# Patient Record
Sex: FEMALE | Race: BLACK OR AFRICAN AMERICAN | Employment: UNEMPLOYED | URBAN - METROPOLITAN AREA
[De-identification: names, ages, dates, MRNs, and addresses within clinical notes are randomized per-mention and may not be internally consistent; named-entity substitution may affect disease eponyms.]

---

## 2018-09-25 ENCOUNTER — HOSPITAL ENCOUNTER (EMERGENCY)
Age: 28
Discharge: HOME | End: 2018-09-25
Attending: EMERGENCY MEDICINE
Payer: COMMERCIAL

## 2018-09-25 VITALS
HEIGHT: 67 IN | OXYGEN SATURATION: 100 % | HEART RATE: 88 BPM | DIASTOLIC BLOOD PRESSURE: 98 MMHG | RESPIRATION RATE: 16 BRPM | WEIGHT: 180 LBS | BODY MASS INDEX: 28.25 KG/M2 | TEMPERATURE: 98 F | SYSTOLIC BLOOD PRESSURE: 145 MMHG

## 2018-09-25 DIAGNOSIS — N39.0 URINARY TRACT INFECTION WITHOUT HEMATURIA, SITE UNSPECIFIED: Primary | ICD-10-CM

## 2018-09-25 LAB
BACTERIA: ABNORMAL /HPF
BILIRUBIN URINE: NEGATIVE
BLOOD, URINE: ABNORMAL
CLARITY: ABNORMAL
COLOR: YELLOW
EPITHELIAL CELLS, UA: ABNORMAL /HPF
GLUCOSE URINE: NEGATIVE MG/DL
HCG(URINE) PREGNANCY TEST: NEGATIVE
KETONES, URINE: ABNORMAL MG/DL
LEUKOCYTE ESTERASE, URINE: ABNORMAL
NITRITE, URINE: NEGATIVE
PH UA: 6.5 (ref 5–9)
PROTEIN UA: 30 MG/DL
RBC UA: ABNORMAL /HPF (ref 0–2)
SPECIFIC GRAVITY UA: 1.02 (ref 1–1.03)
UROBILINOGEN, URINE: 1 E.U./DL
WBC UA: >20 /HPF (ref 0–5)

## 2018-09-25 PROCEDURE — 99283 EMERGENCY DEPT VISIT LOW MDM: CPT

## 2018-09-25 PROCEDURE — 81001 URINALYSIS AUTO W/SCOPE: CPT

## 2018-09-25 PROCEDURE — 81025 URINE PREGNANCY TEST: CPT

## 2018-09-25 PROCEDURE — G0382 LEV 3 HOSP TYPE B ED VISIT: HCPCS

## 2018-09-25 RX ORDER — NITROFURANTOIN 25; 75 MG/1; MG/1
100 CAPSULE ORAL 2 TIMES DAILY
Qty: 20 CAPSULE | Refills: 0 | Status: SHIPPED | OUTPATIENT
Start: 2018-09-25 | End: 2018-10-05

## 2018-09-25 RX ORDER — PHENAZOPYRIDINE HYDROCHLORIDE 100 MG/1
100 TABLET, FILM COATED ORAL 3 TIMES DAILY PRN
Qty: 6 TABLET | Refills: 0 | Status: SHIPPED | OUTPATIENT
Start: 2018-09-25 | End: 2018-09-28

## 2018-09-25 ASSESSMENT — ENCOUNTER SYMPTOMS
ABDOMINAL DISTENTION: 0
WHEEZING: 0
SINUS PRESSURE: 0
EYE PAIN: 0
NAUSEA: 0
SORE THROAT: 0
BACK PAIN: 0
DIARRHEA: 0
EYE REDNESS: 0
VOMITING: 0
EYE DISCHARGE: 0
SHORTNESS OF BREATH: 0
COUGH: 0

## 2018-09-26 ENCOUNTER — HOSPITAL ENCOUNTER (EMERGENCY)
Age: 28
Discharge: HOME | End: 2018-09-26
Payer: COMMERCIAL

## 2018-09-26 VITALS
DIASTOLIC BLOOD PRESSURE: 93 MMHG | RESPIRATION RATE: 16 BRPM | HEART RATE: 83 BPM | OXYGEN SATURATION: 100 % | SYSTOLIC BLOOD PRESSURE: 143 MMHG | WEIGHT: 180 LBS | BODY MASS INDEX: 28.19 KG/M2 | TEMPERATURE: 97.8 F

## 2018-09-26 DIAGNOSIS — B96.89 BACTERIAL VAGINOSIS: Primary | ICD-10-CM

## 2018-09-26 DIAGNOSIS — Z20.2 STD EXPOSURE: ICD-10-CM

## 2018-09-26 DIAGNOSIS — N76.0 BACTERIAL VAGINOSIS: Primary | ICD-10-CM

## 2018-09-26 LAB
BACTERIA: ABNORMAL /HPF
BILIRUBIN URINE: NEGATIVE
BLOOD, URINE: NEGATIVE
CHP ED QC CHECK: YES
CLARITY: CLEAR
CLUE CELLS: ABNORMAL
COLOR: YELLOW
EPITHELIAL CELLS, UA: ABNORMAL /HPF
GLUCOSE URINE: NEGATIVE MG/DL
KETONES, URINE: NEGATIVE MG/DL
LEUKOCYTE ESTERASE, URINE: ABNORMAL
NITRITE, URINE: NEGATIVE
PH UA: 6.5 (ref 5–9)
PREGNANCY TEST URINE, POC: NORMAL
PROTEIN UA: NEGATIVE MG/DL
RBC UA: ABNORMAL /HPF (ref 0–2)
SOURCE WET PREP: ABNORMAL
SPECIFIC GRAVITY UA: 1.01 (ref 1–1.03)
TRICHOMONAS PREP: ABNORMAL
UROBILINOGEN, URINE: 0.2 E.U./DL
WBC UA: ABNORMAL /HPF (ref 0–5)
YEAST WET PREP: ABNORMAL

## 2018-09-26 PROCEDURE — 96372 THER/PROPH/DIAG INJ SC/IM: CPT

## 2018-09-26 PROCEDURE — 87210 SMEAR WET MOUNT SALINE/INK: CPT

## 2018-09-26 PROCEDURE — 6370000000 HC RX 637 (ALT 250 FOR IP): Performed by: PHYSICIAN ASSISTANT

## 2018-09-26 PROCEDURE — 99283 EMERGENCY DEPT VISIT LOW MDM: CPT

## 2018-09-26 PROCEDURE — 87591 N.GONORRHOEAE DNA AMP PROB: CPT

## 2018-09-26 PROCEDURE — 6360000002 HC RX W HCPCS: Performed by: PHYSICIAN ASSISTANT

## 2018-09-26 PROCEDURE — 87491 CHLMYD TRACH DNA AMP PROBE: CPT

## 2018-09-26 PROCEDURE — 81001 URINALYSIS AUTO W/SCOPE: CPT

## 2018-09-26 RX ORDER — METRONIDAZOLE 7.5 MG/G
GEL VAGINAL
Qty: 70 G | Refills: 0 | Status: SHIPPED | OUTPATIENT
Start: 2018-09-26 | End: 2018-10-03

## 2018-09-26 RX ORDER — AZITHROMYCIN 250 MG/1
1000 TABLET, FILM COATED ORAL ONCE
Status: COMPLETED | OUTPATIENT
Start: 2018-09-26 | End: 2018-09-26

## 2018-09-26 RX ORDER — CEFTRIAXONE SODIUM 250 MG/1
250 INJECTION, POWDER, FOR SOLUTION INTRAMUSCULAR; INTRAVENOUS ONCE
Status: COMPLETED | OUTPATIENT
Start: 2018-09-26 | End: 2018-09-26

## 2018-09-26 RX ADMIN — CEFTRIAXONE 250 MG: 250 INJECTION, POWDER, FOR SOLUTION INTRAMUSCULAR; INTRAVENOUS at 17:32

## 2018-09-26 RX ADMIN — AZITHROMYCIN MONOHYDRATE 1000 MG: 250 TABLET ORAL at 17:29

## 2018-09-26 NOTE — ED PROVIDER NOTES
[facility]     Department of Emergency Medicine   ED  Provider Note  Admit Date/RoomTime: 9/26/2018  2:54 PM  ED Room: 19/19  Chief Complaint      Exposure to STD (pt went to urgent care yesterday and diagnosed with uti, pt states her partner called and told her he has a std today. )    History of Present Illness   Source of history provided by:  patient. History/Exam Limitations: none. Elkin Muse is a 32 y.o. old female who has a past medical Hx of:   Past Medical History:   Diagnosis Date    Abnormal Pap smear     Complication of anesthesia 2011    States hhas spinal curvature and had one-sided Epidural    Herpes simplex without mention of complication     presents to the emergency department by private vehicle, for STD exposure. Patient states that her boyfriend tested positive for currently on chlamydia. She states that she is here for STD testing. She denies any vaginal discharge. She states that she was having some dysuria for the past few days and did go to urgent care yesterday. She was diagnosed with a urinary tract infection insert on Macrobid. She has been taking them. She denies any fevers or chills. She denies any vomiting or diarrhea. No abdominal pain. ROS    Pertinent positives and negatives are stated within HPI, all other systems reviewed and are negative. History reviewed. No pertinent surgical history. Social History:  reports that she has been smoking Cigarettes. She has been smoking about 0.50 packs per day. She has never used smokeless tobacco. She reports that she drinks alcohol. She reports that she uses drugs, including Cocaine. Family History: family history includes Diabetes in her maternal grandfather; Hearing Loss in her sister; Heart Disease in her sister; Heart Surgery in her sister; Hypertension in her sister. Allergies: Patient has no known allergies.     Physical Exam           ED Triage Vitals [09/26/18 1449]   BP Temp Temp Source Pulse Resp SpO2 Height Weight   (!) 143/93 97.8 °F (36.6 °C) Oral 83 16 100 % -- 180 lb (81.6 kg)      Oxygen Saturation Interpretation: Normal.    General Appearance/Constitutional:  Alert, development consistent with age, NAD. HEENT:  NC/NT. PERRLA. Airway patent. Neck:  Supple. No lymphadenopathy. Respiratory:  Clear to auscultation and breath sounds equal.  CV:  Regular rate and rhythm. GI:  General Appearance: normal.       Bowel sounds: normal bowel sounds. Distension:  None. Tenderness: No abdominal tenderness, no rebound tenderness, no guarding. Liver: non-tender. Spleen:  non-tender. Pulsatile Mass: absent. Hernia:  no inguinal or femoral hernias noted. Back: CVA Tenderness: None b/l.  : (chaperone present during examination, jaqueline Weaver, present for exam). External Genitalia: General appearance; normal, Hair distribution; normal, Lesions absent. Urethral Meatus: Size normal, Location normal, Lesions absent, Prolapse absent. Vagina: discharge, minimal, white. Cervix: normal appearing cervix without discharge or lesions. No CMT. Uterus:  normal size, contour, position, consistency, mobility, non-tender. Adenexa: no tenderness bilaterally. Anus/Perineum:  normal.  Integument:  Normal turgor. Warm, dry, without visible rash, unless noted elsewhere. Lymphatics: No lymphangitis or adenopathy noted. Neurological:  Orientation age-appropriate. Motor functions intact.     Lab / Imaging Results   (All laboratory and radiology results have been personally reviewed by myself)  Labs:  Results for orders placed or performed during the hospital encounter of 09/26/18   Wet prep, genital   Result Value Ref Range    Trichomonas Prep None Seen     Yeast, Wet Prep None Seen     Clue Cells, Wet Prep 2+ (A)     Source Wet Prep VAGINAL    Urinalysis   Result Value Ref Range    Color, UA Yellow Straw/Yellow    Clarity, UA Clear Clear Glucose, Ur Negative Negative mg/dL    Bilirubin Urine Negative Negative    Ketones, Urine Negative Negative mg/dL    Specific Gravity, UA 1.015 1.005 - 1.030    Blood, Urine Negative Negative    pH, UA 6.5 5.0 - 9.0    Protein, UA Negative Negative mg/dL    Urobilinogen, Urine 0.2 <2.0 E.U./dL    Nitrite, Urine Negative Negative    Leukocyte Esterase, Urine SMALL (A) Negative   Microscopic Urinalysis   Result Value Ref Range    WBC, UA 10-20 (A) 0 - 5 /HPF    RBC, UA NONE 0 - 2 /HPF    Epi Cells FEW /HPF    Bacteria, UA FEW (A) /HPF   POC Pregnancy Urine Qual   Result Value Ref Range    Preg Test, Ur neg     QC OK? yes      Imaging: All Radiology results interpreted by Radiologist unless otherwise noted. No orders to display     ED Course / Medical Decision Making     Medications   cefTRIAXone (ROCEPHIN) injection 250 mg (not administered)   azithromycin (ZITHROMAX) tablet 1,000 mg (not administered)        Re-examination:  9/26/18       Time: 1700    Patients symptoms show no change. Updated on results. Consults:   None    Procedures:   none    MDM:   Patient will be treated for STDs at this time as well as bacterial vaginosis. Advised to follow-up with ObGyn and return to the ER for any worsening symptoms. Counseling: The emergency provider has spoken with the patient and discussed todays results, in addition to providing specific details for the plan of care and counseling regarding the diagnosis and prognosis. Questions are answered at this time and they are agreeable with the plan . Assessment      1. Bacterial vaginosis    2. STD exposure      Plan   Discharge to home  Patient condition is good    New Medications     New Prescriptions    METRONIDAZOLE (METROGEL VAGINAL) 0.75 % VAGINAL GEL    Place vaginally 2 times daily for 7 days. Electronically signed by CEM Sheffield   DD: 9/26/18  **This report was transcribed using voice recognition software.  Every effort was made to ensure accuracy; however, inadvertent computerized transcription errors may be present.   END OF ED PROVIDER NOTE       Cassia Ordoñez [place]  09/26/18 2315

## 2018-10-01 LAB
CHLAMYDIA TRACHOMATIS AMPLIFIED DET: ABNORMAL
ORGANISM: ABNORMAL

## 2019-07-20 ENCOUNTER — APPOINTMENT (OUTPATIENT)
Dept: ULTRASOUND IMAGING | Age: 29
End: 2019-07-20
Payer: COMMERCIAL

## 2019-07-20 ENCOUNTER — HOSPITAL ENCOUNTER (OUTPATIENT)
Age: 29
Discharge: HOME | End: 2019-07-20
Attending: OBSTETRICS & GYNECOLOGY | Admitting: OBSTETRICS & GYNECOLOGY
Payer: COMMERCIAL

## 2019-07-20 VITALS
TEMPERATURE: 97.9 F | HEIGHT: 67 IN | HEART RATE: 92 BPM | RESPIRATION RATE: 18 BRPM | BODY MASS INDEX: 30.45 KG/M2 | SYSTOLIC BLOOD PRESSURE: 123 MMHG | WEIGHT: 194 LBS | DIASTOLIC BLOOD PRESSURE: 76 MMHG

## 2019-07-20 PROBLEM — Z3A.34 34 WEEKS GESTATION OF PREGNANCY: Status: ACTIVE | Noted: 2019-07-20

## 2019-07-20 LAB
ALBUMIN SERPL-MCNC: 3.8 G/DL (ref 3.5–5.2)
ALP BLD-CCNC: 86 U/L (ref 35–104)
ALT SERPL-CCNC: 6 U/L (ref 0–32)
AMORPHOUS: ABNORMAL
AMPHETAMINE SCREEN, URINE: NOT DETECTED
ANION GAP SERPL CALCULATED.3IONS-SCNC: 11 MMOL/L (ref 7–16)
AST SERPL-CCNC: 13 U/L (ref 0–31)
BACTERIA: ABNORMAL /HPF
BARBITURATE SCREEN URINE: NOT DETECTED
BASOPHILS ABSOLUTE: 0.01 E9/L (ref 0–0.2)
BASOPHILS RELATIVE PERCENT: 0.1 % (ref 0–2)
BENZODIAZEPINE SCREEN, URINE: NOT DETECTED
BILIRUB SERPL-MCNC: 0.4 MG/DL (ref 0–1.2)
BILIRUBIN URINE: NEGATIVE
BLOOD, URINE: NEGATIVE
BUN BLDV-MCNC: 6 MG/DL (ref 6–20)
CALCIUM SERPL-MCNC: 8.9 MG/DL (ref 8.6–10.2)
CANNABINOID SCREEN URINE: NOT DETECTED
CHLORIDE BLD-SCNC: 104 MMOL/L (ref 98–107)
CLARITY: CLEAR
CO2: 23 MMOL/L (ref 22–29)
COCAINE METABOLITE SCREEN URINE: NOT DETECTED
COLOR: YELLOW
CREAT SERPL-MCNC: 0.6 MG/DL (ref 0.5–1)
EOSINOPHILS ABSOLUTE: 0.2 E9/L (ref 0.05–0.5)
EOSINOPHILS RELATIVE PERCENT: 2.3 % (ref 0–6)
EPITHELIAL CELLS, UA: ABNORMAL /HPF
GFR AFRICAN AMERICAN: >60
GFR NON-AFRICAN AMERICAN: >60 ML/MIN/1.73
GLUCOSE BLD-MCNC: 83 MG/DL (ref 74–99)
GLUCOSE URINE: 100 MG/DL
HCT VFR BLD CALC: 30.9 % (ref 34–48)
HEMOGLOBIN: 10.1 G/DL (ref 11.5–15.5)
IMMATURE GRANULOCYTES #: 0.04 E9/L
IMMATURE GRANULOCYTES %: 0.5 % (ref 0–5)
KETONES, URINE: NEGATIVE MG/DL
LEUKOCYTE ESTERASE, URINE: ABNORMAL
LYMPHOCYTES ABSOLUTE: 1.41 E9/L (ref 1.5–4)
LYMPHOCYTES RELATIVE PERCENT: 16.3 % (ref 20–42)
MCH RBC QN AUTO: 29.9 PG (ref 26–35)
MCHC RBC AUTO-ENTMCNC: 32.7 % (ref 32–34.5)
MCV RBC AUTO: 91.4 FL (ref 80–99.9)
METHADONE SCREEN, URINE: NOT DETECTED
MONOCYTES ABSOLUTE: 0.72 E9/L (ref 0.1–0.95)
MONOCYTES RELATIVE PERCENT: 8.3 % (ref 2–12)
NEUTROPHILS ABSOLUTE: 6.27 E9/L (ref 1.8–7.3)
NEUTROPHILS RELATIVE PERCENT: 72.5 % (ref 43–80)
NITRITE, URINE: NEGATIVE
OPIATE SCREEN URINE: NOT DETECTED
PDW BLD-RTO: 12.9 FL (ref 11.5–15)
PH UA: 7 (ref 5–9)
PHENCYCLIDINE SCREEN URINE: NOT DETECTED
PLATELET # BLD: 281 E9/L (ref 130–450)
PMV BLD AUTO: 10.2 FL (ref 7–12)
POTASSIUM SERPL-SCNC: 4.1 MMOL/L (ref 3.5–5)
PROPOXYPHENE SCREEN: NOT DETECTED
PROTEIN UA: NEGATIVE MG/DL
RBC # BLD: 3.38 E12/L (ref 3.5–5.5)
RBC UA: ABNORMAL /HPF (ref 0–2)
SODIUM BLD-SCNC: 138 MMOL/L (ref 132–146)
SPECIFIC GRAVITY UA: 1.01 (ref 1–1.03)
TOTAL PROTEIN: 6.7 G/DL (ref 6.4–8.3)
TRICHOMONAS: ABNORMAL /HPF
UROBILINOGEN, URINE: 0.2 E.U./DL
WBC # BLD: 8.7 E9/L (ref 4.5–11.5)
WBC UA: ABNORMAL /HPF (ref 0–5)

## 2019-07-20 PROCEDURE — 76805 OB US >/= 14 WKS SNGL FETUS: CPT

## 2019-07-20 PROCEDURE — 85025 COMPLETE CBC W/AUTO DIFF WBC: CPT

## 2019-07-20 PROCEDURE — 80053 COMPREHEN METABOLIC PANEL: CPT

## 2019-07-20 PROCEDURE — 81001 URINALYSIS AUTO W/SCOPE: CPT

## 2019-07-20 PROCEDURE — 76775 US EXAM ABDO BACK WALL LIM: CPT

## 2019-07-20 PROCEDURE — 36415 COLL VENOUS BLD VENIPUNCTURE: CPT

## 2019-07-20 PROCEDURE — 2580000003 HC RX 258: Performed by: OBSTETRICS & GYNECOLOGY

## 2019-07-20 PROCEDURE — 6360000002 HC RX W HCPCS: Performed by: OBSTETRICS & GYNECOLOGY

## 2019-07-20 PROCEDURE — 87088 URINE BACTERIA CULTURE: CPT

## 2019-07-20 PROCEDURE — 99201 HC NEW PT, OUTPT VISIT LEVEL 1: CPT

## 2019-07-20 PROCEDURE — 80307 DRUG TEST PRSMV CHEM ANLYZR: CPT

## 2019-07-20 RX ORDER — SODIUM CHLORIDE, SODIUM LACTATE, POTASSIUM CHLORIDE, CALCIUM CHLORIDE 600; 310; 30; 20 MG/100ML; MG/100ML; MG/100ML; MG/100ML
INJECTION, SOLUTION INTRAVENOUS CONTINUOUS
Status: DISCONTINUED | OUTPATIENT
Start: 2019-07-20 | End: 2019-07-21 | Stop reason: HOSPADM

## 2019-07-20 RX ORDER — TERBUTALINE SULFATE 1 MG/ML
0.25 INJECTION, SOLUTION SUBCUTANEOUS ONCE
Status: COMPLETED | OUTPATIENT
Start: 2019-07-20 | End: 2019-07-20

## 2019-07-20 RX ORDER — SODIUM CHLORIDE, SODIUM LACTATE, POTASSIUM CHLORIDE, AND CALCIUM CHLORIDE .6; .31; .03; .02 G/100ML; G/100ML; G/100ML; G/100ML
500 INJECTION, SOLUTION INTRAVENOUS ONCE
Status: COMPLETED | OUTPATIENT
Start: 2019-07-20 | End: 2019-07-20

## 2019-07-20 RX ORDER — CEFOXITIN SODIUM 1 G/50ML
1 INJECTION, SOLUTION INTRAVENOUS EVERY 6 HOURS SCHEDULED
Status: DISCONTINUED | OUTPATIENT
Start: 2019-07-20 | End: 2019-07-21 | Stop reason: HOSPADM

## 2019-07-20 RX ADMIN — CEFOXITIN SODIUM 1 G: 1 INJECTION, SOLUTION INTRAVENOUS at 19:49

## 2019-07-20 RX ADMIN — TERBUTALINE SULFATE 0.25 MG: 1 INJECTION, SOLUTION SUBCUTANEOUS at 19:49

## 2019-07-20 RX ADMIN — SODIUM CHLORIDE, POTASSIUM CHLORIDE, SODIUM LACTATE AND CALCIUM CHLORIDE 500 ML: 600; 310; 30; 20 INJECTION, SOLUTION INTRAVENOUS at 17:45

## 2019-07-20 ASSESSMENT — PAIN DESCRIPTION - DESCRIPTORS: DESCRIPTORS: CRAMPING;DISCOMFORT;PENETRATING

## 2019-07-21 ENCOUNTER — HOSPITAL ENCOUNTER (OUTPATIENT)
Age: 29
Discharge: HOME | End: 2019-07-21
Attending: OBSTETRICS & GYNECOLOGY | Admitting: OBSTETRICS & GYNECOLOGY
Payer: COMMERCIAL

## 2019-07-21 VITALS
SYSTOLIC BLOOD PRESSURE: 129 MMHG | RESPIRATION RATE: 18 BRPM | HEART RATE: 90 BPM | TEMPERATURE: 97.5 F | DIASTOLIC BLOOD PRESSURE: 81 MMHG

## 2019-07-21 PROCEDURE — 99211 OFF/OP EST MAY X REQ PHY/QHP: CPT

## 2019-07-21 NOTE — H&P
complication      Past Surgical History:    No past surgical history on file. Allergies:  Patient has no known allergies.   Social History:    Social History     Socioeconomic History    Marital status: Single     Spouse name: Not on file    Number of children: Not on file    Years of education: Not on file    Highest education level: Not on file   Occupational History    Not on file   Social Needs    Financial resource strain: Not on file    Food insecurity:     Worry: Not on file     Inability: Not on file    Transportation needs:     Medical: Not on file     Non-medical: Not on file   Tobacco Use    Smoking status: Current Every Day Smoker     Packs/day: 0.25     Years: 4.00     Pack years: 1.00     Types: Cigarettes    Smokeless tobacco: Never Used   Substance and Sexual Activity    Alcohol use: Not Currently     Comment: rare    Drug use: Yes     Types: Marijuana    Sexual activity: Yes   Lifestyle    Physical activity:     Days per week: Not on file     Minutes per session: Not on file    Stress: Not on file   Relationships    Social connections:     Talks on phone: Not on file     Gets together: Not on file     Attends Jewish service: Not on file     Active member of club or organization: Not on file     Attends meetings of clubs or organizations: Not on file     Relationship status: Not on file    Intimate partner violence:     Fear of current or ex partner: Not on file     Emotionally abused: Not on file     Physically abused: Not on file     Forced sexual activity: Not on file   Other Topics Concern    Not on file   Social History Narrative    Not on file     Family History:       Problem Relation Age of Onset    Hearing Loss Sister     Hypertension Sister     Heart Disease Sister     Heart Surgery Sister     Diabetes Maternal Grandfather      Medications Prior to Admission:  Medications Prior to Admission: Prenatal MV-Min-Fe Fum-FA-DHA (PRENATAL 1 PO), Take by mouth    REVIEW OF

## 2019-07-21 NOTE — PROGRESS NOTES
Discharged to home undelivered in stable condition with copy of instructions-verbalized understanding. Instructed to followup in office on Wed. 7-24-19 to see Edu Sheth and to call office to schedule appointment. S&S labor reviewed -verbalized understanding. Discharged to home undelivered in stable condition.

## 2019-07-21 NOTE — PROGRESS NOTES
Dr. Zuleyka Kruse called with patient update and lab and u/s results. Patient ok to be discharged to home.

## 2019-07-21 NOTE — PROGRESS NOTES
PO fluids provided. Resting quietly at this time. States contractions are more irregular now and feeling a little better.

## 2019-07-22 LAB — URINE CULTURE, ROUTINE: NORMAL

## 2019-08-18 ENCOUNTER — HOSPITAL ENCOUNTER (INPATIENT)
Age: 29
LOS: 2 days | Discharge: HOME | DRG: 560 | End: 2019-08-20
Attending: OBSTETRICS & GYNECOLOGY | Admitting: OBSTETRICS & GYNECOLOGY
Payer: COMMERCIAL

## 2019-08-18 PROBLEM — Z34.93 NORMAL PREGNANCY IN THIRD TRIMESTER: Status: ACTIVE | Noted: 2019-08-18

## 2019-08-18 LAB
ABO/RH: NORMAL
AMPHETAMINE SCREEN, URINE: NOT DETECTED
ANTIBODY SCREEN: NORMAL
BARBITURATE SCREEN URINE: NOT DETECTED
BASOPHILS ABSOLUTE: 0.01 E9/L (ref 0–0.2)
BASOPHILS RELATIVE PERCENT: 0.1 % (ref 0–2)
BENZODIAZEPINE SCREEN, URINE: NOT DETECTED
CANNABINOID SCREEN URINE: NOT DETECTED
COCAINE METABOLITE SCREEN URINE: NOT DETECTED
EOSINOPHILS ABSOLUTE: 0.16 E9/L (ref 0.05–0.5)
EOSINOPHILS RELATIVE PERCENT: 1.8 % (ref 0–6)
HCT VFR BLD CALC: 30.2 % (ref 34–48)
HEMOGLOBIN: 9.8 G/DL (ref 11.5–15.5)
IMMATURE GRANULOCYTES #: 0.06 E9/L
IMMATURE GRANULOCYTES %: 0.7 % (ref 0–5)
LYMPHOCYTES ABSOLUTE: 1.47 E9/L (ref 1.5–4)
LYMPHOCYTES RELATIVE PERCENT: 16.4 % (ref 20–42)
Lab: NORMAL
MCH RBC QN AUTO: 28.8 PG (ref 26–35)
MCHC RBC AUTO-ENTMCNC: 32.5 % (ref 32–34.5)
MCV RBC AUTO: 88.8 FL (ref 80–99.9)
METHADONE SCREEN, URINE: NOT DETECTED
MONOCYTES ABSOLUTE: 0.57 E9/L (ref 0.1–0.95)
MONOCYTES RELATIVE PERCENT: 6.4 % (ref 2–12)
NEUTROPHILS ABSOLUTE: 6.68 E9/L (ref 1.8–7.3)
NEUTROPHILS RELATIVE PERCENT: 74.6 % (ref 43–80)
OPIATE SCREEN URINE: NOT DETECTED
PDW BLD-RTO: 12.8 FL (ref 11.5–15)
PHENCYCLIDINE SCREEN URINE: NOT DETECTED
PLATELET # BLD: 305 E9/L (ref 130–450)
PMV BLD AUTO: 9.8 FL (ref 7–12)
PROPOXYPHENE SCREEN: NOT DETECTED
RBC # BLD: 3.4 E12/L (ref 3.5–5.5)
WBC # BLD: 9 E9/L (ref 4.5–11.5)

## 2019-08-18 PROCEDURE — 86850 RBC ANTIBODY SCREEN: CPT

## 2019-08-18 PROCEDURE — 36415 COLL VENOUS BLD VENIPUNCTURE: CPT

## 2019-08-18 PROCEDURE — 86900 BLOOD TYPING SEROLOGIC ABO: CPT

## 2019-08-18 PROCEDURE — 2580000003 HC RX 258: Performed by: OBSTETRICS & GYNECOLOGY

## 2019-08-18 PROCEDURE — 80307 DRUG TEST PRSMV CHEM ANLYZR: CPT

## 2019-08-18 PROCEDURE — 86901 BLOOD TYPING SEROLOGIC RH(D): CPT

## 2019-08-18 PROCEDURE — 85025 COMPLETE CBC W/AUTO DIFF WBC: CPT

## 2019-08-18 PROCEDURE — 1220000001 HC SEMI PRIVATE L&D R&B

## 2019-08-18 PROCEDURE — 7200000001 HC VAGINAL DELIVERY

## 2019-08-18 PROCEDURE — 6360000002 HC RX W HCPCS

## 2019-08-18 PROCEDURE — 59050 FETAL MONITOR W/REPORT: CPT

## 2019-08-18 PROCEDURE — 0HQ9XZZ REPAIR PERINEUM SKIN, EXTERNAL APPROACH: ICD-10-PCS | Performed by: OBSTETRICS & GYNECOLOGY

## 2019-08-18 PROCEDURE — 6370000000 HC RX 637 (ALT 250 FOR IP): Performed by: OBSTETRICS & GYNECOLOGY

## 2019-08-18 RX ORDER — DOCUSATE SODIUM 100 MG/1
100 CAPSULE, LIQUID FILLED ORAL 2 TIMES DAILY
Status: DISCONTINUED | OUTPATIENT
Start: 2019-08-18 | End: 2019-08-20 | Stop reason: HOSPADM

## 2019-08-18 RX ORDER — ONDANSETRON 2 MG/ML
4 INJECTION INTRAMUSCULAR; INTRAVENOUS EVERY 6 HOURS PRN
Status: DISCONTINUED | OUTPATIENT
Start: 2019-08-18 | End: 2019-08-20 | Stop reason: HOSPADM

## 2019-08-18 RX ORDER — ACETAMINOPHEN 325 MG/1
650 TABLET ORAL EVERY 4 HOURS PRN
Status: DISCONTINUED | OUTPATIENT
Start: 2019-08-18 | End: 2019-08-20 | Stop reason: HOSPADM

## 2019-08-18 RX ORDER — SODIUM CHLORIDE 0.9 % (FLUSH) 0.9 %
10 SYRINGE (ML) INJECTION PRN
Status: DISCONTINUED | OUTPATIENT
Start: 2019-08-18 | End: 2019-08-20 | Stop reason: HOSPADM

## 2019-08-18 RX ORDER — SODIUM CHLORIDE, SODIUM LACTATE, POTASSIUM CHLORIDE, CALCIUM CHLORIDE 600; 310; 30; 20 MG/100ML; MG/100ML; MG/100ML; MG/100ML
INJECTION, SOLUTION INTRAVENOUS CONTINUOUS
Status: DISCONTINUED | OUTPATIENT
Start: 2019-08-18 | End: 2019-08-20 | Stop reason: HOSPADM

## 2019-08-18 RX ORDER — NALBUPHINE HCL 10 MG/ML
10 AMPUL (ML) INJECTION
Status: DISCONTINUED | OUTPATIENT
Start: 2019-08-18 | End: 2019-08-20 | Stop reason: HOSPADM

## 2019-08-18 RX ORDER — OXYCODONE HYDROCHLORIDE 5 MG/1
10 TABLET ORAL EVERY 4 HOURS PRN
Status: DISCONTINUED | OUTPATIENT
Start: 2019-08-18 | End: 2019-08-19

## 2019-08-18 RX ORDER — OXYCODONE HYDROCHLORIDE 5 MG/1
5 TABLET ORAL EVERY 4 HOURS PRN
Status: DISCONTINUED | OUTPATIENT
Start: 2019-08-18 | End: 2019-08-19

## 2019-08-18 RX ORDER — LANOLIN 100 %
OINTMENT (GRAM) TOPICAL PRN
Status: DISCONTINUED | OUTPATIENT
Start: 2019-08-18 | End: 2019-08-20 | Stop reason: HOSPADM

## 2019-08-18 RX ORDER — SODIUM CHLORIDE 0.9 % (FLUSH) 0.9 %
10 SYRINGE (ML) INJECTION EVERY 12 HOURS SCHEDULED
Status: DISCONTINUED | OUTPATIENT
Start: 2019-08-18 | End: 2019-08-20 | Stop reason: HOSPADM

## 2019-08-18 RX ORDER — LIDOCAINE HYDROCHLORIDE 10 MG/ML
INJECTION, SOLUTION INFILTRATION; PERINEURAL
Status: DISPENSED
Start: 2019-08-18 | End: 2019-08-19

## 2019-08-18 RX ADMIN — BENZOCAINE AND LEVOMENTHOL: 200; 5 SPRAY TOPICAL at 23:37

## 2019-08-18 RX ADMIN — Medication 999 MILLI-UNITS/MIN: at 20:32

## 2019-08-18 RX ADMIN — SODIUM CHLORIDE, POTASSIUM CHLORIDE, SODIUM LACTATE AND CALCIUM CHLORIDE: 600; 310; 30; 20 INJECTION, SOLUTION INTRAVENOUS at 20:10

## 2019-08-18 RX ADMIN — DOCUSATE SODIUM 100 MG: 100 CAPSULE, LIQUID FILLED ORAL at 23:36

## 2019-08-18 RX ADMIN — OXYCODONE HYDROCHLORIDE 10 MG: 5 TABLET ORAL at 23:36

## 2019-08-18 ASSESSMENT — PAIN SCALES - GENERAL
PAINLEVEL_OUTOF10: 10
PAINLEVEL_OUTOF10: 8

## 2019-08-19 LAB
HCT VFR BLD CALC: 27.2 % (ref 34–48)
HEMOGLOBIN: 8.6 G/DL (ref 11.5–15.5)
MCH RBC QN AUTO: 29 PG (ref 26–35)
MCHC RBC AUTO-ENTMCNC: 31.6 % (ref 32–34.5)
MCV RBC AUTO: 91.6 FL (ref 80–99.9)
PDW BLD-RTO: 13 FL (ref 11.5–15)
PLATELET # BLD: 231 E9/L (ref 130–450)
PMV BLD AUTO: 9.8 FL (ref 7–12)
RBC # BLD: 2.97 E12/L (ref 3.5–5.5)
WBC # BLD: 10.9 E9/L (ref 4.5–11.5)

## 2019-08-19 PROCEDURE — 36415 COLL VENOUS BLD VENIPUNCTURE: CPT

## 2019-08-19 PROCEDURE — 6370000000 HC RX 637 (ALT 250 FOR IP): Performed by: OBSTETRICS & GYNECOLOGY

## 2019-08-19 PROCEDURE — 1220000001 HC SEMI PRIVATE L&D R&B

## 2019-08-19 PROCEDURE — 85027 COMPLETE CBC AUTOMATED: CPT

## 2019-08-19 PROCEDURE — 59050 FETAL MONITOR W/REPORT: CPT

## 2019-08-19 RX ORDER — IBUPROFEN 800 MG/1
800 TABLET ORAL EVERY 8 HOURS PRN
Status: DISCONTINUED | OUTPATIENT
Start: 2019-08-19 | End: 2019-08-20 | Stop reason: HOSPADM

## 2019-08-19 RX ORDER — OXYCODONE HYDROCHLORIDE AND ACETAMINOPHEN 5; 325 MG/1; MG/1
2 TABLET ORAL EVERY 4 HOURS PRN
Status: DISCONTINUED | OUTPATIENT
Start: 2019-08-19 | End: 2019-08-20 | Stop reason: HOSPADM

## 2019-08-19 RX ORDER — OXYCODONE HYDROCHLORIDE AND ACETAMINOPHEN 5; 325 MG/1; MG/1
1 TABLET ORAL EVERY 4 HOURS PRN
Status: DISCONTINUED | OUTPATIENT
Start: 2019-08-19 | End: 2019-08-20 | Stop reason: HOSPADM

## 2019-08-19 RX ADMIN — OXYCODONE HYDROCHLORIDE AND ACETAMINOPHEN 1 TABLET: 5; 325 TABLET ORAL at 10:14

## 2019-08-19 RX ADMIN — OXYCODONE HYDROCHLORIDE AND ACETAMINOPHEN 2 TABLET: 5; 325 TABLET ORAL at 22:27

## 2019-08-19 RX ADMIN — DOCUSATE SODIUM 100 MG: 100 CAPSULE, LIQUID FILLED ORAL at 20:45

## 2019-08-19 RX ADMIN — ACETAMINOPHEN 650 MG: 325 TABLET, FILM COATED ORAL at 02:49

## 2019-08-19 RX ADMIN — DOCUSATE SODIUM 100 MG: 100 CAPSULE, LIQUID FILLED ORAL at 08:21

## 2019-08-19 RX ADMIN — ACETAMINOPHEN 650 MG: 325 TABLET, FILM COATED ORAL at 18:22

## 2019-08-19 RX ADMIN — IBUPROFEN 800 MG: 800 TABLET, FILM COATED ORAL at 15:21

## 2019-08-19 RX ADMIN — ACETAMINOPHEN 650 MG: 325 TABLET, FILM COATED ORAL at 08:21

## 2019-08-19 ASSESSMENT — PAIN SCALES - GENERAL
PAINLEVEL_OUTOF10: 10
PAINLEVEL_OUTOF10: 1
PAINLEVEL_OUTOF10: 7
PAINLEVEL_OUTOF10: 7
PAINLEVEL_OUTOF10: 3
PAINLEVEL_OUTOF10: 3
PAINLEVEL_OUTOF10: 5
PAINLEVEL_OUTOF10: 6
PAINLEVEL_OUTOF10: 3
PAINLEVEL_OUTOF10: 8

## 2019-08-19 ASSESSMENT — PAIN DESCRIPTION - DESCRIPTORS
DESCRIPTORS: CRAMPING
DESCRIPTORS: CRAMPING

## 2019-08-20 VITALS
HEIGHT: 67 IN | HEART RATE: 74 BPM | WEIGHT: 190 LBS | RESPIRATION RATE: 16 BRPM | BODY MASS INDEX: 29.82 KG/M2 | SYSTOLIC BLOOD PRESSURE: 118 MMHG | DIASTOLIC BLOOD PRESSURE: 76 MMHG | TEMPERATURE: 98.2 F

## 2019-08-20 PROCEDURE — 7200000001 HC VAGINAL DELIVERY

## 2019-08-20 PROCEDURE — 6370000000 HC RX 637 (ALT 250 FOR IP): Performed by: OBSTETRICS & GYNECOLOGY

## 2019-08-20 RX ORDER — FERROUS SULFATE 325(65) MG
325 TABLET ORAL 2 TIMES DAILY
Qty: 60 TABLET | Refills: 1 | Status: SHIPPED | OUTPATIENT
Start: 2019-08-20 | End: 2020-07-22

## 2019-08-20 RX ORDER — IBUPROFEN 800 MG/1
800 TABLET ORAL EVERY 8 HOURS PRN
Qty: 15 TABLET | Refills: 3 | Status: SHIPPED | OUTPATIENT
Start: 2019-08-20 | End: 2020-07-22

## 2019-08-20 RX ORDER — OXYCODONE HYDROCHLORIDE AND ACETAMINOPHEN 5; 325 MG/1; MG/1
1 TABLET ORAL EVERY 8 HOURS PRN
Qty: 15 TABLET | Refills: 0 | Status: SHIPPED | OUTPATIENT
Start: 2019-08-20 | End: 2019-08-25

## 2019-08-20 RX ADMIN — IBUPROFEN 800 MG: 800 TABLET, FILM COATED ORAL at 02:58

## 2019-08-20 RX ADMIN — DOCUSATE SODIUM 100 MG: 100 CAPSULE, LIQUID FILLED ORAL at 07:48

## 2019-08-20 ASSESSMENT — PAIN SCALES - GENERAL: PAINLEVEL_OUTOF10: 8

## 2019-08-20 NOTE — PROGRESS NOTES
SW states she has not heard back from [facility] yet, they are supposed to return her call. Notified that pt is anxious to go because her ride is supposed to work at 1100.

## 2019-08-20 NOTE — PROGRESS NOTES
Postpartum day #2  Patient doing very well having average bleeding no pain in the abdomen and cramping patient is bottlefeeding baby doing very well  Vital signs stable and afebrile  Abdomen is soft the uterus well contracted nontender  Moderate lochia  H&H 8.6 x 27.2  Postpartum day #2 doing well  Discharged home with instructions  Ibuprofen and Percocet alternately for pain as needed  Iron sulfate twice a day and prenatal vitamin once a day  Return to office in 2 weeks for follow-up

## 2019-09-21 NOTE — H&P
Result Value Ref Range    WBC 9.0 4.5 - 11.5 E9/L    RBC 3.40 (L) 3.50 - 5.50 E12/L    Hemoglobin 9.8 (L) 11.5 - 15.5 g/dL    Hematocrit 30.2 (L) 34.0 - 48.0 %    MCV 88.8 80.0 - 99.9 fL    MCH 28.8 26.0 - 35.0 pg    MCHC 32.5 32.0 - 34.5 %    RDW 12.8 11.5 - 15.0 fL    Platelets 824 259 - 351 E9/L    MPV 9.8 7.0 - 12.0 fL    Neutrophils % 74.6 43.0 - 80.0 %    Immature Granulocytes % 0.7 0.0 - 5.0 %    Lymphocytes % 16.4 (L) 20.0 - 42.0 %    Monocytes % 6.4 2.0 - 12.0 %    Eosinophils % 1.8 0.0 - 6.0 %    Basophils % 0.1 0.0 - 2.0 %    Neutrophils Absolute 6.68 1.80 - 7.30 E9/L    Immature Granulocytes # 0.06 E9/L    Lymphocytes Absolute 1.47 (L) 1.50 - 4.00 E9/L    Monocytes Absolute 0.57 0.10 - 0.95 E9/L    Eosinophils Absolute 0.16 0.05 - 0.50 E9/L    Basophils Absolute 0.01 0.00 - 0.20 E9/L   Urine Drug Screen   Result Value Ref Range    Amphetamine Screen, Urine NOT DETECTED Negative <1000 ng/mL    Barbiturate Screen, Ur NOT DETECTED Negative < 200 ng/mL    Benzodiazepine Screen, Urine NOT DETECTED Negative < 200 ng/mL    Cannabinoid Scrn, Ur NOT DETECTED Negative < 50ng/mL    Cocaine Metabolite Screen, Urine NOT DETECTED Negative < 300 ng/mL    Opiate Scrn, Ur NOT DETECTED Negative < 300ng/mL    PCP Screen, Urine NOT DETECTED Negative < 25 ng/mL    Methadone Screen, Urine NOT DETECTED Negative <300 ng/mL    Propoxyphene Scrn, Ur NOT DETECTED Negative <300 ng/mL    Drug Screen Comment: see below    CBC   Result Value Ref Range    WBC 10.9 4.5 - 11.5 E9/L    RBC 2.97 (L) 3.50 - 5.50 E12/L    Hemoglobin 8.6 (L) 11.5 - 15.5 g/dL    Hematocrit 27.2 (L) 34.0 - 48.0 %    MCV 91.6 80.0 - 99.9 fL    MCH 29.0 26.0 - 35.0 pg    MCHC 31.6 (L) 32.0 - 34.5 %    RDW 13.0 11.5 - 15.0 fL    Platelets 296 085 - 805 E9/L    MPV 9.8 7.0 - 12.0 fL   TYPE AND SCREEN   Result Value Ref Range    ABO/Rh B POS     Antibody Screen NEG        ASSESSMENT AND PLAN:full term pregnancy, spontaneous active labor        Active Problems:

## 2020-07-22 ENCOUNTER — HOSPITAL ENCOUNTER (EMERGENCY)
Age: 30
Discharge: HOME | End: 2020-07-22
Attending: EMERGENCY MEDICINE
Payer: COMMERCIAL

## 2020-07-22 VITALS
OXYGEN SATURATION: 100 % | DIASTOLIC BLOOD PRESSURE: 119 MMHG | HEIGHT: 67 IN | SYSTOLIC BLOOD PRESSURE: 165 MMHG | HEART RATE: 108 BPM | BODY MASS INDEX: 28.25 KG/M2 | WEIGHT: 180 LBS | RESPIRATION RATE: 18 BRPM | TEMPERATURE: 97.5 F

## 2020-07-22 LAB
BILIRUBIN URINE: NEGATIVE
BLOOD, URINE: NEGATIVE
CLARITY: CLEAR
CLUE CELLS: NORMAL
COLOR: YELLOW
GLUCOSE URINE: NEGATIVE MG/DL
HCG, URINE, POC: POSITIVE
HCG, URINE, POC: POSITIVE
KETONES, URINE: NEGATIVE MG/DL
LEUKOCYTE ESTERASE, URINE: NEGATIVE
Lab: NORMAL
Lab: NORMAL
NEGATIVE QC PASS/FAIL: NORMAL
NEGATIVE QC PASS/FAIL: NORMAL
NITRITE, URINE: NEGATIVE
PH UA: 7.5 (ref 5–9)
POSITIVE QC PASS/FAIL: NORMAL
POSITIVE QC PASS/FAIL: NORMAL
PROTEIN UA: NEGATIVE MG/DL
SOURCE WET PREP: NORMAL
SPECIFIC GRAVITY UA: 1.02 (ref 1–1.03)
TRICHOMONAS PREP: NORMAL
UROBILINOGEN, URINE: 0.2 E.U./DL
YEAST WET PREP: NORMAL

## 2020-07-22 PROCEDURE — 6360000002 HC RX W HCPCS: Performed by: EMERGENCY MEDICINE

## 2020-07-22 PROCEDURE — 6370000000 HC RX 637 (ALT 250 FOR IP): Performed by: EMERGENCY MEDICINE

## 2020-07-22 PROCEDURE — 96372 THER/PROPH/DIAG INJ SC/IM: CPT

## 2020-07-22 PROCEDURE — 87591 N.GONORRHOEAE DNA AMP PROB: CPT

## 2020-07-22 PROCEDURE — 87491 CHLMYD TRACH DNA AMP PROBE: CPT

## 2020-07-22 PROCEDURE — 99283 EMERGENCY DEPT VISIT LOW MDM: CPT

## 2020-07-22 PROCEDURE — 87210 SMEAR WET MOUNT SALINE/INK: CPT

## 2020-07-22 PROCEDURE — 81003 URINALYSIS AUTO W/O SCOPE: CPT

## 2020-07-22 RX ORDER — AZITHROMYCIN 250 MG/1
1000 TABLET, FILM COATED ORAL ONCE
Status: COMPLETED | OUTPATIENT
Start: 2020-07-22 | End: 2020-07-22

## 2020-07-22 RX ORDER — CEFTRIAXONE SODIUM 250 MG/1
250 INJECTION, POWDER, FOR SOLUTION INTRAMUSCULAR; INTRAVENOUS ONCE
Status: COMPLETED | OUTPATIENT
Start: 2020-07-22 | End: 2020-07-22

## 2020-07-22 RX ADMIN — AZITHROMYCIN MONOHYDRATE 1000 MG: 250 TABLET ORAL at 03:06

## 2020-07-22 RX ADMIN — CEFTRIAXONE SODIUM 250 MG: 250 INJECTION, POWDER, FOR SOLUTION INTRAMUSCULAR; INTRAVENOUS at 03:07

## 2020-07-22 ASSESSMENT — ENCOUNTER SYMPTOMS
EYE DISCHARGE: 0
ABDOMINAL DISTENTION: 0
NAUSEA: 0
WHEEZING: 0
COUGH: 0
EYE PAIN: 0
BACK PAIN: 0
SHORTNESS OF BREATH: 0
DIARRHEA: 0
EYE REDNESS: 0
SORE THROAT: 0
SINUS PRESSURE: 0
VOMITING: 0

## 2020-07-22 NOTE — ED PROVIDER NOTES
Patient is a 31 y/o female who presents to the ED for an STD check. Patient states that her boyfriend told her that it burns when he urinates. She denies any symptoms. She is currently pregnant. She denies any dysuria, abdominal pain, vaginal discharge or vaginal bleeding. Review of Systems   Constitutional: Negative for chills and fever. HENT: Negative for ear pain, sinus pressure and sore throat. Eyes: Negative for pain, discharge and redness. Respiratory: Negative for cough, shortness of breath and wheezing. Cardiovascular: Negative for chest pain. Gastrointestinal: Negative for abdominal distention, diarrhea, nausea and vomiting. Genitourinary: Negative for dysuria and frequency. Musculoskeletal: Negative for arthralgias and back pain. Skin: Negative for rash and wound. Neurological: Negative for weakness and headaches. Hematological: Negative for adenopathy. All other systems reviewed and are negative. Physical Exam  Vitals signs and nursing note reviewed. Constitutional:       General: She is not in acute distress. Appearance: Normal appearance. HENT:      Head: Normocephalic and atraumatic. Right Ear: External ear normal.      Left Ear: External ear normal.      Nose: Nose normal.      Mouth/Throat:      Mouth: Mucous membranes are moist.   Eyes:      Conjunctiva/sclera: Conjunctivae normal.      Pupils: Pupils are equal, round, and reactive to light. Neck:      Musculoskeletal: Normal range of motion and neck supple. Cardiovascular:      Rate and Rhythm: Normal rate and regular rhythm. Heart sounds: No murmur. Pulmonary:      Effort: Pulmonary effort is normal. No respiratory distress. Breath sounds: Normal breath sounds. No stridor. No wheezing, rhonchi or rales. Abdominal:      General: Bowel sounds are normal. There is no distension. Palpations: Abdomen is soft. Tenderness: There is no abdominal tenderness.  There is no guarding. Musculoskeletal: Normal range of motion. General: No swelling. Skin:     General: Skin is warm and dry. Findings: No rash. Neurological:      Mental Status: She is alert and oriented to person, place, and time. Procedures     [facility]                --------------------------------------------- PAST HISTORY ---------------------------------------------  Past Medical History:  has a past medical history of Abnormal Pap smear, Complication of anesthesia, and Herpes simplex without mention of complication. Past Surgical History:  has no past surgical history on file. Social History:  reports that she has been smoking cigarettes. She has a 1.00 pack-year smoking history. She has never used smokeless tobacco. She reports previous alcohol use. She reports previous drug use. Drugs: Marijuana and Cocaine. Family History: family history includes Diabetes in her maternal grandfather; Hearing Loss in her sister; Heart Disease in her sister; Heart Surgery in her sister; Hypertension in her sister. The patients home medications have been reviewed. Allergies: Patient has no known allergies.     -------------------------------------------------- RESULTS -------------------------------------------------  Labs:  Results for orders placed or performed during the hospital encounter of 07/22/20   C.trachomatis N.gonorrhoeae DNA    Specimen: Cervix   Result Value Ref Range    Source Vagina    Wet prep, genital    Specimen: Vaginal   Result Value Ref Range    Trichomonas Prep None Seen     Yeast, Wet Prep None Seen     Clue Cells, Wet Prep None Seen     Source Wet Prep VAGINAL    Urinalysis   Result Value Ref Range    Color, UA Yellow Straw/Yellow    Clarity, UA Clear Clear    Glucose, Ur Negative Negative mg/dL    Bilirubin Urine Negative Negative    Ketones, Urine Negative Negative mg/dL    Specific Gravity, UA 1.025 1.005 - 1.030    Blood, Urine Negative Negative    pH, UA 7.5 5.0 - 9.0 Protein, UA Negative Negative mg/dL    Urobilinogen, Urine 0.2 <2.0 E.U./dL    Nitrite, Urine Negative Negative    Leukocyte Esterase, Urine Negative Negative   POC Pregnancy Urine Qual   Result Value Ref Range    HCG, Urine, POC Positive Negative    Lot Number [id number]     Positive QC Pass/Fail Pass     Negative QC Pass/Fail Pass    POC Pregnancy Urine Qual   Result Value Ref Range    HCG, Urine, POC Positive Negative    Lot Number [id number]     Positive QC Pass/Fail Pass     Negative QC Pass/Fail Pass        Radiology:  No orders to display       ------------------------- NURSING NOTES AND VITALS REVIEWED ---------------------------  Date / Time Roomed:  7/22/2020  2:02 AM  ED Bed Assignment:  07/07    The nursing notes within the ED encounter and vital signs as below have been reviewed. BP (!) 165/119   Pulse 108   Temp 97.5 °F (36.4 °C) (Temporal)   Resp 18   Ht 5' 7\" (1.702 m)   Wt 180 lb (81.6 kg)   LMP 06/10/2020 (Approximate)   SpO2 100%   BMI 28.19 kg/m²   Oxygen Saturation Interpretation: Normal      ------------------------------------------ PROGRESS NOTES ------------------------------------------  I have spoken with the patient and discussed todays results, in addition to providing specific details for the plan of care and counseling regarding the diagnosis and prognosis. Their questions are answered at this time and they are agreeable with the plan. I discussed at length with them reasons for immediate return here for re evaluation. They will followup with primary care by calling their office tomorrow. --------------------------------- ADDITIONAL PROVIDER NOTES ---------------------------------  At this time the patient is without objective evidence of an acute process requiring hospitalization or inpatient management. They have remained hemodynamically stable throughout their entire ED visit and are stable for discharge with outpatient follow-up.      The plan has been discussed in detail and they are aware of the specific conditions for emergent return, as well as the importance of follow-up. New Prescriptions    No medications on file       Diagnosis:  1. Potential exposure to STD    2. Pregnancy, unspecified gestational age        Disposition:  Patient's disposition: Discharge to home  Patient's condition is stable.              Aure Fischer DO  07/22/20 3502

## 2020-07-24 LAB
C TRACH DNA GENITAL QL NAA+PROBE: NEGATIVE
N. GONORRHOEAE DNA: ABNORMAL
SOURCE: ABNORMAL

## 2020-08-18 ENCOUNTER — APPOINTMENT (OUTPATIENT)
Dept: GENERAL RADIOLOGY | Age: 30
End: 2020-08-18
Payer: COMMERCIAL

## 2020-08-18 ENCOUNTER — HOSPITAL ENCOUNTER (EMERGENCY)
Age: 30
Discharge: HOME | End: 2020-08-18
Attending: EMERGENCY MEDICINE
Payer: COMMERCIAL

## 2020-08-18 VITALS
HEART RATE: 100 BPM | BODY MASS INDEX: 28.25 KG/M2 | OXYGEN SATURATION: 99 % | RESPIRATION RATE: 16 BRPM | DIASTOLIC BLOOD PRESSURE: 83 MMHG | TEMPERATURE: 98.2 F | WEIGHT: 180 LBS | SYSTOLIC BLOOD PRESSURE: 127 MMHG | HEIGHT: 67 IN

## 2020-08-18 PROCEDURE — 73630 X-RAY EXAM OF FOOT: CPT

## 2020-08-18 PROCEDURE — G0382 LEV 3 HOSP TYPE B ED VISIT: HCPCS

## 2020-08-18 ASSESSMENT — PAIN SCALES - GENERAL: PAINLEVEL_OUTOF10: 9

## 2020-08-18 ASSESSMENT — PAIN DESCRIPTION - ORIENTATION: ORIENTATION: RIGHT

## 2020-08-18 ASSESSMENT — ENCOUNTER SYMPTOMS
RESPIRATORY NEGATIVE: 1
ALLERGIC/IMMUNOLOGIC NEGATIVE: 1
EYES NEGATIVE: 1
GASTROINTESTINAL NEGATIVE: 1

## 2020-08-18 ASSESSMENT — PAIN DESCRIPTION - PAIN TYPE: TYPE: ACUTE PAIN

## 2020-08-18 ASSESSMENT — PAIN DESCRIPTION - DESCRIPTORS: DESCRIPTORS: THROBBING

## 2020-08-18 ASSESSMENT — PAIN DESCRIPTION - LOCATION: LOCATION: FOOT

## 2020-08-18 NOTE — ED PROVIDER NOTES
9year old landed on the insole of her foot with all their weight;  Pain on wt bearing, swollen           Review of Systems   Constitutional: Positive for activity change. HENT: Negative. Eyes: Negative. Respiratory: Negative. Cardiovascular: Negative. Gastrointestinal: Negative. Genitourinary: Negative. Musculoskeletal: Positive for arthralgias, gait problem, joint swelling and myalgias. Skin: Negative. Allergic/Immunologic: Negative. Hematological: Negative. Psychiatric/Behavioral: Negative. All other systems reviewed and are negative.     --------------------------------------------- PAST HISTORY ---------------------------------------------  Past Medical History:  has a past medical history of Abnormal Pap smear, Complication of anesthesia, and Herpes simplex without mention of complication. Past Surgical History:  has no past surgical history on file. Social History:  reports that she has been smoking cigarettes. She has a 1.00 pack-year smoking history. She has never used smokeless tobacco. She reports previous alcohol use. She reports previous drug use. Drugs: Marijuana and Cocaine. Family History: family history includes Diabetes in her maternal grandfather; Hearing Loss in her sister; Heart Disease in her sister; Heart Surgery in her sister; Hypertension in her sister. The patients home medications have been reviewed. Allergies: Patient has no known allergies. -------------------------------------------------- RESULTS -------------------------------------------------  No results found for this visit on 08/18/20. XR FOOT RIGHT (MIN 3 VIEWS)   Final Result   No fracture or dislocation. If symptoms persist in 10-14 days, consider   follow-up imaging.             ------------------------- NURSING NOTES AND VITALS REVIEWED ---------------------------   The nursing notes within the ED encounter and vital signs as below have been reviewed.    /83 baseline. Psychiatric:         Mood and Affect: Mood normal.      Xr Foot Right (min 3 Views)    Result Date: 8/18/2020  EXAMINATION: THREE XRAY VIEWS OF THE RIGHT FOOT 8/18/2020 5:34 pm COMPARISON: None. HISTORY: ORDERING SYSTEM PROVIDED HISTORY: medial instep pain, 2 y/o jumped on this area TECHNOLOGIST PROVIDED HISTORY: Reason for exam:->medial instep pain, 8 y/o jumped on this area FINDINGS: No fracture, dislocation, or focal osseous lesion is noted. Mild medial soft tissue swelling. No fracture or dislocation. If symptoms persist in 10-14 days, consider follow-up imaging.      Procedures     ABBY Medina DO  08/18/20 7222

## 2020-11-24 ENCOUNTER — OFFICE VISIT (OUTPATIENT)
Dept: PRIMARY CARE CLINIC | Age: 30
End: 2020-11-24
Payer: COMMERCIAL

## 2020-11-24 VITALS
RESPIRATION RATE: 20 BRPM | HEIGHT: 68 IN | HEART RATE: 108 BPM | BODY MASS INDEX: 28.79 KG/M2 | WEIGHT: 190 LBS | OXYGEN SATURATION: 97 % | SYSTOLIC BLOOD PRESSURE: 130 MMHG | DIASTOLIC BLOOD PRESSURE: 80 MMHG | TEMPERATURE: 99.4 F

## 2020-11-24 LAB
Lab: NORMAL
QC PASS/FAIL: NORMAL
SARS-COV-2, POC: NORMAL

## 2020-11-24 PROCEDURE — 99214 OFFICE O/P EST MOD 30 MIN: CPT | Performed by: NURSE PRACTITIONER

## 2020-11-24 PROCEDURE — 87426 SARSCOV CORONAVIRUS AG IA: CPT | Performed by: NURSE PRACTITIONER

## 2020-11-24 RX ORDER — ASCORBIC ACID 500 MG
500 TABLET ORAL 2 TIMES DAILY
Qty: 14 TABLET | Refills: 0 | Status: ON HOLD
Start: 2020-11-24 | End: 2021-03-10

## 2020-11-24 RX ORDER — ZINC SULFATE 50(220)MG
50 CAPSULE ORAL DAILY
Qty: 7 CAPSULE | Refills: 0 | Status: ON HOLD
Start: 2020-11-24 | End: 2021-01-17 | Stop reason: HOSPADM

## 2020-11-24 RX ORDER — AZITHROMYCIN 250 MG/1
250 TABLET, FILM COATED ORAL DAILY
Qty: 6 TABLET | Refills: 0 | Status: ON HOLD
Start: 2020-11-24 | End: 2021-03-10

## 2020-11-24 NOTE — PROGRESS NOTES
History   Problem Relation Age of Onset   Ardyth Moritz Hearing Loss Sister     Hypertension Sister     Heart Disease Sister     Heart Surgery Sister     Diabetes Maternal Grandfather        Medications:     Current Outpatient Medications:     dextromethorphan-guaiFENesin (MUCINEX DM)  MG per extended release tablet, 1-2 tablets every 12 hours as needed for cough or congestion, Disp: 28 tablet, Rfl: 0    vitamin C (ASCORBIC ACID) 500 MG tablet, Take 1 tablet by mouth 2 times daily for 7 days, Disp: 14 tablet, Rfl: 0    zinc sulfate (ZINC-220) 220 (50 Zn) MG capsule, Take 1 capsule by mouth daily for 7 days, Disp: 7 capsule, Rfl: 0    azithromycin (ZITHROMAX Z-CORTNEY) 250 MG tablet, Take 1 tablet by mouth daily Take 2 tabs on day one, then 1 tab daily for the next 4 days, Disp: 6 tablet, Rfl: 0    Allergies:   No Known Allergies    Social History:     Social History     Tobacco Use    Smoking status: Current Every Day Smoker     Packs/day: 0.25     Years: 4.00     Pack years: 1.00     Types: Cigarettes    Smokeless tobacco: Never Used   Substance Use Topics    Alcohol use: Not Currently     Comment: rare    Drug use: Not Currently     Types: Marijuana, Cocaine     Comment: several positives during this pregnancy       Patient lives at home. Physical Exam:     Vitals:    11/24/20 1050 11/24/20 1102   BP: 130/80    Site: Right Upper Arm    Position: Sitting    Cuff Size: Medium Adult    Pulse: 113 108   Resp: 18 20   Temp: 99.4 °F (37.4 °C)    SpO2: 94% 97%   Weight: 190 lb (86.2 kg)    Height: 5' 7.5\" (1.715 m)        Physical Exam (PE)    Physical Exam  Constitutional:       Appearance: Normal appearance. HENT:      Head: Normocephalic. Comments: Positive bilateral frontal and maxillary sinus tenderness with palpation. Right Ear: Tympanic membrane, ear canal and external ear normal.      Left Ear: Tympanic membrane, ear canal and external ear normal.      Nose: Congestion and rhinorrhea present. Mouth/Throat:      Mouth: Mucous membranes are moist.      Pharynx: Oropharynx is clear. Posterior oropharyngeal erythema present. No oropharyngeal exudate. Eyes:      Pupils: Pupils are equal, round, and reactive to light. Neck:      Musculoskeletal: Normal range of motion and neck supple. Cardiovascular:      Rate and Rhythm: Regular rhythm. Tachycardia present. Pulses: Normal pulses. Heart sounds: Normal heart sounds. Pulmonary:      Effort: Pulmonary effort is normal. No respiratory distress. Breath sounds: Normal breath sounds. No wheezing, rhonchi or rales. Abdominal:      General: Bowel sounds are normal.      Palpations: Abdomen is soft. Tenderness: There is no abdominal tenderness. There is no guarding or rebound. Musculoskeletal: Normal range of motion. Lymphadenopathy:      Cervical: No cervical adenopathy. Skin:     General: Skin is warm and dry. Capillary Refill: Capillary refill takes less than 2 seconds. Neurological:      General: No focal deficit present. Mental Status: She is alert and oriented to person, place, and time. Psychiatric:         Mood and Affect: Mood normal.         Behavior: Behavior normal.          Testing:   (All laboratory and radiology results have been personally reviewed by myself)  Labs:  Results for orders placed or performed in visit on 11/24/20   POCT COVID-19, Antigen   Result Value Ref Range    SARS-COV-2, POC Not-Detected Not Detected    Lot Number [id number]     QC Pass/Fail pass        Imaging: All Radiology results interpreted by Radiologist unless otherwise noted. No orders to display       Assessment / Plan:   The patient's vitals, allergies, medications, and past medical history have been reviewed. Eliane was seen today for shortness of breath, cough, nausea & vomiting and other.     Diagnoses and all orders for this visit:    Upper respiratory tract infection, unspecified type  -     dextromethorphan-guaiFENesin (MUCINEX DM)  MG per extended release tablet; 1-2 tablets every 12 hours as needed for cough or congestion  -     azithromycin (ZITHROMAX Z-CORTNEY) 250 MG tablet; Take 1 tablet by mouth daily Take 2 tabs on day one, then 1 tab daily for the next 4 days    Exposure to COVID-19 virus  -     POCT COVID-19, Antigen  -     vitamin C (ASCORBIC ACID) 500 MG tablet; Take 1 tablet by mouth 2 times daily for 7 days  -     zinc sulfate (ZINC-220) 220 (50 Zn) MG capsule; Take 1 capsule by mouth daily for 7 days  -     COVID-19 Ambulatory    23 weeks gestation of pregnancy        - Patient is directed to take the prescribed medication as ordered. Discussed the benefits of daily vitamin C 1 g and zinc 50 mg for immune support. Patient is advised to call her OB/GYN today and notify them of her symptoms and treatment plan. Patient is advised to stop smoking.    - COVID-19 swab obtained and pending, will call with results once available. Advised cautionary self-quarantine at home in the interim per CDC guidelines. Increase fluids and rest. Symptomatic relief discussed including Tylenol prn pain/fever. Schedule f/u with PCP in 2-3 days. Red flag symptoms were discussed with the patient today. The patient is directed to go the ED if symptoms worsen or change. Pt verbalizes understanding and is in agreement with plan of care. All questions answered.     SIGNATURE: MATILDE Bennett-CNP

## 2020-11-24 NOTE — PATIENT INSTRUCTIONS
Patient Education        Learning About Coronavirus [phone])  Coronavirus [phone]): Overview  What is coronavirus ([id number])? The coronavirus disease (COVID-19) is caused by a virus. It is an illness that was first found in December 2019. It has since spread worldwide. The virus can cause fever, cough, and trouble breathing. In severe cases, it can cause pneumonia and make it hard to breathe without help. It can cause death. This virus spreads person-to-person through droplets from coughing and sneezing. It can also spread when you are close to someone who is infected. And it can spread when you touch something that has the virus on it, such as a doorknob or a tabletop. Coronaviruses are a large group of viruses. They cause the common cold. They also cause more serious illnesses like [place] respiratory syndrome (MERS) and severe acute respiratory syndrome (SARS). COVID-19 is caused by a novel coronavirus. That means it's a new type that has not been seen in people before. How is COVID-19 treated? Mild illness can be treated at home, but more serious illness needs to be treated in the hospital. Treatment may include medicines to reduce symptoms, plus breathing support such as oxygen therapy or a ventilator. Other treatments, such as antiviral medicines, may help people who have COVID-19. What can you do to protect yourself from COVID-19? The best way to protect yourself from getting sick is to:  · Avoid areas where there is an outbreak. · Avoid contact with people who may be infected. · Avoid crowds and try to stay at least 6 feet away from other people. · Wash your hands often, especially after you cough or sneeze. Use soap and water, and scrub for at least 20 seconds. If soap and water aren't available, use an alcohol-based hand . · Avoid touching your mouth, nose, and eyes. What can you do to avoid spreading the virus to others?   To help avoid spreading the virus to others:  · [employer] your hands often with soap or alcohol-based hand sanitizers. · Cover your mouth with a tissue when you cough or sneeze. Then throw the tissue in the trash. · Use a disinfectant to clean things that you touch often. These include doorknobs, remote controls, phones, and handles on your refrigerator and microwave. And don't forget countertops, tabletops, bathrooms, and computer keyboards. · Wear a cloth face cover if you have to go to public areas. If you know or suspect that you have COVID-19:  · Stay home. Don't go to school, work, or public areas. And don't use public transportation, ride-shares, or taxis unless you have no choice. · Leave your home only if you need to get medical care or testing. But call the doctor's office first so they know you're coming. And wear a face cover. · Limit contact with people in your home. If possible, stay in a separate bedroom and use a separate bathroom. · Wear a face cover whenever you're around other people. It can help stop the spread of the virus when you cough or sneeze. · Clean and disinfect your home every day. Use household  and disinfectant wipes or sprays. Take special care to clean things that you grab with your hands. · Self-isolate until it's safe to be around others again. ? If you have symptoms, it's safe when you haven't had a fever for 3 days and your symptoms have improved and it's been at least 10 days since your symptoms started. ? If you were exposed to the virus but don't have symptoms, it's safe to be around others 14 days after exposure. ? Talk to your doctor about whether you also need testing, especially if you have a weakened immune system. When to call for help  Call 911 anytime you think you may need emergency care. For example, call if:  · You have severe trouble breathing. (You can't talk at all.)  · You have constant chest pain or pressure. · You are severely dizzy or lightheaded.   · You are confused or can't think clearly. · Your face and lips have a blue color. · You passed out (lost consciousness) or are very hard to wake up. Call your doctor now if you develop symptoms such as:  · Shortness of breath. · Fever. · Cough. If you need to get care, call ahead to the doctor's office for instructions before you go. Make sure you wear a face cover to prevent exposing other people to the virus. Where can you get the latest information? The following health organizations are tracking and studying this virus. Their websites contain the most up-to-date information. Ludmila Ma also learn what to do if you think you may have been exposed to the virus. · U.S. Centers for Disease Control and Prevention (CDC): The CDC provides updated news about the disease and travel advice. The website also tells you how to prevent the spread of infection. www.cdc.gov  · World Health Organization [facility]): WHO offers information about the virus outbreaks. WHO also has travel advice. www.who.int  Current as of: July 10, 2020               Content Version: 12.6  © 2006-2020 [employer]. Care instructions adapted under license by [facility] ([facility]). If you have questions about a medical condition or this instruction, always ask your healthcare professional. [address] any warranty or liability for your use of this information.

## 2020-11-26 LAB
SARS-COV-2: DETECTED
SOURCE: ABNORMAL

## 2021-01-16 ENCOUNTER — HOSPITAL ENCOUNTER (OUTPATIENT)
Age: 31
Setting detail: OBSERVATION
Discharge: HOME | End: 2021-01-17
Attending: OBSTETRICS & GYNECOLOGY | Admitting: OBSTETRICS & GYNECOLOGY
Payer: COMMERCIAL

## 2021-01-16 PROBLEM — Z34.92 NORMAL PREGNANCY IN SECOND TRIMESTER: Status: ACTIVE | Noted: 2021-01-16

## 2021-01-16 LAB
ALBUMIN SERPL-MCNC: 3.4 G/DL (ref 3.5–5.2)
ALP BLD-CCNC: 137 U/L (ref 35–104)
ALT SERPL-CCNC: 5 U/L (ref 0–32)
AMPHETAMINE SCREEN, URINE: NOT DETECTED
ANION GAP SERPL CALCULATED.3IONS-SCNC: 8 MMOL/L (ref 7–16)
AST SERPL-CCNC: 14 U/L (ref 0–31)
BACTERIA: ABNORMAL /HPF
BARBITURATE SCREEN URINE: NOT DETECTED
BENZODIAZEPINE SCREEN, URINE: NOT DETECTED
BILIRUB SERPL-MCNC: 0.2 MG/DL (ref 0–1.2)
BILIRUBIN URINE: NEGATIVE
BLOOD, URINE: NEGATIVE
BUN BLDV-MCNC: 4 MG/DL (ref 6–20)
CALCIUM SERPL-MCNC: 8.8 MG/DL (ref 8.6–10.2)
CANNABINOID SCREEN URINE: NOT DETECTED
CHLORIDE BLD-SCNC: 104 MMOL/L (ref 98–107)
CLARITY: CLEAR
CO2: 24 MMOL/L (ref 22–29)
COCAINE METABOLITE SCREEN URINE: NOT DETECTED
COLOR: YELLOW
CREAT SERPL-MCNC: 0.7 MG/DL (ref 0.5–1)
EPITHELIAL CELLS, UA: ABNORMAL /HPF
FENTANYL SCREEN, URINE: NOT DETECTED
FETAL FIBRONECTIN: NEGATIVE
GFR AFRICAN AMERICAN: >60
GFR NON-AFRICAN AMERICAN: >60 ML/MIN/1.73
GLUCOSE BLD-MCNC: 81 MG/DL (ref 74–99)
GLUCOSE URINE: NEGATIVE MG/DL
HCT VFR BLD CALC: 32.5 % (ref 34–48)
HEMOGLOBIN: 10.4 G/DL (ref 11.5–15.5)
KETONES, URINE: NEGATIVE MG/DL
LEUKOCYTE ESTERASE, URINE: ABNORMAL
Lab: NORMAL
MCH RBC QN AUTO: 29.1 PG (ref 26–35)
MCHC RBC AUTO-ENTMCNC: 32 % (ref 32–34.5)
MCV RBC AUTO: 91 FL (ref 80–99.9)
METHADONE SCREEN, URINE: NOT DETECTED
NITRITE, URINE: NEGATIVE
OPIATE SCREEN URINE: NOT DETECTED
OXYCODONE URINE: NOT DETECTED
PDW BLD-RTO: 13.1 FL (ref 11.5–15)
PH UA: 7.5 (ref 5–9)
PHENCYCLIDINE SCREEN URINE: NOT DETECTED
PLATELET # BLD: 247 E9/L (ref 130–450)
PMV BLD AUTO: 10.2 FL (ref 7–12)
POTASSIUM SERPL-SCNC: 4 MMOL/L (ref 3.5–5)
PROTEIN UA: NEGATIVE MG/DL
RBC # BLD: 3.57 E12/L (ref 3.5–5.5)
RBC UA: ABNORMAL /HPF (ref 0–2)
SODIUM BLD-SCNC: 136 MMOL/L (ref 132–146)
SPECIFIC GRAVITY UA: 1.01 (ref 1–1.03)
TOTAL PROTEIN: 6.9 G/DL (ref 6.4–8.3)
URIC ACID, SERUM: 2.5 MG/DL (ref 2.4–5.7)
UROBILINOGEN, URINE: 0.2 E.U./DL
WBC # BLD: 9.1 E9/L (ref 4.5–11.5)
WBC UA: ABNORMAL /HPF (ref 0–5)

## 2021-01-16 PROCEDURE — 87591 N.GONORRHOEAE DNA AMP PROB: CPT

## 2021-01-16 PROCEDURE — 96374 THER/PROPH/DIAG INJ IV PUSH: CPT

## 2021-01-16 PROCEDURE — 96376 TX/PRO/DX INJ SAME DRUG ADON: CPT

## 2021-01-16 PROCEDURE — 96368 THER/DIAG CONCURRENT INF: CPT

## 2021-01-16 PROCEDURE — 80307 DRUG TEST PRSMV CHEM ANLYZR: CPT

## 2021-01-16 PROCEDURE — G0378 HOSPITAL OBSERVATION PER HR: HCPCS

## 2021-01-16 PROCEDURE — 96361 HYDRATE IV INFUSION ADD-ON: CPT

## 2021-01-16 PROCEDURE — 87491 CHLMYD TRACH DNA AMP PROBE: CPT

## 2021-01-16 PROCEDURE — 80053 COMPREHEN METABOLIC PANEL: CPT

## 2021-01-16 PROCEDURE — 6360000002 HC RX W HCPCS: Performed by: OBSTETRICS & GYNECOLOGY

## 2021-01-16 PROCEDURE — 87081 CULTURE SCREEN ONLY: CPT

## 2021-01-16 PROCEDURE — 81001 URINALYSIS AUTO W/SCOPE: CPT

## 2021-01-16 PROCEDURE — 2580000003 HC RX 258: Performed by: OBSTETRICS & GYNECOLOGY

## 2021-01-16 PROCEDURE — 96372 THER/PROPH/DIAG INJ SC/IM: CPT

## 2021-01-16 PROCEDURE — 36415 COLL VENOUS BLD VENIPUNCTURE: CPT

## 2021-01-16 PROCEDURE — 96360 HYDRATION IV INFUSION INIT: CPT

## 2021-01-16 PROCEDURE — G0379 DIRECT REFER HOSPITAL OBSERV: HCPCS

## 2021-01-16 PROCEDURE — 84550 ASSAY OF BLOOD/URIC ACID: CPT

## 2021-01-16 PROCEDURE — 96375 TX/PRO/DX INJ NEW DRUG ADDON: CPT

## 2021-01-16 PROCEDURE — 87147 CULTURE TYPE IMMUNOLOGIC: CPT

## 2021-01-16 PROCEDURE — 82731 ASSAY OF FETAL FIBRONECTIN: CPT

## 2021-01-16 PROCEDURE — 85027 COMPLETE CBC AUTOMATED: CPT

## 2021-01-16 RX ORDER — SODIUM CHLORIDE, SODIUM LACTATE, POTASSIUM CHLORIDE, AND CALCIUM CHLORIDE .6; .31; .03; .02 G/100ML; G/100ML; G/100ML; G/100ML
500 INJECTION, SOLUTION INTRAVENOUS ONCE
Status: COMPLETED | OUTPATIENT
Start: 2021-01-16 | End: 2021-01-16

## 2021-01-16 RX ORDER — TERBUTALINE SULFATE 1 MG/ML
0.25 INJECTION, SOLUTION SUBCUTANEOUS ONCE
Status: COMPLETED | OUTPATIENT
Start: 2021-01-16 | End: 2021-01-16

## 2021-01-16 RX ORDER — BETAMETHASONE SODIUM PHOSPHATE AND BETAMETHASONE ACETATE 3; 3 MG/ML; MG/ML
12 INJECTION, SUSPENSION INTRA-ARTICULAR; INTRALESIONAL; INTRAMUSCULAR; SOFT TISSUE ONCE
Status: COMPLETED | OUTPATIENT
Start: 2021-01-17 | End: 2021-01-17

## 2021-01-16 RX ORDER — ZOLPIDEM TARTRATE 5 MG/1
10 TABLET ORAL ONCE
Status: COMPLETED | OUTPATIENT
Start: 2021-01-17 | End: 2021-01-17

## 2021-01-16 RX ORDER — SODIUM CHLORIDE, SODIUM LACTATE, POTASSIUM CHLORIDE, CALCIUM CHLORIDE 600; 310; 30; 20 MG/100ML; MG/100ML; MG/100ML; MG/100ML
INJECTION, SOLUTION INTRAVENOUS CONTINUOUS
Status: DISCONTINUED | OUTPATIENT
Start: 2021-01-16 | End: 2021-01-17 | Stop reason: HOSPADM

## 2021-01-16 RX ORDER — BETAMETHASONE SODIUM PHOSPHATE AND BETAMETHASONE ACETATE 3; 3 MG/ML; MG/ML
12 INJECTION, SUSPENSION INTRA-ARTICULAR; INTRALESIONAL; INTRAMUSCULAR; SOFT TISSUE ONCE
Status: COMPLETED | OUTPATIENT
Start: 2021-01-16 | End: 2021-01-16

## 2021-01-16 RX ORDER — HYDRALAZINE HYDROCHLORIDE 20 MG/ML
10 INJECTION INTRAMUSCULAR; INTRAVENOUS PRN
Status: DISCONTINUED | OUTPATIENT
Start: 2021-01-16 | End: 2021-01-17 | Stop reason: HOSPADM

## 2021-01-16 RX ORDER — TERBUTALINE SULFATE 1 MG/ML
0.25 INJECTION, SOLUTION SUBCUTANEOUS
Status: DISCONTINUED | OUTPATIENT
Start: 2021-01-17 | End: 2021-01-17

## 2021-01-16 RX ORDER — HYDRALAZINE HYDROCHLORIDE 20 MG/ML
5 INJECTION INTRAMUSCULAR; INTRAVENOUS PRN
Status: DISCONTINUED | OUTPATIENT
Start: 2021-01-16 | End: 2021-01-17 | Stop reason: HOSPADM

## 2021-01-16 RX ORDER — HYDRALAZINE HYDROCHLORIDE 20 MG/ML
5 INJECTION INTRAMUSCULAR; INTRAVENOUS ONCE
Status: COMPLETED | OUTPATIENT
Start: 2021-01-16 | End: 2021-01-16

## 2021-01-16 RX ADMIN — SODIUM CHLORIDE, POTASSIUM CHLORIDE, SODIUM LACTATE AND CALCIUM CHLORIDE: 600; 310; 30; 20 INJECTION, SOLUTION INTRAVENOUS at 21:30

## 2021-01-16 RX ADMIN — HYDRALAZINE HYDROCHLORIDE 5 MG: 20 INJECTION, SOLUTION INTRAMUSCULAR; INTRAVENOUS at 23:17

## 2021-01-16 RX ADMIN — TERBUTALINE SULFATE 0.25 MG: 1 INJECTION, SOLUTION SUBCUTANEOUS at 21:30

## 2021-01-16 RX ADMIN — SODIUM CHLORIDE, POTASSIUM CHLORIDE, SODIUM LACTATE AND CALCIUM CHLORIDE 500 ML: 600; 310; 30; 20 INJECTION, SOLUTION INTRAVENOUS at 16:15

## 2021-01-16 RX ADMIN — BETAMETHASONE SODIUM PHOSPHATE AND BETAMETHASONE ACETATE 12 MG: 3; 3 INJECTION, SUSPENSION INTRA-ARTICULAR; INTRALESIONAL; INTRAMUSCULAR at 21:30

## 2021-01-16 RX ADMIN — WATER 1 G: 1 INJECTION INTRAMUSCULAR; INTRAVENOUS; SUBCUTANEOUS at 23:17

## 2021-01-16 RX ADMIN — WATER 1 G: 1 INJECTION INTRAMUSCULAR; INTRAVENOUS; SUBCUTANEOUS at 16:15

## 2021-01-16 NOTE — PROGRESS NOTES
Patient arrived to the unit with complaints of contractions that began yesterday. Patient denies leaking of fluids or bleeding and perceives fetal movement. Patient placed on external monitors for fetal heart tones and contractions. Oriented to triage room 4. Call light with reach.

## 2021-01-16 NOTE — PROGRESS NOTES
Dr Ariel Page notified of patient's labs, vitals, and contractions. Orders received for LR bolus, followed by 125/hr and Ancef 1g IV q6h.

## 2021-01-17 VITALS
HEART RATE: 86 BPM | RESPIRATION RATE: 16 BRPM | SYSTOLIC BLOOD PRESSURE: 139 MMHG | TEMPERATURE: 98 F | DIASTOLIC BLOOD PRESSURE: 74 MMHG

## 2021-01-17 PROCEDURE — G0378 HOSPITAL OBSERVATION PER HR: HCPCS

## 2021-01-17 PROCEDURE — 6360000002 HC RX W HCPCS: Performed by: OBSTETRICS & GYNECOLOGY

## 2021-01-17 PROCEDURE — 96376 TX/PRO/DX INJ SAME DRUG ADON: CPT

## 2021-01-17 PROCEDURE — 6370000000 HC RX 637 (ALT 250 FOR IP): Performed by: OBSTETRICS & GYNECOLOGY

## 2021-01-17 PROCEDURE — 2580000003 HC RX 258: Performed by: OBSTETRICS & GYNECOLOGY

## 2021-01-17 PROCEDURE — 96372 THER/PROPH/DIAG INJ SC/IM: CPT

## 2021-01-17 RX ORDER — CEPHALEXIN 500 MG/1
500 CAPSULE ORAL 4 TIMES DAILY
Qty: 40 CAPSULE | Refills: 0 | Status: SHIPPED | OUTPATIENT
Start: 2021-01-17 | End: 2021-01-27

## 2021-01-17 RX ORDER — CEPHALEXIN 500 MG/1
500 CAPSULE ORAL EVERY 6 HOURS SCHEDULED
Status: DISCONTINUED | OUTPATIENT
Start: 2021-01-17 | End: 2021-01-17 | Stop reason: HOSPADM

## 2021-01-17 RX ADMIN — SODIUM CHLORIDE, POTASSIUM CHLORIDE, SODIUM LACTATE AND CALCIUM CHLORIDE: 600; 310; 30; 20 INJECTION, SOLUTION INTRAVENOUS at 05:27

## 2021-01-17 RX ADMIN — WATER 1 G: 1 INJECTION INTRAMUSCULAR; INTRAVENOUS; SUBCUTANEOUS at 05:27

## 2021-01-17 RX ADMIN — TERBUTALINE SULFATE 0.25 MG: 1 INJECTION, SOLUTION SUBCUTANEOUS at 04:05

## 2021-01-17 RX ADMIN — TERBUTALINE SULFATE 0.25 MG: 1 INJECTION, SOLUTION SUBCUTANEOUS at 00:35

## 2021-01-17 RX ADMIN — CEPHALEXIN 500 MG: 500 CAPSULE ORAL at 18:03

## 2021-01-17 RX ADMIN — ZOLPIDEM TARTRATE 10 MG: 5 TABLET ORAL at 00:35

## 2021-01-17 RX ADMIN — CEPHALEXIN 500 MG: 500 CAPSULE ORAL at 12:07

## 2021-01-17 RX ADMIN — BETAMETHASONE SODIUM PHOSPHATE AND BETAMETHASONE ACETATE 12 MG: 3; 3 INJECTION, SUSPENSION INTRA-ARTICULAR; INTRALESIONAL; INTRAMUSCULAR at 21:04

## 2021-01-17 NOTE — PROGRESS NOTES
Dr. Lizzy Fox notified of SVE, negative FFN result, fetal heart rate, and uterine activity. Aware that UDS is still pending. Orders received.

## 2021-01-17 NOTE — PROGRESS NOTES
Dr. Akbar Evans updated on ctx pattern, fhr, blood pressures, and overall patient status. Orders to stop the terb, continue iv fluids, and antibiotics, general diet, and increase in PO fluids.

## 2021-01-17 NOTE — PROGRESS NOTES
Assumed patient care. Plan for the evening discussed and all questions answered and patient encouraged to call with any questions, needs, or concerns.

## 2021-01-17 NOTE — PROGRESS NOTES
Department of Obstetrics and Gynecology  Labor and Delivery    Progress Note  Patient Name: Ok Gimenez  YOB: 1990  MRN:    [id number]    Date: 1/17/2021      SUBJECTIVE:  denies having contractions, vaginal bleeding and leaking per vagina    OBJECTIVE:     /78   Pulse 105   Temp 98.2 °F (36.8 °C) (Oral)   Resp 16   LMP 06/14/2020    Weeks of gestation: 32  Fetal heart rate:       Baseline Heart Rate: 140       Accelerations:  present       Long Term Variability:  moderate       Decelerations:  absent         Contraction frequency: Irregular and occasional patient does not feel any since this morning    Membranes:  Intact    Cervix:         Dilation:  Closed                    Vaginal bleeding: ab    Results for orders placed or performed during the hospital encounter of 01/16/21   C.trachomatis N.gonorrhoeae DNA    Specimen: Cervix   Result Value Ref Range    Source Vagina    CBC   Result Value Ref Range    WBC 9.1 4.5 - 11.5 E9/L    RBC 3.57 3.50 - 5.50 E12/L    Hemoglobin 10.4 (L) 11.5 - 15.5 g/dL    Hematocrit 32.5 (L) 34.0 - 48.0 %    MCV 91.0 80.0 - 99.9 fL    MCH 29.1 26.0 - 35.0 pg    MCHC 32.0 32.0 - 34.5 %    RDW 13.1 11.5 - 15.0 fL    Platelets 688 335 - 962 E9/L    MPV 10.2 7.0 - 12.0 fL   Comprehensive metabolic panel   Result Value Ref Range    Sodium 136 132 - 146 mmol/L    Potassium 4.0 3.5 - 5.0 mmol/L    Chloride 104 98 - 107 mmol/L    CO2 24 22 - 29 mmol/L    Anion Gap 8 7 - 16 mmol/L    Glucose 81 74 - 99 mg/dL    BUN 4 (L) 6 - 20 mg/dL    CREATININE 0.7 0.5 - 1.0 mg/dL    GFR Non-African American >60 >=60 mL/min/1.73    GFR African American >60     Calcium 8.8 8.6 - 10.2 mg/dL    Total Protein 6.9 6.4 - 8.3 g/dL    Alb 3.4 (L) 3.5 - 5.2 g/dL    Total Bilirubin 0.2 0.0 - 1.2 mg/dL    Alkaline Phosphatase 137 (H) 35 - 104 U/L    ALT 5 0 - 32 U/L    AST 14 0 - 31 U/L   URIC ACID   Result Value Ref Range    Uric Acid, Serum 2.5 2.4 - 5.7 mg/dL Urinalysis with Microscopic   Result Value Ref Range    Color, UA Yellow Straw/Yellow    Clarity, UA Clear Clear    Glucose, Ur Negative Negative mg/dL    Bilirubin Urine Negative Negative    Ketones, Urine Negative Negative mg/dL    Specific Gravity, UA 1.015 1.005 - 1.030    Blood, Urine Negative Negative    pH, UA 7.5 5.0 - 9.0    Protein, UA Negative Negative mg/dL    Urobilinogen, Urine 0.2 <2.0 E.U./dL    Nitrite, Urine Negative Negative    Leukocyte Esterase, Urine SMALL (A) Negative    WBC, UA 1-3 0 - 5 /HPF    RBC, UA NONE 0 - 2 /HPF    Epithelial Cells, UA FEW /HPF    Bacteria, UA NONE SEEN None Seen /HPF   Drug screen multi urine   Result Value Ref Range    Amphetamine Screen, Urine NOT DETECTED Negative <1000 ng/mL    Barbiturate Screen, Ur NOT DETECTED Negative < 200 ng/mL    Benzodiazepine Screen, Urine NOT DETECTED Negative < 200 ng/mL    Cannabinoid Scrn, Ur NOT DETECTED Negative < 50ng/mL    Cocaine Metabolite Screen, Urine NOT DETECTED Negative < 300 ng/mL    Opiate Scrn, Ur NOT DETECTED Negative < 300ng/mL    PCP Screen, Urine NOT DETECTED Negative < 25 ng/mL    Methadone Screen, Urine NOT DETECTED Negative <300 ng/mL    Oxycodone Urine NOT DETECTED Negative <100 ng/mL    FENTANYL SCREEN, URINE NOT DETECTED Negative <1 ng/mL    Drug Screen Comment: see below    Fetal fibronectin   Result Value Ref Range    Fetal Fibronectin NEGATIVE               ASSESSMENT & PLAN: 31 wk pregnancy  Threatened  labor  Urinary tract infection  Anemia  Grand multipara  Severe PIH r/o toxemia     Threatened premature labor resolved and will continue the hydration and antibiotics orally as IV got infiltrated, and oral hydration patient will receive second dose of Celestone this evening and if she does not have any more contractions cramping, may discharge her to home with instructions.         Krishna Canales M.D.  2021  11:58 AM

## 2021-01-17 NOTE — PROGRESS NOTES
Dr. Gill Anderson called and updated on lab results, ctx pattern, fhr, and blood pressures elevated. Orders received.

## 2021-01-17 NOTE — PROGRESS NOTES
RN went to administer patient's IV ancef. IV was infiltrated. Dr Christa Robledo notified, and new orders received to d/c the IV ancef and start patient on Keflex PO q6h. IV was removed and ice applied to the area.

## 2021-01-17 NOTE — PROGRESS NOTES
Patient comfortable at this time. Denies feeling any contractions. Call light within reach and patient encouraged to call with any questions, needs, or concerns.

## 2021-01-17 NOTE — H&P
Department of Obstetrics and Gynecology   Obstetrics History and Physical      Eliane Arora  2021  [id number]    CHIEF COMPLAINT: Abdominal pains contractions    HISTORY OF PRESENT ILLNESS:      The patient is a 26 y.o. female [id number] at 31w0d. With complains of abdominal pain contractions since yesterday and it is felt more today patient comes to the labor and delivery for further evaluation management denies any vaginal bleeding spotting or any leaking patient had a recent episode in the family next of kin passed away because of some domestic reason that patient does not want to disclose about. Patient is very upset emotionally. OB History        13    Para   6    Term   6       0    AB   6    Living   6       SAB   1    TAB   4    Ectopic   0    Molar   0    Multiple   0    Live Births   4          Obstetric Comments    viable female at 12pm-Apgars 9/10 ,Wt.  5-6   Low Vacuum Extraction Twin B at 1422pm Apgars 9/9 Wt. 5-9         Patient presents with a chief complaint as above and is being admitted for evaluation and management no history of any headache dizziness or migraine denies having any right upper quadrant pain denies having any swelling    Estimated Due Date: Estimated Date of Delivery: 3/21/21    PRENATAL CARE:    Complicated by:   Patient Active Problem List   Diagnosis Code    Twin pregnancy, twins discordant, antepartum O30.009, O41.80    Dichorionic diamniotic twin gestation O30.46   Leticia Marino Dichorionic diamniotic twin pregnancy in third trimester O34.200    29 weeks gestation of pregnancy Z3A.34    Normal pregnancy in third trimester Z34.93    Normal pregnancy in second trimester Z34.92       PAST OB HISTORY  OB History        13    Para   6    Term   6       0    AB   6    Living   6       SAB   1    TAB   4    Ectopic   0    Molar   0    Multiple   0    Live Births   4          Obstetric Comments  viable female at 12pm-Apgars 9/10 ,Wt. 5-6   Low Vacuum Extraction Twin B at 1422pm Apgars 9/9 Wt. 5-9             Past Medical History:        Diagnosis Date    Abnormal Pap smear     Complication of anesthesia 2011    States hhas spinal curvature and had one-sided Epidural    Herpes simplex without mention of complication      Past Surgical History:    No past surgical history on file. Allergies:  Patient has no known allergies.   Social History:    Social History     Socioeconomic History    Marital status: Single     Spouse name: Not on file    Number of children: Not on file    Years of education: Not on file    Highest education level: Not on file   Occupational History    Not on file   Social Needs    Financial resource strain: Not on file    Food insecurity     Worry: Not on file     Inability: Not on file    Transportation needs     Medical: Not on file     Non-medical: Not on file   Tobacco Use    Smoking status: Current Every Day Smoker     Packs/day: 0.25     Years: 4.00     Pack years: 1.00     Types: Cigarettes    Smokeless tobacco: Never Used   Substance and Sexual Activity    Alcohol use: Not Currently     Comment: rare    Drug use: Not Currently     Types: Marijuana, Cocaine     Comment: several positives during this pregnancy    Sexual activity: Yes     Partners: Male   Lifestyle    Physical activity     Days per week: Not on file     Minutes per session: Not on file    Stress: Not on file   Relationships    Social connections     Talks on phone: Not on file     Gets together: Not on file     Attends Hindu service: Not on file     Active member of club or organization: Not on file     Attends meetings of clubs or organizations: Not on file     Relationship status: Not on file    Intimate partner violence     Fear of current or ex partner: Not on file     Emotionally abused: Not on file     Physically abused: Not on file     Forced sexual activity: Not on file Other Topics Concern    Not on file   Social History Narrative    Not on file     Family History:       Problem Relation Age of Onset   Fazal David Hearing Loss Sister     Hypertension Sister     Heart Disease Sister     Heart Surgery Sister     Diabetes Maternal Grandfather      Medications Prior to Admission:  Medications Prior to Admission: dextromethorphan-guaiFENesin (MUCINEX DM)  MG per extended release tablet, 1-2 tablets every 12 hours as needed for cough or congestion  vitamin C (ASCORBIC ACID) 500 MG tablet, Take 1 tablet by mouth 2 times daily for 7 days  zinc sulfate (ZINC-220) 220 (50 Zn) MG capsule, Take 1 capsule by mouth daily for 7 days  azithromycin (ZITHROMAX Z-CORTNEY) 250 MG tablet, Take 1 tablet by mouth daily Take 2 tabs on day one, then 1 tab daily for the next 4 days    REVIEW OF SYSTEMS:    CONSTITUTIONAL:  negative  RESPIRATORY:  negative  CARDIOVASCULAR:  negative  GASTROINTESTINAL:  Negative  GENITOURINARY: Negative  ALLERGIC/IMMUNOLOGIC:  negative  NEUROLOGICAL:  negative  BEHAVIOR/PSYCH:  negative    PHYSICAL EXAM:  Blood pressure 131/76, pulse 92, temperature 98.2 °F (36.8 °C), temperature source Oral, resp. rate 16, last menstrual period 06/14/2020, unknown if currently breastfeeding. General appearance:  awake, alert, cooperative, no apparent distress, and appears stated age  Abdomen:  Gravid  uterus, consistent with her gestational age 32w0d, reg  Uterine contractions. , CVA tenderness No      Fetal heart rate:  Reassuring.  140,with accels and moderate variability,no decels,  Pelvis:  Adequate pelvis  Cervix: soft   Closed,post   50%    -3    Membranes:  intact  Extremities: nontender bilaterally,edema1+    DATA:  Labs:  CBC:   Lab Results   Component Value Date    WBC 9.1 01/16/2021    RBC 3.57 01/16/2021    HGB 10.4 01/16/2021    HCT 32.5 01/16/2021    MCV 91.0 01/16/2021    RDW 13.1 01/16/2021     01/16/2021     CMP:    Lab Results   Component Value Date  01/16/2021    K 4.0 01/16/2021     01/16/2021    CO2 24 01/16/2021    BUN 4 01/16/2021    PROT 6.9 01/16/2021     U/A:  No components found for: [place], USPGRAV, UPH, UPROTEIN, UGLUCOSE, UKETONE, UBILI, UBLOOD, UNITRITE, UUROBIL, ULEUKEST, USQEPI, URENEPI, UWBC, URBC, Synchari, UHYALINE  TSH:  No results found for: TSH  RPR:  No results found for: RPR  RUBELLA: No results found for: RUBELLAIGG  HEPBSAG: No results found for: HBSAGI  HIV:  No results found for: HIV  HgBA1c:  No components found for: HGBA1C  Blood Type:  No results found for: ABOINT  RH:  No results found for: ANATITER, C3, C4, RF  Antibody Screen:  No components found for: ABSCINT  Gonorrhea:  No components found for: PRBCHLGC  Chlamydia:  No components found for: CCHLAM  gbs-STREP B SCREEN:   Lab Results   Component Value Date    GBSAG not isolated 05/09/2012       No orders to display       Results for orders placed or performed during the hospital encounter of 01/16/21   C.trachomatis N.gonorrhoeae DNA    Specimen: Cervix   Result Value Ref Range    Source Vagina    CBC   Result Value Ref Range    WBC 9.1 4.5 - 11.5 E9/L    RBC 3.57 3.50 - 5.50 E12/L    Hemoglobin 10.4 (L) 11.5 - 15.5 g/dL    Hematocrit 32.5 (L) 34.0 - 48.0 %    MCV 91.0 80.0 - 99.9 fL    MCH 29.1 26.0 - 35.0 pg    MCHC 32.0 32.0 - 34.5 %    RDW 13.1 11.5 - 15.0 fL    Platelets 372 842 - 953 E9/L    MPV 10.2 7.0 - 12.0 fL   Comprehensive metabolic panel   Result Value Ref Range    Sodium 136 132 - 146 mmol/L    Potassium 4.0 3.5 - 5.0 mmol/L    Chloride 104 98 - 107 mmol/L    CO2 24 22 - 29 mmol/L    Anion Gap 8 7 - 16 mmol/L    Glucose 81 74 - 99 mg/dL    BUN 4 (L) 6 - 20 mg/dL    CREATININE 0.7 0.5 - 1.0 mg/dL    GFR Non-African American >60 >=60 mL/min/1.73    GFR African American >60     Calcium 8.8 8.6 - 10.2 mg/dL    Total Protein 6.9 6.4 - 8.3 g/dL    Alb 3.4 (L) 3.5 - 5.2 g/dL    Total Bilirubin 0.2 0.0 - 1.2 mg/dL Alkaline Phosphatase 137 (H) 35 - 104 U/L    ALT 5 0 - 32 U/L    AST 14 0 - 31 U/L   URIC ACID   Result Value Ref Range    Uric Acid, Serum 2.5 2.4 - 5.7 mg/dL   Urinalysis with Microscopic   Result Value Ref Range    Color, UA Yellow Straw/Yellow    Clarity, UA Clear Clear    Glucose, Ur Negative Negative mg/dL    Bilirubin Urine Negative Negative    Ketones, Urine Negative Negative mg/dL    Specific Gravity, UA 1.015 1.005 - 1.030    Blood, Urine Negative Negative    pH, UA 7.5 5.0 - 9.0    Protein, UA Negative Negative mg/dL    Urobilinogen, Urine 0.2 <2.0 E.U./dL    Nitrite, Urine Negative Negative    Leukocyte Esterase, Urine SMALL (A) Negative    WBC, UA 1-3 0 - 5 /HPF    RBC, UA NONE 0 - 2 /HPF    Epithelial Cells, UA FEW /HPF    Bacteria, UA NONE SEEN None Seen /HPF   Drug screen multi urine   Result Value Ref Range    Amphetamine Screen, Urine NOT DETECTED Negative <1000 ng/mL    Barbiturate Screen, Ur NOT DETECTED Negative < 200 ng/mL    Benzodiazepine Screen, Urine NOT DETECTED Negative < 200 ng/mL    Cannabinoid Scrn, Ur NOT DETECTED Negative < 50ng/mL    Cocaine Metabolite Screen, Urine NOT DETECTED Negative < 300 ng/mL    Opiate Scrn, Ur NOT DETECTED Negative < 300ng/mL    PCP Screen, Urine NOT DETECTED Negative < 25 ng/mL    Methadone Screen, Urine NOT DETECTED Negative <300 ng/mL    Oxycodone Urine NOT DETECTED Negative <100 ng/mL    FENTANYL SCREEN, URINE NOT DETECTED Negative <1 ng/mL    Drug Screen Comment: see below    Fetal fibronectin   Result Value Ref Range    Fetal Fibronectin NEGATIVE        ASSESSMENT AND PLAN:31 week pregnancy, threatened  labor  Urinary tract infection  Grand multipara  Severe hypertension, rule out toxemia  Active Problems:    Normal pregnancy in second trimester  Resolved Problems:    * No resolved hospital problems.  *      Labor: OBSERVATION, routine labor orders  Fetus: Reassuring  GC chlamydia cultures CBC CMP clean-catch UA with micro and GBS ,FFN IVFluids,labs, reassess periodically and hydralazine intravenously to control the severe hypertension if it does not get corrected with rest and other measures. May consider Brethine subcu to control the contractions as patient is very  and the administer Celestone 2 doses 24 hours apart. Will administer fluids and antibiotics first.  FF and is negative however contractions continue with a similar frequency and  Brethine subcu and course of celestone.   Close observation        Electronically signed by Mary Kay Majano MD on 2021 at 11:48 AM

## 2021-01-17 NOTE — PROGRESS NOTES
Multiple attempts to readjust fetal monitor. Denies any contractions at this time. States she is comfortable and would like to sleep.

## 2021-01-18 LAB
GROUP B STREP CULTURE: ABNORMAL
ORGANISM: ABNORMAL

## 2021-01-18 NOTE — PROGRESS NOTES
Dr. Jeseina Enciso updated on overall patient status, ctx pattern, fhr. Orders for patient to received scheduled second dose of celestone and patient can be discharged home.

## 2021-01-20 LAB
C TRACH DNA GENITAL QL NAA+PROBE: ABNORMAL
N. GONORRHOEAE DNA: ABNORMAL
SOURCE: ABNORMAL

## 2021-03-10 ENCOUNTER — APPOINTMENT (OUTPATIENT)
Dept: LABOR AND DELIVERY | Age: 31
DRG: 560 | End: 2021-03-10
Payer: COMMERCIAL

## 2021-03-10 ENCOUNTER — HOSPITAL ENCOUNTER (INPATIENT)
Age: 31
LOS: 3 days | Discharge: HOME | DRG: 560 | End: 2021-03-13
Attending: OBSTETRICS & GYNECOLOGY | Admitting: OBSTETRICS & GYNECOLOGY
Payer: COMMERCIAL

## 2021-03-10 DIAGNOSIS — D62 ANEMIA ASSOCIATED WITH ACUTE BLOOD LOSS: Primary | ICD-10-CM

## 2021-03-10 LAB
ABO/RH: NORMAL
ALBUMIN SERPL-MCNC: 3.5 G/DL (ref 3.5–5.2)
ALP BLD-CCNC: 107 U/L (ref 35–104)
ALT SERPL-CCNC: 8 U/L (ref 0–32)
AMPHETAMINE SCREEN, URINE: NOT DETECTED
ANION GAP SERPL CALCULATED.3IONS-SCNC: 11 MMOL/L (ref 7–16)
ANTIBODY SCREEN: NORMAL
AST SERPL-CCNC: 20 U/L (ref 0–31)
BARBITURATE SCREEN URINE: NOT DETECTED
BASOPHILS ABSOLUTE: 0.02 E9/L (ref 0–0.2)
BASOPHILS RELATIVE PERCENT: 0.3 % (ref 0–2)
BENZODIAZEPINE SCREEN, URINE: NOT DETECTED
BILIRUB SERPL-MCNC: 0.3 MG/DL (ref 0–1.2)
BUN BLDV-MCNC: 8 MG/DL (ref 6–20)
CALCIUM SERPL-MCNC: 8.4 MG/DL (ref 8.6–10.2)
CANNABINOID SCREEN URINE: NOT DETECTED
CHLORIDE BLD-SCNC: 105 MMOL/L (ref 98–107)
CO2: 19 MMOL/L (ref 22–29)
COCAINE METABOLITE SCREEN URINE: NOT DETECTED
CREAT SERPL-MCNC: 0.6 MG/DL (ref 0.5–1)
EOSINOPHILS ABSOLUTE: 0.19 E9/L (ref 0.05–0.5)
EOSINOPHILS RELATIVE PERCENT: 2.8 % (ref 0–6)
FENTANYL SCREEN, URINE: NOT DETECTED
GFR AFRICAN AMERICAN: >60
GFR NON-AFRICAN AMERICAN: >60 ML/MIN/1.73
GLUCOSE BLD-MCNC: 78 MG/DL (ref 74–99)
HCT VFR BLD CALC: 27.8 % (ref 34–48)
HEMOGLOBIN: 8.9 G/DL (ref 11.5–15.5)
IMMATURE GRANULOCYTES #: 0.02 E9/L
IMMATURE GRANULOCYTES %: 0.3 % (ref 0–5)
LYMPHOCYTES ABSOLUTE: 1.52 E9/L (ref 1.5–4)
LYMPHOCYTES RELATIVE PERCENT: 22.5 % (ref 20–42)
Lab: NORMAL
MCH RBC QN AUTO: 28.1 PG (ref 26–35)
MCHC RBC AUTO-ENTMCNC: 32 % (ref 32–34.5)
MCV RBC AUTO: 87.7 FL (ref 80–99.9)
METHADONE SCREEN, URINE: NOT DETECTED
MONOCYTES ABSOLUTE: 0.56 E9/L (ref 0.1–0.95)
MONOCYTES RELATIVE PERCENT: 8.3 % (ref 2–12)
NEUTROPHILS ABSOLUTE: 4.44 E9/L (ref 1.8–7.3)
NEUTROPHILS RELATIVE PERCENT: 65.8 % (ref 43–80)
OPIATE SCREEN URINE: NOT DETECTED
OXYCODONE URINE: NOT DETECTED
PDW BLD-RTO: 13.7 FL (ref 11.5–15)
PHENCYCLIDINE SCREEN URINE: NOT DETECTED
PLATELET # BLD: 263 E9/L (ref 130–450)
PMV BLD AUTO: 10.6 FL (ref 7–12)
POTASSIUM SERPL-SCNC: 3.8 MMOL/L (ref 3.5–5)
RBC # BLD: 3.17 E12/L (ref 3.5–5.5)
SODIUM BLD-SCNC: 135 MMOL/L (ref 132–146)
TOTAL PROTEIN: 6.1 G/DL (ref 6.4–8.3)
WBC # BLD: 6.8 E9/L (ref 4.5–11.5)

## 2021-03-10 PROCEDURE — 2580000003 HC RX 258: Performed by: OBSTETRICS & GYNECOLOGY

## 2021-03-10 PROCEDURE — 85025 COMPLETE CBC W/AUTO DIFF WBC: CPT

## 2021-03-10 PROCEDURE — 86850 RBC ANTIBODY SCREEN: CPT

## 2021-03-10 PROCEDURE — 1220000001 HC SEMI PRIVATE L&D R&B

## 2021-03-10 PROCEDURE — 36415 COLL VENOUS BLD VENIPUNCTURE: CPT

## 2021-03-10 PROCEDURE — 86900 BLOOD TYPING SEROLOGIC ABO: CPT

## 2021-03-10 PROCEDURE — 80307 DRUG TEST PRSMV CHEM ANLYZR: CPT

## 2021-03-10 PROCEDURE — 86901 BLOOD TYPING SEROLOGIC RH(D): CPT

## 2021-03-10 PROCEDURE — 80053 COMPREHEN METABOLIC PANEL: CPT

## 2021-03-10 RX ORDER — PENICILLIN G 3000000 [IU]/50ML
3 INJECTION, SOLUTION INTRAVENOUS EVERY 4 HOURS
Status: DISCONTINUED | OUTPATIENT
Start: 2021-03-11 | End: 2021-03-11

## 2021-03-10 RX ORDER — SODIUM CHLORIDE 0.9 % (FLUSH) 0.9 %
10 SYRINGE (ML) INJECTION PRN
Status: DISCONTINUED | OUTPATIENT
Start: 2021-03-10 | End: 2021-03-13 | Stop reason: HOSPADM

## 2021-03-10 RX ORDER — NALBUPHINE HCL 10 MG/ML
10 AMPUL (ML) INJECTION
Status: COMPLETED | OUTPATIENT
Start: 2021-03-10 | End: 2021-03-11

## 2021-03-10 RX ORDER — SODIUM CHLORIDE 0.9 % (FLUSH) 0.9 %
10 SYRINGE (ML) INJECTION EVERY 12 HOURS SCHEDULED
Status: DISCONTINUED | OUTPATIENT
Start: 2021-03-10 | End: 2021-03-13 | Stop reason: HOSPADM

## 2021-03-10 RX ORDER — DOCUSATE SODIUM 100 MG/1
100 CAPSULE, LIQUID FILLED ORAL 2 TIMES DAILY
Status: DISCONTINUED | OUTPATIENT
Start: 2021-03-10 | End: 2021-03-13 | Stop reason: HOSPADM

## 2021-03-10 RX ORDER — ONDANSETRON 2 MG/ML
4 INJECTION INTRAMUSCULAR; INTRAVENOUS EVERY 6 HOURS PRN
Status: DISCONTINUED | OUTPATIENT
Start: 2021-03-10 | End: 2021-03-13 | Stop reason: HOSPADM

## 2021-03-10 RX ORDER — SODIUM CHLORIDE, SODIUM LACTATE, POTASSIUM CHLORIDE, CALCIUM CHLORIDE 600; 310; 30; 20 MG/100ML; MG/100ML; MG/100ML; MG/100ML
INJECTION, SOLUTION INTRAVENOUS CONTINUOUS
Status: DISCONTINUED | OUTPATIENT
Start: 2021-03-10 | End: 2021-03-13 | Stop reason: HOSPADM

## 2021-03-10 RX ADMIN — SODIUM CHLORIDE, POTASSIUM CHLORIDE, SODIUM LACTATE AND CALCIUM CHLORIDE: 600; 310; 30; 20 INJECTION, SOLUTION INTRAVENOUS at 23:30

## 2021-03-11 ENCOUNTER — ANESTHESIA (OUTPATIENT)
Dept: LABOR AND DELIVERY | Age: 31
DRG: 560 | End: 2021-03-11
Payer: COMMERCIAL

## 2021-03-11 ENCOUNTER — ANESTHESIA EVENT (OUTPATIENT)
Dept: LABOR AND DELIVERY | Age: 31
DRG: 560 | End: 2021-03-11
Payer: COMMERCIAL

## 2021-03-11 PROCEDURE — 2500000003 HC RX 250 WO HCPCS: Performed by: ANESTHESIOLOGY

## 2021-03-11 PROCEDURE — 2580000003 HC RX 258: Performed by: OBSTETRICS & GYNECOLOGY

## 2021-03-11 PROCEDURE — 6360000002 HC RX W HCPCS: Performed by: OBSTETRICS & GYNECOLOGY

## 2021-03-11 PROCEDURE — 6370000000 HC RX 637 (ALT 250 FOR IP): Performed by: OBSTETRICS & GYNECOLOGY

## 2021-03-11 PROCEDURE — 1220000001 HC SEMI PRIVATE L&D R&B

## 2021-03-11 PROCEDURE — 7200000001 HC VAGINAL DELIVERY

## 2021-03-11 PROCEDURE — 10907ZC DRAINAGE OF AMNIOTIC FLUID, THERAPEUTIC FROM PRODUCTS OF CONCEPTION, VIA NATURAL OR ARTIFICIAL OPENING: ICD-10-PCS | Performed by: OBSTETRICS & GYNECOLOGY

## 2021-03-11 PROCEDURE — 0UQMXZZ REPAIR VULVA, EXTERNAL APPROACH: ICD-10-PCS | Performed by: OBSTETRICS & GYNECOLOGY

## 2021-03-11 PROCEDURE — 3700000025 EPIDURAL BLOCK: Performed by: ANESTHESIOLOGY

## 2021-03-11 RX ORDER — EPHEDRINE SULFATE/0.9% NACL/PF 50 MG/5 ML
5 SYRINGE (ML) INTRAVENOUS PRN
Status: DISCONTINUED | OUTPATIENT
Start: 2021-03-11 | End: 2021-03-13 | Stop reason: HOSPADM

## 2021-03-11 RX ORDER — OXYCODONE HYDROCHLORIDE AND ACETAMINOPHEN 5; 325 MG/1; MG/1
1 TABLET ORAL EVERY 4 HOURS PRN
Status: DISCONTINUED | OUTPATIENT
Start: 2021-03-11 | End: 2021-03-13 | Stop reason: HOSPADM

## 2021-03-11 RX ORDER — MODIFIED LANOLIN
OINTMENT (GRAM) TOPICAL PRN
Status: DISCONTINUED | OUTPATIENT
Start: 2021-03-11 | End: 2021-03-13 | Stop reason: HOSPADM

## 2021-03-11 RX ORDER — KETOROLAC TROMETHAMINE 30 MG/ML
30 INJECTION, SOLUTION INTRAMUSCULAR; INTRAVENOUS EVERY 6 HOURS PRN
Status: ACTIVE | OUTPATIENT
Start: 2021-03-11 | End: 2021-03-11

## 2021-03-11 RX ORDER — LIDOCAINE HYDROCHLORIDE 10 MG/ML
5 INJECTION, SOLUTION EPIDURAL; INFILTRATION; INTRACAUDAL; PERINEURAL ONCE
Status: DISCONTINUED | OUTPATIENT
Start: 2021-03-11 | End: 2021-03-13 | Stop reason: HOSPADM

## 2021-03-11 RX ORDER — FERROUS SULFATE 325(65) MG
325 TABLET ORAL 2 TIMES DAILY WITH MEALS
Status: DISCONTINUED | OUTPATIENT
Start: 2021-03-11 | End: 2021-03-13 | Stop reason: HOSPADM

## 2021-03-11 RX ORDER — METHYLERGONOVINE MALEATE 0.2 MG/ML
200 INJECTION INTRAVENOUS PRN
Status: DISCONTINUED | OUTPATIENT
Start: 2021-03-11 | End: 2021-03-13 | Stop reason: HOSPADM

## 2021-03-11 RX ORDER — OXYCODONE HYDROCHLORIDE AND ACETAMINOPHEN 5; 325 MG/1; MG/1
2 TABLET ORAL EVERY 4 HOURS PRN
Status: DISCONTINUED | OUTPATIENT
Start: 2021-03-11 | End: 2021-03-13 | Stop reason: HOSPADM

## 2021-03-11 RX ORDER — ACETAMINOPHEN 325 MG/1
650 TABLET ORAL EVERY 4 HOURS PRN
Status: DISCONTINUED | OUTPATIENT
Start: 2021-03-11 | End: 2021-03-13 | Stop reason: HOSPADM

## 2021-03-11 RX ORDER — LIDOCAINE HYDROCHLORIDE 10 MG/ML
INJECTION, SOLUTION EPIDURAL; INFILTRATION; INTRACAUDAL; PERINEURAL
Status: DISPENSED
Start: 2021-03-11 | End: 2021-03-11

## 2021-03-11 RX ORDER — SODIUM CHLORIDE, SODIUM LACTATE, POTASSIUM CHLORIDE, AND CALCIUM CHLORIDE .6; .31; .03; .02 G/100ML; G/100ML; G/100ML; G/100ML
500 INJECTION, SOLUTION INTRAVENOUS ONCE
Status: DISCONTINUED | OUTPATIENT
Start: 2021-03-11 | End: 2021-03-13 | Stop reason: HOSPADM

## 2021-03-11 RX ADMIN — DOCUSATE SODIUM 100 MG: 100 CAPSULE, LIQUID FILLED ORAL at 20:50

## 2021-03-11 RX ADMIN — Medication 2 MILLI-UNITS/MIN: at 03:00

## 2021-03-11 RX ADMIN — Medication 87.3 MILLI-UNITS/MIN: at 11:11

## 2021-03-11 RX ADMIN — NALBUPHINE HYDROCHLORIDE 10 MG: 10 INJECTION, SOLUTION INTRAMUSCULAR; INTRAVENOUS; SUBCUTANEOUS at 05:28

## 2021-03-11 RX ADMIN — ACETAMINOPHEN 650 MG: 325 TABLET, FILM COATED ORAL at 20:50

## 2021-03-11 RX ADMIN — OXYCODONE AND ACETAMINOPHEN 1 TABLET: 5; 325 TABLET ORAL at 16:32

## 2021-03-11 RX ADMIN — Medication 909 MILLI-UNITS/MIN: at 11:00

## 2021-03-11 RX ADMIN — SODIUM CHLORIDE, POTASSIUM CHLORIDE, SODIUM LACTATE AND CALCIUM CHLORIDE: 600; 310; 30; 20 INJECTION, SOLUTION INTRAVENOUS at 05:28

## 2021-03-11 RX ADMIN — Medication 15 ML: at 09:41

## 2021-03-11 RX ADMIN — NALBUPHINE HYDROCHLORIDE 10 MG: 10 INJECTION, SOLUTION INTRAMUSCULAR; INTRAVENOUS; SUBCUTANEOUS at 08:39

## 2021-03-11 RX ADMIN — SODIUM CHLORIDE, POTASSIUM CHLORIDE, SODIUM LACTATE AND CALCIUM CHLORIDE: 600; 310; 30; 20 INJECTION, SOLUTION INTRAVENOUS at 09:46

## 2021-03-11 RX ADMIN — FERROUS SULFATE TAB 325 MG (65 MG ELEMENTAL FE) 325 MG: 325 (65 FE) TAB at 16:25

## 2021-03-11 RX ADMIN — OXYCODONE AND ACETAMINOPHEN 1 TABLET: 5; 325 TABLET ORAL at 17:03

## 2021-03-11 RX ADMIN — Medication 15 ML/HR: at 09:45

## 2021-03-11 ASSESSMENT — PAIN SCALES - GENERAL
PAINLEVEL_OUTOF10: 10
PAINLEVEL_OUTOF10: 7

## 2021-03-11 ASSESSMENT — LIFESTYLE VARIABLES: SMOKING_STATUS: 1

## 2021-03-11 NOTE — PROGRESS NOTES
Patient is feeling contractions every 2 to 3 minutes, vitals afebrile and stable  Fetal heart tones 140s reactive no decelerations noticed  Received analgesics IV with no pain relief  Artificial rupture of membranes done with a clear fluid and Intran is placed to record the contractions and adjust the Pitocin accordingly  Diaz catheter is placed to have a continuous drainage. Advised to change the positions frequently  And epidural if no pain relief  Anticipate vaginal delivery.

## 2021-03-11 NOTE — ANESTHESIA PRE PROCEDURE
Department of Anesthesiology  Preprocedure Note       Name:  Lonnie Howard   Age:  26 y.o.  :  1990                                          MRN:  [id number]         Date:  3/11/2021      Surgeon: * No surgeons listed *    Procedure: * No procedures listed *    Medications prior to admission:   Prior to Admission medications    Not on File       Current medications:    Current Facility-Administered Medications   Medication Dose Route Frequency Provider Last Rate Last Admin    lidocaine PF 1 % injection             lidocaine PF 1 % injection 5 mL  5 mL Intradermal Once Mary Kay Majano MD        lactated ringers infusion   Intravenous Continuous Mary Kay Majano  mL/hr at 21 0528 New Bag at 21 0528    sodium chloride flush 0.9 % injection 10 mL  10 mL Intravenous 2 times per day Mary Kay Majano MD        sodium chloride flush 0.9 % injection 10 mL  10 mL Intravenous PRN Mary Kay Majano MD        oxytocin (PITOCIN) 30 units in 500 mL infusion  909 lesly-units/min Intravenous PRN Mary Kay Majano MD        And    oxytocin (PITOCIN) 30 units in 500 mL infusion  87.3 lesly-units/min Intravenous PRN Mary Kay Majano MD        [facility] injection 4 mg  4 mg Intravenous Q6H PRN Mary Kay Majano MD        docusate sodium (COLACE) capsule 100 mg  100 mg Oral BID Mary Kay Majano MD        oxytocin (PITOCIN) 30 units in 500 mL infusion  1-20 lesly-units/min Intravenous Continuous Mary Kay Majano MD 12 mL/hr at 21 0800 12 lesly-units/min at 21 0800       Allergies:  No Known Allergies    Problem List:    Patient Active Problem List   Diagnosis Code    Twin pregnancy, twins discordant, antepartum O35.36, O41.80    Dichorionic diamniotic twin gestation O30.46    Dichorionic diamniotic twin pregnancy in third trimester O34.200    29 weeks gestation of pregnancy Z3A.34    Normal pregnancy in third trimester Z34.93    Normal pregnancy in second trimester Z34.92    Normal labor and delivery O80       Past Medical History:        Diagnosis Date    Abnormal Pap smear     Complication of anesthesia 2011    States hhas spinal curvature and had one-sided Epidural    Herpes simplex without mention of complication        Past Surgical History:  History reviewed. No pertinent surgical history. Social History:    Social History     Tobacco Use    Smoking status: Current Every Day Smoker     Packs/day: 0.25     Years: 4.00     Pack years: 1.00     Types: Cigarettes    Smokeless tobacco: Never Used   Substance Use Topics    Alcohol use: Not Currently     Comment: rare                                Ready to quit: Not Answered  Counseling given: Not Answered      Vital Signs (Current):   Vitals:    03/11/21 0447 03/11/21 0547 03/11/21 0647 03/11/21 0756   BP: 132/74 (!) 139/90 (!) 140/83 (!) 146/94   Pulse: 82 81 83 72   Resp: 17 17 16 16   Temp:       TempSrc:       Weight:       Height:                                                  BP Readings from Last 3 Encounters:   03/11/21 (!) 146/94   01/17/21 139/74   11/24/20 130/80       NPO Status:                                                                                 BMI:   Wt Readings from Last 3 Encounters:   03/10/21 205 lb (93 kg)   11/24/20 190 lb (86.2 kg)   08/18/20 180 lb (81.6 kg)     Body mass index is 32.11 kg/m².     CBC:   Lab Results   Component Value Date    WBC 6.8 03/10/2021    RBC 3.17 03/10/2021    HGB 8.9 03/10/2021    HCT 27.8 03/10/2021    MCV 87.7 03/10/2021    RDW 13.7 03/10/2021     03/10/2021       CMP:   Lab Results   Component Value Date     03/10/2021    K 3.8 03/10/2021     03/10/2021    CO2 19 03/10/2021    BUN 8 03/10/2021    CREATININE 0.6 03/10/2021    GFRAA >60 03/10/2021    LABGLOM >60 03/10/2021    GLUCOSE 78 03/10/2021    GLUCOSE 90 11/08/2010    PROT 6.1 03/10/2021    CALCIUM 8.4 03/10/2021    BILITOT 0.3 03/10/2021    ALKPHOS 107 03/10/2021    AST 20 03/10/2021    ALT 8 03/10/2021       POC Tests: No results for input(s): POCGLU, POCNA, POCK, POCCL, POCBUN, POCHEMO, POCHCT in the last 72 hours. Coags: No results found for: PROTIME, INR, APTT    HCG (If Applicable):   Lab Results   Component Value Date    PREGTESTUR neg 09/26/2018        ABGs: No results found for: PHART, PO2ART, HYQ9NPE, OGV4POC, BEART, N5JGUPFX     Type & Screen (If Applicable):  No results found for: LABABO, LABRH    Drug/Infectious Status (If Applicable):  No results found for: HIV, HEPCAB    COVID-19 Screening (If Applicable):   Lab Results   Component Value Date    COVID19 Detected 11/24/2020         Anesthesia Evaluation  Patient summary reviewed history of anesthetic complications (States hhas spinal curvature and had one-sided Epidural): Airway: Mallampati: II  TM distance: >3 FB   Neck ROM: full  Mouth opening: > = 3 FB Dental: normal exam         Pulmonary: breath sounds clear to auscultation  (+) current smoker                          ROS comment: COVID Pos 11/24/2020   Cardiovascular:Negative CV ROS            Rhythm: regular  Rate: normal                    Neuro/Psych:   Negative Neuro/Psych ROS              GI/Hepatic/Renal: Neg GI/Hepatic/Renal ROS            Endo/Other: Negative Endo/Other ROS                    Abdominal:   (+) obese,         Vascular: negative vascular ROS. Anesthesia Plan      epidural     ASA 2             Anesthetic plan and risks discussed with patient.                       Pino Sommer MD   3/11/2021

## 2021-03-11 NOTE — PROGRESS NOTES
Patient states epidural not working well, co breakthrough pain, using PCA button, SVE shows 7cm dilitation. Reassurance given.

## 2021-03-11 NOTE — H&P
Department of Obstetrics and Gynecology   Obstetrics History and Physical      Eliane Velasco  3/11/2021  [id number]    CHIEF COMPLAINT:  Induction of labor    HISTORY OF PRESENT ILLNESS:      The patient is a 26 y.o. female [id number] at 38w6d  iup  OB History        13    Para   6    Term   6       0    AB   6    Living   6       SAB   1    TAB   4    Ectopic   0    Molar   0    Multiple   0    Live Births   4          Obstetric Comments    viable female at 12pm-Apgars 9/10 ,Wt. 5-6   Low Vacuum Extraction Twin B at 1422pm Apgars 9/9 Wt. 5-9         Patient presents with a chief complaint as above and is being admitted for induction of labor with Pitocin, GBS negative    Estimated Due Date: Estimated Date of Delivery: 3/17/21    PRENATAL CARE:    Complicated by:   Patient Active Problem List   Diagnosis Code    Twin pregnancy, twins discordant, antepartum O35.36, O41.80    Dichorionic diamniotic twin gestation O30.46   Koki Horn Dichorionic diamniotic twin pregnancy in third trimester O34.200    [address] weeks gestation of pregnancy Z3A.34    Normal pregnancy in third trimester Z34.93    Normal pregnancy in second trimester Z34.92    Normal labor and delivery O80       PAST OB HISTORY  OB History        13    Para   6    Term   6       0    AB   6    Living   6       SAB   1    TAB   4    Ectopic   0    Molar   0    Multiple   0    Live Births   4          Obstetric Comments    viable female at 12pm-Apgars 9/10 ,Wt. 5-6   Low Vacuum Extraction Twin B at 1422pm Apgars 9/9 Wt. 5-9             Past Medical History:        Diagnosis Date    Abnormal Pap smear     Complication of anesthesia     States hhas spinal curvature and had one-sided Epidural    Herpes simplex without mention of complication      Past Surgical History:    History reviewed. No pertinent surgical history. Allergies:  Patient has no known allergies.   Social History:    Social History     Socioeconomic History    Marital status: Single     Spouse name: Not on file    Number of children: Not on file    Years of education: Not on file    Highest education level: Not on file   Occupational History    Not on file   Social Needs    Financial resource strain: Not on file    Food insecurity     Worry: Not on file     Inability: Not on file    Transportation needs     Medical: Not on file     Non-medical: Not on file   Tobacco Use    Smoking status: Current Every Day Smoker     Packs/day: 0.25     Years: 4.00     Pack years: 1.00     Types: Cigarettes    Smokeless tobacco: Never Used   Substance and Sexual Activity    Alcohol use: Not Currently     Comment: rare    Drug use: Not Currently     Types: Marijuana, Cocaine     Comment: several positives during this pregnancy    Sexual activity: Yes     Partners: Male   Lifestyle    Physical activity     Days per week: Not on file     Minutes per session: Not on file    Stress: Not on file   Relationships    Social connections     Talks on phone: Not on file     Gets together: Not on file     Attends [employer] service: Not on file     Active member of club or organization: Not on file     Attends meetings of clubs or organizations: Not on file     Relationship status: Not on file    Intimate partner violence     Fear of current or ex partner: Not on file     Emotionally abused: Not on file     Physically abused: Not on file     Forced sexual activity: Not on file   Other Topics Concern    Not on file   Social History Narrative    Not on file     Family History:       Problem Relation Age of Onset    Hearing Loss Sister     Hypertension Sister     Heart Disease Sister     Heart Surgery Sister     Diabetes Maternal Grandfather      Medications Prior to Admission:  Medications Prior to Admission: [DISCONTINUED] dextromethorphan-guaiFENesin (MUCINEX DM)  MG per extended release tablet, 1-2 tablets every 12 hours as needed for cough or congestion  [DISCONTINUED] vitamin C (ASCORBIC ACID) 500 MG tablet, Take 1 tablet by mouth 2 times daily for 7 days  [DISCONTINUED] azithromycin (ZITHROMAX Z-CORTNEY) 250 MG tablet, Take 1 tablet by mouth daily Take 2 tabs on day one, then 1 tab daily for the next 4 days    REVIEW OF SYSTEMS:    CONSTITUTIONAL:  negative  RESPIRATORY:  negative  CARDIOVASCULAR:  negative  GASTROINTESTINAL:  Negative  GENITOURINARY: Negative  ALLERGIC/IMMUNOLOGIC:  negative  NEUROLOGICAL:  negative  BEHAVIOR/PSYCH:  negative    PHYSICAL EXAM:  Blood pressure (!) 155/98, pulse 77, temperature 98.5 °F (36.9 °C), temperature source Oral, resp. rate (!) 169, height 5' 7\" (1.702 m), weight 205 lb (93 kg), last menstrual period 06/14/2020, unknown if currently breastfeeding. General appearance:  awake, alert, cooperative, no apparent distress, and appears stated age  Abdomen:  Gravid  uterus, consistent with her gestational age 38w7d, iup  Uterine contractions. , CVA tenderness No      Fetal heart rate:  Reassuring.  140,with accels and moderate variability,no decels,  Pelvis:  Adequate pelvis  Cervix: soft   1 cm   50%    -2  Presentation: CEPHALIC  Membranes:  intact  Extremities: nontender bilaterally,edema1+    DATA:  Labs:  CBC:   Lab Results   Component Value Date    WBC 6.8 03/10/2021    RBC 3.17 03/10/2021    HGB 8.9 03/10/2021    HCT 27.8 03/10/2021    MCV 87.7 03/10/2021    RDW 13.7 03/10/2021     03/10/2021     CMP:    Lab Results   Component Value Date     03/10/2021    K 3.8 03/10/2021     03/10/2021    CO2 19 03/10/2021    BUN 8 03/10/2021    PROT 6.1 03/10/2021     U/A:  No components found for: Lore Colton, USPGRAV, UPH, UPROTEIN, UGLUCOSE, UKETONE, UBILI, UBLOOD, UNITRITE, UUROBIL, ULEUKEST, USQEPI, URENEPI, UWBC, URBC, Synchari, UHYALINE  TSH:  No results found for: TSH  RPR:  No results found for: RPR  RUBELLA: No results found for: RUBELLAIGG  HEPBSAG: No results found for: HBSAGI  HIV:  No results found for: HIV  HgBA1c:  No components found for: HGBA1C  Blood Type:  No results found for: ABOINT  RH:  No results found for: ANATITER, C3, C4, RF  Antibody Screen:  No components found for: ABSCINT  Gonorrhea:  No components found for: PRBCHLGC  Chlamydia:  No components found for: CCHLAM  gbs-STREP B SCREEN:   Lab Results   Component Value Date    GBSAG not isolated 05/09/2012       No orders to display       Results for orders placed or performed during the hospital encounter of 03/10/21   Urine Drug Screen   Result Value Ref Range    Amphetamine Screen, Urine NOT DETECTED Negative <1000 ng/mL    Barbiturate Screen, Ur NOT DETECTED Negative < 200 ng/mL    Benzodiazepine Screen, Urine NOT DETECTED Negative < 200 ng/mL    Cannabinoid Scrn, Ur NOT DETECTED Negative < 50ng/mL    Cocaine Metabolite Screen, Urine NOT DETECTED Negative < 300 ng/mL    Opiate Scrn, Ur NOT DETECTED Negative < 300ng/mL    PCP Screen, Urine NOT DETECTED Negative < 25 ng/mL    Methadone Screen, Urine NOT DETECTED Negative <300 ng/mL    Oxycodone Urine NOT DETECTED Negative <100 ng/mL    FENTANYL SCREEN, URINE NOT DETECTED Negative <1 ng/mL    Drug Screen Comment: see below    CBC auto differential   Result Value Ref Range    WBC 6.8 4.5 - 11.5 E9/L    RBC 3.17 (L) 3.50 - 5.50 E12/L    Hemoglobin 8.9 (L) 11.5 - 15.5 g/dL    Hematocrit 27.8 (L) 34.0 - 48.0 %    MCV 87.7 80.0 - 99.9 fL    MCH 28.1 26.0 - 35.0 pg    MCHC 32.0 32.0 - 34.5 %    RDW 13.7 11.5 - 15.0 fL    Platelets 071 323 - 463 E9/L    MPV 10.6 7.0 - 12.0 fL    Neutrophils % 65.8 43.0 - 80.0 %    Immature Granulocytes % 0.3 0.0 - 5.0 %    Lymphocytes % 22.5 20.0 - 42.0 %    Monocytes % 8.3 2.0 - 12.0 %    Eosinophils % 2.8 0.0 - 6.0 %    Basophils % 0.3 0.0 - 2.0 %    Neutrophils Absolute 4.44 1.80 - 7.30 E9/L    Immature Granulocytes # 0.02 E9/L    Lymphocytes Absolute 1.52 1.50 - 4.00 E9/L    Monocytes Absolute 0.56 0.10 - 0.95 E9/L    Eosinophils Absolute 0.19 0.05 - 0.50 E9/L    Basophils Absolute 0.02 0.00 - 0.20 E9/L   Comprehensive metabolic panel   Result Value Ref Range    Sodium 135 132 - 146 mmol/L    Potassium 3.8 3.5 - 5.0 mmol/L    Chloride 105 98 - 107 mmol/L    CO2 19 (L) 22 - 29 mmol/L    Anion Gap 11 7 - 16 mmol/L    Glucose 78 74 - 99 mg/dL    BUN 8 6 - 20 mg/dL    CREATININE 0.6 0.5 - 1.0 mg/dL    GFR Non-African American >60 >=60 mL/min/1.73    GFR African American >60     Calcium 8.4 (L) 8.6 - 10.2 mg/dL    Total Protein 6.1 (L) 6.4 - 8.3 g/dL    Albumin 3.5 3.5 - 5.2 g/dL    Total Bilirubin 0.3 0.0 - 1.2 mg/dL    Alkaline Phosphatase 107 (H) 35 - 104 U/L    ALT 8 0 - 32 U/L    AST 20 0 - 31 U/L   TYPE AND SCREEN   Result Value Ref Range    ABO/Rh B POS     Antibody Screen NEG        ASSESSMENT AND PLAN:38 [id number] pregnancy, gestational hypertension  Anemia  Active Problems:    Normal labor and delivery  Resolved Problems:    * No resolved hospital problems. *    As patient is having spontaneous contractions will hold Pitocin till tomorrow morning. will start Pitocin induction tomorrow with continuous monitoring.   Labor: OBSERVATION, anticipate normal delivery, routine labor orders  Fetus: Reassuring  GBS: Negative  IVFluids,labs,analgesics/epidural as needed        Electronically signed by Stevie Cisneros MD on 3/11/2021 at 10:15 AM

## 2021-03-11 NOTE — PROGRESS NOTES
Dr. Reji Ash at bedside, AROM clear fluid, IUPC placed. Plan of care reviewed. Patient perceives fetal movement, states contractions are mild. FHT and Uterine activity assessed q15 min per protocol. Mother of patient at bedside, supportive.

## 2021-03-11 NOTE — L&D DELIVERY SUMMARY NOTE
Department of Obstetrics and Gynecology  Spontaneous Vaginal Delivery Note  Eliane TRAYLOR,[id number]    Pre-operative Diagnosis: Grand multipara ,43 weeks IUP with gestational hypertension for induction of labor with Pitocin    Post-operative Diagnosis:  Living  infant(s) and Male    Information for the patient's :  Hiral Del Rio [[id number]]             Male infant       Labor & Delivery Summary  Dilation Complete Date: 21  Dilation Complete Time: 1057    Infant Wt:   Information for the patient's :  Hiral Del Rio [[id number]]      3289 g        APGARS:     Information for the patient's :  Nandini Cline [[id number]]        9 at 1 minute 9 at 5 minutes     Anesthesia:  epidural anesthesia    Application and Delivery: Labial tear on the right labia minora    Delivery Summary:  Labor & Delivery Summary  Dilation Complete Date: 21  Dilation Complete Time: 1057Patient is admitted to LDR and placed on external monitors. Contractions are regular,FHT reactive with moderate variability without decels. patient was started on Pitocin at 3:30 in the morning. Pt received analgesics IV artificial rupture of membranes done with a clear fluid and Intran was placed and or epidural anesthesia. Progress of labor as anticipated and now precipitously fully dilated cervix. With subsequent contractions she started pushing and delivered vaginally spontaneously without any complications with labia minora tear on the right side,Placenta and cord and membranes delivered spontaneously. Pt is given pitocin in IV fluids. . Labial tears are repaired in standard manner. Vaginal walls,cervix,perineum,rectum intact. Uterus is well contracted. Pt is watched for next 1 hour. mother and baby both are  stable and doing well. Routine postpartum care    Specimen:  Placenta sent to pathology No    Estimated blood loss: 300 cc             Condition:  infant stable to general nursery and mother stable Infant Feeding:    both breast and bottle - Similac with low iron    Gilbert Canales M.D. 3/11/2021 11:51 AM    Eliane Waller

## 2021-03-11 NOTE — PROGRESS NOTES
Myself and KAYLEN Houston agreeable of Category 1 tracing and appropriate to start pitocin as ordered at 0300.

## 2021-03-11 NOTE — PROGRESS NOTES
27 yo  38w3d ambulatory into LND for scheduled IOL. Pt denies any bleeding, cramping, or leaking of fluid. Pt perceives adequate fetal movement. Pt states IOL has been order for high BP. Ambulated to LD8 and oriented to room and call light. efm applied, assessed and monitored every 15 minutes. SVE done 1-2/thick.

## 2021-03-11 NOTE — ANESTHESIA PROCEDURE NOTES
Epidural Block    Patient location during procedure: patient floor  Start time: 3/11/2021 9:30 AM  End time: 3/11/2021 9:50 AM  Reason for block: labor epidural  Staffing  Performed: anesthesiologist   Anesthesiologist: Saira Parra MD  Preanesthetic Checklist  Completed: patient identified, IV checked, site marked, risks and benefits discussed, surgical consent, monitors and equipment checked, pre-op evaluation, timeout performed, anesthesia consent given, oxygen available and patient being monitored  Epidural  Patient position: sitting  Prep: Betadine  Patient monitoring: cardiac monitor and frequent blood pressure checks  Approach: midline  Location: lumbar (1-5)  Injection technique: KAMERON air  Provider prep: mask and sterile gloves  Needle  Needle type: Tuohy   Needle gauge: 16 G  Needle length: 3.5 in  Needle insertion depth: 6.1 cm  Catheter type: end hole  Catheter size: 18 G  Catheter at skin depth: 12 cm  Test dose: negative  Assessment  Sensory level: T6  Hemodynamics: stable  Attempts: 1

## 2021-03-11 NOTE — PROGRESS NOTES
Called Dr. Blake Jama to report patients arrival for scheduled induction. Reported elevated BP's, cervical exam, FHT/UC pattern and pain level experienced with contractions. Reported that patient is GBS negative. Orders received to admit patient, infuse LR and monitor patient, start pitocin if needed at 0300.

## 2021-03-11 NOTE — PROGRESS NOTES
RN returns to bedside after pericare completed; patient having blooding show in restroom and on paper chux. Bedding changed and back to bed with EFM applied. Patient getting more uncomfortable but still doing well with breathing techniques and denies the need for meds and denies feeling any vaginal pressure. Call light within reach.

## 2021-03-11 NOTE — PROGRESS NOTES
RN at bedside to help patient to restroom. Off EFM at this time. Requests made for IV pain control. Will obtain IV nubain per order.

## 2021-03-11 NOTE — PROGRESS NOTES
Patient co pressure, urge to push, complete dilitation, called to nurses station and instructed to call Dr. Kaylen Perez for delivery.

## 2021-03-11 NOTE — PROGRESS NOTES
RN at bedside, patient up and off efm to use the restroom. Understanding to place call light once finished so that she may be helped back to bed and efm reapplied. Call light within reach in restroom.

## 2021-03-11 NOTE — CARE COORDINATION
SS Note:  SS Consult noted regarding \" Mom's SO/FOB  in 2021: supportive resources; history of Cocaine and THC use in distant past; UDS on delivery and during prenatal visits all negative\" Met with pt Eliane for supportive consult, she denies current drug use, addiction or need for treatment, she reports having all necessary provisions and supports to take her baby home and is planning to call for a [facility] appointment, she did accept information on counseling agencies, nursing notified of no ss concerns for 's release home with MOB,complete assessment in 's SS progress note. Electronically signed by ASH Manriquez on 3/11/2021 at 3:33 PM

## 2021-03-11 NOTE — PROGRESS NOTES
Called Dr. Christa Robledo to update on patients cervical exam, FHT/UC pattern, pain level and dose of nubain given. Reported patient has been changing positions from left to right and has tolerated labor well thus far. No new orders received.

## 2021-03-12 LAB
HCT VFR BLD CALC: 30 % (ref 34–48)
HEMOGLOBIN: 9.3 G/DL (ref 11.5–15.5)

## 2021-03-12 PROCEDURE — 85018 HEMOGLOBIN: CPT

## 2021-03-12 PROCEDURE — 36415 COLL VENOUS BLD VENIPUNCTURE: CPT

## 2021-03-12 PROCEDURE — 85014 HEMATOCRIT: CPT

## 2021-03-12 PROCEDURE — 6370000000 HC RX 637 (ALT 250 FOR IP): Performed by: OBSTETRICS & GYNECOLOGY

## 2021-03-12 PROCEDURE — 1220000001 HC SEMI PRIVATE L&D R&B

## 2021-03-12 PROCEDURE — 59050 FETAL MONITOR W/REPORT: CPT

## 2021-03-12 RX ORDER — PRENATAL WITH FERROUS FUM AND FOLIC ACID 3080; 920; 120; 400; 22; 1.84; 3; 20; 10; 1; 12; 200; 27; 25; 2 [IU]/1; [IU]/1; MG/1; [IU]/1; MG/1; MG/1; MG/1; MG/1; MG/1; MG/1; UG/1; MG/1; MG/1; MG/1; MG/1
1 TABLET ORAL DAILY
Status: DISCONTINUED | OUTPATIENT
Start: 2021-03-12 | End: 2021-03-13 | Stop reason: HOSPADM

## 2021-03-12 RX ORDER — IBUPROFEN 600 MG/1
600 TABLET ORAL EVERY 6 HOURS PRN
Status: DISCONTINUED | OUTPATIENT
Start: 2021-03-12 | End: 2021-03-13 | Stop reason: HOSPADM

## 2021-03-12 RX ORDER — NIFEDIPINE 30 MG/1
30 TABLET, FILM COATED, EXTENDED RELEASE ORAL DAILY
Status: DISCONTINUED | OUTPATIENT
Start: 2021-03-12 | End: 2021-03-13 | Stop reason: HOSPADM

## 2021-03-12 RX ADMIN — FERROUS SULFATE TAB 325 MG (65 MG ELEMENTAL FE) 325 MG: 325 (65 FE) TAB at 08:19

## 2021-03-12 RX ADMIN — ACETAMINOPHEN 650 MG: 325 TABLET, FILM COATED ORAL at 13:26

## 2021-03-12 RX ADMIN — DOCUSATE SODIUM 100 MG: 100 CAPSULE, LIQUID FILLED ORAL at 19:37

## 2021-03-12 RX ADMIN — ACETAMINOPHEN 650 MG: 325 TABLET, FILM COATED ORAL at 05:57

## 2021-03-12 RX ADMIN — FERROUS SULFATE TAB 325 MG (65 MG ELEMENTAL FE) 325 MG: 325 (65 FE) TAB at 16:43

## 2021-03-12 RX ADMIN — PRENATAL WITH FERROUS FUM AND FOLIC ACID 1 TABLET: 3080; 920; 120; 400; 22; 1.84; 3; 20; 10; 1; 12; 200; 27; 25; 2 TABLET ORAL at 16:43

## 2021-03-12 RX ADMIN — OXYCODONE AND ACETAMINOPHEN 2 TABLET: 5; 325 TABLET ORAL at 00:17

## 2021-03-12 RX ADMIN — DOCUSATE SODIUM 100 MG: 100 CAPSULE, LIQUID FILLED ORAL at 08:19

## 2021-03-12 RX ADMIN — BENZOCAINE AND LEVOMENTHOL: 200; 5 SPRAY TOPICAL at 07:45

## 2021-03-12 RX ADMIN — OXYCODONE AND ACETAMINOPHEN 2 TABLET: 5; 325 TABLET ORAL at 09:17

## 2021-03-12 RX ADMIN — IBUPROFEN 600 MG: 600 TABLET, FILM COATED ORAL at 16:58

## 2021-03-12 RX ADMIN — NIFEDIPINE 30 MG: 30 TABLET, FILM COATED, EXTENDED RELEASE ORAL at 16:58

## 2021-03-12 RX ADMIN — OXYCODONE AND ACETAMINOPHEN 2 TABLET: 5; 325 TABLET ORAL at 19:37

## 2021-03-12 ASSESSMENT — PAIN SCALES - GENERAL
PAINLEVEL_OUTOF10: 5
PAINLEVEL_OUTOF10: 8
PAINLEVEL_OUTOF10: 8

## 2021-03-12 NOTE — PROGRESS NOTES
[facility] [place] Hearing screening results were discussed with parent. Questions answered. Brochure given to parent. Advised to monitor developmental milestones and contact physician for any concerns.    John Diamond

## 2021-03-12 NOTE — ANESTHESIA POSTPROCEDURE EVALUATION
Department of Anesthesiology  Postprocedure Note    Patient: Candida Mchugh  MRN: [id number]  YOB: 1990  Date of evaluation: 3/11/2021  Time:  7:28 PM     Procedure Summary     Date: 03/11/21 Room / Location:     Anesthesia Start: 0930 Anesthesia Stop: 1100    Procedure: Labor Analgesia Diagnosis:     Scheduled Providers:  Responsible Provider: Cristi Cannon MD    Anesthesia Type: epidural ASA Status: 2          Anesthesia Type: epidural    Vignesh Phase I:      Vignesh Phase II:      Last vitals: Reviewed and per EMR flowsheets.        Anesthesia Post Evaluation    Patient location during evaluation: floor  Patient participation: complete - patient participated  Level of consciousness: awake  Airway patency: patent  Nausea & Vomiting: no nausea and no vomiting  Complications: no  Cardiovascular status: hemodynamically stable  Respiratory status: acceptable  Hydration status: stable

## 2021-03-12 NOTE — PROGRESS NOTES
Subjective:     Postpartum Day 1:   The patient feels well. The patient denies emotional concerns. Pain is well controlled with current medications. The baby iswell. Baby is feeding via bottle - Similac with low iron. Urinary output is adequate. The patient is ambulating well. The patient is tolerating a normal diet. Flatus has been passed. Objective:        Blood pressure (!) 149/84, pulse 72, temperature 97.9 °F (36.6 °C), temperature source Oral, resp. rate 16, height 5' 7\" (1.702 m), weight 205 lb (93 kg), last menstrual period 06/14/2020, SpO2 99 %, unknown if currently breastfeeding. No intake/output data recorded. Lab Results   Component Value Date    WBC 6.8 03/10/2021    HGB 9.3 (L) 03/12/2021    HCT 30.0 (L) 03/12/2021    MCV 87.7 03/10/2021     03/10/2021       General:    alert, appears stated age and cooperative   Lungs:    Lochia:  appropriate   Uterine    firmnontender   Back         no pain to palpation   DVT Evaluation:  No evidence of DVT seen on physical exam.  Negative Felisa's sign.   @ LABS@    Assessment:     Postpartum day 1 doing well.male  Active Problems:    Normal labor and delivery  Resolved Problems:    * No resolved hospital problems. *    Plan:     Continue current care. Prenatal vit daily,  Pain meds as needed, routine postpartum care  Iron sulfate 325 twice a day and prenatal vitamin once a day    Gilbert Canales M.D. 3/12/2021 4:25 PM    Eliane Jeff  1990  [id number]

## 2021-03-12 NOTE — PLAN OF CARE
Problem: Fluid Volume - Imbalance:  Goal: Absence of postpartum hemorrhage signs and symptoms  Description: Absence of postpartum hemorrhage signs and symptoms  Outcome: Met This Shift     Problem: Pain - Acute:  Goal: Able to cope with pain  Description: Able to cope with pain  Outcome: Met This Shift     Problem: Mood - Altered:  Goal: Mood stable  Description: Mood stable  Outcome: Met This Shift

## 2021-03-12 NOTE — PROGRESS NOTES
Dr Hernandez Spore in the patient's room to assess. Instructed to start patient on ferrous sulfate BID and prenatal vitamin daily.

## 2021-03-13 VITALS
DIASTOLIC BLOOD PRESSURE: 89 MMHG | OXYGEN SATURATION: 99 % | TEMPERATURE: 97.9 F | RESPIRATION RATE: 16 BRPM | WEIGHT: 205 LBS | SYSTOLIC BLOOD PRESSURE: 140 MMHG | HEART RATE: 88 BPM | BODY MASS INDEX: 32.18 KG/M2 | HEIGHT: 67 IN

## 2021-03-13 PROCEDURE — 6370000000 HC RX 637 (ALT 250 FOR IP): Performed by: OBSTETRICS & GYNECOLOGY

## 2021-03-13 RX ORDER — VITAMIN A ACETATE, BETA CAROTENE, ASCORBIC ACID, CHOLECALCIFEROL, .ALPHA.-TOCOPHEROL ACETATE, DL-, THIAMINE MONONITRATE, RIBOFLAVIN, NIACINAMIDE, PYRIDOXINE HYDROCHLORIDE, FOLIC ACID, CYANOCOBALAMIN, CALCIUM CARBONATE, FERROUS FUMARATE, ZINC OXIDE, CUPRIC OXIDE 3080; 12; 120; 400; 1; 1.84; 3; 20; 22; 920; 25; 200; 27; 10; 2 [IU]/1; UG/1; MG/1; [IU]/1; MG/1; MG/1; MG/1; MG/1; MG/1; [IU]/1; MG/1; MG/1; MG/1; MG/1; MG/1
1 TABLET, FILM COATED ORAL DAILY
Qty: 30 TABLET | Refills: 3 | Status: SHIPPED | OUTPATIENT
Start: 2021-03-13 | End: 2022-03-18

## 2021-03-13 RX ORDER — FERROUS SULFATE 325(65) MG
325 TABLET ORAL 2 TIMES DAILY
Qty: 180 TABLET | Refills: 1 | Status: SHIPPED | OUTPATIENT
Start: 2021-03-13 | End: 2022-03-18

## 2021-03-13 RX ORDER — NIFEDIPINE 30 MG/1
30 TABLET, EXTENDED RELEASE ORAL DAILY
Qty: 30 TABLET | Refills: 1 | Status: ON HOLD | OUTPATIENT
Start: 2021-03-13 | End: 2021-03-31 | Stop reason: HOSPADM

## 2021-03-13 RX ADMIN — IBUPROFEN 600 MG: 600 TABLET, FILM COATED ORAL at 07:24

## 2021-03-13 RX ADMIN — FERROUS SULFATE TAB 325 MG (65 MG ELEMENTAL FE) 325 MG: 325 (65 FE) TAB at 07:24

## 2021-03-13 RX ADMIN — DOCUSATE SODIUM 100 MG: 100 CAPSULE, LIQUID FILLED ORAL at 07:24

## 2021-03-13 RX ADMIN — NIFEDIPINE 30 MG: 30 TABLET, FILM COATED, EXTENDED RELEASE ORAL at 07:24

## 2021-03-13 RX ADMIN — OXYCODONE AND ACETAMINOPHEN 1 TABLET: 5; 325 TABLET ORAL at 05:19

## 2021-03-13 NOTE — PROGRESS NOTES
Discharge instructions and prescriptions reviewed with patient; discharge form signed by patient and placed in soft chart.

## 2021-03-13 NOTE — DISCHARGE SUMMARY
Patient ID:  Diamond Dominique  [id number]  80 y.o.  1990         Discharge Summary      Admit date: 3/10/2021    Discharge date and time:      Admitting Physician: JULIANNA Deleon FACOG     Diagnoses: Normal labor and delivery [O80] vaginal delivery of male infant, postpartum hypertension    Discharged Condition: good    Indication for Admission: 26 y.o. y.o. [id number] admitted at Term pregnancy in :\"Induced\" labor with Pitocin without complications    Procedures Performed: Labor & Delivery Summary  Dilation Complete Date: 21  Dilation Complete Time: 12    male      Information for the patient's :  Walker Span [[id number]]      . apgars   Information for the patient's :  Walker Span [[id number]]          . Hospital Course: Patient was admitted on the day  and underwent an uncomplicated procedure. Pt received analgesics IV and /or epidural anesthesia and delivered without comps. postpartum care was uncomplicated. H/H stable,Vital stable,,Uterus nontender,perineum healing well/ incision and wound dry no bleeding or oozing, Moderate lochia,voided without probs and passed flatus. Discharge Exam:  BP (!) 140/89   Pulse 88   Temp 97.9 °F (36.6 °C) (Oral)   Resp 16   Ht 5' 7\" (1.702 m)   Wt 205 lb (93 kg)   LMP 2020   SpO2 99%   Breastfeeding Unknown   BMI 32.11 kg/m²   General appearance: alert, appears stated age and cooperative  Abdomen: soft, non-tender; bowel sounds normal; no masses,  no organomegaly uterus well contracted nontender extremities: extremities normal, atraumatic, no cyanosis or edema and Homans sign is negative, no sign of DVT    Disposition: home    Patient Instructions:    Activity: activity as tolerated  Diet: regular diet  Wound Care: none needed    Discharge Medication:    [place]   Home Medication Instructions OGB:[id number]    Printed on:21 1050   Medication Information                      ferrous sulfate (IRON 325) 325 (65 Fe) MG tablet  Take 1 tablet by mouth 2 times daily             NIFEdipine (PROCARDIA XL) 30 MG extended release tablet  Take 1 tablet by mouth daily                Patient is prescribed prenatal vitamin once a day along with iron sulfate three times a day and patient will continue with the Procardia thirty XL once a day  Follow-up with Gilbert Canales M.D. in 1 week.     Linda Canales M.D.  3/13/2021  10:52 AM

## 2021-03-13 NOTE — PROGRESS NOTES
Assuming care of patient. Patient resting in bed, requesting percocet for abdominal cramping. Plan of care discussed with patient; patient verbalizes understanding. Call light in reach.

## 2021-03-13 NOTE — PROGRESS NOTES
Assuming care of patient. Patient resting in bed. Plan of care discussed with patient; patient verbalizes understanding. Patient offered breastfeeding support; declines at this time and states that she wants to proceed with bottle-feeding with formula. Call light in reach.

## 2021-03-13 NOTE — PLAN OF CARE
Problem: Pain - Acute:  Goal: Pain level will decrease  Description: Pain level will decrease  Outcome: Met This Shift  Goal: Able to cope with pain  Description: Able to cope with pain  Outcome: Met This Shift     Problem: Mood - Altered:  Goal: Mood stable  Description: Mood stable  Outcome: Met This Shift

## 2021-03-27 ENCOUNTER — HOSPITAL ENCOUNTER (INPATIENT)
Age: 31
LOS: 4 days | Discharge: HOME | DRG: 561 | End: 2021-03-31
Attending: EMERGENCY MEDICINE | Admitting: INTERNAL MEDICINE
Payer: COMMERCIAL

## 2021-03-27 ENCOUNTER — APPOINTMENT (OUTPATIENT)
Dept: CT IMAGING | Age: 31
DRG: 561 | End: 2021-03-27
Payer: COMMERCIAL

## 2021-03-27 DIAGNOSIS — N17.9 AKI (ACUTE KIDNEY INJURY) (HCC): ICD-10-CM

## 2021-03-27 DIAGNOSIS — I16.0 HYPERTENSIVE URGENCY: Primary | ICD-10-CM

## 2021-03-27 DIAGNOSIS — Z91.14 NONCOMPLIANCE WITH MEDICATIONS: ICD-10-CM

## 2021-03-27 DIAGNOSIS — F14.90 COCAINE USE: ICD-10-CM

## 2021-03-27 LAB
ACETAMINOPHEN LEVEL: <5 MCG/ML (ref 10–30)
ALBUMIN SERPL-MCNC: 3.8 G/DL (ref 3.5–5.2)
ALP BLD-CCNC: 86 U/L (ref 35–104)
ALT SERPL-CCNC: 9 U/L (ref 0–32)
AMPHETAMINE SCREEN, URINE: NOT DETECTED
ANION GAP SERPL CALCULATED.3IONS-SCNC: 10 MMOL/L (ref 7–16)
AST SERPL-CCNC: 15 U/L (ref 0–31)
BACTERIA: ABNORMAL /HPF
BARBITURATE SCREEN URINE: NOT DETECTED
BASOPHILS ABSOLUTE: 0.02 E9/L (ref 0–0.2)
BASOPHILS RELATIVE PERCENT: 0.3 % (ref 0–2)
BENZODIAZEPINE SCREEN, URINE: NOT DETECTED
BILIRUB SERPL-MCNC: 0.5 MG/DL (ref 0–1.2)
BILIRUBIN DIRECT: <0.2 MG/DL (ref 0–0.3)
BILIRUBIN URINE: NEGATIVE
BILIRUBIN, INDIRECT: NORMAL MG/DL (ref 0–1)
BLOOD, URINE: ABNORMAL
BUN BLDV-MCNC: 21 MG/DL (ref 6–20)
CALCIUM SERPL-MCNC: 8.5 MG/DL (ref 8.6–10.2)
CANNABINOID SCREEN URINE: NOT DETECTED
CHLORIDE BLD-SCNC: 104 MMOL/L (ref 98–107)
CLARITY: CLEAR
CO2: 24 MMOL/L (ref 22–29)
COCAINE METABOLITE SCREEN URINE: POSITIVE
COLOR: YELLOW
CREAT SERPL-MCNC: 2.7 MG/DL (ref 0.5–1)
EOSINOPHILS ABSOLUTE: 0.16 E9/L (ref 0.05–0.5)
EOSINOPHILS RELATIVE PERCENT: 2.1 % (ref 0–6)
ETHANOL: <10 MG/DL (ref 0–0.08)
FENTANYL SCREEN, URINE: NOT DETECTED
GFR AFRICAN AMERICAN: 25
GFR NON-AFRICAN AMERICAN: 25 ML/MIN/1.73
GLUCOSE BLD-MCNC: 140 MG/DL (ref 74–99)
GLUCOSE URINE: NEGATIVE MG/DL
HCT VFR BLD CALC: 37.8 % (ref 34–48)
HEMOGLOBIN: 11.7 G/DL (ref 11.5–15.5)
IMMATURE GRANULOCYTES #: 0.02 E9/L
IMMATURE GRANULOCYTES %: 0.3 % (ref 0–5)
KETONES, URINE: NEGATIVE MG/DL
LACTIC ACID: 0.7 MMOL/L (ref 0.5–2.2)
LEUKOCYTE ESTERASE, URINE: ABNORMAL
LYMPHOCYTES ABSOLUTE: 0.85 E9/L (ref 1.5–4)
LYMPHOCYTES RELATIVE PERCENT: 11.1 % (ref 20–42)
Lab: ABNORMAL
MAGNESIUM: 2.2 MG/DL (ref 1.6–2.6)
MCH RBC QN AUTO: 27.4 PG (ref 26–35)
MCHC RBC AUTO-ENTMCNC: 31 % (ref 32–34.5)
MCV RBC AUTO: 88.5 FL (ref 80–99.9)
METHADONE SCREEN, URINE: NOT DETECTED
MONOCYTES ABSOLUTE: 0.55 E9/L (ref 0.1–0.95)
MONOCYTES RELATIVE PERCENT: 7.2 % (ref 2–12)
NEUTROPHILS ABSOLUTE: 6.05 E9/L (ref 1.8–7.3)
NEUTROPHILS RELATIVE PERCENT: 79 % (ref 43–80)
NITRITE, URINE: NEGATIVE
OPIATE SCREEN URINE: NOT DETECTED
OXYCODONE URINE: NOT DETECTED
PDW BLD-RTO: 14.1 FL (ref 11.5–15)
PH UA: 6 (ref 5–9)
PHENCYCLIDINE SCREEN URINE: NOT DETECTED
PLATELET # BLD: 301 E9/L (ref 130–450)
PMV BLD AUTO: 9.9 FL (ref 7–12)
POTASSIUM REFLEX MAGNESIUM: 3.7 MMOL/L (ref 3.5–5)
PROTEIN UA: 30 MG/DL
RBC # BLD: 4.27 E12/L (ref 3.5–5.5)
RBC UA: ABNORMAL /HPF (ref 0–2)
SALICYLATE, SERUM: <0.3 MG/DL (ref 0–30)
SARS-COV-2, NAAT: NOT DETECTED
SODIUM BLD-SCNC: 138 MMOL/L (ref 132–146)
SPECIFIC GRAVITY UA: 1.01 (ref 1–1.03)
TOTAL PROTEIN: 6.8 G/DL (ref 6.4–8.3)
TRICYCLIC ANTIDEPRESSANTS SCREEN SERUM: NEGATIVE NG/ML
UROBILINOGEN, URINE: 0.2 E.U./DL
WBC # BLD: 7.7 E9/L (ref 4.5–11.5)
WBC UA: ABNORMAL /HPF (ref 0–5)

## 2021-03-27 PROCEDURE — 87635 SARS-COV-2 COVID-19 AMP PRB: CPT

## 2021-03-27 PROCEDURE — 80076 HEPATIC FUNCTION PANEL: CPT

## 2021-03-27 PROCEDURE — 6370000000 HC RX 637 (ALT 250 FOR IP): Performed by: STUDENT IN AN ORGANIZED HEALTH CARE EDUCATION/TRAINING PROGRAM

## 2021-03-27 PROCEDURE — 6360000002 HC RX W HCPCS: Performed by: EMERGENCY MEDICINE

## 2021-03-27 PROCEDURE — 80179 DRUG ASSAY SALICYLATE: CPT

## 2021-03-27 PROCEDURE — 80048 BASIC METABOLIC PNL TOTAL CA: CPT

## 2021-03-27 PROCEDURE — 80143 DRUG ASSAY ACETAMINOPHEN: CPT

## 2021-03-27 PROCEDURE — 96376 TX/PRO/DX INJ SAME DRUG ADON: CPT

## 2021-03-27 PROCEDURE — 36415 COLL VENOUS BLD VENIPUNCTURE: CPT

## 2021-03-27 PROCEDURE — 74176 CT ABD & PELVIS W/O CONTRAST: CPT

## 2021-03-27 PROCEDURE — 2580000003 HC RX 258: Performed by: STUDENT IN AN ORGANIZED HEALTH CARE EDUCATION/TRAINING PROGRAM

## 2021-03-27 PROCEDURE — 93005 ELECTROCARDIOGRAM TRACING: CPT | Performed by: STUDENT IN AN ORGANIZED HEALTH CARE EDUCATION/TRAINING PROGRAM

## 2021-03-27 PROCEDURE — 6360000002 HC RX W HCPCS: Performed by: STUDENT IN AN ORGANIZED HEALTH CARE EDUCATION/TRAINING PROGRAM

## 2021-03-27 PROCEDURE — 83605 ASSAY OF LACTIC ACID: CPT

## 2021-03-27 PROCEDURE — 72131 CT LUMBAR SPINE W/O DYE: CPT

## 2021-03-27 PROCEDURE — 80307 DRUG TEST PRSMV CHEM ANLYZR: CPT

## 2021-03-27 PROCEDURE — 1200000000 HC SEMI PRIVATE

## 2021-03-27 PROCEDURE — 81001 URINALYSIS AUTO W/SCOPE: CPT

## 2021-03-27 PROCEDURE — 85025 COMPLETE CBC W/AUTO DIFF WBC: CPT

## 2021-03-27 PROCEDURE — 82077 ASSAY SPEC XCP UR&BREATH IA: CPT

## 2021-03-27 PROCEDURE — 99223 1ST HOSP IP/OBS HIGH 75: CPT | Performed by: INTERNAL MEDICINE

## 2021-03-27 PROCEDURE — 2580000003 HC RX 258: Performed by: INTERNAL MEDICINE

## 2021-03-27 PROCEDURE — 6360000002 HC RX W HCPCS: Performed by: INTERNAL MEDICINE

## 2021-03-27 PROCEDURE — 2580000003 HC RX 258: Performed by: EMERGENCY MEDICINE

## 2021-03-27 PROCEDURE — 99283 EMERGENCY DEPT VISIT LOW MDM: CPT

## 2021-03-27 PROCEDURE — 83735 ASSAY OF MAGNESIUM: CPT

## 2021-03-27 PROCEDURE — 96374 THER/PROPH/DIAG INJ IV PUSH: CPT

## 2021-03-27 RX ORDER — FENTANYL CITRATE 50 UG/ML
50 INJECTION, SOLUTION INTRAMUSCULAR; INTRAVENOUS ONCE
Status: COMPLETED | OUTPATIENT
Start: 2021-03-27 | End: 2021-03-27

## 2021-03-27 RX ORDER — HYDRALAZINE HYDROCHLORIDE 20 MG/ML
10 INJECTION INTRAMUSCULAR; INTRAVENOUS EVERY 6 HOURS PRN
Status: DISCONTINUED | OUTPATIENT
Start: 2021-03-27 | End: 2021-03-29

## 2021-03-27 RX ORDER — 0.9 % SODIUM CHLORIDE 0.9 %
1000 INTRAVENOUS SOLUTION INTRAVENOUS ONCE
Status: COMPLETED | OUTPATIENT
Start: 2021-03-27 | End: 2021-03-27

## 2021-03-27 RX ORDER — SODIUM CHLORIDE 0.9 % (FLUSH) 0.9 %
10 SYRINGE (ML) INJECTION PRN
Status: DISCONTINUED | OUTPATIENT
Start: 2021-03-27 | End: 2021-03-31 | Stop reason: HOSPADM

## 2021-03-27 RX ORDER — LORAZEPAM 2 MG/ML
0.5 INJECTION INTRAMUSCULAR EVERY 6 HOURS PRN
Status: DISCONTINUED | OUTPATIENT
Start: 2021-03-27 | End: 2021-03-31 | Stop reason: HOSPADM

## 2021-03-27 RX ORDER — SODIUM CHLORIDE 0.9 % (FLUSH) 0.9 %
10 SYRINGE (ML) INJECTION EVERY 12 HOURS SCHEDULED
Status: DISCONTINUED | OUTPATIENT
Start: 2021-03-27 | End: 2021-03-31 | Stop reason: HOSPADM

## 2021-03-27 RX ORDER — SODIUM CHLORIDE 9 MG/ML
INJECTION, SOLUTION INTRAVENOUS CONTINUOUS
Status: DISCONTINUED | OUTPATIENT
Start: 2021-03-27 | End: 2021-03-28

## 2021-03-27 RX ORDER — NIFEDIPINE 30 MG/1
30 TABLET, FILM COATED, EXTENDED RELEASE ORAL DAILY
Status: DISCONTINUED | OUTPATIENT
Start: 2021-03-28 | End: 2021-03-28

## 2021-03-27 RX ORDER — HYDRALAZINE HYDROCHLORIDE 20 MG/ML
10 INJECTION INTRAMUSCULAR; INTRAVENOUS ONCE
Status: COMPLETED | OUTPATIENT
Start: 2021-03-27 | End: 2021-03-27

## 2021-03-27 RX ORDER — ACETAMINOPHEN 325 MG/1
650 TABLET ORAL EVERY 6 HOURS PRN
Status: DISCONTINUED | OUTPATIENT
Start: 2021-03-27 | End: 2021-03-31 | Stop reason: HOSPADM

## 2021-03-27 RX ORDER — ONDANSETRON 2 MG/ML
4 INJECTION INTRAMUSCULAR; INTRAVENOUS EVERY 6 HOURS PRN
Status: DISCONTINUED | OUTPATIENT
Start: 2021-03-27 | End: 2021-03-31 | Stop reason: HOSPADM

## 2021-03-27 RX ORDER — FAMOTIDINE 20 MG/1
20 TABLET, FILM COATED ORAL DAILY
Status: DISCONTINUED | OUTPATIENT
Start: 2021-03-28 | End: 2021-03-31 | Stop reason: HOSPADM

## 2021-03-27 RX ORDER — POLYETHYLENE GLYCOL 3350 17 G/17G
17 POWDER, FOR SOLUTION ORAL DAILY PRN
Status: DISCONTINUED | OUTPATIENT
Start: 2021-03-27 | End: 2021-03-31 | Stop reason: HOSPADM

## 2021-03-27 RX ORDER — SODIUM CHLORIDE 9 MG/ML
25 INJECTION, SOLUTION INTRAVENOUS PRN
Status: DISCONTINUED | OUTPATIENT
Start: 2021-03-27 | End: 2021-03-31 | Stop reason: HOSPADM

## 2021-03-27 RX ORDER — CLONIDINE HYDROCHLORIDE 0.2 MG/1
0.2 TABLET ORAL EVERY 8 HOURS PRN
Status: DISCONTINUED | OUTPATIENT
Start: 2021-03-27 | End: 2021-03-31 | Stop reason: HOSPADM

## 2021-03-27 RX ORDER — IBUPROFEN 800 MG/1
800 TABLET ORAL ONCE
Status: COMPLETED | OUTPATIENT
Start: 2021-03-27 | End: 2021-03-27

## 2021-03-27 RX ORDER — PROMETHAZINE HYDROCHLORIDE 25 MG/1
12.5 TABLET ORAL EVERY 6 HOURS PRN
Status: DISCONTINUED | OUTPATIENT
Start: 2021-03-27 | End: 2021-03-31 | Stop reason: HOSPADM

## 2021-03-27 RX ORDER — ACETAMINOPHEN 650 MG/1
650 SUPPOSITORY RECTAL EVERY 6 HOURS PRN
Status: DISCONTINUED | OUTPATIENT
Start: 2021-03-27 | End: 2021-03-31 | Stop reason: HOSPADM

## 2021-03-27 RX ADMIN — SODIUM CHLORIDE 1000 ML: 9 INJECTION, SOLUTION INTRAVENOUS at 16:41

## 2021-03-27 RX ADMIN — SODIUM CHLORIDE, PRESERVATIVE FREE 10 ML: 5 INJECTION INTRAVENOUS at 23:02

## 2021-03-27 RX ADMIN — IBUPROFEN 800 MG: 800 TABLET, FILM COATED ORAL at 15:34

## 2021-03-27 RX ADMIN — HYDRALAZINE HYDROCHLORIDE 10 MG: 20 INJECTION INTRAMUSCULAR; INTRAVENOUS at 17:39

## 2021-03-27 RX ADMIN — FENTANYL CITRATE 50 MCG: 50 INJECTION INTRAMUSCULAR; INTRAVENOUS at 17:39

## 2021-03-27 RX ADMIN — HYDRALAZINE HYDROCHLORIDE 10 MG: 20 INJECTION INTRAMUSCULAR; INTRAVENOUS at 19:24

## 2021-03-27 RX ADMIN — LORAZEPAM 0.5 MG: 2 INJECTION INTRAMUSCULAR; INTRAVENOUS at 23:03

## 2021-03-27 RX ADMIN — SODIUM CHLORIDE: 9 INJECTION, SOLUTION INTRAVENOUS at 23:04

## 2021-03-27 RX ADMIN — SODIUM CHLORIDE 1000 ML: 9 INJECTION, SOLUTION INTRAVENOUS at 17:48

## 2021-03-27 ASSESSMENT — PAIN SCALES - GENERAL
PAINLEVEL_OUTOF10: 0
PAINLEVEL_OUTOF10: 8
PAINLEVEL_OUTOF10: 8

## 2021-03-27 ASSESSMENT — ENCOUNTER SYMPTOMS
COUGH: 0
SHORTNESS OF BREATH: 0
CONSTIPATION: 1
DIARRHEA: 0
ABDOMINAL PAIN: 0
BACK PAIN: 1

## 2021-03-27 NOTE — ED PROVIDER NOTES
HPI   26-year-old female patient 2 weeks status post vaginal delivery presented to emergency department for back pain. Patient had epidural prior to delivery. Patient says she has been having back pain since however over the last three days it has gotten worse, pain mild to moderate in severity. Patient also complaining of constipation. She has not had a bowel movement in 3 days now. She denies any fevers, chills. No meningeal signs, no saddle anesthesias or paresthesias and no leg weakness. Patient's back pain radiates around her back into her lumbar areas however she does not have any abdominal pain. Yesterday patient had one episode of vomiting. Currently not nauseous. Patient took ibuprofen yesterday for her pain which helped her symptoms. Patient admits to snorting cocaine 3 days ago, however she denies IV drug use. Review of Systems   Constitutional: Negative for chills and fever. HENT: Negative for congestion. Respiratory: Negative for cough and shortness of breath. Gastrointestinal: Positive for constipation. Negative for abdominal pain and diarrhea. Genitourinary: Negative for difficulty urinating and dysuria. Musculoskeletal: Positive for back pain. Negative for neck pain. Skin: Negative for rash. Physical Exam  Vitals signs and nursing note reviewed. HENT:      Head: Normocephalic and atraumatic. Nose: Nose normal. No congestion. Mouth/Throat:      Mouth: Mucous membranes are moist.      Pharynx: Oropharynx is clear. Eyes:      Conjunctiva/sclera: Conjunctivae normal.      Pupils: Pupils are equal, round, and reactive to light. Neck:      Musculoskeletal: Normal range of motion and neck supple. No neck rigidity or muscular tenderness. Comments: No meningeal signs  Cardiovascular:      Rate and Rhythm: Normal rate and regular rhythm. Pulses: Normal pulses. Heart sounds: Normal heart sounds.    Pulmonary:      Effort: Pulmonary effort is unable to pick them up from pharmacy as she lost the prescription. Lab work showing creatinine of 2.7 and GFR of 25. Significant change from March 10 with a creatinine of 0.6. With conjunction of patient's elevated blood pressure and creatinine patient will need to be admitted for hypertensive urgency. Spoke with hospitalist who accepted. ED Course as of Mar 27 1950   Sat Mar 27, 2021   1524 ATTENDING PROVIDER ATTESTATION:     I have personally performed and/or participated in the history, exam, medical decision making, and procedures and agree with all pertinent clinical information unless otherwise noted. I have also reviewed and agree with the past medical, family and social history unless otherwise noted. I have discussed this patient in detail with the resident, and provided the instruction and education regarding patient here complaining of 3 to 4 days of increasing low back pain. Describes as initially starting the mid area and then stretching out both lateral sides of her lumbar region. Has had some constipation for the last 3 days as well. Admits to cocaine use. No abdominal pain. No nausea vomiting or diarrhea. No thoracic or cervical spine tenderness. No pain with range of motion of the lumbar spine. Describes currently as more pain in the paraspinous regions and not the midline. No extremity numbness, tingling, paresthesias or weakness. .  My findings/plan: Patient is sitting the bed no distress with range of motion of her cervical, thoracic and lumbar spines. She has during my exam no midline lumbar spine tenderness but does have a small pablo where she had her epidural.  She has very minimal discomfort on palpation of the bilateral low lumbar paraspinous musculature regions with no CVA tenderness. Abdomen nontender. No meningeal signs. No focal neurologic deficits. She denies fevers, sweats or chills, night sweats or weight loss. Also denies IV drug use.           [NC]   3004 Patient with elevated creatinine 2.7 and GFR 25    [FG]   1715 Of note, chart review shows patient to been discharged from her recent delivery and pregnancy with pregnancy-induced hypertension on nifedipine. She lost her prescription and did not fill it. States that she saw her OB/GYN yesterday who refilled her prescription but she still has not picked it up or been taking it. [NC]      ED Course User Index  [FG] Alhaji Sumner DO  [NC] Rafael Ayon DO            --------------------------------------------- PAST HISTORY ---------------------------------------------  Past Medical History:  has a past medical history of Abnormal Pap smear, Complication of anesthesia, Herpes simplex without mention of complication, and Hypertensive urgency. Past Surgical History:  has no past surgical history on file. Social History:  reports that she has been smoking cigarettes. She has a 1.00 pack-year smoking history. She has never used smokeless tobacco. She reports previous alcohol use. She reports previous drug use. Drugs: Marijuana and Cocaine. Family History: family history includes Diabetes in her maternal grandfather; Hearing Loss in her sister; Heart Disease in her sister; Heart Surgery in her sister; Hypertension in her sister. The patients home medications have been reviewed. Allergies: Patient has no known allergies.     -------------------------------------------------- RESULTS -------------------------------------------------    LABS:  Results for orders placed or performed during the hospital encounter of 03/27/21   COVID-19, Rapid    Specimen: Nasopharyngeal Swab   Result Value Ref Range    SARS-CoV-2, NAAT Not Detected Not Detected   CBC Auto Differential   Result Value Ref Range    WBC 7.7 4.5 - 11.5 E9/L    RBC 4.27 3.50 - 5.50 E12/L    Hemoglobin 11.7 11.5 - 15.5 g/dL    Hematocrit 37.8 34.0 - 48.0 %    MCV 88.5 80.0 - 99.9 fL    MCH 27.4 26.0 - 35.0 pg    MCHC 31.0 (L) 32.0 - 34.5 % RDW 14.1 11.5 - 15.0 fL    Platelets 280 183 - 711 E9/L    MPV 9.9 7.0 - 12.0 fL    Neutrophils % 79.0 43.0 - 80.0 %    Immature Granulocytes % 0.3 0.0 - 5.0 %    Lymphocytes % 11.1 (L) 20.0 - 42.0 %    Monocytes % 7.2 2.0 - 12.0 %    Eosinophils % 2.1 0.0 - 6.0 %    Basophils % 0.3 0.0 - 2.0 %    Neutrophils Absolute 6.05 1.80 - 7.30 E9/L    Immature Granulocytes # 0.02 E9/L    Lymphocytes Absolute 0.85 (L) 1.50 - 4.00 E9/L    Monocytes Absolute 0.55 0.10 - 0.95 E9/L    Eosinophils Absolute 0.16 0.05 - 0.50 E9/L    Basophils Absolute 0.02 0.00 - 0.20 F0/V   Basic Metabolic Panel w/ Reflex to MG   Result Value Ref Range    Sodium 138 132 - 146 mmol/L    Potassium reflex Magnesium 3.7 3.5 - 5.0 mmol/L    Chloride 104 98 - 107 mmol/L    CO2 24 22 - 29 mmol/L    Anion Gap 10 7 - 16 mmol/L    Glucose 140 (H) 74 - 99 mg/dL    BUN 21 (H) 6 - 20 mg/dL    CREATININE 2.7 (H) 0.5 - 1.0 mg/dL    GFR Non-African American 25 >=60 mL/min/1.73    GFR African American 25     Calcium 8.5 (L) 8.6 - 10.2 mg/dL   Urinalysis with Microscopic   Result Value Ref Range    Color, UA Yellow Straw/Yellow    Clarity, UA Clear Clear    Glucose, Ur Negative Negative mg/dL    Bilirubin Urine Negative Negative    Ketones, Urine Negative Negative mg/dL    Specific Gravity, UA 1.010 1.005 - 1.030    Blood, Urine MODERATE (A) Negative    pH, UA 6.0 5.0 - 9.0    Protein, UA 30 (A) Negative mg/dL    Urobilinogen, Urine 0.2 <2.0 E.U./dL    Nitrite, Urine Negative Negative    Leukocyte Esterase, Urine SMALL (A) Negative    WBC, UA 1-3 0 - 5 /HPF    RBC, UA NONE 0 - 2 /HPF    Bacteria, UA NONE SEEN None Seen /HPF   Hepatic function panel   Result Value Ref Range    Total Protein 6.8 6.4 - 8.3 g/dL    Albumin 3.8 3.5 - 5.2 g/dL    Alkaline Phosphatase 86 35 - 104 U/L    ALT 9 0 - 32 U/L    AST 15 0 - 31 U/L    Total Bilirubin 0.5 0.0 - 1.2 mg/dL    Bilirubin, Direct <0.2 0.0 - 0.3 mg/dL    Bilirubin, Indirect see below 0.0 - 1.0 mg/dL   URINE DRUG SCREEN   Result Value Ref Range    Amphetamine Screen, Urine NOT DETECTED Negative <1000 ng/mL    Barbiturate Screen, Ur NOT DETECTED Negative < 200 ng/mL    Benzodiazepine Screen, Urine NOT DETECTED Negative < 200 ng/mL    Cannabinoid Scrn, Ur NOT DETECTED Negative < 50ng/mL    Cocaine Metabolite Screen, Urine POSITIVE (A) Negative < 300 ng/mL    Opiate Scrn, Ur NOT DETECTED Negative < 300ng/mL    PCP Screen, Urine NOT DETECTED Negative < 25 ng/mL    Methadone Screen, Urine NOT DETECTED Negative <300 ng/mL    Oxycodone Urine NOT DETECTED Negative <100 ng/mL    FENTANYL SCREEN, URINE NOT DETECTED Negative <1 ng/mL    Drug Screen Comment: see below    Serum Drug Screen   Result Value Ref Range    Ethanol Lvl <10 mg/dL    Acetaminophen Level <5.0 (L) 10.0 - 09.1 mcg/mL    Salicylate, Serum <3.4 0.0 - 30.0 mg/dL   Lactic Acid, Plasma   Result Value Ref Range    Lactic Acid 0.7 0.5 - 2.2 mmol/L   EKG 12 Lead   Result Value Ref Range    Ventricular Rate 68 BPM    Atrial Rate 68 BPM    P-R Interval 150 ms    QRS Duration 92 ms    Q-T Interval 410 ms    QTc Calculation (Bazett) 435 ms    P Axis 21 degrees    R Axis 8 degrees    T Axis 37 degrees       RADIOLOGY:  CT LUMBAR SPINE WO CONTRAST   Final Result   No acute abnormality of lumbar spine. CT ABDOMEN PELVIS WO CONTRAST Additional Contrast? None   Final Result   1. No renal, ureteral, or bladder calculi. No hydronephrosis. 2. Partially visualized ground-glass opacity in the right lower lobe. Correlate with presentation. Follow-up recommended. 3. Other findings as described. EKG:  This EKG is signed and interpreted by me. Rate: 68  Rhythm: Sinus  Interpretation: no acute changes  Comparison: no prior available for comparison.       ------------------------- NURSING NOTES AND VITALS REVIEWED ---------------------------  Date / Time Roomed:  3/27/2021  2:57 PM  ED Bed Assignment:  16/16    The nursing notes within the ED encounter and vital signs as below have been reviewed. Patient Vitals for the past 24 hrs:   BP Temp Temp src Pulse Resp SpO2   03/27/21 1922 (!) 164/106 98.7 °F (37.1 °C) Oral 66 18 100 %   03/27/21 1819 (!) 194/118 -- -- -- 18 --   03/27/21 1633 (!) 166/119 -- -- -- -- --   03/27/21 1456 -- 98.4 °F (36.9 °C) -- -- -- --   03/27/21 1453 (!) 179/98 -- -- 88 18 98 %       Oxygen Saturation Interpretation: Normal    ------------------------------------------ PROGRESS NOTES ------------------------------------------  Re-evaluation(s):  Time: 7:30  Patients symptoms show no change  Repeat physical examination is not changed    Counseling:  I have spoken with the patient and discussed todays results, in addition to providing specific details for the plan of care and counseling regarding the diagnosis and prognosis. Their questions are answered at this time and they are agreeable with the plan of admission.    --------------------------------- ADDITIONAL PROVIDER NOTES ---------------------------------  Consultations:  Time: 7:50. Spoke with Dr. Brittaney Wyatt.  Discussed case. They will admit the patient. This patient's ED course included: a personal history and physicial examination, re-evaluation prior to disposition, multiple bedside re-evaluations, IV medications and cardiac monitoring    This patient has remained hemodynamically stable during their ED course. Diagnosis:  1. Hypertensive urgency    2. JERAMY (acute kidney injury) ([facility] [address]    3. Cocaine use    4. Noncompliance with medications        Disposition:  Patient's disposition: Admit to med/surg with tele  Patient's condition is stable.            Sean Hoang DO  Resident  03/28/21 0000

## 2021-03-27 NOTE — Clinical Note
Patient Class: Inpatient [101]   REQUIRED: Diagnosis: Hypertensive urgency [[id number]]   Estimated Length of Stay: Estimated stay of less than 2 midnights   Telemetry Bed Required?: Yes

## 2021-03-28 ENCOUNTER — APPOINTMENT (OUTPATIENT)
Dept: ULTRASOUND IMAGING | Age: 31
DRG: 561 | End: 2021-03-28
Payer: COMMERCIAL

## 2021-03-28 LAB
ALBUMIN SERPL-MCNC: 3.1 G/DL (ref 3.5–5.2)
ALP BLD-CCNC: 92 U/L (ref 35–104)
ALT SERPL-CCNC: 9 U/L (ref 0–32)
ANION GAP SERPL CALCULATED.3IONS-SCNC: 12 MMOL/L (ref 7–16)
AST SERPL-CCNC: 15 U/L (ref 0–31)
BASOPHILS ABSOLUTE: 0.04 E9/L (ref 0–0.2)
BASOPHILS RELATIVE PERCENT: 0.6 % (ref 0–2)
BILIRUB SERPL-MCNC: 0.5 MG/DL (ref 0–1.2)
BUN BLDV-MCNC: 22 MG/DL (ref 6–20)
CALCIUM SERPL-MCNC: 8.3 MG/DL (ref 8.6–10.2)
CHLORIDE BLD-SCNC: 111 MMOL/L (ref 98–107)
CO2: 18 MMOL/L (ref 22–29)
CREAT SERPL-MCNC: 2.7 MG/DL (ref 0.5–1)
CREATININE URINE: 15 MG/DL (ref 29–226)
EKG ATRIAL RATE: 68 BPM
EKG P AXIS: 21 DEGREES
EKG P-R INTERVAL: 150 MS
EKG Q-T INTERVAL: 410 MS
EKG QRS DURATION: 92 MS
EKG QTC CALCULATION (BAZETT): 435 MS
EKG R AXIS: 8 DEGREES
EKG T AXIS: 37 DEGREES
EKG VENTRICULAR RATE: 68 BPM
EOSINOPHILS ABSOLUTE: 0.2 E9/L (ref 0.05–0.5)
EOSINOPHILS RELATIVE PERCENT: 2.8 % (ref 0–6)
GFR AFRICAN AMERICAN: 25
GFR NON-AFRICAN AMERICAN: 25 ML/MIN/1.73
GLUCOSE BLD-MCNC: 99 MG/DL (ref 74–99)
HCT VFR BLD CALC: 35.6 % (ref 34–48)
HEMOGLOBIN: 10.9 G/DL (ref 11.5–15.5)
IMMATURE GRANULOCYTES #: 0.02 E9/L
IMMATURE GRANULOCYTES %: 0.3 % (ref 0–5)
LYMPHOCYTES ABSOLUTE: 1.24 E9/L (ref 1.5–4)
LYMPHOCYTES RELATIVE PERCENT: 17.2 % (ref 20–42)
MCH RBC QN AUTO: 27.5 PG (ref 26–35)
MCHC RBC AUTO-ENTMCNC: 30.6 % (ref 32–34.5)
MCV RBC AUTO: 89.9 FL (ref 80–99.9)
MONOCYTES ABSOLUTE: 0.57 E9/L (ref 0.1–0.95)
MONOCYTES RELATIVE PERCENT: 7.9 % (ref 2–12)
NEUTROPHILS ABSOLUTE: 5.15 E9/L (ref 1.8–7.3)
NEUTROPHILS RELATIVE PERCENT: 71.2 % (ref 43–80)
PDW BLD-RTO: 14.2 FL (ref 11.5–15)
PLATELET # BLD: 281 E9/L (ref 130–450)
PMV BLD AUTO: 10 FL (ref 7–12)
POTASSIUM REFLEX MAGNESIUM: 4 MMOL/L (ref 3.5–5)
PROTEIN PROTEIN: 13 MG/DL (ref 0–12)
PROTEIN/CREAT RATIO: 0.9
PROTEIN/CREAT RATIO: 0.9 (ref 0–0.2)
RBC # BLD: 3.96 E12/L (ref 3.5–5.5)
SODIUM BLD-SCNC: 141 MMOL/L (ref 132–146)
TOTAL PROTEIN: 5.9 G/DL (ref 6.4–8.3)
WBC # BLD: 7.2 E9/L (ref 4.5–11.5)

## 2021-03-28 PROCEDURE — 6360000002 HC RX W HCPCS: Performed by: INTERNAL MEDICINE

## 2021-03-28 PROCEDURE — 2580000003 HC RX 258: Performed by: INTERNAL MEDICINE

## 2021-03-28 PROCEDURE — 82570 ASSAY OF URINE CREATININE: CPT

## 2021-03-28 PROCEDURE — 2500000003 HC RX 250 WO HCPCS: Performed by: INTERNAL MEDICINE

## 2021-03-28 PROCEDURE — 36415 COLL VENOUS BLD VENIPUNCTURE: CPT

## 2021-03-28 PROCEDURE — 76770 US EXAM ABDO BACK WALL COMP: CPT

## 2021-03-28 PROCEDURE — 93010 ELECTROCARDIOGRAM REPORT: CPT | Performed by: INTERNAL MEDICINE

## 2021-03-28 PROCEDURE — 85025 COMPLETE CBC W/AUTO DIFF WBC: CPT

## 2021-03-28 PROCEDURE — 84156 ASSAY OF PROTEIN URINE: CPT

## 2021-03-28 PROCEDURE — 6370000000 HC RX 637 (ALT 250 FOR IP): Performed by: INTERNAL MEDICINE

## 2021-03-28 PROCEDURE — 1200000000 HC SEMI PRIVATE

## 2021-03-28 PROCEDURE — 99232 SBSQ HOSP IP/OBS MODERATE 35: CPT | Performed by: INTERNAL MEDICINE

## 2021-03-28 PROCEDURE — 80053 COMPREHEN METABOLIC PANEL: CPT

## 2021-03-28 RX ORDER — MORPHINE SULFATE 2 MG/ML
2 INJECTION, SOLUTION INTRAMUSCULAR; INTRAVENOUS EVERY 4 HOURS PRN
Status: DISCONTINUED | OUTPATIENT
Start: 2021-03-28 | End: 2021-03-31 | Stop reason: HOSPADM

## 2021-03-28 RX ORDER — NIFEDIPINE 60 MG/1
60 TABLET, EXTENDED RELEASE ORAL DAILY
Status: DISCONTINUED | OUTPATIENT
Start: 2021-03-29 | End: 2021-03-31 | Stop reason: HOSPADM

## 2021-03-28 RX ADMIN — LORAZEPAM 0.5 MG: 2 INJECTION INTRAMUSCULAR; INTRAVENOUS at 14:34

## 2021-03-28 RX ADMIN — ACETAMINOPHEN 650 MG: 325 TABLET, FILM COATED ORAL at 07:50

## 2021-03-28 RX ADMIN — SODIUM CHLORIDE: 9 INJECTION, SOLUTION INTRAVENOUS at 09:14

## 2021-03-28 RX ADMIN — SODIUM CHLORIDE, PRESERVATIVE FREE 10 ML: 5 INJECTION INTRAVENOUS at 21:50

## 2021-03-28 RX ADMIN — CLONIDINE HYDROCHLORIDE 0.2 MG: 0.2 TABLET ORAL at 20:58

## 2021-03-28 RX ADMIN — ACETAMINOPHEN 650 MG: 325 TABLET, FILM COATED ORAL at 02:20

## 2021-03-28 RX ADMIN — ACETAMINOPHEN 650 MG: 325 TABLET, FILM COATED ORAL at 21:50

## 2021-03-28 RX ADMIN — NIFEDIPINE 30 MG: 30 TABLET, FILM COATED, EXTENDED RELEASE ORAL at 05:28

## 2021-03-28 RX ADMIN — SODIUM CHLORIDE, PRESERVATIVE FREE 10 ML: 5 INJECTION INTRAVENOUS at 14:04

## 2021-03-28 RX ADMIN — MORPHINE SULFATE 2 MG: 2 INJECTION, SOLUTION INTRAMUSCULAR; INTRAVENOUS at 14:03

## 2021-03-28 RX ADMIN — MORPHINE SULFATE 2 MG: 2 INJECTION, SOLUTION INTRAMUSCULAR; INTRAVENOUS at 20:54

## 2021-03-28 RX ADMIN — FAMOTIDINE 20 MG: 20 TABLET, FILM COATED ORAL at 09:13

## 2021-03-28 RX ADMIN — ENOXAPARIN SODIUM 40 MG: 40 INJECTION SUBCUTANEOUS at 09:13

## 2021-03-28 RX ADMIN — CLONIDINE HYDROCHLORIDE 0.2 MG: 0.2 TABLET ORAL at 02:20

## 2021-03-28 RX ADMIN — LORAZEPAM 0.5 MG: 2 INJECTION INTRAMUSCULAR; INTRAVENOUS at 07:52

## 2021-03-28 RX ADMIN — ACETAMINOPHEN 650 MG: 325 TABLET, FILM COATED ORAL at 14:34

## 2021-03-28 RX ADMIN — LORAZEPAM 0.5 MG: 2 INJECTION INTRAMUSCULAR; INTRAVENOUS at 21:50

## 2021-03-28 RX ADMIN — SODIUM BICARBONATE: 84 INJECTION, SOLUTION INTRAVENOUS at 13:57

## 2021-03-28 ASSESSMENT — PAIN SCALES - GENERAL
PAINLEVEL_OUTOF10: 0
PAINLEVEL_OUTOF10: 8
PAINLEVEL_OUTOF10: 0
PAINLEVEL_OUTOF10: 10
PAINLEVEL_OUTOF10: 3
PAINLEVEL_OUTOF10: 10
PAINLEVEL_OUTOF10: 9
PAINLEVEL_OUTOF10: 10
PAINLEVEL_OUTOF10: 10

## 2021-03-28 ASSESSMENT — PAIN DESCRIPTION - ORIENTATION: ORIENTATION: LOWER

## 2021-03-28 ASSESSMENT — PAIN DESCRIPTION - LOCATION: LOCATION: BACK

## 2021-03-28 ASSESSMENT — PAIN DESCRIPTION - ONSET
ONSET: ON-GOING
ONSET: ON-GOING

## 2021-03-28 NOTE — PROGRESS NOTES
Darvin Rose,    Your patient is on a medication that requires a renal dose adjustment. Renal Function Assessment:    Date Body Weight IBW Adj. Body Weight SCr CrCl Dialysis status   3/27/2021 88 kg 61.6 kg 72.2 kg 2.7 34 ml/min UNK       Pharmacy has renally dose-adjusted the following medication(s):    Date Medication Original Dosing Regimen New Dosing Regimen   3/27/2021 Pepcid 20 mg BID 20 mg QD           These changes were made per protocol according to the [facility] Renal Function-Based Dose Adjustments Policy    *Please note this dose may need readjusted if your patient's renal function significantly improves. Please contact pharmacy with any questions regarding these changes.

## 2021-03-28 NOTE — CONSULTS
Nephrology Consult Note  Patient's Name: Ivan Rader  10:25 AM  3/28/2021    Nephrologist: Carolyn Espinal    Reason for Consult:  HTN Nephropathy  Requesting Physician:  Jose Alejandro Souza MD    Chief Complaint:  Back Pain    History Obtained From:  patient and past medical records    History of Present Ilness:    Ivan Rader is a 26 y.o. female with prior history of hospital admission 3/10/21-3/13/21 for vaginal delivery of a male infant and delivery affected by postpartum hypertension. BP on D/C 140/89 and serum cr 0.6mg/dl on 3/11/21. Pt presented back to the ED 3/27/21 with the c/o back pain and constipation and 1 episode of emesis. She took ibuprofen 1 day PTA and  1 week after being home  Started to  snorted cocaine. No hx of IVDA . Pt was given a prescription for nifedipine extended release 30mg qd at [facility] [place] on [place]13 but did not get the prescription filled. BP in the //118. She is still having vaginal blood loss. She denies any dysuria, gross hematuria or nocturia  Pt is [id number] and this is the 1st pregnancy with HTN, no prior hx of Pre-Eclampsia, she had 1 spontaneous miscarriage and 5 induced abortions    Past Medical History:   Diagnosis Date    Abnormal Pap smear     JERAMY (acute kidney injury) ([facility] [address] 1/90/0190    Complication of anesthesia 2011    States hhas spinal curvature and had one-sided Epidural    Herpes simplex without mention of complication     Hypertensive urgency 3/27/2021       History reviewed. No pertinent surgical history. Family History   Problem Relation Age of Onset   [place] Hearing Loss Sister     Hypertension Sister     Heart Disease Sister     Heart Surgery Sister     Diabetes Maternal Grandfather         reports that she has been smoking cigarettes. She has a 1.00 pack-year smoking history. She has never used smokeless tobacco. She reports previous alcohol use. She reports previous drug use. Drugs: Marijuana and Cocaine.     Allergies:  Patient has no known allergies. Current Medications:    LORazepam (ATIVAN) injection 0.5 mg, Q6H PRN  0.9 % sodium chloride infusion, Continuous  cloNIDine (CATAPRES) tablet 0.2 mg, Q8H PRN  hydrALAZINE (APRESOLINE) injection 10 mg, Q6H PRN  sodium chloride flush 0.9 % injection 10 mL, 2 times per day  sodium chloride flush 0.9 % injection 10 mL, PRN  0.9 % sodium chloride infusion, PRN  enoxaparin (LOVENOX) injection 40 mg, Daily  promethazine (PHENERGAN) tablet 12.5 mg, Q6H PRN    Or  ondansetron (ZOFRAN) injection 4 mg, Q6H PRN  polyethylene glycol (GLYCOLAX) packet 17 g, Daily PRN  famotidine (PEPCID) tablet 20 mg, Daily  acetaminophen (TYLENOL) tablet 650 mg, Q6H PRN    Or  acetaminophen (TYLENOL) suppository 650 mg, Q6H PRN  NIFEdipine (ADALAT CC) extended release tablet 30 mg, Daily        Review of Systems:   Pertinent items are noted in HPI.     Physical exam:   Constitutional:  Young female in NAD  Vitals:   VITALS:  BP (!) 148/86 Comment: manual  Pulse 63   Temp 98.2 °F (36.8 °C) (Oral)   Resp 20   Ht 5' 7\" (1.702 m)   Wt 194 lb (88 kg)   LMP 06/14/2020   SpO2 99%   BMI 30.38 kg/m²   24HR INTAKE/OUTPUT:    Intake/Output Summary (Last 24 hours) at 3/28/2021 1026  Last data filed at 3/28/2021 0920  Gross per 24 hour   Intake 3670 ml   Output 1900 ml   Net 1770 ml     URINARY CATHETER OUTPUT (Diaz):     DRAIN/TUBE OUTPUT:     VENT SETTINGS:  Vent Information  SpO2: 99 %  Additional Respiratory  Assessments  Pulse: 63  Resp: 20  SpO2: 99 %    Skin: no rash, turgor wnl  Heent:  eomi, mmm  Neck: no bruits or jvd noted  Cardiovascular: PMI not lat dispalced  S1, S2 without S3 or rub  Respiratory: CTA B without w/r/r  Abdomen:  +bs, soft, nt, nd  Ext: trace bilat lower extremity edema  Psychiatric: mood and affect appropriate  Musculoskeletal:  Rom, muscular strength intact    Data:   Labs:  CBC:   Lab Results   Component Value Date    WBC 7.2 03/28/2021    RBC 3.96 03/28/2021    HGB 10.9 03/28/2021    HCT 35.6 03/28/2021 MCV 89.9 03/28/2021    MCH 27.5 03/28/2021    MCHC 30.6 03/28/2021    RDW 14.2 03/28/2021     03/28/2021    MPV 10.0 03/28/2021     CBC with Differential:    Lab Results   Component Value Date    WBC 7.2 03/28/2021    RBC 3.96 03/28/2021    HGB 10.9 03/28/2021    HCT 35.6 03/28/2021     03/28/2021    MCV 89.9 03/28/2021    MCH 27.5 03/28/2021    MCHC 30.6 03/28/2021    RDW 14.2 03/28/2021    SEGSPCT 73 06/06/2012    LYMPHOPCT 17.2 03/28/2021    MONOPCT 7.9 03/28/2021    BASOPCT 0.6 03/28/2021    MONOSABS 0.57 03/28/2021    LYMPHSABS 1.24 03/28/2021    EOSABS 0.20 03/28/2021    BASOSABS 0.04 03/28/2021     Platelets:    Lab Results   Component Value Date     03/28/2021     Hemoglobin/Hematocrit:    Lab Results   Component Value Date    HGB 10.9 03/28/2021    HCT 35.6 03/28/2021     CMP:    Lab Results   Component Value Date     03/28/2021    K 4.0 03/28/2021     03/28/2021    CO2 18 03/28/2021    BUN 22 03/28/2021    CREATININE 2.7 03/28/2021    GFRAA 25 03/28/2021    LABGLOM 25 03/28/2021    GLUCOSE 99 03/28/2021    GLUCOSE 90 11/08/2010    PROT 5.9 03/28/2021    LABALBU 3.1 03/28/2021    CALCIUM 8.3 03/28/2021    BILITOT 0.5 03/28/2021    ALKPHOS 92 03/28/2021    AST 15 03/28/2021    ALT 9 03/28/2021     BMP:    Lab Results   Component Value Date     03/28/2021    K 4.0 03/28/2021     03/28/2021    CO2 18 03/28/2021    BUN 22 03/28/2021    LABALBU 3.1 03/28/2021    CREATININE 2.7 03/28/2021    CALCIUM 8.3 03/28/2021    GFRAA 25 03/28/2021    LABGLOM 25 03/28/2021    GLUCOSE 99 03/28/2021    GLUCOSE 90 11/08/2010     BUN/Creatinine:    Lab Results   Component Value Date    BUN 22 03/28/2021    CREATININE 2.7 03/28/2021     Hepatic Function Panel:    Lab Results   Component Value Date    ALKPHOS 92 03/28/2021    ALT 9 03/28/2021    AST 15 03/28/2021    PROT 5.9 03/28/2021    BILITOT 0.5 03/28/2021    BILIDIR <0.2 03/27/2021    IBILI see below 03/27/2021    LABALBU 3.1 IAMMENTA, IBARBIT, IBENZO, ICOCAINE, IMARTHC, IOPIATES, IPHENCYC  24 Hour Urine for Protein:  No components found for: RAWUPRO, UHRS3, QKWB46FN, UTV3  24 Hour Urine for Creatinine Clearance:  No components found for: CREAT4, UHRS10, UTV10     Imaging:  Location:200       Exam: US RETROPERITONEAL LIMITED       Comparison: None       History:   Urinary tract infection, pregnancy       Real time sector scanning of the kidneys was obtained in multiple   positions. The kidneys are normal in size and configuration.  The   renal cortical thickness and echogenicity are normal.  There is no   evidence of solid or cystic mass. No evidence for hydronephrosis. The   longitudinal renal measurement is 12.5 cm for the left kidney and 11.1   cm for the right kidney.  The urinary bladder is normal.           Impression   Normal renal ultrasound. EXAMINATION:   CT OF THE ABDOMEN AND PELVIS WITHOUT CONTRAST 3/27/2021 4:56 pm       TECHNIQUE:   CT of the abdomen and pelvis was performed without the administration of   intravenous contrast. Multiplanar reformatted images are provided for review.    Dose modulation, iterative reconstruction, and/or weight based adjustment of   the mA/kV was utilized to reduce the radiation dose to as low as reasonably   achievable.       COMPARISON:   CT abdomen pelvis 11/16/2016.       HISTORY:   ORDERING SYSTEM PROVIDED HISTORY: concern for obstruction, renal failure   TECHNOLOGIST PROVIDED HISTORY:   Reason for exam:->concern for obstruction, renal failure   Additional Contrast?->None   Decision Support Exception->Emergency Medical Condition (MA)       FINDINGS:   Lower Chest: Partially visualized ground-glass opacity in the right lower   lobe.  Prominent sized heart.       Organs:       Evaluation of the parenchymal organs is limited due to lack of intravenous   contrast.       Liver: Unremarkable on this unenhanced exam.       Spleen: Unremarkable on this unenhanced exam.       Pancreas: No inflammatory changes appreciated on this unenhanced exam.       Adrenal glands: Unremarkable on this unenhanced exam.       Kidneys: Unremarkable on this unenhanced exam. No renal, ureteral, or bladder   calculi.       Gallbladder: Incompletely distended.       GI/Bowel:       Evaluation of the bowel and mesentery is limited due to lack of enteric   contrast.       Visualized esophagus/stomach: Small hiatal hernia.       Small bowel: No dilated small bowel.       Large bowel: Scattered colonic diverticula without adjacent stranding.       Appendix: Normal.       Pelvis: Bladder is incompletely distended. Uterus appears enlarged.  Ovaries   are not well evaluated.       Peritoneum/Retroperitoneum:       Adenopathy: Suboptimal evaluation for adenopathy due to lack of intravenous   and enteric contrast.       Abdominal aorta: No abdominal aortic aneurysm.       Free fluid/air: No free fluid. No free air.       Bones/Soft Tissues: Diastasis of the rectus musculature.  Other nonspecific   sclerosis in the proximal left femur.           Impression   1. No renal, ureteral, or bladder calculi.  No hydronephrosis. 2. Partially visualized ground-glass opacity in the right lower lobe. Correlate with presentation.  Follow-up recommended. 3. Other findings as described. 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 Check Sed Rate  2. Check Basic Serologies  3. No Further NSAID's  4. Continue IVF  5. Recheck BMP this PM  6.  Will proceed with Perc renal bx for definitive Dx    2-HTN in the Peripartum possibly exacerbated by the JERAMY  PLAN:1. Titrate the Nifedipine    3-Non Anion gap Met Acidosis with the JERAMY  HCO3 below goal 22  PLAN:1. Change the IVF to an HCO3 based IVF    4- Hypocalcemia with hypoalbuminemia in the setting of the JERAMY  PLAN:1.  Check PO4, Vit D      Thank you Dr. Mack Basurto MD for allowing us to participate in care of Paula Lerner  10:25 AM  3/28/2021

## 2021-03-28 NOTE — PROGRESS NOTES
Department of Internal Medicine  General Internal Medicine  Attending Progress Note  Chief Complaint   Patient presents with    Back Pain     back pain at epidural sight, two weeks postpartum        SUBJECTIVE:    Patient reports that she is doing okay. Denies fever chills. No chest pain. Back pain has improved.     OBJECTIVE      Medications    Current Facility-Administered Medications: [START ON 3/29/2021] NIFEdipine (PROCARDIA XL) extended release tablet 60 mg, 60 mg, Oral, Daily  sodium bicarbonate 75 mEq in dextrose 5 % and 0.45 % NaCl 1,000 mL infusion, , Intravenous, Continuous  morphine (PF) injection 2 mg, 2 mg, Intravenous, Q4H PRN  LORazepam (ATIVAN) injection 0.5 mg, 0.5 mg, Intravenous, Q6H PRN  cloNIDine (CATAPRES) tablet 0.2 mg, 0.2 mg, Oral, Q8H PRN  hydrALAZINE (APRESOLINE) injection 10 mg, 10 mg, Intravenous, Q6H PRN  sodium chloride flush 0.9 % injection 10 mL, 10 mL, Intravenous, 2 times per day  sodium chloride flush 0.9 % injection 10 mL, 10 mL, Intravenous, PRN  0.9 % sodium chloride infusion, 25 mL, Intravenous, PRN  [Held by provider] enoxaparin (LOVENOX) injection 40 mg, 40 mg, Subcutaneous, Daily  promethazine (PHENERGAN) tablet 12.5 mg, 12.5 mg, Oral, Q6H PRN **OR** ondansetron (ZOFRAN) injection 4 mg, 4 mg, Intravenous, Q6H PRN  polyethylene glycol (GLYCOLAX) packet 17 g, 17 g, Oral, Daily PRN  famotidine (PEPCID) tablet 20 mg, 20 mg, Oral, Daily  acetaminophen (TYLENOL) tablet 650 mg, 650 mg, Oral, Q6H PRN **OR** acetaminophen (TYLENOL) suppository 650 mg, 650 mg, Rectal, Q6H PRN  Physical    VITALS:  BP (!) 148/86 Comment: manual  Pulse 63   Temp 98.2 °F (36.8 °C) (Oral)   Resp 20   Ht 5' 7\" (1.702 m)   Wt 194 lb (88 kg)   LMP 06/14/2020   SpO2 99%   BMI 30.38 kg/m²   CONSTITUTIONAL:  awake, alert, cooperative, no apparent distress, and appears stated age  EYES:  Lids and lashes normal, pupils equal, round and reactive to light, extra ocular muscles intact, sclera clear, conjunctiva normal  ENT:  Normocephalic, without obvious abnormality, atraumatic, sinuses nontender on palpation, external ears without lesions, oral pharynx with moist mucus membranes, tonsils without erythema or exudates, gums normal and good dentition. NECK:  Supple, symmetrical, trachea midline, no adenopathy, thyroid symmetric, not enlarged and no tenderness, skin normal  HEMATOLOGIC/LYMPHATICS:  no cervical lymphadenopathy  BACK:  Symmetric, no curvature, spinous processes are non-tender on palpation, paraspinous muscles are non-tender on palpation, no costal vertebral tenderness  LUNGS:  No increased work of breathing, good air exchange, clear to auscultation bilaterally, no crackles or wheezing  CARDIOVASCULAR:  Normal apical impulse, regular rate and rhythm, normal S1 and S2, no S3 or S4, and no murmur noted  ABDOMEN:  No scars, normal bowel sounds, soft, non-distended, non-tender, no masses palpated, no hepatosplenomegally  MUSCULOSKELETAL:  There is no redness, warmth, or swelling of the joints. NEUROLOGIC:  Awake, alert, oriented to name, place and time. SKIN:  no bruising or bleeding  Data    CBC:   Lab Results   Component Value Date    WBC 7.2 03/28/2021    RBC 3.96 03/28/2021    HGB 10.9 03/28/2021    HCT 35.6 03/28/2021    MCV 89.9 03/28/2021    MCH 27.5 03/28/2021    MCHC 30.6 03/28/2021    RDW 14.2 03/28/2021     03/28/2021    MPV 10.0 03/28/2021     CMP:    Lab Results   Component Value Date     03/28/2021    K 4.0 03/28/2021     03/28/2021    CO2 18 03/28/2021    BUN 22 03/28/2021    CREATININE 2.7 03/28/2021    GFRAA 25 03/28/2021    LABGLOM 25 03/28/2021    GLUCOSE 99 03/28/2021    GLUCOSE 90 11/08/2010    PROT 5.9 03/28/2021    LABALBU 3.1 03/28/2021    CALCIUM 8.3 03/28/2021    BILITOT 0.5 03/28/2021    ALKPHOS 92 03/28/2021    AST 15 03/28/2021    ALT 9 03/28/2021       ASSESSMENT AND PLAN      1.   Hypertensive urgency  Bp is better controlled  Continue

## 2021-03-28 NOTE — H&P
[address] [place], [place] [address]ist Group   HISTORY AND PHYSICAL EXAM      AUTHOR: Gilma William PATIENT NAME: Alisa Jay   DATE: 2021 MRN: [id number], : 1990   Primary Care Physician: Ruth Ariza MD     CHIEF COMPLAINT / REASON FOR ADMISSION:  Back pain, Poorly controlled blood pressure  HPI:   This is a 26 y.o. female  has a past medical history of Recent vignal delivery 2 weeks back and Hypertensive urgency. presented with Back pain, Poorly controlled blood pressure for last few days prior to arrival to the hospital. Recently diagnosed with HTN but noncompliant and had prio hospitalization with HTN urgency. Since delivery, she has on and off back pain and in the ED, a CT of spine showed no lesion or any abscess formation. Patient was also found to have HTN urgency with renal impairment and now being admitted of further evaluation   The patient was seen and examined at bedside, appears alert and awake with no acute distress and is able to answer simple  questions. On direct questioning, patient denied any  resting ongoing chest pain, resting SOB, hemoptysis, productive cough, fever, ongoing palpitation, active abdominal pain, hematemesis, rectal bleeding, mine, hematuria, any other  and GI complaints and any new focal neuro deficits. ROS:  Pertinent positives and negatives are noted in the HPI, all other systems are reviewed and negative    PMH:  Past Medical History:   Diagnosis Date    Abnormal Pap smear     JERAMY (acute kidney injury) ([facility] [address] [id number]    Complication of anesthesia 2011    States hhas spinal curvature and had one-sided Epidural    Herpes simplex without mention of complication     Hypertensive urgency 3/27/2021       Surgical History:  History reviewed. No pertinent surgical history. Medications Prior to Admission:    Prior to Admission medications    Medication Sig Start Date End Date Taking?  Authorizing Provider   NIFEdipine (PROCARDIA XL) 30 MG extended 25 >=60 mL/min/1.73    GFR African American 25     Calcium 8.5 (L) 8.6 - 10.2 mg/dL   Urinalysis with Microscopic   Result Value Ref Range    Color, UA Yellow Straw/Yellow    Clarity, UA Clear Clear    Glucose, Ur Negative Negative mg/dL    Bilirubin Urine Negative Negative    Ketones, Urine Negative Negative mg/dL    Specific Gravity, UA 1.010 1.005 - 1.030    Blood, Urine MODERATE (A) Negative    pH, UA 6.0 5.0 - 9.0    Protein, UA 30 (A) Negative mg/dL    Urobilinogen, Urine 0.2 <2.0 E.U./dL    Nitrite, Urine Negative Negative    Leukocyte Esterase, Urine SMALL (A) Negative    WBC, UA 1-3 0 - 5 /HPF    RBC, UA NONE 0 - 2 /HPF    Bacteria, UA NONE SEEN None Seen /HPF   Hepatic function panel   Result Value Ref Range    Total Protein 6.8 6.4 - 8.3 g/dL    Albumin 3.8 3.5 - 5.2 g/dL    Alkaline Phosphatase 86 35 - 104 U/L    ALT 9 0 - 32 U/L    AST 15 0 - 31 U/L    Total Bilirubin 0.5 0.0 - 1.2 mg/dL    Bilirubin, Direct <0.2 0.0 - 0.3 mg/dL    Bilirubin, Indirect see below 0.0 - 1.0 mg/dL   URINE DRUG SCREEN   Result Value Ref Range    Amphetamine Screen, Urine NOT DETECTED Negative <1000 ng/mL    Barbiturate Screen, Ur NOT DETECTED Negative < 200 ng/mL    Benzodiazepine Screen, Urine NOT DETECTED Negative < 200 ng/mL    Cannabinoid Scrn, Ur NOT DETECTED Negative < 50ng/mL    Cocaine Metabolite Screen, Urine POSITIVE (A) Negative < 300 ng/mL    Opiate Scrn, Ur NOT DETECTED Negative < 300ng/mL    PCP Screen, Urine NOT DETECTED Negative < 25 ng/mL    Methadone Screen, Urine NOT DETECTED Negative <300 ng/mL    Oxycodone Urine NOT DETECTED Negative <100 ng/mL    FENTANYL SCREEN, URINE NOT DETECTED Negative <1 ng/mL    Drug Screen Comment: see below    Serum Drug Screen   Result Value Ref Range    Ethanol Lvl <10 mg/dL    Acetaminophen Level <5.0 (L) 10.0 - 04.0 mcg/mL    Salicylate, Serum <0.6 0.0 - 30.0 mg/dL    TCA Scrn NEGATIVE Cutoff:300 ng/mL   Lactic Acid, Plasma   Result Value Ref Range    Lactic Acid 0.7 0.5 - with elevated creatinine 2.7 and GFR 25    [FG]   1715 Of note, chart review shows patient to been discharged from her recent delivery and pregnancy with pregnancy-induced hypertension on nifedipine. She lost her prescription and did not fill it. States that she saw her OB/GYN yesterday who refilled her prescription but she still has not picked it up or been taking it. [NC]      ED Course User Index  [FG] Armani Davenport DO  [NC] Shadia Mccollum DO     Radiology: Ct Abdomen Pelvis Wo Contrast Additional Contrast? None    Result Date: 3/27/2021  EXAMINATION: CT OF THE ABDOMEN AND PELVIS WITHOUT CONTRAST 3/27/2021 4:56 pm TECHNIQUE: CT of the abdomen and pelvis was performed without the administration of intravenous contrast. Multiplanar reformatted images are provided for review. Dose modulation, iterative reconstruction, and/or weight based adjustment of the mA/kV was utilized to reduce the radiation dose to as low as reasonably achievable. COMPARISON: CT abdomen pelvis 11/16/2016. HISTORY: ORDERING SYSTEM PROVIDED HISTORY: concern for obstruction, renal failure TECHNOLOGIST PROVIDED HISTORY: Reason for exam:->concern for obstruction, renal failure Additional Contrast?->None Decision Support Exception->Emergency Medical Condition (MA) FINDINGS: Lower Chest: Partially visualized ground-glass opacity in the right lower lobe. Prominent sized heart. Organs: Evaluation of the parenchymal organs is limited due to lack of intravenous contrast. Liver: Unremarkable on this unenhanced exam. Spleen: Unremarkable on this unenhanced exam. Pancreas: No inflammatory changes appreciated on this unenhanced exam. Adrenal glands: Unremarkable on this unenhanced exam. Kidneys: Unremarkable on this unenhanced exam. No renal, ureteral, or bladder calculi. Gallbladder: Incompletely distended.  GI/Bowel: Evaluation of the bowel and mesentery is limited due to lack of enteric contrast. Visualized esophagus/stomach: Small hiatal hernia. Small bowel: No dilated small bowel. Large bowel: Scattered colonic diverticula without adjacent stranding. Appendix: Normal. Pelvis: Bladder is incompletely distended. Uterus appears enlarged. Ovaries are not well evaluated. Peritoneum/Retroperitoneum: Adenopathy: Suboptimal evaluation for adenopathy due to lack of intravenous and enteric contrast. Abdominal aorta: No abdominal aortic aneurysm. Free fluid/air: No free fluid. No free air. Bones/Soft Tissues: Diastasis of the rectus musculature. Other nonspecific sclerosis in the proximal left femur. 1. No renal, ureteral, or bladder calculi. No hydronephrosis. 2. Partially visualized ground-glass opacity in the right lower lobe. Correlate with presentation. Follow-up recommended. 3. Other findings as described. Ct Lumbar Spine Wo Contrast    Result Date: 3/27/2021  EXAMINATION: CT OF THE LUMBAR SPINE WITHOUT CONTRAST  3/27/2021 TECHNIQUE: CT of the lumbar spine was performed without the administration of intravenous contrast. Multiplanar reformatted images are provided for review. Dose modulation, iterative reconstruction, and/or weight based adjustment of the mA/kV was utilized to reduce the radiation dose to as low as reasonably achievable. COMPARISON: None HISTORY: ORDERING SYSTEM PROVIDED HISTORY: LOWER BACK PAIN TECHNOLOGIST PROVIDED HISTORY: Reason for exam:->epidural 2 weeks ago, pain at the site Decision Support Exception->Emergency Medical Condition (MA) FINDINGS: BONES/ALIGNMENT: There is normal alignment of the spine. The vertebral body heights are maintained. No osseous destructive lesion is seen. DEGENERATIVE CHANGES: No significant degenerative changes of the lumbar spine. No obvious central canal stenosis. Moderate left neural foraminal narrowing at L5/S1. SOFT TISSUES/RETROPERITONEUM: No paraspinal mass is seen. No acute abnormality of lumbar spine.      ASSESSMENT:    Present on Admission:   Hypertensive urgency   JERAMY

## 2021-03-29 ENCOUNTER — APPOINTMENT (OUTPATIENT)
Dept: INTERVENTIONAL RADIOLOGY/VASCULAR | Age: 31
DRG: 561 | End: 2021-03-29
Payer: COMMERCIAL

## 2021-03-29 LAB
ALBUMIN SERPL-MCNC: 3.5 G/DL (ref 3.5–5.2)
ALP BLD-CCNC: 82 U/L (ref 35–104)
ALT SERPL-CCNC: 7 U/L (ref 0–32)
ANION GAP SERPL CALCULATED.3IONS-SCNC: 12 MMOL/L (ref 7–16)
APTT: 27.9 SEC (ref 24.5–35.1)
AST SERPL-CCNC: 12 U/L (ref 0–31)
BILIRUB SERPL-MCNC: 0.4 MG/DL (ref 0–1.2)
BUN BLDV-MCNC: 21 MG/DL (ref 6–20)
C-REACTIVE PROTEIN: 1.8 MG/DL (ref 0–0.4)
C3 COMPLEMENT: 150 MG/DL (ref 90–180)
C4 COMPLEMENT: 30 MG/DL (ref 10–40)
CALCIUM SERPL-MCNC: 8.7 MG/DL (ref 8.6–10.2)
CHLORIDE BLD-SCNC: 106 MMOL/L (ref 98–107)
CO2: 25 MMOL/L (ref 22–29)
CREAT SERPL-MCNC: 2.8 MG/DL (ref 0.5–1)
FERRITIN: 49 NG/ML
FOLATE: 10 NG/ML (ref 4.8–24.2)
GFR AFRICAN AMERICAN: 24
GFR NON-AFRICAN AMERICAN: 24 ML/MIN/1.73
GLUCOSE BLD-MCNC: 113 MG/DL (ref 74–99)
HCT VFR BLD CALC: 38.5 % (ref 34–48)
HCT VFR BLD CALC: 38.7 % (ref 34–48)
HEMOGLOBIN: 11.6 G/DL (ref 11.5–15.5)
HEMOGLOBIN: 12.3 G/DL (ref 11.5–15.5)
INR BLD: 1
IRON SATURATION: 17 % (ref 15–50)
IRON: 54 MCG/DL (ref 37–145)
LUPUS ANTICOAG DVVT: NORMAL
MAGNESIUM: 2 MG/DL (ref 1.6–2.6)
MCH RBC QN AUTO: 27 PG (ref 26–35)
MCH RBC QN AUTO: 27.6 PG (ref 26–35)
MCHC RBC AUTO-ENTMCNC: 30.1 % (ref 32–34.5)
MCHC RBC AUTO-ENTMCNC: 31.8 % (ref 32–34.5)
MCV RBC AUTO: 87 FL (ref 80–99.9)
MCV RBC AUTO: 89.7 FL (ref 80–99.9)
PDW BLD-RTO: 14.1 FL (ref 11.5–15)
PDW BLD-RTO: 14.3 FL (ref 11.5–15)
PHOSPHORUS: 4.9 MG/DL (ref 2.5–4.5)
PLATELET # BLD: 303 E9/L (ref 130–450)
PLATELET # BLD: 306 E9/L (ref 130–450)
PMV BLD AUTO: 10.4 FL (ref 7–12)
PMV BLD AUTO: 10.7 FL (ref 7–12)
POTASSIUM SERPL-SCNC: 3.7 MMOL/L (ref 3.5–5)
PROTHROMBIN TIME: 11.7 SEC (ref 9.3–12.4)
RBC # BLD: 4.29 E12/L (ref 3.5–5.5)
RBC # BLD: 4.45 E12/L (ref 3.5–5.5)
SEDIMENTATION RATE, ERYTHROCYTE: 30 MM/HR (ref 0–20)
SODIUM BLD-SCNC: 143 MMOL/L (ref 132–146)
TOTAL IRON BINDING CAPACITY: 316 MCG/DL (ref 250–450)
TOTAL PROTEIN: 6.4 G/DL (ref 6.4–8.3)
VITAMIN B-12: 453 PG/ML (ref 211–946)
VITAMIN D 25-HYDROXY: 11 NG/ML (ref 30–100)
WBC # BLD: 5.8 E9/L (ref 4.5–11.5)
WBC # BLD: 7.3 E9/L (ref 4.5–11.5)

## 2021-03-29 PROCEDURE — 83550 IRON BINDING TEST: CPT

## 2021-03-29 PROCEDURE — 85730 THROMBOPLASTIN TIME PARTIAL: CPT

## 2021-03-29 PROCEDURE — 86255 FLUORESCENT ANTIBODY SCREEN: CPT

## 2021-03-29 PROCEDURE — 83516 IMMUNOASSAY NONANTIBODY: CPT

## 2021-03-29 PROCEDURE — 99232 SBSQ HOSP IP/OBS MODERATE 35: CPT | Performed by: INTERNAL MEDICINE

## 2021-03-29 PROCEDURE — 85027 COMPLETE CBC AUTOMATED: CPT

## 2021-03-29 PROCEDURE — 86706 HEP B SURFACE ANTIBODY: CPT

## 2021-03-29 PROCEDURE — 6370000000 HC RX 637 (ALT 250 FOR IP): Performed by: INTERNAL MEDICINE

## 2021-03-29 PROCEDURE — 84100 ASSAY OF PHOSPHORUS: CPT

## 2021-03-29 PROCEDURE — 83735 ASSAY OF MAGNESIUM: CPT

## 2021-03-29 PROCEDURE — 80053 COMPREHEN METABOLIC PANEL: CPT

## 2021-03-29 PROCEDURE — 6360000002 HC RX W HCPCS: Performed by: INTERNAL MEDICINE

## 2021-03-29 PROCEDURE — 2500000003 HC RX 250 WO HCPCS: Performed by: INTERNAL MEDICINE

## 2021-03-29 PROCEDURE — 83540 ASSAY OF IRON: CPT

## 2021-03-29 PROCEDURE — 6360000002 HC RX W HCPCS: Performed by: STUDENT IN AN ORGANIZED HEALTH CARE EDUCATION/TRAINING PROGRAM

## 2021-03-29 PROCEDURE — 85613 RUSSELL VIPER VENOM DILUTED: CPT

## 2021-03-29 PROCEDURE — 7100000010 HC PHASE II RECOVERY - FIRST 15 MIN

## 2021-03-29 PROCEDURE — 82728 ASSAY OF FERRITIN: CPT

## 2021-03-29 PROCEDURE — 82306 VITAMIN D 25 HYDROXY: CPT

## 2021-03-29 PROCEDURE — 36415 COLL VENOUS BLD VENIPUNCTURE: CPT

## 2021-03-29 PROCEDURE — 86140 C-REACTIVE PROTEIN: CPT

## 2021-03-29 PROCEDURE — 76942 ECHO GUIDE FOR BIOPSY: CPT

## 2021-03-29 PROCEDURE — 0TB03ZX EXCISION OF RIGHT KIDNEY, PERCUTANEOUS APPROACH, DIAGNOSTIC: ICD-10-PCS | Performed by: STUDENT IN AN ORGANIZED HEALTH CARE EDUCATION/TRAINING PROGRAM

## 2021-03-29 PROCEDURE — 2580000003 HC RX 258: Performed by: INTERNAL MEDICINE

## 2021-03-29 PROCEDURE — 6370000000 HC RX 637 (ALT 250 FOR IP): Performed by: STUDENT IN AN ORGANIZED HEALTH CARE EDUCATION/TRAINING PROGRAM

## 2021-03-29 PROCEDURE — 86160 COMPLEMENT ANTIGEN: CPT

## 2021-03-29 PROCEDURE — 86038 ANTINUCLEAR ANTIBODIES: CPT

## 2021-03-29 PROCEDURE — 86147 CARDIOLIPIN ANTIBODY EA IG: CPT

## 2021-03-29 PROCEDURE — 85651 RBC SED RATE NONAUTOMATED: CPT

## 2021-03-29 PROCEDURE — 86146 BETA-2 GLYCOPROTEIN ANTIBODY: CPT

## 2021-03-29 PROCEDURE — 2709999900 IR BIOPSY KIDNEY PERCUTANEOUS

## 2021-03-29 PROCEDURE — 50200 RENAL BIOPSY PERQ: CPT

## 2021-03-29 PROCEDURE — 87340 HEPATITIS B SURFACE AG IA: CPT

## 2021-03-29 PROCEDURE — 7100000011 HC PHASE II RECOVERY - ADDTL 15 MIN

## 2021-03-29 PROCEDURE — 86803 HEPATITIS C AB TEST: CPT

## 2021-03-29 PROCEDURE — 1200000000 HC SEMI PRIVATE

## 2021-03-29 PROCEDURE — 85610 PROTHROMBIN TIME: CPT

## 2021-03-29 PROCEDURE — 82746 ASSAY OF FOLIC ACID SERUM: CPT

## 2021-03-29 PROCEDURE — 82607 VITAMIN B-12: CPT

## 2021-03-29 RX ORDER — HYDRALAZINE HYDROCHLORIDE 20 MG/ML
20 INJECTION INTRAMUSCULAR; INTRAVENOUS EVERY 4 HOURS PRN
Status: DISCONTINUED | OUTPATIENT
Start: 2021-03-29 | End: 2021-03-31 | Stop reason: HOSPADM

## 2021-03-29 RX ORDER — MIDAZOLAM HYDROCHLORIDE 1 MG/ML
INJECTION INTRAMUSCULAR; INTRAVENOUS
Status: COMPLETED | OUTPATIENT
Start: 2021-03-29 | End: 2021-03-29

## 2021-03-29 RX ORDER — ACETAMINOPHEN 325 MG/1
650 TABLET ORAL EVERY 4 HOURS PRN
Status: DISCONTINUED | OUTPATIENT
Start: 2021-03-29 | End: 2021-03-31 | Stop reason: HOSPADM

## 2021-03-29 RX ORDER — CLONIDINE HYDROCHLORIDE 0.1 MG/1
0.1 TABLET ORAL 3 TIMES DAILY
Status: DISCONTINUED | OUTPATIENT
Start: 2021-03-29 | End: 2021-03-31 | Stop reason: HOSPADM

## 2021-03-29 RX ORDER — FENTANYL CITRATE 50 UG/ML
INJECTION, SOLUTION INTRAMUSCULAR; INTRAVENOUS
Status: COMPLETED | OUTPATIENT
Start: 2021-03-29 | End: 2021-03-29

## 2021-03-29 RX ADMIN — FENTANYL CITRATE 50 MCG: 50 INJECTION, SOLUTION INTRAMUSCULAR; INTRAVENOUS at 08:53

## 2021-03-29 RX ADMIN — MORPHINE SULFATE 2 MG: 2 INJECTION, SOLUTION INTRAMUSCULAR; INTRAVENOUS at 12:39

## 2021-03-29 RX ADMIN — NIFEDIPINE 60 MG: 60 TABLET, FILM COATED, EXTENDED RELEASE ORAL at 10:06

## 2021-03-29 RX ADMIN — CLONIDINE HYDROCHLORIDE 0.2 MG: 0.2 TABLET ORAL at 10:07

## 2021-03-29 RX ADMIN — SODIUM BICARBONATE: 84 INJECTION, SOLUTION INTRAVENOUS at 06:09

## 2021-03-29 RX ADMIN — ACETAMINOPHEN 650 MG: 325 TABLET, FILM COATED ORAL at 16:02

## 2021-03-29 RX ADMIN — ACETAMINOPHEN 650 MG: 325 TABLET, FILM COATED ORAL at 23:55

## 2021-03-29 RX ADMIN — MORPHINE SULFATE 2 MG: 2 INJECTION, SOLUTION INTRAMUSCULAR; INTRAVENOUS at 06:11

## 2021-03-29 RX ADMIN — LORAZEPAM 0.5 MG: 2 INJECTION INTRAMUSCULAR; INTRAVENOUS at 16:03

## 2021-03-29 RX ADMIN — CLONIDINE HYDROCHLORIDE 0.1 MG: 0.1 TABLET ORAL at 12:39

## 2021-03-29 RX ADMIN — ACETAMINOPHEN 650 MG: 325 TABLET, FILM COATED ORAL at 10:06

## 2021-03-29 RX ADMIN — CLONIDINE HYDROCHLORIDE 0.1 MG: 0.1 TABLET ORAL at 20:37

## 2021-03-29 RX ADMIN — LORAZEPAM 0.5 MG: 2 INJECTION INTRAMUSCULAR; INTRAVENOUS at 23:56

## 2021-03-29 RX ADMIN — MORPHINE SULFATE 2 MG: 2 INJECTION, SOLUTION INTRAMUSCULAR; INTRAVENOUS at 20:41

## 2021-03-29 RX ADMIN — MIDAZOLAM 1 MG: 1 INJECTION INTRAMUSCULAR; INTRAVENOUS at 08:53

## 2021-03-29 RX ADMIN — FAMOTIDINE 20 MG: 20 TABLET, FILM COATED ORAL at 10:07

## 2021-03-29 ASSESSMENT — PAIN - FUNCTIONAL ASSESSMENT: PAIN_FUNCTIONAL_ASSESSMENT: PREVENTS OR INTERFERES WITH MANY ACTIVE NOT PASSIVE ACTIVITIES

## 2021-03-29 ASSESSMENT — PAIN DESCRIPTION - DESCRIPTORS: DESCRIPTORS: ACHING;DISCOMFORT;CONSTANT

## 2021-03-29 ASSESSMENT — PAIN DESCRIPTION - LOCATION: LOCATION: BACK

## 2021-03-29 ASSESSMENT — PAIN SCALES - GENERAL
PAINLEVEL_OUTOF10: 7
PAINLEVEL_OUTOF10: 6
PAINLEVEL_OUTOF10: 7
PAINLEVEL_OUTOF10: 5
PAINLEVEL_OUTOF10: 9

## 2021-03-29 ASSESSMENT — PAIN DESCRIPTION - PAIN TYPE
TYPE: CHRONIC PAIN
TYPE: CHRONIC PAIN

## 2021-03-29 ASSESSMENT — PAIN DESCRIPTION - ORIENTATION: ORIENTATION: LOWER

## 2021-03-29 NOTE — CARE COORDINATION
Ss note:3/29/2021 10:06 AM  Met with pt, pt is a readmit, relays she just had a baby boy 2 weeks ago. Pt reports she presented with back pain, the father of her baby advised her to come into the hospital. Pt reports independence, drove self here, car in lot. Pt aware of outpt substance abuse programs & has a hx of going to [employer]. Pt denies speaking with Peer [facility] representative and states she will follow up with them post discharge. Plan is home, no home going needs relayed.  Denys Vickers, CURRYW

## 2021-03-29 NOTE — PRE SEDATION
Sedation Pre-Procedure Note    Patient Name: Ann Inman   YOB: 1990  Room/Bed: 1796/5390-11  Medical Record Number: [id number]  Date: 3/29/2021   Time: 8:49 AM       Indication:  Ultrasound guided renal bx    Consent: I have discussed with the patient and/or the patient representative the indication, alternatives, and the possible risks and/or complications of the planned procedure and the anesthesia methods. The patient and/or patient representative appear to understand and agree to proceed. Vital Signs:   Vitals:    03/28/21 2245   BP: (!) 180/92   Pulse:    Resp:    Temp:    SpO2:        Past Medical History:   has a past medical history of Abnormal Pap smear, JERAMY (acute kidney injury) ([facility] [address] Complication of anesthesia, Herpes simplex without mention of complication, and Hypertensive urgency. Past Surgical History:   has no past surgical history on file. Medications:   Scheduled Meds:    NIFEdipine  60 mg Oral Daily    sodium chloride flush  10 mL Intravenous 2 times per day    [Held by provider] enoxaparin  40 mg Subcutaneous Daily    famotidine  20 mg Oral Daily     Continuous Infusions:    IV infusion builder 84 mL/hr at 03/29/21 0609    sodium chloride       PRN Meds: morphine, LORazepam, cloNIDine, hydrALAZINE, sodium chloride flush, sodium chloride, promethazine **OR** ondansetron, polyethylene glycol, acetaminophen **OR** acetaminophen  Home Meds:   Prior to Admission medications    Medication Sig Start Date End Date Taking?  Authorizing Provider   NIFEdipine (PROCARDIA XL) 30 MG extended release tablet Take 1 tablet by mouth daily 3/13/21   Yan Horton MD   ferrous sulfate (IRON 325) 325 (65 Fe) MG tablet Take 1 tablet by mouth 2 times daily 3/13/21   Yan Horton MD   Prenatal Vit-Fe Fumarate-FA (PRENATAL PLUS) 27-1 MG TABS Take 1 tablet by mouth daily 3/13/21   Yan Horton MD     Coumadin Use Last 7 Days:  no  Antiplatelet drug therapy use last 7 days: no  Other anticoagulant use last 7 days: no  Additional Medication Information:  none      Pre-Sedation Documentation and Exam:   I have personally completed a history, physical exam & review of systems for this patient (see notes).     Mallampati Airway Assessment:  Mallampati Class I - (soft palate, fauces, uvula & anterior/posterior tonsillar pillars are visible)    Prior History of Anesthesia Complications:   none    ASA Classification:  Class 1 - A normal healthy patient    Sedation/ Anesthesia Plan:   intravenous sedation    Medications Planned:   midazolam (Versed) intravenously    Patient is an appropriate candidate for plan of sedation: yes    Electronically signed by Heather Odell MD on 3/29/2021 at 8:49 AM

## 2021-03-29 NOTE — PROGRESS NOTES
Nephrology  Note  Patient's Name: Woodrow Cosme  10:48 AM  3/29/2021    Nephrologist: Siria Sierra    Reason for Consult:  HTN Nephropathy  Requesting Physician:  Laure Sandifer, MD    Chief Complaint:  Back Pain    History Obtained From:  patient and past medical records    History of Present Ilness from the 3/28/21 note:    Woodrow Cosme is a 26 y.o. female with prior history of hospital admission 3/10/21-3/13/21 for vaginal delivery of a male infant and delivery affected by postpartum hypertension. BP on D/C 140/89 and serum cr 0.6mg/dl on 3/11/21. Pt presented back to the ED 3/27/21 with the c/o back pain and constipation and 1 episode of emesis. She took ibuprofen 1 day PTA and  1 week after being home  Started to  snorted cocaine. No hx of IVDA . Pt was given a prescription for nifedipine extended release 30mg qd at [facility] on 3/13 but did not get the prescription filled. BP in the //118. She is still having vaginal blood loss.  She denies any dysuria, gross hematuria or nocturia  Pt is [id number] and this is the 1st pregnancy with HTN, no prior hx of Pre-Eclampsia, she had 1 spontaneous miscarriage and 5 induced abortions    3/29/21: Pt just back from perc renal bx, states side is sore but tolerated the procedure well, pt advised to stay flat for 2 hours    Past Medical History:   Diagnosis Date    Abnormal Pap smear     JERAMY (acute kidney injury) ([facility] [place] [address] 7/02/4025    Complication of anesthesia 2011    States hhas spinal curvature and had one-sided Epidural    Herpes simplex without mention of complication     Hypertensive urgency 3/27/2021       Past Surgical History:   Procedure Laterality Date    IR BIOPSY KIDNEY PERCUTANEOUS  3/29/2021    IR BIOPSY KIDNEY PERCUTANEOUS 3/29/2021 [facility] PROCED[facility]       Family History   Problem Relation Age of Onset    Hearing Loss Sister     Hypertension Sister     Heart Disease Sister     Heart Surgery Sister     Diabetes Maternal Grandfather reports that she has been smoking cigarettes. She has a 1.00 pack-year smoking history. She has never used smokeless tobacco. She reports previous alcohol use. She reports previous drug use. Drugs: Marijuana and Cocaine. Allergies:  Patient has no known allergies. Current Medications:    acetaminophen (TYLENOL) tablet 650 mg, Q4H PRN  NIFEdipine (PROCARDIA XL) extended release tablet 60 mg, Daily  sodium bicarbonate 75 mEq in dextrose 5 % and 0.45 % NaCl 1,000 mL infusion, Continuous  morphine (PF) injection 2 mg, Q4H PRN  LORazepam (ATIVAN) injection 0.5 mg, Q6H PRN  cloNIDine (CATAPRES) tablet 0.2 mg, Q8H PRN  hydrALAZINE (APRESOLINE) injection 10 mg, Q6H PRN  sodium chloride flush 0.9 % injection 10 mL, 2 times per day  sodium chloride flush 0.9 % injection 10 mL, PRN  0.9 % sodium chloride infusion, PRN  [Held by provider] enoxaparin (LOVENOX) injection 40 mg, Daily  promethazine (PHENERGAN) tablet 12.5 mg, Q6H PRN    Or  ondansetron (ZOFRAN) injection 4 mg, Q6H PRN  polyethylene glycol (GLYCOLAX) packet 17 g, Daily PRN  famotidine (PEPCID) tablet 20 mg, Daily  acetaminophen (TYLENOL) tablet 650 mg, Q6H PRN    Or  acetaminophen (TYLENOL) suppository 650 mg, Q6H PRN        Review of Systems:   Pertinent items are noted in HPI.     Physical exam:   Constitutional:  Young female in NAD  Vitals:   VITALS:  BP (!) 165/113   Pulse 56   Temp 97.4 °F (36.3 °C) (Axillary)   Resp 17   Ht 5' 7\" (1.702 m)   Wt 194 lb (88 kg)   LMP 06/14/2020   SpO2 100%   BMI 30.38 kg/m²   24HR INTAKE/OUTPUT:      Intake/Output Summary (Last 24 hours) at 3/29/2021 1048  Last data filed at 3/28/2021 1755  Gross per 24 hour   Intake 1280 ml   Output 900 ml   Net 380 ml     URINARY CATHETER OUTPUT (Diaz):     DRAIN/TUBE OUTPUT:     VENT SETTINGS:  Vent Information  SpO2: 100 %  Additional Respiratory  Assessments  Pulse: 56  Resp: 17  SpO2: 100 %    Skin: no rash, turgor wnl  Heent:  eomi, mmm  Neck: no bruits or jvd noted  Cardiovascular: PMI not lat dispalced  S1, S2 without S3 or rub  Respiratory: CTA B without w/r/r  Abdomen:  +bs, soft, nt, nd  Ext: trace bilat lower extremity edema  Psychiatric: mood and affect appropriate  Musculoskeletal:  Rom, muscular strength intact    Data:   Labs:  CBC:   Lab Results   Component Value Date    WBC 5.8 03/29/2021    RBC 4.29 03/29/2021    HGB 11.6 03/29/2021    HCT 38.5 03/29/2021    MCV 89.7 03/29/2021    MCH 27.0 03/29/2021    MCHC 30.1 03/29/2021    RDW 14.3 03/29/2021     03/29/2021    MPV 10.4 03/29/2021     CBC with Differential:    Lab Results   Component Value Date    WBC 5.8 03/29/2021    RBC 4.29 03/29/2021    HGB 11.6 03/29/2021    HCT 38.5 03/29/2021     03/29/2021    MCV 89.7 03/29/2021    MCH 27.0 03/29/2021    MCHC 30.1 03/29/2021    RDW 14.3 03/29/2021    SEGSPCT 73 06/06/2012    LYMPHOPCT 17.2 03/28/2021    MONOPCT 7.9 03/28/2021    BASOPCT 0.6 03/28/2021    MONOSABS 0.57 03/28/2021    LYMPHSABS 1.24 03/28/2021    EOSABS 0.20 03/28/2021    BASOSABS 0.04 03/28/2021     Platelets:    Lab Results   Component Value Date     03/29/2021     Hemoglobin/Hematocrit:    Lab Results   Component Value Date    HGB 11.6 03/29/2021    HCT 38.5 03/29/2021     CMP:    Lab Results   Component Value Date     03/29/2021    K 3.7 03/29/2021    K 4.0 03/28/2021     03/29/2021    CO2 25 03/29/2021    BUN 21 03/29/2021    CREATININE 2.8 03/29/2021    GFRAA 24 03/29/2021    LABGLOM 24 03/29/2021    GLUCOSE 113 03/29/2021    GLUCOSE 90 11/08/2010    PROT 6.4 03/29/2021    LABALBU 3.5 03/29/2021    CALCIUM 8.7 03/29/2021    BILITOT 0.4 03/29/2021    ALKPHOS 82 03/29/2021    AST 12 03/29/2021    ALT 7 03/29/2021     BMP:    Lab Results   Component Value Date     03/29/2021    K 3.7 03/29/2021    K 4.0 03/28/2021     03/29/2021    CO2 25 03/29/2021    BUN 21 03/29/2021    LABALBU 3.5 03/29/2021    CREATININE 2.8 03/29/2021    CALCIUM 8.7 03/29/2021 GFRAA 24 03/29/2021    LABGLOM 24 03/29/2021    GLUCOSE 113 03/29/2021    GLUCOSE 90 11/08/2010     BUN/Creatinine:    Lab Results   Component Value Date    BUN 21 03/29/2021    CREATININE 2.8 03/29/2021     Hepatic Function Panel:    Lab Results   Component Value Date    ALKPHOS 82 03/29/2021    ALT 7 03/29/2021    AST 12 03/29/2021    PROT 6.4 03/29/2021    BILITOT 0.4 03/29/2021    BILIDIR <0.2 03/27/2021    IBILI see below 03/27/2021    LABALBU 3.5 03/29/2021     Albumin:    Lab Results   Component Value Date    LABALBU 3.5 03/29/2021     Calcium:    Lab Results   Component Value Date    CALCIUM 8.7 03/29/2021     Ionized Calcium:  No results found for: IONCA  Magnesium:    Lab Results   Component Value Date    MG 2.0 03/29/2021     Phosphorus:    Lab Results   Component Value Date    PHOS 4.9 03/29/2021     LDH:  No results found for: LDH  Uric Acid:    Lab Results   Component Value Date    LABURIC 2.5 01/16/2021     PT/INR:    Lab Results   Component Value Date    PROTIME 11.7 03/29/2021    INR 1.0 03/29/2021     PTT:    Lab Results   Component Value Date    APTT 27.9 03/29/2021   [APTT}  Troponin:  No results found for: TROPONINI  U/A:    Lab Results   Component Value Date    COLORU Yellow 03/27/2021    PROTEINU 30 03/27/2021    PHUR 6.0 03/27/2021    WBCUA 1-3 03/27/2021    WBCUA 1-3 05/02/2011    RBCUA NONE 03/27/2021    RBCUA NONE 05/02/2011    TRICHOMONAS RARE 07/20/2019    BACTERIA NONE SEEN 03/27/2021    CLARITYU Clear 03/27/2021    SPECGRAV 1.010 03/27/2021    LEUKOCYTESUR SMALL 03/27/2021    UROBILINOGEN 0.2 03/27/2021    BILIRUBINUR Negative 03/27/2021    BILIRUBINUR NEGATIVE 05/02/2011    BLOODU MODERATE 03/27/2021    GLUCOSEU Negative 03/27/2021    GLUCOSEU 100 05/02/2011    AMORPHOUS FEW 07/20/2019     ABG:  No results found for: PH, PCO2, PO2, HCO3, BE, THGB, TCO2, O2SAT  HgBA1c:  No results found for: LABA1C  Microalbumen/Creatinine ratio:  No components found for: RUCREAT  FLP:  No results found for: TRIG, HDL, LDLCALC, LDLDIRECT, LABVLDL  TSH:  No results found for: TSH  VITAMIN B12: No components found for: B12  FOLATE:  No results found for: FOLATE  Iron Saturation:  No components found for: PERCENTFE  FERRITIN:  No results found for: FERRITIN  HIV:  No results found for: HIV  AGGIE:  No results found for: ANATITER, AGGIE  AMYLASE:    Lab Results   Component Value Date    AMYLASE 85 11/15/2016     LIPASE:    Lab Results   Component Value Date    LIPASE 36 11/15/2016     Fibrinogen Level:  No components found for: FIB  Urine Toxicology:  No components found for: IAMMENTA, IBARBIT, IBENZO, ICOCAINE, IMARTHC, IOPIATES, IPHENCYC  24 Hour Urine for Protein:  No components found for: RAWUPRO, UHRS3, XEWV75SG, UTV3  24 Hour Urine for Creatinine Clearance:  No components found for: CREAT4, UHRS10, UTV10     Imaging:  Location:200       Exam: US RETROPERITONEAL LIMITED       Comparison: None       History:   Urinary tract infection, pregnancy       Real time sector scanning of the kidneys was obtained in multiple   positions. The kidneys are normal in size and configuration.  The   renal cortical thickness and echogenicity are normal.  There is no   evidence of solid or cystic mass. No evidence for hydronephrosis. The   longitudinal renal measurement is 12.5 cm for the left kidney and 11.1   cm for the right kidney.  The urinary bladder is normal.           Impression   Normal renal ultrasound. EXAMINATION:   CT OF THE ABDOMEN AND PELVIS WITHOUT CONTRAST 3/27/2021 4:56 pm       TECHNIQUE:   CT of the abdomen and pelvis was performed without the administration of   intravenous contrast. Multiplanar reformatted images are provided for review.    Dose modulation, iterative reconstruction, and/or weight based adjustment of   the mA/kV was utilized to reduce the radiation dose to as low as reasonably   achievable.       COMPARISON:   CT abdomen pelvis 11/16/2016.       HISTORY:   ORDERING SYSTEM PROVIDED HISTORY: concern for obstruction, renal failure   TECHNOLOGIST PROVIDED HISTORY:   Reason for exam:->concern for obstruction, renal failure   Additional Contrast?->None   Decision Support Exception->Emergency Medical Condition (MA)       FINDINGS:   Lower Chest: Partially visualized ground-glass opacity in the right lower   lobe.  Prominent sized heart.       Organs:       Evaluation of the parenchymal organs is limited due to lack of intravenous   contrast.       Liver: Unremarkable on this unenhanced exam.       Spleen: Unremarkable on this unenhanced exam.       Pancreas: No inflammatory changes appreciated on this unenhanced exam.       Adrenal glands: Unremarkable on this unenhanced exam.       Kidneys: Unremarkable on this unenhanced exam. No renal, ureteral, or bladder   calculi.       Gallbladder: Incompletely distended.       GI/Bowel:       Evaluation of the bowel and mesentery is limited due to lack of enteric   contrast.       Visualized esophagus/stomach: Small hiatal hernia.       Small bowel: No dilated small bowel.       Large bowel: Scattered colonic diverticula without adjacent stranding.       Appendix: Normal.       Pelvis: Bladder is incompletely distended. Uterus appears enlarged.  Ovaries   are not well evaluated.       Peritoneum/Retroperitoneum:       Adenopathy: Suboptimal evaluation for adenopathy due to lack of intravenous   and enteric contrast.       Abdominal aorta: No abdominal aortic aneurysm.       Free fluid/air: No free fluid. No free air.       Bones/Soft Tissues: Diastasis of the rectus musculature.  Other nonspecific   sclerosis in the proximal left femur.           Impression   1. No renal, ureteral, or bladder calculi.  No hydronephrosis. 2. Partially visualized ground-glass opacity in the right lower lobe. Correlate with presentation.  Follow-up recommended. 3. Other findings as described.        Assessment  1-Stage III JERAMY in there postpartum setting  UA with mod blood and protein 30mg/dl, LE small, Ni (-), pastor none this may reflect component of vaginal bleeding  There are no elevated LFT's of low PLT to suggest either a HELLP Syndrome or TTP  Pt did use cocaine which has been associated with ANCA Vasculitis  She also used NSAID's  Sed Rate 30 and CRP 1.8 elevated but not too a level suggestive of an active vasculitis  C3& C4 not low  S/P Perc Renal Bx  PLAN:1. Follow Cr  2. Await  Basic Serologies  3. No Further NSAID's  4. Discontinue IVF  5. Recheck CBC this PM      2-HTN in the Peripartum possibly exacerbated by the JERAMY  BP above ,goal  PLAN:1. Titrate the Nifedipine    3-Non Anion gap Met Acidosis with the JERAMY  HCO3 below goal 22  PLAN:1. Change the IVF to an HCO3 based IVF    4- Hypocalcemia with hypoalbuminemia in the setting of the JERAMY  PLAN:1.  Check PO4, Vit D      Thank you Dr. Isidro Patel MD for allowing us to participate in care of Paula Lerner  10:48 AM  3/29/2021

## 2021-03-29 NOTE — BRIEF OP NOTE
Brief Post IR note      Patient: Catie Vail  YOB: 1990  MRN: [id number]    Diagnosis: JERAMY    Procedure: ultrasound guided right renal biopsy    Findings: core biopsies of right kidney    Specimen: 4 coresx 20G    EBL: min    Complication: none    Plan:   -f/u biopsy results        Jose Martin Hodge MD  Vascular and Interventional Radiology ([facility])    Daytime direct number to [address]: [address]:      [address] [place] [address] Core: [phone]  Outpatient IR [phone]      Electronically signed by Jose Martin Hodge MD on 3/29/2021 at 9:18 AM

## 2021-03-29 NOTE — PROGRESS NOTES
Department of Internal Medicine  General Internal Medicine  Attending Progress Note  Chief Complaint   Patient presents with    Back Pain     back pain at epidural sight, two weeks postpartum        SUBJECTIVE:    Denies pain,  fever chills, chest pain. Does not c/o Back pain.   Towards the end of our visit she preferred to take a phone call so I excused myself    OBJECTIVE      Medications    Current Facility-Administered Medications: acetaminophen (TYLENOL) tablet 650 mg, 650 mg, Oral, Q4H PRN  NIFEdipine (PROCARDIA XL) extended release tablet 60 mg, 60 mg, Oral, Daily  sodium bicarbonate 75 mEq in dextrose 5 % and 0.45 % NaCl 1,000 mL infusion, , Intravenous, Continuous  morphine (PF) injection 2 mg, 2 mg, Intravenous, Q4H PRN  LORazepam (ATIVAN) injection 0.5 mg, 0.5 mg, Intravenous, Q6H PRN  cloNIDine (CATAPRES) tablet 0.2 mg, 0.2 mg, Oral, Q8H PRN  hydrALAZINE (APRESOLINE) injection 10 mg, 10 mg, Intravenous, Q6H PRN  sodium chloride flush 0.9 % injection 10 mL, 10 mL, Intravenous, 2 times per day  sodium chloride flush 0.9 % injection 10 mL, 10 mL, Intravenous, PRN  0.9 % sodium chloride infusion, 25 mL, Intravenous, PRN  [Held by provider] enoxaparin (LOVENOX) injection 40 mg, 40 mg, Subcutaneous, Daily  promethazine (PHENERGAN) tablet 12.5 mg, 12.5 mg, Oral, Q6H PRN **OR** ondansetron (ZOFRAN) injection 4 mg, 4 mg, Intravenous, Q6H PRN  polyethylene glycol (GLYCOLAX) packet 17 g, 17 g, Oral, Daily PRN  famotidine (PEPCID) tablet 20 mg, 20 mg, Oral, Daily  acetaminophen (TYLENOL) tablet 650 mg, 650 mg, Oral, Q6H PRN **OR** acetaminophen (TYLENOL) suppository 650 mg, 650 mg, Rectal, Q6H PRN  Physical    VITALS:  BP (!) 165/113   Pulse 56   Temp 97.4 °F (36.3 °C) (Axillary)   Resp 17   Ht 5' 7\" (1.702 m)   Wt 194 lb (88 kg)   LMP 06/14/2020   SpO2 100%   BMI 30.38 kg/m²   CONSTITUTIONAL:  awake, alert, cooperative, no apparent distress, and appears stated age  EYES:  Lids and lashes normal, pupils equal, round and reactive to light, extra ocular muscles intact, sclera clear, conjunctiva normal  ENT:  Normocephalic, without obvious abnormality, atraumatic, sinuses nontender on palpation, external ears without lesions, oral pharynx with moist mucus membranes, tonsils without erythema or exudates, gums normal and good dentition. NECK:  Supple, symmetrical, trachea midline, no adenopathy, thyroid symmetric, not enlarged and no tenderness, skin normal  HEMATOLOGIC/LYMPHATICS:  no cervical lymphadenopathy  BACK:  Symmetric, no curvature, spinous processes are non-tender on palpation, paraspinous muscles are non-tender on palpation, no costal vertebral tenderness  LUNGS:  No increased work of breathing, good air exchange, clear to auscultation bilaterally, no crackles or wheezing  CARDIOVASCULAR:  Normal apical impulse, regular rate and rhythm, normal S1 and S2, no S3 or S4, and no murmur noted  ABDOMEN:  No scars, normal bowel sounds, soft, non-distended, non-tender, no masses palpated, no hepatosplenomegally  MUSCULOSKELETAL:  There is no redness, warmth, or swelling of the joints. NEUROLOGIC:  Awake, alert, oriented to name, place and time.    SKIN:  no bruising or bleeding  Data    CBC:   Lab Results   Component Value Date    WBC 5.8 03/29/2021    RBC 4.29 03/29/2021    HGB 11.6 03/29/2021    HCT 38.5 03/29/2021    MCV 89.7 03/29/2021    MCH 27.0 03/29/2021    MCHC 30.1 03/29/2021    RDW 14.3 03/29/2021     03/29/2021    MPV 10.4 03/29/2021     CMP:    Lab Results   Component Value Date     03/29/2021    K 3.7 03/29/2021    K 4.0 03/28/2021     03/29/2021    CO2 25 03/29/2021    BUN 21 03/29/2021    CREATININE 2.8 03/29/2021    GFRAA 24 03/29/2021    LABGLOM 24 03/29/2021    GLUCOSE 113 03/29/2021    GLUCOSE 90 11/08/2010    PROT 6.4 03/29/2021    LABALBU 3.5 03/29/2021    CALCIUM 8.7 03/29/2021    BILITOT 0.4 03/29/2021    ALKPHOS 82 03/29/2021    AST 12 03/29/2021    ALT 7 03/29/2021 ASSESSMENT AND PLAN    History of present illness from History and Physical:  This is a 26 y.o. female  has a past medical history of Recent vignal delivery 2 weeks back and Hypertensive urgency. presented with Back pain, Poorly controlled blood pressure for last few days prior to arrival to the hospital. Recently diagnosed with HTN but noncompliant and had prio hospitalization with HTN urgency. Since delivery, she has on and off back pain and in the ED, a CT of spine showed no lesion or any abscess formation. Patient was also found to have HTN urgency with renal impairment and now being admitted of further evaluation   The patient was seen and examined at bedside, appears alert and awake with no acute distress and is able to answer simple  questions. On direct questioning, patient denied any  resting ongoing chest pain, resting SOB, hemoptysis, productive cough, fever, ongoing palpitation, active abdominal pain, hematemesis, rectal bleeding, mine, hematuria, any other  and GI complaints and any new focal neuro deficits. 1.  Hypertensive urgency: Bp spiked last night  Continue procardia and titrate    2. JERAMY (acute kidney injury) ([facility] [address]  Suspects that this may be due to #1  However, she's being worked up for other etiologies  Normal renal US   Renal biopsy per Nephro      3. Cocaine Abuse: 3/29 she plans on getting back into treatment    4. Mild Anemia. This may be related to recent pregnancy  Continue to monitor    5. Metabolic Acidosis: may be due to RTA.    This is related to recent decline in renal function  Replaced with IV Bicarb

## 2021-03-29 NOTE — PROGRESS NOTES
Patient came down to special procedures for renal biopsy under ultrasound    Patient positioned prone on patients cart with monitoring devices attached. Patient images reviewed by Dr Makenna Alejandro     Patient prepped and draped. 9557 Procedure start     With the guidance of ultrasound, needle inserted to right kidney and core biopsy x3 taken by Dr. Makenna Alejandro    Patient images reviewed by Dr. Candy Sosa   . Puncture site cleansed and dry dressing applied of 2x2 and tegaderm    0913 Procedure completed       Biopsy sample taken to laboratory.      Nurse to nurse called to 4th floor and spoke with [facility] RN

## 2021-03-29 NOTE — PLAN OF CARE
Problem: Pain:  Goal: Pain level will decrease  Description: Pain level will decrease  3/29/2021 0350 by Harmony Ballesteros, RN  Outcome: Met This Shift  3/28/2021 2108 by Russ Zheng RN  Outcome: Met This Shift  3/28/2021 1550 by Theodore Bishop RN  Outcome: Ongoing

## 2021-03-30 VITALS
OXYGEN SATURATION: 99 % | HEART RATE: 82 BPM | WEIGHT: 194 LBS | TEMPERATURE: 97.6 F | HEIGHT: 67 IN | SYSTOLIC BLOOD PRESSURE: 129 MMHG | BODY MASS INDEX: 30.45 KG/M2 | DIASTOLIC BLOOD PRESSURE: 81 MMHG | RESPIRATION RATE: 16 BRPM

## 2021-03-30 LAB
ALBUMIN SERPL-MCNC: 3.3 G/DL (ref 3.5–5.2)
ALP BLD-CCNC: 83 U/L (ref 35–104)
ALT SERPL-CCNC: 7 U/L (ref 0–32)
ANION GAP SERPL CALCULATED.3IONS-SCNC: 13 MMOL/L (ref 7–16)
ANTI-NUCLEAR ANTIBODY (ANA): NEGATIVE
AST SERPL-CCNC: 9 U/L (ref 0–31)
BILIRUB SERPL-MCNC: 0.4 MG/DL (ref 0–1.2)
BUN BLDV-MCNC: 22 MG/DL (ref 6–20)
CALCIUM SERPL-MCNC: 8.7 MG/DL (ref 8.6–10.2)
CHLORIDE BLD-SCNC: 102 MMOL/L (ref 98–107)
CO2: 26 MMOL/L (ref 22–29)
CREAT SERPL-MCNC: 2.9 MG/DL (ref 0.5–1)
GFR AFRICAN AMERICAN: 23
GFR NON-AFRICAN AMERICAN: 23 ML/MIN/1.73
GLUCOSE BLD-MCNC: 103 MG/DL (ref 74–99)
HBV SURFACE AB TITR SER: REACTIVE {TITER}
HCT VFR BLD CALC: 38.1 % (ref 34–48)
HEMOGLOBIN: 11.8 G/DL (ref 11.5–15.5)
HEPATITIS B SURFACE ANTIGEN INTERPRETATION: NORMAL
MAGNESIUM: 2 MG/DL (ref 1.6–2.6)
MCH RBC QN AUTO: 27.1 PG (ref 26–35)
MCHC RBC AUTO-ENTMCNC: 31 % (ref 32–34.5)
MCV RBC AUTO: 87.6 FL (ref 80–99.9)
PDW BLD-RTO: 14.1 FL (ref 11.5–15)
PHOSPHORUS: 5.1 MG/DL (ref 2.5–4.5)
PLATELET # BLD: 294 E9/L (ref 130–450)
PMV BLD AUTO: 10.3 FL (ref 7–12)
POTASSIUM SERPL-SCNC: 3.6 MMOL/L (ref 3.5–5)
RBC # BLD: 4.35 E12/L (ref 3.5–5.5)
SODIUM BLD-SCNC: 141 MMOL/L (ref 132–146)
TOTAL PROTEIN: 6.5 G/DL (ref 6.4–8.3)
WBC # BLD: 7 E9/L (ref 4.5–11.5)

## 2021-03-30 PROCEDURE — 6370000000 HC RX 637 (ALT 250 FOR IP): Performed by: INTERNAL MEDICINE

## 2021-03-30 PROCEDURE — 6360000002 HC RX W HCPCS: Performed by: INTERNAL MEDICINE

## 2021-03-30 PROCEDURE — 84100 ASSAY OF PHOSPHORUS: CPT

## 2021-03-30 PROCEDURE — 36415 COLL VENOUS BLD VENIPUNCTURE: CPT

## 2021-03-30 PROCEDURE — 80053 COMPREHEN METABOLIC PANEL: CPT

## 2021-03-30 PROCEDURE — 2580000003 HC RX 258: Performed by: INTERNAL MEDICINE

## 2021-03-30 PROCEDURE — 85027 COMPLETE CBC AUTOMATED: CPT

## 2021-03-30 PROCEDURE — 99232 SBSQ HOSP IP/OBS MODERATE 35: CPT | Performed by: INTERNAL MEDICINE

## 2021-03-30 PROCEDURE — 1200000000 HC SEMI PRIVATE

## 2021-03-30 PROCEDURE — 6370000000 HC RX 637 (ALT 250 FOR IP): Performed by: STUDENT IN AN ORGANIZED HEALTH CARE EDUCATION/TRAINING PROGRAM

## 2021-03-30 PROCEDURE — 83735 ASSAY OF MAGNESIUM: CPT

## 2021-03-30 RX ORDER — ERGOCALCIFEROL 1.25 MG/1
50000 CAPSULE ORAL WEEKLY
Status: DISCONTINUED | OUTPATIENT
Start: 2021-03-30 | End: 2021-03-31 | Stop reason: HOSPADM

## 2021-03-30 RX ORDER — SEVELAMER CARBONATE 800 MG/1
800 TABLET, FILM COATED ORAL
Status: DISCONTINUED | OUTPATIENT
Start: 2021-03-30 | End: 2021-03-31 | Stop reason: HOSPADM

## 2021-03-30 RX ADMIN — CLONIDINE HYDROCHLORIDE 0.1 MG: 0.1 TABLET ORAL at 21:08

## 2021-03-30 RX ADMIN — SEVELAMER CARBONATE 800 MG: 800 TABLET, FILM COATED ORAL at 20:04

## 2021-03-30 RX ADMIN — ACETAMINOPHEN 650 MG: 325 TABLET, FILM COATED ORAL at 08:58

## 2021-03-30 RX ADMIN — MORPHINE SULFATE 2 MG: 2 INJECTION, SOLUTION INTRAMUSCULAR; INTRAVENOUS at 21:10

## 2021-03-30 RX ADMIN — ACETAMINOPHEN 650 MG: 325 TABLET, FILM COATED ORAL at 16:32

## 2021-03-30 RX ADMIN — SODIUM CHLORIDE, PRESERVATIVE FREE 10 ML: 5 INJECTION INTRAVENOUS at 09:00

## 2021-03-30 RX ADMIN — CLONIDINE HYDROCHLORIDE 0.1 MG: 0.1 TABLET ORAL at 13:51

## 2021-03-30 RX ADMIN — CLONIDINE HYDROCHLORIDE 0.1 MG: 0.1 TABLET ORAL at 08:58

## 2021-03-30 RX ADMIN — FAMOTIDINE 20 MG: 20 TABLET, FILM COATED ORAL at 08:58

## 2021-03-30 RX ADMIN — SEVELAMER CARBONATE 800 MG: 800 TABLET, FILM COATED ORAL at 13:51

## 2021-03-30 RX ADMIN — SODIUM CHLORIDE, PRESERVATIVE FREE 10 ML: 5 INJECTION INTRAVENOUS at 21:08

## 2021-03-30 RX ADMIN — LORAZEPAM 0.5 MG: 2 INJECTION INTRAMUSCULAR; INTRAVENOUS at 08:58

## 2021-03-30 RX ADMIN — POLYETHYLENE GLYCOL 3350 17 G: 17 POWDER, FOR SOLUTION ORAL at 09:09

## 2021-03-30 RX ADMIN — NIFEDIPINE 60 MG: 60 TABLET, FILM COATED, EXTENDED RELEASE ORAL at 08:59

## 2021-03-30 RX ADMIN — ERGOCALCIFEROL 50000 UNITS: 1.25 CAPSULE ORAL at 13:51

## 2021-03-30 RX ADMIN — LORAZEPAM 0.5 MG: 2 INJECTION INTRAMUSCULAR; INTRAVENOUS at 16:34

## 2021-03-30 ASSESSMENT — PAIN DESCRIPTION - PAIN TYPE: TYPE: ACUTE PAIN

## 2021-03-30 ASSESSMENT — PAIN SCALES - GENERAL
PAINLEVEL_OUTOF10: 10
PAINLEVEL_OUTOF10: 4
PAINLEVEL_OUTOF10: 8

## 2021-03-30 ASSESSMENT — PAIN DESCRIPTION - DESCRIPTORS: DESCRIPTORS: CONSTANT;ACHING;SHARP;SHOOTING

## 2021-03-30 ASSESSMENT — PAIN DESCRIPTION - LOCATION
LOCATION: BACK
LOCATION: BACK

## 2021-03-30 ASSESSMENT — PAIN DESCRIPTION - PROGRESSION: CLINICAL_PROGRESSION: GRADUALLY WORSENING

## 2021-03-30 ASSESSMENT — PAIN DESCRIPTION - ORIENTATION: ORIENTATION: RIGHT;LOWER

## 2021-03-30 NOTE — PROGRESS NOTES
Nephrology Progress Note  Patient's Name: Raimundo Kirk  9:50 AM  3/30/2021    Nephrologist: Brian Waterman    Reason for Consult:  HTN Nephropathy  Requesting Physician:  Mack Basurto MD    Chief Complaint:  Back Pain    History Obtained From:  patient and past medical records    History of Present Ilness from the 3/28/21 note:    Raimundo Kirk is a 26 y.o. female with prior history of hospital admission 3/10/21-3/13/21 for vaginal delivery of a male infant and delivery affected by postpartum hypertension. BP on D/C 140/89 and serum cr 0.6mg/dl on 3/11/21. Pt presented back to the ED 3/27/21 with the c/o back pain and constipation and 1 episode of emesis. She took ibuprofen 1 day PTA and  1 week after being home  Started to  snorted cocaine. No hx of IVDA . Pt was given a prescription for nifedipine extended release 30mg qd at [facility] on 3/13 but did not get the prescription filled. BP in the //118. She is still having vaginal blood loss. She denies any dysuria, gross hematuria or nocturia  Pt is [id number] and this is the 1st pregnancy with HTN, no prior hx of Pre-Eclampsia, she had 1 spontaneous miscarriage and 5 induced abortions    3/30/21: Pt denies significant flank pain post BX. States she feels tired    Past Medical History:   Diagnosis Date    Abnormal Pap smear     JERAMY (acute kidney injury) ([facility] [address] 7/07/3971    Complication of anesthesia 2011    States hhas spinal curvature and had one-sided Epidural    Herpes simplex without mention of complication     Hypertensive urgency 3/27/2021       Past Surgical History:   Procedure Laterality Date    IR BIOPSY KIDNEY PERCUTANEOUS  3/29/2021    IR BIOPSY KIDNEY PERCUTANEOUS 3/29/2021 [facility]       Family History   Problem Relation Age of Onset    Hearing Loss Sister     Hypertension Sister     Heart Disease Sister     Heart Surgery Sister     Diabetes Maternal Grandfather         reports that she has been smoking cigarettes.  She has a 1.00 pack-year smoking history. She has never used smokeless tobacco. She reports previous alcohol use. She reports previous drug use. Drugs: Marijuana and Cocaine. Allergies:  Patient has no known allergies. Current Medications:    acetaminophen (TYLENOL) tablet 650 mg, Q4H PRN  hydrALAZINE (APRESOLINE) injection 20 mg, Q4H PRN  cloNIDine (CATAPRES) tablet 0.1 mg, TID  NIFEdipine (PROCARDIA XL) extended release tablet 60 mg, Daily  morphine (PF) injection 2 mg, Q4H PRN  LORazepam (ATIVAN) injection 0.5 mg, Q6H PRN  cloNIDine (CATAPRES) tablet 0.2 mg, Q8H PRN  sodium chloride flush 0.9 % injection 10 mL, 2 times per day  sodium chloride flush 0.9 % injection 10 mL, PRN  0.9 % sodium chloride infusion, PRN  [Held by provider] enoxaparin (LOVENOX) injection 40 mg, Daily  promethazine (PHENERGAN) tablet 12.5 mg, Q6H PRN    Or  ondansetron (ZOFRAN) injection 4 mg, Q6H PRN  polyethylene glycol (GLYCOLAX) packet 17 g, Daily PRN  famotidine (PEPCID) tablet 20 mg, Daily  acetaminophen (TYLENOL) tablet 650 mg, Q6H PRN    Or  acetaminophen (TYLENOL) suppository 650 mg, Q6H PRN        Review of Systems:   Pertinent items are noted in HPI.     Physical exam:   Constitutional:  Young female in NAD  Vitals:   VITALS:  /89   Pulse 77   Temp 97.6 °F (36.4 °C) (Oral)   Resp 17   Ht 5' 7\" (1.702 m)   Wt 194 lb (88 kg)   LMP 06/14/2020   SpO2 98%   BMI 30.38 kg/m²   24HR INTAKE/OUTPUT:      Intake/Output Summary (Last 24 hours) at 3/30/2021 0950  Last data filed at 3/30/2021 0406  Gross per 24 hour   Intake 840 ml   Output --   Net 840 ml     URINARY CATHETER OUTPUT (Diaz):     DRAIN/TUBE OUTPUT:     VENT SETTINGS:  Vent Information  SpO2: 98 %  Additional Respiratory  Assessments  Pulse: 77  Resp: 17  SpO2: 98 %    Skin: no rash, turgor wnl  Heent:  eomi, mmm  Neck: no bruits or jvd noted  Cardiovascular: PMI not lat dispalced  S1, S2 without S3 or rub  Respiratory: CTA B without w/r/r  Abdomen:  +bs, soft, nt, nd  Ext: trace bilat lower extremity edema  Psychiatric: mood and affect appropriate  Musculoskeletal:  Rom, muscular strength intact    Data:   Labs:  CBC:   Lab Results   Component Value Date    WBC 7.0 03/30/2021    RBC 4.35 03/30/2021    HGB 11.8 03/30/2021    HCT 38.1 03/30/2021    MCV 87.6 03/30/2021    MCH 27.1 03/30/2021    MCHC 31.0 03/30/2021    RDW 14.1 03/30/2021     03/30/2021    MPV 10.3 03/30/2021     CBC with Differential:    Lab Results   Component Value Date    WBC 7.0 03/30/2021    RBC 4.35 03/30/2021    HGB 11.8 03/30/2021    HCT 38.1 03/30/2021     03/30/2021    MCV 87.6 03/30/2021    MCH 27.1 03/30/2021    MCHC 31.0 03/30/2021    RDW 14.1 03/30/2021    SEGSPCT 73 06/06/2012    LYMPHOPCT 17.2 03/28/2021    MONOPCT 7.9 03/28/2021    BASOPCT 0.6 03/28/2021    MONOSABS 0.57 03/28/2021    LYMPHSABS 1.24 03/28/2021    EOSABS 0.20 03/28/2021    BASOSABS 0.04 03/28/2021     Platelets:    Lab Results   Component Value Date     03/30/2021     Hemoglobin/Hematocrit:    Lab Results   Component Value Date    HGB 11.8 03/30/2021    HCT 38.1 03/30/2021     CMP:    Lab Results   Component Value Date     03/30/2021    K 3.6 03/30/2021    K 4.0 03/28/2021     03/30/2021    CO2 26 03/30/2021    BUN 22 03/30/2021    CREATININE 2.9 03/30/2021    GFRAA 23 03/30/2021    LABGLOM 23 03/30/2021    GLUCOSE 103 03/30/2021    GLUCOSE 90 11/08/2010    PROT 6.5 03/30/2021    LABALBU 3.3 03/30/2021    CALCIUM 8.7 03/30/2021    BILITOT 0.4 03/30/2021    ALKPHOS 83 03/30/2021    AST 9 03/30/2021    ALT 7 03/30/2021     BMP:    Lab Results   Component Value Date     03/30/2021    K 3.6 03/30/2021    K 4.0 03/28/2021     03/30/2021    CO2 26 03/30/2021    BUN 22 03/30/2021    LABALBU 3.3 03/30/2021    CREATININE 2.9 03/30/2021    CALCIUM 8.7 03/30/2021    GFRAA 23 03/30/2021    LABGLOM 23 03/30/2021    GLUCOSE 103 03/30/2021    GLUCOSE 90 11/08/2010     BUN/Creatinine:    Lab Results Lab Results   Component Value Date    FOLATE 10.0 03/29/2021     Iron Saturation:  No components found for: PERCENTFE  FERRITIN:    Lab Results   Component Value Date    FERRITIN 49 03/29/2021     HIV:  No results found for: HIV  AGGIE:  No results found for: ANATITER, AGGIE  AMYLASE:    Lab Results   Component Value Date    AMYLASE 85 11/15/2016     LIPASE:    Lab Results   Component Value Date    LIPASE 36 11/15/2016     Fibrinogen Level:  No components found for: FIB  Urine Toxicology:  No components found for: IAMMENTA, IBARBIT, IBENZO, ICOCAINE, IMARTHC, IOPIATES, IPHENCYC  24 Hour Urine for Protein:  No components found for: RAWUPRO, UHRS3, UOTQ01GV, UTV3  24 Hour Urine for Creatinine Clearance:  No components found for: CREAT4, UHRS10, UTV10     Imaging:  Location:200       Exam: US RETROPERITONEAL LIMITED       Comparison: None       History:   Urinary tract infection, pregnancy       Real time sector scanning of the kidneys was obtained in multiple   positions. The kidneys are normal in size and configuration.  The   renal cortical thickness and echogenicity are normal.  There is no   evidence of solid or cystic mass. No evidence for hydronephrosis. The   longitudinal renal measurement is 12.5 cm for the left kidney and 11.1   cm for the right kidney.  The urinary bladder is normal.           Impression   Normal renal ultrasound. EXAMINATION:   CT OF THE ABDOMEN AND PELVIS WITHOUT CONTRAST 3/27/2021 4:56 pm       TECHNIQUE:   CT of the abdomen and pelvis was performed without the administration of   intravenous contrast. Multiplanar reformatted images are provided for review.    Dose modulation, iterative reconstruction, and/or weight based adjustment of   the mA/kV was utilized to reduce the radiation dose to as low as reasonably   achievable.       COMPARISON:   CT abdomen pelvis 11/16/2016.       HISTORY:   ORDERING SYSTEM PROVIDED HISTORY: concern for obstruction, renal failure   TECHNOLOGIST PROVIDED HISTORY:   Reason for exam:->concern for obstruction, renal failure   Additional Contrast?->None   Decision Support Exception->Emergency Medical Condition (MA)       FINDINGS:   Lower Chest: Partially visualized ground-glass opacity in the right lower   lobe.  Prominent sized heart.       Organs:       Evaluation of the parenchymal organs is limited due to lack of intravenous   contrast.       Liver: Unremarkable on this unenhanced exam.       Spleen: Unremarkable on this unenhanced exam.       Pancreas: No inflammatory changes appreciated on this unenhanced exam.       Adrenal glands: Unremarkable on this unenhanced exam.       Kidneys: Unremarkable on this unenhanced exam. No renal, ureteral, or bladder   calculi.       Gallbladder: Incompletely distended.       GI/Bowel:       Evaluation of the bowel and mesentery is limited due to lack of enteric   contrast.       Visualized esophagus/stomach: Small hiatal hernia.       Small bowel: No dilated small bowel.       Large bowel: Scattered colonic diverticula without adjacent stranding.       Appendix: Normal.       Pelvis: Bladder is incompletely distended. Uterus appears enlarged.  Ovaries   are not well evaluated.       Peritoneum/Retroperitoneum:       Adenopathy: Suboptimal evaluation for adenopathy due to lack of intravenous   and enteric contrast.       Abdominal aorta: No abdominal aortic aneurysm.       Free fluid/air: No free fluid. No free air.       Bones/Soft Tissues: Diastasis of the rectus musculature.  Other nonspecific   sclerosis in the proximal left femur.           Impression   1. No renal, ureteral, or bladder calculi.  No hydronephrosis. 2. Partially visualized ground-glass opacity in the right lower lobe. Correlate with presentation.  Follow-up recommended. 3. Other findings as described.        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 Follow Cr  2. Await  Basic Serologies  3. No Further NSAID's    2-HTN in the Peripartum possibly exacerbated by the JERAMY  BP at near goal <130/80  PLAN:1. Continue on the  Nifedipine 60mg QD and the Clonidine 0.1mg tid    3-Non Anion gap Met Acidosis with the JERAMY  HCO3 at goal >/=22  PLAN:1. Follow    4- Hypocalcemia with hypoalbuminemia with Hyperphosphatemia in the setting of the JERAMY with Unspecified Vit D Def  Vit D 11  PO4 5.1  PLAN:1. Start Vit D supplement  2.  Start PO4 binder      Thank you Dr. Laure Sandifer, MD for allowing us to participate in care of Paula Lerner  9:50 AM  3/30/2021

## 2021-03-30 NOTE — PROGRESS NOTES
Department of Internal Medicine  General Internal Medicine  Attending Progress Note  Chief Complaint   Patient presents with    Back Pain     back pain at epidural sight, two weeks postpartum        SUBJECTIVE:    Denies pain,  fever chills, chest pain. Does c/o Back pain. At biopsy site.   Male visitor present    OBJECTIVE      Medications    Current Facility-Administered Medications: sevelamer (RENVELA) tablet 800 mg, 800 mg, Oral, TID WC  vitamin D (ERGOCALCIFEROL) capsule 50,000 Units, 50,000 Units, Oral, Weekly  acetaminophen (TYLENOL) tablet 650 mg, 650 mg, Oral, Q4H PRN  hydrALAZINE (APRESOLINE) injection 20 mg, 20 mg, Intravenous, Q4H PRN  cloNIDine (CATAPRES) tablet 0.1 mg, 0.1 mg, Oral, TID  NIFEdipine (PROCARDIA XL) extended release tablet 60 mg, 60 mg, Oral, Daily  morphine (PF) injection 2 mg, 2 mg, Intravenous, Q4H PRN  LORazepam (ATIVAN) injection 0.5 mg, 0.5 mg, Intravenous, Q6H PRN  cloNIDine (CATAPRES) tablet 0.2 mg, 0.2 mg, Oral, Q8H PRN  sodium chloride flush 0.9 % injection 10 mL, 10 mL, Intravenous, 2 times per day  sodium chloride flush 0.9 % injection 10 mL, 10 mL, Intravenous, PRN  0.9 % sodium chloride infusion, 25 mL, Intravenous, PRN  [Held by provider] enoxaparin (LOVENOX) injection 40 mg, 40 mg, Subcutaneous, Daily  promethazine (PHENERGAN) tablet 12.5 mg, 12.5 mg, Oral, Q6H PRN **OR** ondansetron (ZOFRAN) injection 4 mg, 4 mg, Intravenous, Q6H PRN  polyethylene glycol (GLYCOLAX) packet 17 g, 17 g, Oral, Daily PRN  famotidine (PEPCID) tablet 20 mg, 20 mg, Oral, Daily  acetaminophen (TYLENOL) tablet 650 mg, 650 mg, Oral, Q6H PRN **OR** acetaminophen (TYLENOL) suppository 650 mg, 650 mg, Rectal, Q6H PRN  Physical    VITALS:  /89   Pulse 77   Temp 97.6 °F (36.4 °C) (Oral)   Resp 17   Ht 5' 7\" (1.702 m)   Wt 194 lb (88 kg)   LMP 06/14/2020   SpO2 98%   BMI 30.38 kg/m²   CONSTITUTIONAL:  awake, alert, cooperative, no apparent distress, and appears stated age  EYES:  Lids and lashes normal, pupils equal, round and reactive to light, extra ocular muscles intact, sclera clear, conjunctiva normal  ENT:  Normocephalic, without obvious abnormality, atraumatic, sinuses nontender on palpation, external ears without lesions, oral pharynx with moist mucus membranes, tonsils without erythema or exudates, gums normal and good dentition. NECK:  Supple, symmetrical, trachea midline, no adenopathy, thyroid symmetric, not enlarged and no tenderness, skin normal  HEMATOLOGIC/LYMPHATICS:  no cervical lymphadenopathy  BACK:  Symmetric, no curvature, spinous processes are non-tender on palpation, paraspinous muscles are non-tender on palpation, no costal vertebral tenderness  LUNGS:  No increased work of breathing, good air exchange, clear to auscultation bilaterally, no crackles or wheezing  CARDIOVASCULAR:  Normal apical impulse, regular rate and rhythm, normal S1 and S2, no S3 or S4, and no murmur noted  ABDOMEN:  No scars, normal bowel sounds, soft, non-distended, non-tender, no masses palpated, no hepatosplenomegally  MUSCULOSKELETAL:  There is no redness, warmth, or swelling of the joints. NEUROLOGIC:  Awake, alert, oriented to name, place and time.    SKIN:  no bruising or bleeding  Data    CBC:   Lab Results   Component Value Date    WBC 7.0 03/30/2021    RBC 4.35 03/30/2021    HGB 11.8 03/30/2021    HCT 38.1 03/30/2021    MCV 87.6 03/30/2021    MCH 27.1 03/30/2021    MCHC 31.0 03/30/2021    RDW 14.1 03/30/2021     03/30/2021    MPV 10.3 03/30/2021     CMP:    Lab Results   Component Value Date     03/30/2021    K 3.6 03/30/2021    K 4.0 03/28/2021     03/30/2021    CO2 26 03/30/2021    BUN 22 03/30/2021    CREATININE 2.9 03/30/2021    GFRAA 23 03/30/2021    LABGLOM 23 03/30/2021    GLUCOSE 103 03/30/2021    GLUCOSE 90 11/08/2010    PROT 6.5 03/30/2021    LABALBU 3.3 03/30/2021    CALCIUM 8.7 03/30/2021    BILITOT 0.4 03/30/2021    ALKPHOS 83 03/30/2021    AST 9 03/30/2021 ALT 7 03/30/2021       ASSESSMENT AND PLAN    History of present illness from History and Physical:  This is a 26 y.o. female  has a past medical history of Recent vignal delivery 2 weeks back and Hypertensive urgency. presented with Back pain, Poorly controlled blood pressure for last few days prior to arrival to the hospital. Recently diagnosed with HTN but noncompliant and had prio hospitalization with HTN urgency. Since delivery, she has on and off back pain and in the ED, a CT of spine showed no lesion or any abscess formation. Patient was also found to have HTN urgency with renal impairment and now being admitted of further evaluation   The patient was seen and examined at bedside, appears alert and awake with no acute distress and is able to answer simple  questions. On direct questioning, patient denied any  resting ongoing chest pain, resting SOB, hemoptysis, productive cough, fever, ongoing palpitation, active abdominal pain, hematemesis, rectal bleeding, mine, hematuria, any other  and GI complaints and any new focal neuro deficits. 1.  Hypertensive urgency: Nifedipine 60mg QD and the Clonidine 0.1mg tid    2. JERAMY (acute kidney injury): Suspects that this may be due to #1  per Nephro. Normal renal US    3. Cocaine Abuse: 3/29 she plans on getting back into treatment    4. Mild Anemia. This may be related to recent pregnancy  Continue to monitor    5. Metabolic Acidosis:  IV Bicarb    6 low calcium low albumin with JERAMY and vitamin D deficiency so agree with renal they are supplementing vitamin D and giving a phosphate binder    7 DVT prophylaxis SCDs  8. Full code.   9.  Disposition Home when renal function is stable

## 2021-03-30 NOTE — PLAN OF CARE
Problem: Falls - Risk of:  Goal: Will remain free from falls  Description: Will remain free from falls  3/30/2021 0037 by Werner Valenzuela RN  Outcome: Met This Shift  3/29/2021 1900 by Demetrice Parmar RN  Outcome: Met This Shift     Problem: Falls - Risk of:  Goal: Absence of physical injury  Description: Absence of physical injury  Outcome: Met This Shift

## 2021-03-31 LAB
ALBUMIN SERPL-MCNC: 3.4 G/DL (ref 3.5–5.2)
ALP BLD-CCNC: 87 U/L (ref 35–104)
ALT SERPL-CCNC: 6 U/L (ref 0–32)
ANION GAP SERPL CALCULATED.3IONS-SCNC: 15 MMOL/L (ref 7–16)
ANTICARDIOLIPIN IGA ANTIBODY: 0 APL (ref 0–11)
ANTICARDIOLIPIN IGG ANTIBODY: 0 GPL (ref 0–14)
AST SERPL-CCNC: 9 U/L (ref 0–31)
BETA GLOBULIN: 0 AU/ML (ref 0–19)
BETA-2 GLYCOPROTEIN 1 IGG ANTIBODY: 0 SGU (ref 0–20)
BETA-2 GLYCOPROTEIN 1 IGM ANTIBODY: 3 SMU (ref 0–20)
BILIRUB SERPL-MCNC: 0.4 MG/DL (ref 0–1.2)
BUN BLDV-MCNC: 26 MG/DL (ref 6–20)
CALCIUM SERPL-MCNC: 9.1 MG/DL (ref 8.6–10.2)
CARDIOLIPIN AB IGM: 0 MPL (ref 0–12)
CHLORIDE BLD-SCNC: 102 MMOL/L (ref 98–107)
CO2: 24 MMOL/L (ref 22–29)
CREAT SERPL-MCNC: 3 MG/DL (ref 0.5–1)
GFR AFRICAN AMERICAN: 22
GFR NON-AFRICAN AMERICAN: 22 ML/MIN/1.73
GLUCOSE BLD-MCNC: 94 MG/DL (ref 74–99)
HCT VFR BLD CALC: 39.5 % (ref 34–48)
HEMOGLOBIN: 12.2 G/DL (ref 11.5–15.5)
MAGNESIUM: 1.9 MG/DL (ref 1.6–2.6)
MCH RBC QN AUTO: 27.3 PG (ref 26–35)
MCHC RBC AUTO-ENTMCNC: 30.9 % (ref 32–34.5)
MCV RBC AUTO: 88.4 FL (ref 80–99.9)
PDW BLD-RTO: 14.1 FL (ref 11.5–15)
PHOSPHORUS: 5.5 MG/DL (ref 2.5–4.5)
PLATELET # BLD: 299 E9/L (ref 130–450)
PMV BLD AUTO: 10.6 FL (ref 7–12)
POTASSIUM SERPL-SCNC: 3.8 MMOL/L (ref 3.5–5)
RBC # BLD: 4.47 E12/L (ref 3.5–5.5)
SODIUM BLD-SCNC: 141 MMOL/L (ref 132–146)
TOTAL PROTEIN: 7 G/DL (ref 6.4–8.3)
WBC # BLD: 6.2 E9/L (ref 4.5–11.5)

## 2021-03-31 PROCEDURE — 6360000002 HC RX W HCPCS: Performed by: INTERNAL MEDICINE

## 2021-03-31 PROCEDURE — 99239 HOSP IP/OBS DSCHRG MGMT >30: CPT | Performed by: INTERNAL MEDICINE

## 2021-03-31 PROCEDURE — 6370000000 HC RX 637 (ALT 250 FOR IP): Performed by: INTERNAL MEDICINE

## 2021-03-31 PROCEDURE — 83735 ASSAY OF MAGNESIUM: CPT

## 2021-03-31 PROCEDURE — 36415 COLL VENOUS BLD VENIPUNCTURE: CPT

## 2021-03-31 PROCEDURE — 84100 ASSAY OF PHOSPHORUS: CPT

## 2021-03-31 PROCEDURE — 85027 COMPLETE CBC AUTOMATED: CPT

## 2021-03-31 PROCEDURE — 80053 COMPREHEN METABOLIC PANEL: CPT

## 2021-03-31 RX ORDER — ERGOCALCIFEROL 1.25 MG/1
50000 CAPSULE ORAL WEEKLY
Qty: 5 CAPSULE | Refills: 0 | Status: SHIPPED | OUTPATIENT
Start: 2021-04-06 | End: 2022-03-18

## 2021-03-31 RX ORDER — NIFEDIPINE 60 MG/1
60 TABLET, FILM COATED, EXTENDED RELEASE ORAL DAILY
Qty: 30 TABLET | Refills: 3 | Status: SHIPPED | OUTPATIENT
Start: 2021-04-01 | End: 2022-03-18

## 2021-03-31 RX ORDER — FAMOTIDINE 20 MG/1
20 TABLET, FILM COATED ORAL DAILY
Qty: 60 TABLET | Refills: 3 | Status: SHIPPED | OUTPATIENT
Start: 2021-04-01 | End: 2022-03-18

## 2021-03-31 RX ORDER — SEVELAMER CARBONATE 800 MG/1
800 TABLET, FILM COATED ORAL
Qty: 90 TABLET | Refills: 3 | Status: SHIPPED | OUTPATIENT
Start: 2021-03-31 | End: 2022-03-18

## 2021-03-31 RX ADMIN — CLONIDINE HYDROCHLORIDE 0.1 MG: 0.1 TABLET ORAL at 09:16

## 2021-03-31 RX ADMIN — ACETAMINOPHEN 650 MG: 325 TABLET, FILM COATED ORAL at 03:14

## 2021-03-31 RX ADMIN — SEVELAMER CARBONATE 800 MG: 800 TABLET, FILM COATED ORAL at 09:15

## 2021-03-31 RX ADMIN — LORAZEPAM 0.5 MG: 2 INJECTION INTRAMUSCULAR; INTRAVENOUS at 09:21

## 2021-03-31 RX ADMIN — NIFEDIPINE 60 MG: 60 TABLET, FILM COATED, EXTENDED RELEASE ORAL at 09:15

## 2021-03-31 RX ADMIN — LORAZEPAM 0.5 MG: 2 INJECTION INTRAMUSCULAR; INTRAVENOUS at 03:14

## 2021-03-31 RX ADMIN — FAMOTIDINE 20 MG: 20 TABLET, FILM COATED ORAL at 09:15

## 2021-03-31 NOTE — DISCHARGE SUMMARY
[facility] Physician Discharge Summary             attend 12 step program      Activity level: Slowly increase as tolerated    Diet: DIET GENERAL;    Labs: None are pending at the discharge    Condition at discharge: Stable    Dispo: Return to home setting     Patient ID:  Fidencio Roberto  [id number]  89 y.o.  1990    Admit date: 3/27/2021    Discharge date and time:  3/31/2021  10:30 AM    Admission Diagnoses: Active Problems:    Hypertensive urgency    JERAMY (acute kidney injury) ([facility] [address]  Resolved Problems:    * No resolved hospital problems. *      Discharge Diagnoses: Active Problems:    Hypertensive urgency    JERAMY (acute kidney injury) ([facility] [address]  Resolved Problems:    * No resolved hospital problems. *      Consults:  IP CONSULT TO NEPHROLOGY    Procedures: None significant except if described in hospital course. Hospital Course:   History of present illness from History and Physical:  This is a 26 y.o. female  has a past medical history of Recent vignal delivery 2 weeks back and Hypertensive urgency. presented with Back pain, Poorly controlled blood pressure for last few days prior to arrival to the hospital. Recently diagnosed with HTN but noncompliant and had prio hospitalization with HTN urgency. Since delivery, she has on and off back pain and in the ED, a CT of spine showed no lesion or any abscess formation. Patient was also found to have HTN urgency with renal impairment and now being admitted of further evaluation   The patient was seen and examined at bedside, appears alert and awake with no acute distress and is able to answer simple  questions. On direct questioning, patient denied any  resting ongoing chest pain, resting SOB, hemoptysis, productive cough, fever, ongoing palpitation, active abdominal pain, hematemesis, rectal bleeding, mine, hematuria, any other  and GI complaints and any new focal neuro deficits.       1.  Hypertensive urgency controlled with Nifedipine 60mg QD and the Clonidine 0.1mg tid    2. JERAMY (acute kidney injury): Suspects that this may be due to #1  per Nephro. Normal renal US. Creatinine not improving as of 3/31. However it is stable with very little change in last several days. I d/w dr Radha Celestin who deems her safe for d/c. I agree    3. Cocaine Abuse: 3/29 she plans on getting back into treatment. 3/31 encouraged her again to do so    4. Mild Anemia. This may be related to recent pregnancy  Continue to monitor    5. s/p Metabolic Acidosis s/p IV Bicarb    6 low calcium low albumin with JERAMY and vitamin D deficiency so agree with renal they are supplementing vitamin D and giving a phosphate binder    7 postpartum mid-March follow-up with OB prn. Total of 67 kids.  and her mother helps        Discharge Exam:  Vitals:    03/29/21 2037 03/30/21 0730 03/30/21 1615 03/30/21 2100   BP: 122/74 134/89 134/71 129/81   Pulse:  77 85 82   Resp:  17 16    Temp:  97.6 °F (36.4 °C) 97.6 °F (36.4 °C)    TempSrc:  Oral Oral    SpO2:  98% 99%    Weight:       Height:           No acute distress moist membranes   Normocephalic, without obvious abnormality, atraumatic, external ears without lesions,   Neck supple no cervical lymphadenopathy  Heart has a regular rate and rhythm no murmur  Lungs are clear to auscultation bilaterally with equal movements. Abdomen is soft nontender nondistended no rebound or guarding. No  significant peripheral edema good peripheral perfusion. No significant rashes or new skin lesions. No new focality on neuro exam which is overall grossly intact. Affect and mood seem to be appropriate     I/O last 3 completed shifts: In: 1400 [P.O.:1400]  Out: -   No intake/output data recorded.       LABS:  Recent Labs     03/29/21  0639 03/30/21  0737 03/31/21  0702    141 141   K 3.7 3.6 3.8    102 102   CO2 25 26 24   BUN 21* 22* 26*   CREATININE 2.8* 2.9* 3.0*   GLUCOSE 113* 103* 94   CALCIUM 8.7 8.7 9.1 Normal. Pelvis: Bladder is incompletely distended. Uterus appears enlarged. Ovaries are not well evaluated. Peritoneum/Retroperitoneum: Adenopathy: Suboptimal evaluation for adenopathy due to lack of intravenous and enteric contrast. Abdominal aorta: No abdominal aortic aneurysm. Free fluid/air: No free fluid. No free air. Bones/Soft Tissues: Diastasis of the rectus musculature. Other nonspecific sclerosis in the proximal left femur. 1. No renal, ureteral, or bladder calculi. No hydronephrosis. 2. Partially visualized ground-glass opacity in the right lower lobe. Correlate with presentation. Follow-up recommended. 3. Other findings as described. Ct Lumbar Spine Wo Contrast    Result Date: 3/27/2021  EXAMINATION: CT OF THE LUMBAR SPINE WITHOUT CONTRAST  3/27/2021 TECHNIQUE: CT of the lumbar spine was performed without the administration of intravenous contrast. Multiplanar reformatted images are provided for review. Dose modulation, iterative reconstruction, and/or weight based adjustment of the mA/kV was utilized to reduce the radiation dose to as low as reasonably achievable. COMPARISON: None HISTORY: ORDERING SYSTEM PROVIDED HISTORY: LOWER BACK PAIN TECHNOLOGIST PROVIDED HISTORY: Reason for exam:->epidural 2 weeks ago, pain at the site Decision Support Exception->Emergency Medical Condition (MA) FINDINGS: BONES/ALIGNMENT: There is normal alignment of the spine. The vertebral body heights are maintained. No osseous destructive lesion is seen. DEGENERATIVE CHANGES: No significant degenerative changes of the lumbar spine. No obvious central canal stenosis. Moderate left neural foraminal narrowing at L5/S1. SOFT TISSUES/RETROPERITONEUM: No paraspinal mass is seen. No acute abnormality of lumbar spine.      Us Retroperitoneal Complete    Result Date: 3/28/2021  EXAMINATION: RETROPERITONEAL ULTRASOUND OF THE KIDNEYS AND URINARY BLADDER 3/28/2021 COMPARISON: None [facility] transcription errors may be present.      Signed:  Electronically signed by Omayra Snow MD on 3/31/2021 at 10:30 AM

## 2021-03-31 NOTE — PROGRESS NOTES
equal, round and reactive to light, extra ocular muscles intact, sclera clear, conjunctiva normal  ENT:  Normocephalic, without obvious abnormality, atraumatic, sinuses nontender on palpation, external ears without lesions, oral pharynx with moist mucus membranes, tonsils without erythema or exudates, gums normal and good dentition. NECK:  Supple, symmetrical, trachea midline, no adenopathy, thyroid symmetric, not enlarged and no tenderness, skin normal  HEMATOLOGIC/LYMPHATICS:  no cervical lymphadenopathy  BACK:  Symmetric, no curvature, spinous processes are non-tender on palpation, paraspinous muscles are non-tender on palpation, no costal vertebral tenderness  LUNGS:  No increased work of breathing, good air exchange, clear to auscultation bilaterally, no crackles or wheezing  CARDIOVASCULAR:  Normal apical impulse, regular rate and rhythm, normal S1 and S2, no S3 or S4, and no murmur noted  ABDOMEN:  No scars, normal bowel sounds, soft, non-distended, non-tender, no masses palpated, no hepatosplenomegally  MUSCULOSKELETAL:  There is no redness, warmth, or swelling of the joints. NEUROLOGIC:  Awake, alert, oriented to name, place and time.    SKIN:  no bruising or bleeding  Data    CBC:   Lab Results   Component Value Date    WBC 6.2 03/31/2021    RBC 4.47 03/31/2021    HGB 12.2 03/31/2021    HCT 39.5 03/31/2021    MCV 88.4 03/31/2021    MCH 27.3 03/31/2021    MCHC 30.9 03/31/2021    RDW 14.1 03/31/2021     03/31/2021    MPV 10.6 03/31/2021     CMP:    Lab Results   Component Value Date     03/31/2021    K 3.8 03/31/2021    K 4.0 03/28/2021     03/31/2021    CO2 24 03/31/2021    BUN 26 03/31/2021    CREATININE 3.0 03/31/2021    GFRAA 22 03/31/2021    LABGLOM 22 03/31/2021    GLUCOSE 94 03/31/2021    GLUCOSE 90 11/08/2010    PROT 7.0 03/31/2021    LABALBU 3.4 03/31/2021    CALCIUM 9.1 03/31/2021    BILITOT 0.4 03/31/2021    ALKPHOS 87 03/31/2021    AST 9 03/31/2021    ALT 6 03/31/2021 ASSESSMENT AND PLAN    History of present illness from History and Physical:  This is a 26 y.o. female  has a past medical history of Recent vignal delivery 2 weeks back and Hypertensive urgency. presented with Back pain, Poorly controlled blood pressure for last few days prior to arrival to the hospital. Recently diagnosed with HTN but noncompliant and had prio hospitalization with HTN urgency. Since delivery, she has on and off back pain and in the ED, a CT of spine showed no lesion or any abscess formation. Patient was also found to have HTN urgency with renal impairment and now being admitted of further evaluation   The patient was seen and examined at bedside, appears alert and awake with no acute distress and is able to answer simple  questions. On direct questioning, patient denied any  resting ongoing chest pain, resting SOB, hemoptysis, productive cough, fever, ongoing palpitation, active abdominal pain, hematemesis, rectal bleeding, mine, hematuria, any other  and GI complaints and any new focal neuro deficits. 1.  Hypertensive urgency: Nifedipine 60mg QD and the Clonidine 0.1mg tid    2. JERAMY (acute kidney injury): Suspects that this may be due to #1  per Nephro. Normal renal US. Creatinine not improving as of 3/31    3. Cocaine Abuse: 3/29 she plans on getting back into treatment. 3/31 encouraged her again to do so    4. Mild Anemia. This may be related to recent pregnancy  Continue to monitor    5. s/p Metabolic Acidosis s/p IV Bicarb    6 low calcium low albumin with JERAMY and vitamin D deficiency so agree with renal they are supplementing vitamin D and giving a phosphate binder    7 postpartum mid-March follow-up with OB prn. Total of 67 kids.  and her mother hel[ps    6. Full code. 9.  Disposition Home when renal function is stable  10.  DVT prophylaxis SCDs

## 2021-03-31 NOTE — PROGRESS NOTES
smoking history. She has never used smokeless tobacco. She reports previous alcohol use. She reports previous drug use. Drugs: Marijuana and Cocaine. Allergies:  Patient has no known allergies. Current Medications:    sevelamer (RENVELA) tablet 800 mg, TID WC  vitamin D (ERGOCALCIFEROL) capsule 50,000 Units, Weekly  acetaminophen (TYLENOL) tablet 650 mg, Q4H PRN  hydrALAZINE (APRESOLINE) injection 20 mg, Q4H PRN  cloNIDine (CATAPRES) tablet 0.1 mg, TID  NIFEdipine (PROCARDIA XL) extended release tablet 60 mg, Daily  morphine (PF) injection 2 mg, Q4H PRN  LORazepam (ATIVAN) injection 0.5 mg, Q6H PRN  cloNIDine (CATAPRES) tablet 0.2 mg, Q8H PRN  sodium chloride flush 0.9 % injection 10 mL, 2 times per day  sodium chloride flush 0.9 % injection 10 mL, PRN  0.9 % sodium chloride infusion, PRN  [Held by provider] enoxaparin (LOVENOX) injection 40 mg, Daily  promethazine (PHENERGAN) tablet 12.5 mg, Q6H PRN    Or  ondansetron (ZOFRAN) injection 4 mg, Q6H PRN  polyethylene glycol (GLYCOLAX) packet 17 g, Daily PRN  famotidine (PEPCID) tablet 20 mg, Daily  acetaminophen (TYLENOL) tablet 650 mg, Q6H PRN    Or  acetaminophen (TYLENOL) suppository 650 mg, Q6H PRN        Review of Systems:   Pertinent items are noted in HPI.     Physical exam:   Constitutional:  Young female in NAD  Vitals:   VITALS:  /81   Pulse 82   Temp 97.6 °F (36.4 °C) (Oral)   Resp 16   Ht 5' 7\" (1.702 m)   Wt 194 lb (88 kg)   LMP 06/14/2020   SpO2 99%   BMI 30.38 kg/m²   24HR INTAKE/OUTPUT:      Intake/Output Summary (Last 24 hours) at 3/31/2021 1146  Last data filed at 3/31/2021 0900  Gross per 24 hour   Intake 1500 ml   Output --   Net 1500 ml     URINARY CATHETER OUTPUT (Diaz):     DRAIN/TUBE OUTPUT:     VENT SETTINGS:  Vent Information  SpO2: 99 %  Additional Respiratory  Assessments  Pulse: 82  Resp: 16  SpO2: 99 %    Skin: no rash, turgor wnl  Heent:  eomi, mmm  Neck: no bruits or jvd noted  Cardiovascular: PMI not lat dispalced S1, S2 without S3 or rub  Respiratory: CTA B without w/r/r  Abdomen:  +bs, soft, nt, nd  Ext: trace bilat lower extremity edema  Psychiatric: mood and affect appropriate  Musculoskeletal:  Rom, muscular strength intact    Data:   Labs:  CBC:   Lab Results   Component Value Date    WBC 6.2 03/31/2021    RBC 4.47 03/31/2021    HGB 12.2 03/31/2021    HCT 39.5 03/31/2021    MCV 88.4 03/31/2021    MCH 27.3 03/31/2021    MCHC 30.9 03/31/2021    RDW 14.1 03/31/2021     03/31/2021    MPV 10.6 03/31/2021     CBC with Differential:    Lab Results   Component Value Date    WBC 6.2 03/31/2021    RBC 4.47 03/31/2021    HGB 12.2 03/31/2021    HCT 39.5 03/31/2021     03/31/2021    MCV 88.4 03/31/2021    MCH 27.3 03/31/2021    MCHC 30.9 03/31/2021    RDW 14.1 03/31/2021    SEGSPCT 73 06/06/2012    LYMPHOPCT 17.2 03/28/2021    MONOPCT 7.9 03/28/2021    BASOPCT 0.6 03/28/2021    MONOSABS 0.57 03/28/2021    LYMPHSABS 1.24 03/28/2021    EOSABS 0.20 03/28/2021    BASOSABS 0.04 03/28/2021     Platelets:    Lab Results   Component Value Date     03/31/2021     Hemoglobin/Hematocrit:    Lab Results   Component Value Date    HGB 12.2 03/31/2021    HCT 39.5 03/31/2021     CMP:    Lab Results   Component Value Date     03/31/2021    K 3.8 03/31/2021    K 4.0 03/28/2021     03/31/2021    CO2 24 03/31/2021    BUN 26 03/31/2021    CREATININE 3.0 03/31/2021    GFRAA 22 03/31/2021    LABGLOM 22 03/31/2021    GLUCOSE 94 03/31/2021    GLUCOSE 90 11/08/2010    PROT 7.0 03/31/2021    LABALBU 3.4 03/31/2021    CALCIUM 9.1 03/31/2021    BILITOT 0.4 03/31/2021    ALKPHOS 87 03/31/2021    AST 9 03/31/2021    ALT 6 03/31/2021     BMP:    Lab Results   Component Value Date     03/31/2021    K 3.8 03/31/2021    K 4.0 03/28/2021     03/31/2021    CO2 24 03/31/2021    BUN 26 03/31/2021    LABALBU 3.4 03/31/2021    CREATININE 3.0 03/31/2021    CALCIUM 9.1 03/31/2021    GFRAA 22 03/31/2021    LABGLOM 22 03/31/2021 GLUCOSE 94 03/31/2021    GLUCOSE 90 11/08/2010     BUN/Creatinine:    Lab Results   Component Value Date    BUN 26 03/31/2021    CREATININE 3.0 03/31/2021     Hepatic Function Panel:    Lab Results   Component Value Date    ALKPHOS 87 03/31/2021    ALT 6 03/31/2021    AST 9 03/31/2021    PROT 7.0 03/31/2021    BILITOT 0.4 03/31/2021    BILIDIR <0.2 03/27/2021    IBILI see below 03/27/2021    LABALBU 3.4 03/31/2021     Albumin:    Lab Results   Component Value Date    LABALBU 3.4 03/31/2021     Calcium:    Lab Results   Component Value Date    CALCIUM 9.1 03/31/2021     Ionized Calcium:  No results found for: IONCA  Magnesium:    Lab Results   Component Value Date    MG 1.9 03/31/2021     Phosphorus:    Lab Results   Component Value Date    PHOS 5.5 03/31/2021     LDH:  No results found for: LDH  Uric Acid:    Lab Results   Component Value Date    LABURIC 2.5 01/16/2021     PT/INR:    Lab Results   Component Value Date    PROTIME 11.7 03/29/2021    INR 1.0 03/29/2021     PTT:    Lab Results   Component Value Date    APTT 27.9 03/29/2021   [APTT}  Troponin:  No results found for: TROPONINI  U/A:    Lab Results   Component Value Date    COLORU Yellow 03/27/2021    PROTEINU 30 03/27/2021    PHUR 6.0 03/27/2021    WBCUA 1-3 03/27/2021    WBCUA 1-3 05/02/2011    RBCUA NONE 03/27/2021    RBCUA NONE 05/02/2011    TRICHOMONAS RARE 07/20/2019    BACTERIA NONE SEEN 03/27/2021    CLARITYU Clear 03/27/2021    SPECGRAV 1.010 03/27/2021    LEUKOCYTESUR SMALL 03/27/2021    UROBILINOGEN 0.2 03/27/2021    BILIRUBINUR Negative 03/27/2021    BILIRUBINUR NEGATIVE 05/02/2011    BLOODU MODERATE 03/27/2021    GLUCOSEU Negative 03/27/2021    GLUCOSEU 100 05/02/2011    AMORPHOUS FEW 07/20/2019     ABG:  No results found for: PH, PCO2, PO2, HCO3, BE, THGB, TCO2, O2SAT  HgBA1c:  No results found for: LABA1C  Microalbumen/Creatinine ratio:  No components found for: RUCREAT  FLP:  No results found for: TRIG, HDL, LDLCALC, LDLDIRECT, LABVLDL  TSH:  No results found for: TSH  VITAMIN B12: No components found for: B12  FOLATE:    Lab Results   Component Value Date    FOLATE 10.0 03/29/2021     Iron Saturation:  No components found for: PERCENTFE  FERRITIN:    Lab Results   Component Value Date    FERRITIN 49 03/29/2021     HIV:  No results found for: HIV  AGGIE:    Lab Results   Component Value Date    AGGIE NEGATIVE 03/29/2021     AMYLASE:    Lab Results   Component Value Date    AMYLASE 85 11/15/2016     LIPASE:    Lab Results   Component Value Date    LIPASE 36 11/15/2016     Fibrinogen Level:  No components found for: FIB  Urine Toxicology:  No components found for: IAMMENTA, IBARBIT, IBENZO, ICOCAINE, IMARTHC, IOPIATES, IPHENCYC  24 Hour Urine for Protein:  No components found for: RAWUPRO, UHRS3, SDNN03XG, UTV3  24 Hour Urine for Creatinine Clearance:  No components found for: CREAT4, UHRS10, UTV10     Imaging:  Location:200       Exam: US RETROPERITONEAL LIMITED       Comparison: None       History:   Urinary tract infection, pregnancy       Real time sector scanning of the kidneys was obtained in multiple   positions. The kidneys are normal in size and configuration.  The   renal cortical thickness and echogenicity are normal.  There is no   evidence of solid or cystic mass. No evidence for hydronephrosis. The   longitudinal renal measurement is 12.5 cm for the left kidney and 11.1   cm for the right kidney.  The urinary bladder is normal.           Impression   Normal renal ultrasound. EXAMINATION:   CT OF THE ABDOMEN AND PELVIS WITHOUT CONTRAST 3/27/2021 4:56 pm       TECHNIQUE:   CT of the abdomen and pelvis was performed without the administration of   intravenous contrast. Multiplanar reformatted images are provided for review.    Dose modulation, iterative reconstruction, and/or weight based adjustment of   the mA/kV was utilized to reduce the radiation dose to as low as reasonably   achievable.       COMPARISON:   CT abdomen pelvis 11/16/2016.       HISTORY:   ORDERING SYSTEM PROVIDED HISTORY: concern for obstruction, renal failure   TECHNOLOGIST PROVIDED HISTORY:   Reason for exam:->concern for obstruction, renal failure   Additional Contrast?->None   Decision Support Exception->Emergency Medical Condition (MA)       FINDINGS:   Lower Chest: Partially visualized ground-glass opacity in the right lower   lobe.  Prominent sized heart.       Organs:       Evaluation of the parenchymal organs is limited due to lack of intravenous   contrast.       Liver: Unremarkable on this unenhanced exam.       Spleen: Unremarkable on this unenhanced exam.       Pancreas: No inflammatory changes appreciated on this unenhanced exam.       Adrenal glands: Unremarkable on this unenhanced exam.       Kidneys: Unremarkable on this unenhanced exam. No renal, ureteral, or bladder   calculi.       Gallbladder: Incompletely distended.       GI/Bowel:       Evaluation of the bowel and mesentery is limited due to lack of enteric   contrast.       Visualized esophagus/stomach: Small hiatal hernia.       Small bowel: No dilated small bowel.       Large bowel: Scattered colonic diverticula without adjacent stranding.       Appendix: Normal.       Pelvis: Bladder is incompletely distended. Uterus appears enlarged.  Ovaries   are not well evaluated.       Peritoneum/Retroperitoneum:       Adenopathy: Suboptimal evaluation for adenopathy due to lack of intravenous   and enteric contrast.       Abdominal aorta: No abdominal aortic aneurysm.       Free fluid/air: No free fluid. No free air.       Bones/Soft Tissues: Diastasis of the rectus musculature.  Other nonspecific   sclerosis in the proximal left femur.           Impression   1. No renal, ureteral, or bladder calculi.  No hydronephrosis. 2. Partially visualized ground-glass opacity in the right lower lobe. Correlate with presentation.  Follow-up recommended. 3. Other findings as described.        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 Follow Cr as an outpt  2. Await remainder of the   Basic Serologies  3. No Further NSAID's or Cocaine and pt verbalizes understanding  4. OK for D/C will follow labs Q Mon and Thurs for 2 weeks and office will call with F/U appt    2-HTN in the Peripartum possibly exacerbated by the JERAMY  BP at near goal <130/80  PLAN:1. Continue on the  Nifedipine 60mg QD and the Clonidine 0.1mg tid    3-Non Anion gap Met Acidosis with the JERAMY  HCO3 at goal >/=22  PLAN:1. Follow    4- Hypocalcemia with hypoalbuminemia with Hyperphosphatemia in the setting of the JERAMY with Unspecified Vit D Def  Vit D 11  PO4 5.1  PLAN:1. Contiinue on Vit D supplement  2.  Continue PO4 binder      Thank you Dr. Yan Horton MD for allowing us to participate in care of Paula Lerner  11:46 AM  3/31/2021

## 2021-04-01 LAB
HEPATITIS C ANTIBODY INTERPRETATION: NORMAL
HISTONE ANTIBODY IGG: 0.3 UNITS (ref 0–0.9)

## 2021-04-02 LAB — ANCA IFA: ABNORMAL

## 2021-04-05 LAB
MYELOPEROXIDASE AB: 1 AU/ML (ref 0–19)
SERINE PROTEASE 3 AB: 4 AU/ML (ref 0–19)

## 2021-07-28 ENCOUNTER — HOSPITAL ENCOUNTER (EMERGENCY)
Age: 31
Discharge: HOME | End: 2021-07-29
Attending: EMERGENCY MEDICINE
Payer: COMMERCIAL

## 2021-07-28 ENCOUNTER — APPOINTMENT (OUTPATIENT)
Dept: CT IMAGING | Age: 31
End: 2021-07-28
Payer: COMMERCIAL

## 2021-07-28 VITALS
OXYGEN SATURATION: 100 % | TEMPERATURE: 97.6 F | SYSTOLIC BLOOD PRESSURE: 179 MMHG | HEART RATE: 75 BPM | WEIGHT: 200 LBS | DIASTOLIC BLOOD PRESSURE: 106 MMHG | RESPIRATION RATE: 18 BRPM | BODY MASS INDEX: 30.31 KG/M2 | HEIGHT: 68 IN

## 2021-07-28 DIAGNOSIS — K59.00 CONSTIPATION, UNSPECIFIED CONSTIPATION TYPE: ICD-10-CM

## 2021-07-28 DIAGNOSIS — I10 ESSENTIAL HYPERTENSION: ICD-10-CM

## 2021-07-28 DIAGNOSIS — R10.9 FLANK PAIN: Primary | ICD-10-CM

## 2021-07-28 DIAGNOSIS — N18.1 CHRONIC RENAL IMPAIRMENT, STAGE 1: ICD-10-CM

## 2021-07-28 LAB
ALBUMIN SERPL-MCNC: 4 G/DL (ref 3.5–5.2)
ALP BLD-CCNC: 74 U/L (ref 35–104)
ALT SERPL-CCNC: 15 U/L (ref 0–32)
ANION GAP SERPL CALCULATED.3IONS-SCNC: 8 MMOL/L (ref 7–16)
AST SERPL-CCNC: 18 U/L (ref 0–31)
BACTERIA: ABNORMAL /HPF
BASOPHILS ABSOLUTE: 0.03 E9/L (ref 0–0.2)
BASOPHILS RELATIVE PERCENT: 0.5 % (ref 0–2)
BILIRUB SERPL-MCNC: <0.2 MG/DL (ref 0–1.2)
BILIRUBIN URINE: NEGATIVE
BLOOD, URINE: NEGATIVE
BUN BLDV-MCNC: 20 MG/DL (ref 6–20)
CALCIUM SERPL-MCNC: 9.3 MG/DL (ref 8.6–10.2)
CHLORIDE BLD-SCNC: 108 MMOL/L (ref 98–107)
CLARITY: CLEAR
CO2: 25 MMOL/L (ref 22–29)
COLOR: YELLOW
CREAT SERPL-MCNC: 1.1 MG/DL (ref 0.5–1)
EOSINOPHILS ABSOLUTE: 0.4 E9/L (ref 0.05–0.5)
EOSINOPHILS RELATIVE PERCENT: 6.2 % (ref 0–6)
EPITHELIAL CELLS, UA: ABNORMAL /HPF
GFR AFRICAN AMERICAN: >60
GFR NON-AFRICAN AMERICAN: >60 ML/MIN/1.73
GLUCOSE BLD-MCNC: 107 MG/DL (ref 74–99)
GLUCOSE URINE: NEGATIVE MG/DL
HCG, URINE, POC: NEGATIVE
HCT VFR BLD CALC: 33.6 % (ref 34–48)
HEMOGLOBIN: 10.3 G/DL (ref 11.5–15.5)
IMMATURE GRANULOCYTES #: 0.02 E9/L
IMMATURE GRANULOCYTES %: 0.3 % (ref 0–5)
KETONES, URINE: NEGATIVE MG/DL
LEUKOCYTE ESTERASE, URINE: ABNORMAL
LIPASE: 38 U/L (ref 13–60)
LYMPHOCYTES ABSOLUTE: 1.77 E9/L (ref 1.5–4)
LYMPHOCYTES RELATIVE PERCENT: 27.5 % (ref 20–42)
Lab: NORMAL
MCH RBC QN AUTO: 27.7 PG (ref 26–35)
MCHC RBC AUTO-ENTMCNC: 30.7 % (ref 32–34.5)
MCV RBC AUTO: 90.3 FL (ref 80–99.9)
MONOCYTES ABSOLUTE: 0.49 E9/L (ref 0.1–0.95)
MONOCYTES RELATIVE PERCENT: 7.6 % (ref 2–12)
NEGATIVE QC PASS/FAIL: NORMAL
NEUTROPHILS ABSOLUTE: 3.72 E9/L (ref 1.8–7.3)
NEUTROPHILS RELATIVE PERCENT: 57.9 % (ref 43–80)
NITRITE, URINE: NEGATIVE
PDW BLD-RTO: 14.9 FL (ref 11.5–15)
PH UA: 7 (ref 5–9)
PLATELET # BLD: 279 E9/L (ref 130–450)
PMV BLD AUTO: 9.8 FL (ref 7–12)
POSITIVE QC PASS/FAIL: NORMAL
POTASSIUM SERPL-SCNC: 4.3 MMOL/L (ref 3.5–5)
PROTEIN UA: NEGATIVE MG/DL
RBC # BLD: 3.72 E12/L (ref 3.5–5.5)
RBC UA: ABNORMAL /HPF (ref 0–2)
SODIUM BLD-SCNC: 141 MMOL/L (ref 132–146)
SPECIFIC GRAVITY UA: 1.02 (ref 1–1.03)
TOTAL PROTEIN: 7.4 G/DL (ref 6.4–8.3)
TRICHOMONAS: PRESENT /HPF
UROBILINOGEN, URINE: 0.2 E.U./DL
WBC # BLD: 6.4 E9/L (ref 4.5–11.5)
WBC UA: ABNORMAL /HPF (ref 0–5)

## 2021-07-28 PROCEDURE — 80053 COMPREHEN METABOLIC PANEL: CPT

## 2021-07-28 PROCEDURE — 6370000000 HC RX 637 (ALT 250 FOR IP): Performed by: EMERGENCY MEDICINE

## 2021-07-28 PROCEDURE — 96374 THER/PROPH/DIAG INJ IV PUSH: CPT

## 2021-07-28 PROCEDURE — 6360000002 HC RX W HCPCS: Performed by: EMERGENCY MEDICINE

## 2021-07-28 PROCEDURE — 74176 CT ABD & PELVIS W/O CONTRAST: CPT

## 2021-07-28 PROCEDURE — 81001 URINALYSIS AUTO W/SCOPE: CPT

## 2021-07-28 PROCEDURE — 99285 EMERGENCY DEPT VISIT HI MDM: CPT

## 2021-07-28 PROCEDURE — 83690 ASSAY OF LIPASE: CPT

## 2021-07-28 PROCEDURE — 2580000003 HC RX 258: Performed by: EMERGENCY MEDICINE

## 2021-07-28 PROCEDURE — 85025 COMPLETE CBC W/AUTO DIFF WBC: CPT

## 2021-07-28 RX ORDER — 0.9 % SODIUM CHLORIDE 0.9 %
1000 INTRAVENOUS SOLUTION INTRAVENOUS ONCE
Status: COMPLETED | OUTPATIENT
Start: 2021-07-28 | End: 2021-07-29

## 2021-07-28 RX ORDER — IBUPROFEN 800 MG/1
800 TABLET ORAL ONCE
Status: DISCONTINUED | OUTPATIENT
Start: 2021-07-28 | End: 2021-07-29 | Stop reason: HOSPADM

## 2021-07-28 RX ORDER — FENTANYL CITRATE 50 UG/ML
25 INJECTION, SOLUTION INTRAMUSCULAR; INTRAVENOUS ONCE
Status: COMPLETED | OUTPATIENT
Start: 2021-07-28 | End: 2021-07-28

## 2021-07-28 RX ADMIN — FENTANYL CITRATE 25 MCG: 50 INJECTION, SOLUTION INTRAMUSCULAR; INTRAVENOUS at 23:32

## 2021-07-28 RX ADMIN — SODIUM CHLORIDE 1000 ML: 9 INJECTION, SOLUTION INTRAVENOUS at 22:58

## 2021-07-28 ASSESSMENT — PAIN DESCRIPTION - PAIN TYPE: TYPE: ACUTE PAIN

## 2021-07-28 ASSESSMENT — ENCOUNTER SYMPTOMS
CHEST TIGHTNESS: 0
NAUSEA: 0
SHORTNESS OF BREATH: 0
BACK PAIN: 0
SORE THROAT: 0
VOMITING: 0
COUGH: 0
ABDOMINAL DISTENTION: 0
WHEEZING: 0
ABDOMINAL PAIN: 0
DIARRHEA: 0
SINUS PRESSURE: 0
CONSTIPATION: 1

## 2021-07-28 ASSESSMENT — PAIN SCALES - GENERAL
PAINLEVEL_OUTOF10: 8
PAINLEVEL_OUTOF10: 8

## 2021-07-28 ASSESSMENT — PAIN DESCRIPTION - FREQUENCY: FREQUENCY: CONTINUOUS

## 2021-07-28 ASSESSMENT — PAIN DESCRIPTION - PROGRESSION: CLINICAL_PROGRESSION: GRADUALLY WORSENING

## 2021-07-28 ASSESSMENT — PAIN DESCRIPTION - ONSET: ONSET: ON-GOING

## 2021-07-28 ASSESSMENT — PAIN DESCRIPTION - LOCATION: LOCATION: FLANK

## 2021-07-28 ASSESSMENT — PAIN DESCRIPTION - ORIENTATION: ORIENTATION: RIGHT;LEFT

## 2021-07-28 ASSESSMENT — PAIN DESCRIPTION - DESCRIPTORS: DESCRIPTORS: THROBBING

## 2021-07-28 ASSESSMENT — PAIN - FUNCTIONAL ASSESSMENT: PAIN_FUNCTIONAL_ASSESSMENT: ACTIVITIES ARE NOT PREVENTED

## 2021-07-29 RX ORDER — POLYETHYLENE GLYCOL 3350 17 G/17G
17 POWDER, FOR SOLUTION ORAL DAILY PRN
Qty: 527 G | Refills: 1 | Status: SHIPPED | OUTPATIENT
Start: 2021-07-29 | End: 2021-08-28

## 2021-07-29 RX ORDER — MAGNESIUM CARB/ALUMINUM HYDROX 105-160MG
150 TABLET,CHEWABLE ORAL 2 TIMES DAILY
Qty: 300 ML | Refills: 0 | Status: SHIPPED | OUTPATIENT
Start: 2021-07-29 | End: 2021-07-30

## 2021-07-29 ASSESSMENT — PAIN DESCRIPTION - ORIENTATION: ORIENTATION: RIGHT;LEFT

## 2021-07-29 ASSESSMENT — PAIN DESCRIPTION - DESCRIPTORS: DESCRIPTORS: THROBBING

## 2021-07-29 ASSESSMENT — PAIN SCALES - GENERAL: PAINLEVEL_OUTOF10: 8

## 2021-07-29 ASSESSMENT — PAIN DESCRIPTION - FREQUENCY: FREQUENCY: CONTINUOUS

## 2021-07-29 ASSESSMENT — PAIN DESCRIPTION - ONSET: ONSET: ON-GOING

## 2021-07-29 ASSESSMENT — PAIN - FUNCTIONAL ASSESSMENT: PAIN_FUNCTIONAL_ASSESSMENT: PREVENTS OR INTERFERES WITH ALL ACTIVE AND SOME PASSIVE ACTIVITIES

## 2021-07-29 ASSESSMENT — PAIN DESCRIPTION - PAIN TYPE: TYPE: ACUTE PAIN

## 2021-07-29 ASSESSMENT — PAIN DESCRIPTION - PROGRESSION: CLINICAL_PROGRESSION: NOT CHANGED

## 2021-07-29 ASSESSMENT — PAIN DESCRIPTION - LOCATION: LOCATION: FLANK

## 2021-07-29 NOTE — ED PROVIDER NOTES
Chief complaint:  Flank pain    HPI history provided by the patient  Patient was in complaining of right greater than left flank pain for some time now, least over a couple weeks and potentially longer. States that she has damaged kidneys. She states she has had pain now for some time and has not followed up on an outpatient basis for recheck of her laboratory results which for 5 months ago showed damage to kidneys. She denies fevers, sweats or chills. No nausea vomiting or diarrhea. Admits to constipation. No chest pain or palpitations or shortness of breath. No treatment prior to arrival.  Nothing makes it better or worse. States right side is worse than left side. No migration or radiation of pain. Review of Systems   Constitutional: Negative for chills, diaphoresis, fatigue and fever. HENT: Negative for congestion, sinus pressure and sore throat. Respiratory: Negative for cough, chest tightness, shortness of breath and wheezing. Cardiovascular: Negative for chest pain, palpitations and leg swelling. Gastrointestinal: Positive for constipation. Negative for abdominal distention, abdominal pain, diarrhea, nausea and vomiting. Genitourinary: Positive for flank pain. Negative for difficulty urinating, dysuria, frequency, hematuria and urgency. Musculoskeletal: Negative for arthralgias, back pain, gait problem, joint swelling, myalgias, neck pain and neck stiffness. Skin: Negative for rash and wound. Neurological: Negative for dizziness, seizures, syncope, weakness, light-headedness, numbness and headaches. Hematological: Negative for adenopathy. All other systems reviewed and are negative. Physical Exam  Vitals and nursing note reviewed. Constitutional:       General: She is not in acute distress. Appearance: She is well-developed. She is not ill-appearing, toxic-appearing or diaphoretic. HENT:      Head: Normocephalic and atraumatic.    Eyes:      General: No scleral icterus. Pupils: Pupils are equal, round, and reactive to light. Cardiovascular:      Rate and Rhythm: Normal rate and regular rhythm. Heart sounds: Normal heart sounds. No murmur heard. Pulmonary:      Effort: Pulmonary effort is normal. No respiratory distress. Breath sounds: Normal breath sounds. No stridor, decreased air movement or transmitted upper airway sounds. No decreased breath sounds, wheezing, rhonchi or rales. Chest:      Chest wall: No tenderness. Abdominal:      General: Bowel sounds are normal. There is no distension. Palpations: Abdomen is soft. There is no pulsatile mass. Tenderness: There is no abdominal tenderness. There is no right CVA tenderness, left CVA tenderness, guarding or rebound. Musculoskeletal:         General: No swelling, tenderness, deformity or signs of injury. Cervical back: Normal range of motion and neck supple. No signs of trauma or rigidity. No pain with movement, spinous process tenderness or muscular tenderness. Normal range of motion. Right lower leg: No edema. Left lower leg: No edema. Comments: Arms and legs are neurovascular intact. No pretibial edema or calf pain. Skin:     General: Skin is warm and dry. Coloration: Skin is not cyanotic, jaundiced, mottled or pale. Findings: No erythema or rash. Neurological:      General: No focal deficit present. Mental Status: She is alert and oriented to person, place, and time. GCS: GCS eye subscore is 4. GCS verbal subscore is 5. GCS motor subscore is 6. Cranial Nerves: No cranial nerve deficit. Coordination: Coordination normal.          Procedures     MDM        12:18 AM EDT  Work-up results are discussed with the patient. She states that she knows she has high blood pressure, it has been high for some time now.   She goes to [place] [id number] [employer]  family doctor, she will follow-up closely on an outpatient basis and return if symptoms change or worsen. Chronic but improving renal conditions are discussed with her as well and as well as her CT results. She is very comfortable in home at this time. --------------------------------------------- PAST HISTORY ---------------------------------------------  Past Medical History:  has a past medical history of Abnormal Pap smear, JERAMY (acute kidney injury) ([facility] [address] Complication of anesthesia, Herpes simplex without mention of complication, and Hypertensive urgency. Past Surgical History:  has a past surgical history that includes IR BIOPSY KIDNEY PERCUTANEOUS (3/29/2021). Social History:  reports that she has been smoking cigarettes. She has a 1.00 pack-year smoking history. She has never used smokeless tobacco. She reports previous alcohol use. She reports previous drug use. Drugs: Marijuana and Cocaine. Family History: family history includes Diabetes in her maternal grandfather; Hearing Loss in her sister; Heart Disease in her sister; Heart Surgery in her sister; Hypertension in her sister. The patients home medications have been reviewed. Allergies: Patient has no known allergies.     -------------------------------------------------- RESULTS -------------------------------------------------  Labs:  Results for orders placed or performed during the hospital encounter of 07/28/21   CBC Auto Differential   Result Value Ref Range    WBC 6.4 4.5 - 11.5 E9/L    RBC 3.72 3.50 - 5.50 E12/L    Hemoglobin 10.3 (L) 11.5 - 15.5 g/dL    Hematocrit 33.6 (L) 34.0 - 48.0 %    MCV 90.3 80.0 - 99.9 fL    MCH 27.7 26.0 - 35.0 pg    MCHC 30.7 (L) 32.0 - 34.5 %    RDW 14.9 11.5 - 15.0 fL    Platelets 869 396 - 441 E9/L    MPV 9.8 7.0 - 12.0 fL    Neutrophils % 57.9 43.0 - 80.0 %    Immature Granulocytes % 0.3 0.0 - 5.0 %    Lymphocytes % 27.5 20.0 - 42.0 %    Monocytes % 7.6 2.0 - 12.0 %    Eosinophils % 6.2 (H) 0.0 - 6.0 %    Basophils % 0.5 0.0 - 2.0 %    Neutrophils Absolute 3.72 1.80 - 7.30 E9/L Immature Granulocytes # 0.02 E9/L    Lymphocytes Absolute 1.77 1.50 - 4.00 E9/L    Monocytes Absolute 0.49 0.10 - 0.95 E9/L    Eosinophils Absolute 0.40 0.05 - 0.50 E9/L    Basophils Absolute 0.03 0.00 - 0.20 E9/L   Comprehensive Metabolic Panel   Result Value Ref Range    Sodium 141 132 - 146 mmol/L    Potassium 4.3 3.5 - 5.0 mmol/L    Chloride 108 (H) 98 - 107 mmol/L    CO2 25 22 - 29 mmol/L    Anion Gap 8 7 - 16 mmol/L    Glucose 107 (H) 74 - 99 mg/dL    BUN 20 6 - 20 mg/dL    CREATININE 1.1 (H) 0.5 - 1.0 mg/dL    GFR Non-African American >60 >=60 mL/min/1.73    GFR African American >60     Calcium 9.3 8.6 - 10.2 mg/dL    Total Protein 7.4 6.4 - 8.3 g/dL    Albumin 4.0 3.5 - 5.2 g/dL    Total Bilirubin <0.2 0.0 - 1.2 mg/dL    Alkaline Phosphatase 74 35 - 104 U/L    ALT 15 0 - 32 U/L    AST 18 0 - 31 U/L   Lipase   Result Value Ref Range    Lipase 38 13 - 60 U/L   Urinalysis   Result Value Ref Range    Color, UA Yellow Straw/Yellow    Clarity, UA Clear Clear    Glucose, Ur Negative Negative mg/dL    Bilirubin Urine Negative Negative    Ketones, Urine Negative Negative mg/dL    Specific Gravity, UA 1.025 1.005 - 1.030    Blood, Urine Negative Negative    pH, UA 7.0 5.0 - 9.0    Protein, UA Negative Negative mg/dL    Urobilinogen, Urine 0.2 <2.0 E.U./dL    Nitrite, Urine Negative Negative    Leukocyte Esterase, Urine TRACE (A) Negative   Microscopic Urinalysis   Result Value Ref Range    WBC, UA 2-5 0 - 5 /HPF    RBC, UA NONE 0 - 2 /HPF    Epithelial Cells, UA RARE /HPF    Bacteria, UA FEW (A) None Seen /HPF    Trichomonas, UA Present (A) None Seen /HPF   POC Pregnancy Urine Qual   Result Value Ref Range    HCG, Urine, POC Negative Negative    Lot Number [id number]     Positive QC Pass/Fail Pass     Negative QC Pass/Fail Pass        Radiology:  CT ABDOMEN PELVIS WO CONTRAST Additional Contrast? None   Final Result   No urinary tract stones or hydronephrosis. Diffuse colonic fecal retention. ------------------------- NURSING NOTES AND VITALS REVIEWED ---------------------------  Date / Time Roomed:  7/28/2021 10:08 PM  ED Bed Assignment:  [id number]    The nursing notes within the ED encounter and vital signs as below have been reviewed. BP (!) 179/106   Pulse 75   Temp 97.6 °F (36.4 °C)   Resp 18   Ht 5' 8\" (1.727 m)   Wt 200 lb (90.7 kg)   LMP 07/05/2021   SpO2 100%   BMI 30.41 kg/m²   Oxygen Saturation Interpretation: Normal      ------------------------------------------ PROGRESS NOTES ------------------------------------------  I have spoken with the patient and discussed todays results, in addition to providing specific details for the plan of care and counseling regarding the diagnosis and prognosis. Their questions are answered at this time and they are agreeable with the plan. I discussed at length with them reasons for immediate return here for re evaluation. They will followup with primary care by calling their office tomorrow. --------------------------------- ADDITIONAL PROVIDER NOTES ---------------------------------  At this time the patient is without objective evidence of an acute process requiring hospitalization or inpatient management. They have remained hemodynamically stable throughout their entire ED visit and are stable for discharge with outpatient follow-up. The plan has been discussed in detail and they are aware of the specific conditions for emergent return, as well as the importance of follow-up. New Prescriptions    MAGNESIUM CITRATE 1.745 GM/30ML SOLUTION    Take 150 mLs by mouth 2 times daily for 2 doses 150ML PO QD AS NEEDED FOR CONSTIPATION. POLYETHYLENE GLYCOL (MIRALAX) 17 G PACKET    Take 17 g by mouth daily as needed for Constipation       Diagnosis:  1. Flank pain    2. Constipation, unspecified constipation type    3. Essential hypertension    4.  Chronic renal impairment, stage 1        Disposition:  Patient's disposition:

## 2022-03-18 ENCOUNTER — HOSPITAL ENCOUNTER (EMERGENCY)
Age: 32
Discharge: HOME | End: 2022-03-18
Attending: EMERGENCY MEDICINE
Payer: COMMERCIAL

## 2022-03-18 VITALS
HEIGHT: 67 IN | HEART RATE: 112 BPM | BODY MASS INDEX: 31.39 KG/M2 | TEMPERATURE: 97.1 F | RESPIRATION RATE: 16 BRPM | SYSTOLIC BLOOD PRESSURE: 148 MMHG | OXYGEN SATURATION: 100 % | WEIGHT: 200 LBS | DIASTOLIC BLOOD PRESSURE: 108 MMHG

## 2022-03-18 DIAGNOSIS — Z34.90 PREGNANCY, UNSPECIFIED GESTATIONAL AGE: Primary | ICD-10-CM

## 2022-03-18 LAB — HCG(URINE) PREGNANCY TEST: POSITIVE

## 2022-03-18 PROCEDURE — 81025 URINE PREGNANCY TEST: CPT

## 2022-03-18 PROCEDURE — 99283 EMERGENCY DEPT VISIT LOW MDM: CPT

## 2022-03-18 NOTE — ED PROVIDER NOTES
Pulse 112   Temp 97.1 °F (36.2 °C) (Temporal)   Resp 16   Ht 5' 7\" (1.702 m)   Wt 200 lb (90.7 kg)   LMP 01/18/2022   SpO2 100%   BMI 31.32 kg/m²   Oxygen Saturation Interpretation: Normal      ---------------------------------------------------PHYSICAL EXAM--------------------------------------      Constitutional/General: Alert and oriented x3, well appearing, non toxic in NAD  Head: NC/AT  Eyes: PERRL, EOMI  Mouth: Oropharynx clear, handling secretions, no trismus  Neck: Supple, full ROM, no meningeal signs  Pulmonary: Lungs clear to auscultation bilaterally, no wheezes, rales, or rhonchi. Not in respiratory distress  Cardiovascular:  Regular rate and rhythm, no murmurs, gallops, or rubs. 2+ distal pulses  Abdomen: Soft, non tender, non distended,   Extremities: Moves all extremities x 4. Warm and well perfused  Skin: warm and dry without rash  Neurologic: GCS 15,  Psych: Normal Affect      ------------------------------ ED COURSE/MEDICAL DECISION MAKING----------------------  Medications - No data to display      Medical Decision Making: Follow-up with Dr. Miguel Sanchez. Counseling: The emergency provider has spoken with the patient and discussed todays results, in addition to providing specific details for the plan of care and counseling regarding the diagnosis and prognosis. Questions are answered at this time and they are agreeable with the plan.      --------------------------------- IMPRESSION AND DISPOSITION ---------------------------------    IMPRESSION  1.  Pregnancy, unspecified gestational age        DISPOSITION  Disposition: Discharge to home  Patient condition is good                  Qi Johnson MD  03/18/22 [id number]

## 2022-04-24 ENCOUNTER — HOSPITAL ENCOUNTER (EMERGENCY)
Age: 32
Discharge: HOME | End: 2022-04-24
Attending: EMERGENCY MEDICINE
Payer: COMMERCIAL

## 2022-04-24 VITALS
WEIGHT: 200 LBS | OXYGEN SATURATION: 99 % | RESPIRATION RATE: 18 BRPM | HEIGHT: 68 IN | TEMPERATURE: 98 F | BODY MASS INDEX: 30.31 KG/M2 | SYSTOLIC BLOOD PRESSURE: 152 MMHG | HEART RATE: 88 BPM | DIASTOLIC BLOOD PRESSURE: 102 MMHG

## 2022-04-24 DIAGNOSIS — R51.9 ACUTE NONINTRACTABLE HEADACHE, UNSPECIFIED HEADACHE TYPE: Primary | ICD-10-CM

## 2022-04-24 DIAGNOSIS — Z34.90 PREGNANCY, UNSPECIFIED GESTATIONAL AGE: ICD-10-CM

## 2022-04-24 LAB
BACTERIA: NORMAL /HPF
BILIRUBIN URINE: NEGATIVE
BLOOD, URINE: NEGATIVE
CLARITY: CLEAR
COLOR: YELLOW
EPITHELIAL CELLS, UA: NORMAL /HPF
GLUCOSE URINE: NEGATIVE MG/DL
HCG, URINE, POC: POSITIVE
KETONES, URINE: NEGATIVE MG/DL
LEUKOCYTE ESTERASE, URINE: ABNORMAL
Lab: NORMAL
NEGATIVE QC PASS/FAIL: NORMAL
NITRITE, URINE: NEGATIVE
PH UA: 6.5 (ref 5–9)
POSITIVE QC PASS/FAIL: NORMAL
PROTEIN UA: NEGATIVE MG/DL
RBC UA: NORMAL /HPF (ref 0–2)
SPECIFIC GRAVITY UA: >=1.03 (ref 1–1.03)
UROBILINOGEN, URINE: 0.2 E.U./DL
WBC UA: NORMAL /HPF (ref 0–5)

## 2022-04-24 PROCEDURE — 2580000003 HC RX 258: Performed by: EMERGENCY MEDICINE

## 2022-04-24 PROCEDURE — 99284 EMERGENCY DEPT VISIT MOD MDM: CPT

## 2022-04-24 PROCEDURE — 81001 URINALYSIS AUTO W/SCOPE: CPT

## 2022-04-24 PROCEDURE — 6360000002 HC RX W HCPCS: Performed by: EMERGENCY MEDICINE

## 2022-04-24 PROCEDURE — 96375 TX/PRO/DX INJ NEW DRUG ADDON: CPT

## 2022-04-24 PROCEDURE — 96374 THER/PROPH/DIAG INJ IV PUSH: CPT

## 2022-04-24 RX ORDER — 0.9 % SODIUM CHLORIDE 0.9 %
1000 INTRAVENOUS SOLUTION INTRAVENOUS ONCE
Status: COMPLETED | OUTPATIENT
Start: 2022-04-24 | End: 2022-04-24

## 2022-04-24 RX ORDER — METOCLOPRAMIDE HYDROCHLORIDE 5 MG/ML
10 INJECTION INTRAMUSCULAR; INTRAVENOUS ONCE
Status: COMPLETED | OUTPATIENT
Start: 2022-04-24 | End: 2022-04-24

## 2022-04-24 RX ORDER — DIPHENHYDRAMINE HYDROCHLORIDE 50 MG/ML
25 INJECTION INTRAMUSCULAR; INTRAVENOUS ONCE
Status: COMPLETED | OUTPATIENT
Start: 2022-04-24 | End: 2022-04-24

## 2022-04-24 RX ORDER — PNV NO.95/FERROUS FUM/FOLIC AC 28MG-0.8MG
1 TABLET ORAL DAILY
Qty: 30 TABLET | Refills: 0 | Status: ON HOLD | OUTPATIENT
Start: 2022-04-24 | End: 2022-07-24 | Stop reason: SDUPTHER

## 2022-04-24 RX ADMIN — SODIUM CHLORIDE 1000 ML: 9 INJECTION, SOLUTION INTRAVENOUS at 10:00

## 2022-04-24 RX ADMIN — DIPHENHYDRAMINE HYDROCHLORIDE 25 MG: 50 INJECTION, SOLUTION INTRAMUSCULAR; INTRAVENOUS at 09:53

## 2022-04-24 RX ADMIN — METOCLOPRAMIDE 10 MG: 5 INJECTION, SOLUTION INTRAMUSCULAR; INTRAVENOUS at 09:54

## 2022-04-24 ASSESSMENT — ENCOUNTER SYMPTOMS
COUGH: 0
VOMITING: 0
EYE REDNESS: 0
BLURRED VISION: 0
VISUAL CHANGE: 0
NAUSEA: 0
SWOLLEN GLANDS: 0
WHEEZING: 0
DIARRHEA: 0
SORE THROAT: 0
EYE PAIN: 0
BACK PAIN: 0
EYE DISCHARGE: 0
ABDOMINAL DISTENTION: 0
ABDOMINAL PAIN: 0
SHORTNESS OF BREATH: 0
SINUS PRESSURE: 0
TINGLING: 0

## 2022-04-24 ASSESSMENT — PAIN - FUNCTIONAL ASSESSMENT: PAIN_FUNCTIONAL_ASSESSMENT: NONE - DENIES PAIN

## 2022-04-24 NOTE — ED PROVIDER NOTES
Patient reports history of migraine type headaches. She reports she has had a headache for 5 days. She states it will significantly improve with a dose of Tylenol so that she can to sleep but, has back to next day. She denies any visual changes. No fever or chills. No sinus pain or pressure. No postnasal drip. Has a secondary complaint, she advises that she has not had a period since sometime around October. Prior to today, she is  6 para 7. No pelvic pain, vaginal discharge or bleeding. The history is provided by the patient. Headache  Pain location:  Generalized  Quality:  Dull  Radiates to:  Does not radiate  Severity currently:  7/10  Severity at highest:  10  Onset quality:  Gradual  Duration:  5 days  Timing:  Intermittent  Chronicity:  Recurrent  Similar to prior headaches: yes    Relieved by:  Acetaminophen  Worsened by:  Light  Associated symptoms: no abdominal pain, no back pain, no blurred vision, no congestion, no cough, no diarrhea, no dizziness, no drainage, no ear pain, no eye pain, no facial pain, no fatigue, no fever, no focal weakness, no hearing loss, no loss of balance, no myalgias, no nausea, no near-syncope, no neck pain, no neck stiffness, no numbness, no paresthesias, no seizures, no sinus pressure, no sore throat, no swollen glands, no syncope, no tingling, no URI, no visual change, no vomiting and no weakness         Review of Systems   Constitutional: Negative for chills, fatigue and fever. HENT: Negative for congestion, ear pain, hearing loss, postnasal drip, sinus pressure and sore throat. Eyes: Negative for blurred vision, pain, discharge and redness. Respiratory: Negative for cough, shortness of breath and wheezing. Cardiovascular: Negative for chest pain, syncope and near-syncope. Gastrointestinal: Negative for abdominal distention, abdominal pain, diarrhea, nausea and vomiting. Genitourinary: Negative for dysuria and frequency.         Amenorrhea since October. Musculoskeletal: Negative for arthralgias, back pain, myalgias, neck pain and neck stiffness. Skin: Negative for rash and wound. Neurological: Positive for headaches. Negative for dizziness, focal weakness, seizures, weakness, numbness, paresthesias and loss of balance. Hematological: Negative for adenopathy. All other systems reviewed and are negative. Physical Exam  Vitals and nursing note reviewed. Constitutional:       Appearance: She is well-developed. HENT:      Head: Normocephalic and atraumatic. Eyes:      Pupils: Pupils are equal, round, and reactive to light. Cardiovascular:      Rate and Rhythm: Normal rate and regular rhythm. Heart sounds: Normal heart sounds. No murmur heard. Pulmonary:      Effort: Pulmonary effort is normal. No respiratory distress. Breath sounds: Normal breath sounds. No wheezing or rales. Abdominal:      General: Bowel sounds are normal.      Palpations: Abdomen is soft. Tenderness: There is no abdominal tenderness. There is no guarding or rebound. Comments: No tenderness to palpation of the abdomen. There is some mild central protuberance but, no fetal structures can be palpated externally. Musculoskeletal:      Cervical back: Normal range of motion and neck supple. Skin:     General: Skin is warm and dry. Neurological:      Mental Status: She is alert and oriented to person, place, and time. Cranial Nerves: No cranial nerve deficit. Coordination: Coordination normal.          Procedures     MDM        9:36 AM EDT  Bedside ultrasound utilized to detect fetal heart rate of 150.    10:37 AM EDT  Into follow-up with the patient. She is sleeping. Upon awakening, she states her headache is gone. We discussed close outpatient follow-up with OB. She plans to see Dr. Luiza Noel. We also discussed avoiding smoking, drugs or alcohol and proper use of the seatbelt.   We will provide a prescription for prenatal vitamins.           --------------------------------------------- PAST HISTORY ---------------------------------------------  Past Medical History:  has a past medical history of Abnormal Pap smear, JERAMY (acute kidney injury) ([facility] [address] Complication of anesthesia, Herpes simplex without mention of complication, and Hypertensive urgency. Past Surgical History:  has a past surgical history that includes IR BIOPSY KIDNEY PERCUTANEOUS (3/29/2021). Social History:  reports that she has been smoking cigarettes. She has a 1.00 pack-year smoking history. She has never used smokeless tobacco. She reports previous alcohol use. She reports previous drug use. Drugs: Marijuana (Weed) and Cocaine. Family History: family history includes Diabetes in her maternal grandfather; Hearing Loss in her sister; Heart Disease in her sister; Heart Surgery in her sister; Hypertension in her sister. The patients home medications have been reviewed. Allergies: Patient has no known allergies.     -------------------------------------------------- RESULTS -------------------------------------------------  Labs:  Results for orders placed or performed during the hospital encounter of 04/24/22   Urinalysis   Result Value Ref Range    Color, UA Yellow Straw/Yellow    Clarity, UA Clear Clear    Glucose, Ur Negative Negative mg/dL    Bilirubin Urine Negative Negative    Ketones, Urine Negative Negative mg/dL    Specific Gravity, UA >=1.030 1.005 - 1.030    Blood, Urine Negative Negative    pH, UA 6.5 5.0 - 9.0    Protein, UA Negative Negative mg/dL    Urobilinogen, Urine 0.2 <2.0 E.U./dL    Nitrite, Urine Negative Negative    Leukocyte Esterase, Urine TRACE (A) Negative   Microscopic Urinalysis   Result Value Ref Range    WBC, UA 1-3 0 - 5 /HPF    RBC, UA NONE 0 - 2 /HPF    Epithelial Cells, UA RARE /HPF    Bacteria, UA NONE SEEN None Seen /HPF   POC Pregnancy Urine Qual   Result Value Ref Range    HCG, Urine, POC Positive Negative    Lot Number [id number]     Positive QC Pass/Fail Pass     Negative QC Pass/Fail Pass        Radiology:  No orders to display       ------------------------- NURSING NOTES AND VITALS REVIEWED ---------------------------  Date / Time Roomed:  4/24/2022  9:15 AM  ED Bed Assignment:  16/16    The nursing notes within the ED encounter and vital signs as below have been reviewed. BP (!) 171/120   Pulse 91   Temp 97.4 °F (36.3 °C) (Infrared)   Resp 20   Ht 5' 8\" (1.727 m)   Wt 200 lb (90.7 kg)   LMP 10/17/2021 (Approximate)   SpO2 98%   BMI 30.41 kg/m²   Oxygen Saturation Interpretation: Normal      ------------------------------------------ PROGRESS NOTES ------------------------------------------  10:37 AM EDT  I have spoken with the patient and discussed todays results, in addition to providing specific details for the plan of care and counseling regarding the diagnosis and prognosis. Their questions are answered at this time and they are agreeable with the plan. I discussed at length with them reasons for immediate return here for re evaluation. They will followup with their Gynecologist and primary care physician by calling their office tomorrow. --------------------------------- ADDITIONAL PROVIDER NOTES ---------------------------------  At this time the patient is without objective evidence of an acute process requiring hospitalization or inpatient management. They have remained hemodynamically stable throughout their entire ED visit and are stable for discharge with outpatient follow-up. The plan has been discussed in detail and they are aware of the specific conditions for emergent return, as well as the importance of follow-up. New Prescriptions    PRENATAL VIT-FE FUMARATE-FA (PRENATAL VITAMIN) 27-0.8 MG TABS    Take 1 tablet by mouth daily       Diagnosis:  1. Acute nonintractable headache, unspecified headache type    2.  Pregnancy, unspecified gestational age Disposition:  Patient's disposition: Discharge to home  Patient's condition is stable.        Bill Matta DO  04/24/22 1045

## 2022-06-16 ENCOUNTER — APPOINTMENT (OUTPATIENT)
Dept: CT IMAGING | Age: 32
DRG: 566 | End: 2022-06-16
Payer: COMMERCIAL

## 2022-06-16 ENCOUNTER — APPOINTMENT (OUTPATIENT)
Dept: ULTRASOUND IMAGING | Age: 32
DRG: 566 | End: 2022-06-16
Payer: COMMERCIAL

## 2022-06-16 ENCOUNTER — HOSPITAL ENCOUNTER (INPATIENT)
Age: 32
LOS: 2 days | Discharge: HOME | DRG: 566 | End: 2022-06-18
Attending: OBSTETRICS & GYNECOLOGY | Admitting: OBSTETRICS & GYNECOLOGY
Payer: COMMERCIAL

## 2022-06-16 ENCOUNTER — HOSPITAL ENCOUNTER (INPATIENT)
Age: 32
LOS: 1 days | Discharge: HOME | DRG: 566 | End: 2022-06-16
Attending: OBSTETRICS & GYNECOLOGY | Admitting: OBSTETRICS & GYNECOLOGY
Payer: COMMERCIAL

## 2022-06-16 VITALS
SYSTOLIC BLOOD PRESSURE: 171 MMHG | OXYGEN SATURATION: 100 % | DIASTOLIC BLOOD PRESSURE: 95 MMHG | TEMPERATURE: 98.3 F | HEART RATE: 88 BPM | RESPIRATION RATE: 18 BRPM

## 2022-06-16 PROBLEM — Z3A.27 27 WEEKS GESTATION OF PREGNANCY: Status: ACTIVE | Noted: 2022-06-16

## 2022-06-16 PROBLEM — O13.1 PIH (PREGNANCY INDUCED HYPERTENSION), FIRST TRIMESTER: Status: ACTIVE | Noted: 2022-06-16

## 2022-06-16 PROBLEM — O26.90 COMPLICATED PREGNANCY, UNSPECIFIED TRIMESTER: Status: ACTIVE | Noted: 2022-06-16

## 2022-06-16 LAB
ABO/RH: NORMAL
ABO/RH: NORMAL
ALBUMIN SERPL-MCNC: 3.6 G/DL (ref 3.5–5.2)
ALP BLD-CCNC: 80 U/L (ref 35–104)
ALT SERPL-CCNC: 5 U/L (ref 0–32)
AMPHETAMINE SCREEN, URINE: POSITIVE
ANION GAP SERPL CALCULATED.3IONS-SCNC: 9 MMOL/L (ref 7–16)
ANTIBODY SCREEN: NORMAL
ANTIBODY SCREEN: NORMAL
AST SERPL-CCNC: 13 U/L (ref 0–31)
BACTERIA: ABNORMAL /HPF
BARBITURATE SCREEN URINE: NOT DETECTED
BASOPHILS ABSOLUTE: 0.02 E9/L (ref 0–0.2)
BASOPHILS RELATIVE PERCENT: 0.4 % (ref 0–2)
BENZODIAZEPINE SCREEN, URINE: NOT DETECTED
BILIRUB SERPL-MCNC: 0.3 MG/DL (ref 0–1.2)
BILIRUBIN URINE: ABNORMAL
BLOOD, URINE: NEGATIVE
BUN BLDV-MCNC: 10 MG/DL (ref 6–20)
CALCIUM SERPL-MCNC: 9 MG/DL (ref 8.6–10.2)
CANNABINOID SCREEN URINE: NOT DETECTED
CHLORIDE BLD-SCNC: 104 MMOL/L (ref 98–107)
CLARITY: CLEAR
CO2: 23 MMOL/L (ref 22–29)
COCAINE METABOLITE SCREEN URINE: POSITIVE
COLOR: YELLOW
CREAT SERPL-MCNC: 0.8 MG/DL (ref 0.5–1)
EOSINOPHILS ABSOLUTE: 0.25 E9/L (ref 0.05–0.5)
EOSINOPHILS RELATIVE PERCENT: 4.6 % (ref 0–6)
EPITHELIAL CELLS, UA: ABNORMAL /HPF
FENTANYL SCREEN, URINE: POSITIVE
GFR AFRICAN AMERICAN: >60
GFR NON-AFRICAN AMERICAN: >60 ML/MIN/1.73
GLUCOSE BLD-MCNC: 106 MG/DL (ref 74–99)
GLUCOSE URINE: NEGATIVE MG/DL
HCT VFR BLD CALC: 32 % (ref 34–48)
HEMOGLOBIN: 10.5 G/DL (ref 11.5–15.5)
IMMATURE GRANULOCYTES #: 0.03 E9/L
IMMATURE GRANULOCYTES %: 0.6 % (ref 0–5)
KETONES, URINE: NEGATIVE MG/DL
LEUKOCYTE ESTERASE, URINE: ABNORMAL
LYMPHOCYTES ABSOLUTE: 1.15 E9/L (ref 1.5–4)
LYMPHOCYTES RELATIVE PERCENT: 21.1 % (ref 20–42)
Lab: ABNORMAL
MCH RBC QN AUTO: 29.5 PG (ref 26–35)
MCHC RBC AUTO-ENTMCNC: 32.8 % (ref 32–34.5)
MCV RBC AUTO: 89.9 FL (ref 80–99.9)
METHADONE SCREEN, URINE: NOT DETECTED
MONOCYTES ABSOLUTE: 0.3 E9/L (ref 0.1–0.95)
MONOCYTES RELATIVE PERCENT: 5.5 % (ref 2–12)
NEUTROPHILS ABSOLUTE: 3.69 E9/L (ref 1.8–7.3)
NEUTROPHILS RELATIVE PERCENT: 67.8 % (ref 43–80)
NITRITE, URINE: NEGATIVE
OPIATE SCREEN URINE: NOT DETECTED
OXYCODONE URINE: NOT DETECTED
PDW BLD-RTO: 14.1 FL (ref 11.5–15)
PH UA: 6 (ref 5–9)
PHENCYCLIDINE SCREEN URINE: NOT DETECTED
PLATELET # BLD: 218 E9/L (ref 130–450)
PMV BLD AUTO: 10.2 FL (ref 7–12)
POTASSIUM SERPL-SCNC: 3.7 MMOL/L (ref 3.5–5)
PROTEIN UA: ABNORMAL MG/DL
RBC # BLD: 3.56 E12/L (ref 3.5–5.5)
RBC UA: ABNORMAL /HPF (ref 0–2)
SODIUM BLD-SCNC: 136 MMOL/L (ref 132–146)
SPECIFIC GRAVITY UA: >=1.03 (ref 1–1.03)
TOTAL PROTEIN: 6.7 G/DL (ref 6.4–8.3)
TRICHOMONAS: PRESENT /HPF
UROBILINOGEN, URINE: 0.2 E.U./DL
WBC # BLD: 5.4 E9/L (ref 4.5–11.5)
WBC UA: ABNORMAL /HPF (ref 0–5)

## 2022-06-16 PROCEDURE — 96360 HYDRATION IV INFUSION INIT: CPT

## 2022-06-16 PROCEDURE — 6370000000 HC RX 637 (ALT 250 FOR IP): Performed by: OBSTETRICS & GYNECOLOGY

## 2022-06-16 PROCEDURE — 2500000003 HC RX 250 WO HCPCS

## 2022-06-16 PROCEDURE — 81001 URINALYSIS AUTO W/SCOPE: CPT

## 2022-06-16 PROCEDURE — 1220000001 HC SEMI PRIVATE L&D R&B

## 2022-06-16 PROCEDURE — 96361 HYDRATE IV INFUSION ADD-ON: CPT

## 2022-06-16 PROCEDURE — 2580000003 HC RX 258: Performed by: OBSTETRICS & GYNECOLOGY

## 2022-06-16 PROCEDURE — 36415 COLL VENOUS BLD VENIPUNCTURE: CPT

## 2022-06-16 PROCEDURE — 6360000002 HC RX W HCPCS: Performed by: OBSTETRICS & GYNECOLOGY

## 2022-06-16 PROCEDURE — 80053 COMPREHEN METABOLIC PANEL: CPT

## 2022-06-16 PROCEDURE — 96374 THER/PROPH/DIAG INJ IV PUSH: CPT

## 2022-06-16 PROCEDURE — 1220000000 HC SEMI PRIVATE OB R&B

## 2022-06-16 PROCEDURE — 86901 BLOOD TYPING SEROLOGIC RH(D): CPT

## 2022-06-16 PROCEDURE — G0480 DRUG TEST DEF 1-7 CLASSES: HCPCS

## 2022-06-16 PROCEDURE — 76819 FETAL BIOPHYS PROFIL W/O NST: CPT

## 2022-06-16 PROCEDURE — 86900 BLOOD TYPING SEROLOGIC ABO: CPT

## 2022-06-16 PROCEDURE — 70450 CT HEAD/BRAIN W/O DYE: CPT

## 2022-06-16 PROCEDURE — 80307 DRUG TEST PRSMV CHEM ANLYZR: CPT

## 2022-06-16 PROCEDURE — 76815 OB US LIMITED FETUS(S): CPT

## 2022-06-16 PROCEDURE — 6360000002 HC RX W HCPCS

## 2022-06-16 PROCEDURE — 85025 COMPLETE CBC W/AUTO DIFF WBC: CPT

## 2022-06-16 PROCEDURE — 86850 RBC ANTIBODY SCREEN: CPT

## 2022-06-16 PROCEDURE — 99202 OFFICE O/P NEW SF 15 MIN: CPT

## 2022-06-16 RX ORDER — LABETALOL HYDROCHLORIDE 5 MG/ML
80 INJECTION, SOLUTION INTRAVENOUS
Status: ACTIVE | OUTPATIENT
Start: 2022-06-16 | End: 2022-06-16

## 2022-06-16 RX ORDER — HYDRALAZINE HYDROCHLORIDE 20 MG/ML
10 INJECTION INTRAMUSCULAR; INTRAVENOUS ONCE
Status: COMPLETED | OUTPATIENT
Start: 2022-06-16 | End: 2022-06-16

## 2022-06-16 RX ORDER — LABETALOL 100 MG/1
200 TABLET, FILM COATED ORAL 3 TIMES DAILY
Status: DISCONTINUED | OUTPATIENT
Start: 2022-06-16 | End: 2022-06-18

## 2022-06-16 RX ORDER — SODIUM CHLORIDE 0.9 % (FLUSH) 0.9 %
5-40 SYRINGE (ML) INJECTION EVERY 12 HOURS SCHEDULED
Status: DISCONTINUED | OUTPATIENT
Start: 2022-06-16 | End: 2022-06-18 | Stop reason: SDUPTHER

## 2022-06-16 RX ORDER — ACETAMINOPHEN 500 MG
1000 TABLET ORAL EVERY 6 HOURS PRN
Status: DISCONTINUED | OUTPATIENT
Start: 2022-06-16 | End: 2022-06-16 | Stop reason: HOSPADM

## 2022-06-16 RX ORDER — LABETALOL HYDROCHLORIDE 5 MG/ML
40 INJECTION, SOLUTION INTRAVENOUS
Status: ACTIVE | OUTPATIENT
Start: 2022-06-16 | End: 2022-06-16

## 2022-06-16 RX ORDER — ASPIRIN 81 MG/1
81 TABLET ORAL DAILY
COMMUNITY

## 2022-06-16 RX ORDER — BETAMETHASONE SODIUM PHOSPHATE AND BETAMETHASONE ACETATE 3; 3 MG/ML; MG/ML
INJECTION, SUSPENSION INTRA-ARTICULAR; INTRALESIONAL; INTRAMUSCULAR; SOFT TISSUE
Status: COMPLETED
Start: 2022-06-16 | End: 2022-06-16

## 2022-06-16 RX ORDER — SODIUM CHLORIDE 0.9 % (FLUSH) 0.9 %
5-40 SYRINGE (ML) INJECTION PRN
Status: DISCONTINUED | OUTPATIENT
Start: 2022-06-16 | End: 2022-06-18 | Stop reason: SDUPTHER

## 2022-06-16 RX ORDER — ACETAMINOPHEN 500 MG
1000 TABLET ORAL EVERY 6 HOURS PRN
Status: DISCONTINUED | OUTPATIENT
Start: 2022-06-16 | End: 2022-06-18 | Stop reason: HOSPADM

## 2022-06-16 RX ORDER — SODIUM CHLORIDE, SODIUM LACTATE, POTASSIUM CHLORIDE, CALCIUM CHLORIDE 600; 310; 30; 20 MG/100ML; MG/100ML; MG/100ML; MG/100ML
INJECTION, SOLUTION INTRAVENOUS CONTINUOUS
Status: DISCONTINUED | OUTPATIENT
Start: 2022-06-16 | End: 2022-06-18 | Stop reason: SDUPTHER

## 2022-06-16 RX ORDER — LABETALOL 100 MG/1
TABLET, FILM COATED ORAL
Status: DISPENSED
Start: 2022-06-16 | End: 2022-06-17

## 2022-06-16 RX ORDER — CALCIUM GLUCONATE 94 MG/ML
1000 INJECTION, SOLUTION INTRAVENOUS PRN
Status: DISCONTINUED | OUTPATIENT
Start: 2022-06-16 | End: 2022-06-18 | Stop reason: SDUPTHER

## 2022-06-16 RX ORDER — HYDRALAZINE HYDROCHLORIDE 20 MG/ML
10 INJECTION INTRAMUSCULAR; INTRAVENOUS
Status: ACTIVE | OUTPATIENT
Start: 2022-06-16 | End: 2022-06-16

## 2022-06-16 RX ORDER — SODIUM CHLORIDE 9 MG/ML
INJECTION, SOLUTION INTRAVENOUS PRN
Status: DISCONTINUED | OUTPATIENT
Start: 2022-06-16 | End: 2022-06-18 | Stop reason: SDUPTHER

## 2022-06-16 RX ORDER — BETAMETHASONE SODIUM PHOSPHATE AND BETAMETHASONE ACETATE 3; 3 MG/ML; MG/ML
12 INJECTION, SUSPENSION INTRA-ARTICULAR; INTRALESIONAL; INTRAMUSCULAR; SOFT TISSUE ONCE
Status: COMPLETED | OUTPATIENT
Start: 2022-06-16 | End: 2022-06-16

## 2022-06-16 RX ORDER — LABETALOL HYDROCHLORIDE 5 MG/ML
20 INJECTION, SOLUTION INTRAVENOUS
Status: COMPLETED | OUTPATIENT
Start: 2022-06-16 | End: 2022-06-16

## 2022-06-16 RX ORDER — NIFEDIPINE 10 MG/1
10 CAPSULE ORAL ONCE
Status: COMPLETED | OUTPATIENT
Start: 2022-06-16 | End: 2022-06-16

## 2022-06-16 RX ORDER — MAGNESIUM SULFATE IN WATER 40 MG/ML
INJECTION, SOLUTION INTRAVENOUS
Status: COMPLETED
Start: 2022-06-16 | End: 2022-06-16

## 2022-06-16 RX ORDER — LABETALOL HYDROCHLORIDE 5 MG/ML
INJECTION, SOLUTION INTRAVENOUS
Status: COMPLETED
Start: 2022-06-16 | End: 2022-06-16

## 2022-06-16 RX ORDER — MAGNESIUM SULFATE IN WATER 40 MG/ML
4000 INJECTION, SOLUTION INTRAVENOUS ONCE
Status: COMPLETED | OUTPATIENT
Start: 2022-06-16 | End: 2022-06-16

## 2022-06-16 RX ORDER — ONDANSETRON 2 MG/ML
4 INJECTION INTRAMUSCULAR; INTRAVENOUS EVERY 6 HOURS PRN
Status: DISCONTINUED | OUTPATIENT
Start: 2022-06-16 | End: 2022-06-18 | Stop reason: HOSPADM

## 2022-06-16 RX ORDER — SODIUM CHLORIDE, SODIUM LACTATE, POTASSIUM CHLORIDE, CALCIUM CHLORIDE 600; 310; 30; 20 MG/100ML; MG/100ML; MG/100ML; MG/100ML
INJECTION, SOLUTION INTRAVENOUS CONTINUOUS
Status: DISCONTINUED | OUTPATIENT
Start: 2022-06-16 | End: 2022-06-16 | Stop reason: HOSPADM

## 2022-06-16 RX ORDER — LABETALOL 200 MG/1
200 TABLET, FILM COATED ORAL 2 TIMES DAILY
Status: ON HOLD | COMMUNITY
End: 2022-06-18 | Stop reason: HOSPADM

## 2022-06-16 RX ADMIN — HYDRALAZINE HYDROCHLORIDE 10 MG: 20 INJECTION, SOLUTION INTRAMUSCULAR; INTRAVENOUS at 19:06

## 2022-06-16 RX ADMIN — LABETALOL HYDROCHLORIDE 20 MG: 5 INJECTION, SOLUTION INTRAVENOUS at 21:29

## 2022-06-16 RX ADMIN — MAGNESIUM SULFATE HEPTAHYDRATE 2000 MG/HR: 40 INJECTION, SOLUTION INTRAVENOUS at 18:30

## 2022-06-16 RX ADMIN — LABETALOL HYDROCHLORIDE 20 MG: 5 INJECTION INTRAVENOUS at 21:29

## 2022-06-16 RX ADMIN — NIFEDIPINE 10 MG: 10 CAPSULE ORAL at 18:26

## 2022-06-16 RX ADMIN — BETAMETHASONE SODIUM PHOSPHATE AND BETAMETHASONE ACETATE 12 MG: 3; 3 INJECTION, SUSPENSION INTRA-ARTICULAR; INTRALESIONAL; INTRAMUSCULAR at 15:26

## 2022-06-16 RX ADMIN — HYDRALAZINE HYDROCHLORIDE 10 MG: 20 INJECTION INTRAMUSCULAR; INTRAVENOUS at 14:42

## 2022-06-16 RX ADMIN — ACETAMINOPHEN 1000 MG: 500 TABLET ORAL at 20:45

## 2022-06-16 RX ADMIN — ACETAMINOPHEN 1000 MG: 500 TABLET ORAL at 14:40

## 2022-06-16 RX ADMIN — SODIUM CHLORIDE, POTASSIUM CHLORIDE, SODIUM LACTATE AND CALCIUM CHLORIDE: 600; 310; 30; 20 INJECTION, SOLUTION INTRAVENOUS at 18:29

## 2022-06-16 RX ADMIN — MAGNESIUM SULFATE IN WATER 4000 MG: 40 INJECTION, SOLUTION INTRAVENOUS at 15:32

## 2022-06-16 RX ADMIN — SODIUM CHLORIDE, POTASSIUM CHLORIDE, SODIUM LACTATE AND CALCIUM CHLORIDE 80 ML/HR: 600; 310; 30; 20 INJECTION, SOLUTION INTRAVENOUS at 14:30

## 2022-06-16 RX ADMIN — LABETALOL HYDROCHLORIDE 200 MG: 100 TABLET, FILM COATED ORAL at 17:59

## 2022-06-16 RX ADMIN — HYDRALAZINE HYDROCHLORIDE 10 MG: 20 INJECTION INTRAMUSCULAR; INTRAVENOUS at 15:17

## 2022-06-16 RX ADMIN — MAGNESIUM SULFATE HEPTAHYDRATE 4000 MG: 40 INJECTION, SOLUTION INTRAVENOUS at 15:32

## 2022-06-16 RX ADMIN — BETAMETHASONE SODIUM PHOSPHATE AND BETAMETHASONE ACETATE 12 MG: 3; 3 INJECTION, SUSPENSION INTRA-ARTICULAR; INTRALESIONAL; INTRAMUSCULAR; SOFT TISSUE at 15:26

## 2022-06-16 RX ADMIN — NIFEDIPINE 10 MG: 10 CAPSULE ORAL at 18:01

## 2022-06-16 RX ADMIN — MAGNESIUM SULFATE HEPTAHYDRATE 2000 MG/HR: 40 INJECTION, SOLUTION INTRAVENOUS at 15:52

## 2022-06-16 ASSESSMENT — PAIN - FUNCTIONAL ASSESSMENT: PAIN_FUNCTIONAL_ASSESSMENT: ACTIVITIES ARE NOT PREVENTED

## 2022-06-16 ASSESSMENT — PAIN DESCRIPTION - LOCATION
LOCATION: HEAD
LOCATION: HEAD

## 2022-06-16 ASSESSMENT — PAIN SCALES - GENERAL
PAINLEVEL_OUTOF10: 6
PAINLEVEL_OUTOF10: 10

## 2022-06-16 NOTE — H&P
Department of Obstetrics and Gynecology  Attending Obstetrics History and Physical      HISTORY OF PRESENT ILLNESS:      The patient is a 33 y.o.  15 parity 6 at 27 weeks 5 days. Transfer from [facility] secondary to chronic htn with superimposed preeclampsia. states takes labetalol 200 mg bid for chronic htn and took medication today. Denies drug use but uds + cocaine , fentanyl and amphetamine. Was started on mgsulfate and celestone at [facility]. Estimated Due Date:  9/10/22  Contractions:  no  Leaking of fluid: no  nfm  Blleeding:  No    PRENATAL CARE:    Complications:chronic htn      Active Problems:    * No active hospital problems. *  Resolved Problems:    * No resolved hospital problems. *        PAST OB HISTORY    OB History    Para Term  AB Living   15 6 6 0 8 7   SAB IAB Ectopic Molar Multiple Live Births   3 4 0 0 1 7      # Outcome Date GA Lbr Ghulam/2nd Weight Sex Delivery Anes PTL Lv   15 Current            14 Term 21 38w4d / 00:03 7 lb 4 oz (3.289 kg) M Vag-Spont EPI N BORIS   13 2021           12 Term 19 38w6d  6 lb 13 oz (3.09 kg) M Vag-Spont None N BORIS   11 2016           10A Term 14    F Vag-Spont   BORIS   10B Term 14    M Vag-Spont   BORIS   9 SAB 10/2013           8 Term 12 39w4d 04:46 7 lb 8 oz (3.402 kg) F Vag-Spont EPI N BORIS   7 Term 11   6 lb 9 oz (2.977 kg) F Vag-Spont EPI N BORIS   6 AB            5 Term 10/08/06   7 lb 11 oz (3.487 kg) M Vag-Spont EPI N BORIS   4 IAB            3 IAB            2 IAB            1 IAB               Obstetric Comments    viable female at 1349pm-Apgars 9/10 ,Wt.  5-6   Low Vacuum Extraction Twin B at 1422pm Apgars 9/9 Wt. 5-9           Pre-eclampsia:  No      :  No      D & C:  No      Cerclage:  No      LEEP:  No      Myomectomy:  No       Labor: No    Past Medical History:    Past Medical History:   Diagnosis Date    Abnormal Pap smear     JERAMY (acute kidney injury) ([facility] [address] 3/27/2021  Complication of anesthesia 2011    States hhas spinal curvature and had one-sided Epidural    Herpes simplex without mention of complication     Hypertensive urgency 3/27/2021        Past Surgical History:    Past Surgical History:   Procedure Laterality Date    IR BIOPSY KIDNEY PERCUTANEOUS  3/29/2021    IR BIOPSY KIDNEY PERCUTANEOUS 3/29/2021 [facility]        Past Family History:  Family History   Problem Relation Age of Onset   [facility] Hearing Loss Sister     Hypertension Sister     Heart Disease Sister     Heart Surgery Sister     Diabetes Maternal Grandfather        ROS:  Const: No fever, chills, night sweats, no recent unexplained weight gain/loss  HEENT: No blurred vision, double vision; no ear problems; no sore throat, congestion; no running nose. Resp: No cough, no sputum, no pleuritic chest pain, no sob  Cardio: No chest pain, no exertional dyspnea, no PND, no orthopnea, no palpitation, no leg swelling. GI: No dysphagia, no reflux; no abdominal pain, no n/v; no c/d. No hematochezia    : No dysuria, no frequency, hesitancy; no hematuria  MSK: no joint pain, no myalgia, no change in ROM  Neuro: no focal weakness in extremities, no slurred speech, no double vision, no numbness or tingling in extremities  Endo: no heat/cold intolerance, no polyphagia, polydipsia or polyuria  Hem: no increased bleeding, no bruising, no lymphadenopathy  Skin: no skin changes  Psych: no depressed mood, no suicidal ideation    Social History:     reports that she has been smoking cigarettes. She has a 1.00 pack-year smoking history. She has never used smokeless tobacco. She reports previous alcohol use. She reports previous drug use. Drugs: Marijuana (Weed) and Cocaine.      Medications Prior to Admission:  Medications Prior to Admission: labetalol (NORMODYNE) 200 MG tablet, Take 200 mg by mouth 2 times daily  aspirin 81 MG EC tablet, Take 81 mg by mouth daily  Prenatal Vit-Fe Fumarate-FA (PRENATAL VITAMIN) 27-0.8 MG TABS, Take 1 tablet by mouth daily    Allergies:  Patient has no known allergies. PHYSICAL EXAM:  BP (!) 178/103   Pulse 94   Ht 5' 7\" (1.702 m)   Wt 201 lb (91.2 kg)   LMP 01/18/2022   BMI 31.48 kg/m²   General appearance: Comfortable  Lungs:  CTA   Heart:  Regular Rhythm  Abdomen:  Soft, non-tender, gravid  Fetal heart rate:  Baseline Heart Rate 140    Contraction frequency:  none  Membranes:  Intact      ASSESSMENT     IUP at 27 weeks.     Chronic htn  Elevated bp  uds + cocaine ,fentanyl, and amphetamines         Plan: dr Robyn Edgar gave orders to nurse          Electronically signed by Sascha Davenport MD on 6/16/2022 at 5:58 PM

## 2022-06-16 NOTE — PROGRESS NOTES
[id number] 27w5d AMA 9/10/22 ambulatory to the L&D with complaints of a headache for 3days and HTN. EFM applied. Maternal perception of fetal movement noted. Pt denies any bleeding or leaking fluids. Pt denies any contractions or pain. Call light within reach.

## 2022-06-16 NOTE — PROGRESS NOTES
Notified Dr. Celina Payne of patient arrival. Reviewed recent blood pressures. New orders received. Call back in 30 minutes with updates.

## 2022-06-16 NOTE — PROGRESS NOTES
Reviewed patient blood pressures with Dr. Francie Alcantar. New orders received.  Call back in 30 minutes

## 2022-06-16 NOTE — PROGRESS NOTES
Updated Dr. Siva Enamorado on patient blood pressures. New orders received.  Call back in 30 minutes with updates

## 2022-06-16 NOTE — H&P
Department of Obstetrics and Gynecology   Obstetrics History and Physical      Eliane Hutchison  2022  [id number]    CHIEF COMPLAINT:  Severe headache since yesterday    HISTORY OF PRESENT ILLNESS:      The patient is a 33 y.o. female [id number] at 27w5d. headache comes to the labor and delivery for further evaluation and management patient is known hypertensive through the pregnancy has been on labetalol 200 mg twice a day and was seen by nephrologist on  with no recommendations. Today patient is here for further evaluation and management. Her urine protein is trace edema is mild and labs need to be checked. OB History        15    Para   6    Term   6       0    AB   8    Living   7       SAB   3    IAB   4    Ectopic   0    Molar   0    Multiple   1    Live Births   7          Obstetric Comments    viable female at 12pm-Apgars 9/10 ,Wt. 5-6   Low Vacuum Extraction Twin B at 1422pm Apgars 9/9 Wt. 5-9         Patient presents with a chief complaint as above and is being admitted for further assessment. Discussed with Dr. Cristiana Lawrence regarding the patient and as patient is having severe hypertension and not coming under control with 2 hydralazine. Patient was given 10 mg of hydralazine intravenously over 2 minutes and repeat blood pressure check after 20 minutes is again persistently high 1 6110 and another dose of 10 mg was repeated in the same fashion and it continued to remain high. Patient has IV 80 cc an hour. Both of us agreed to transfer the patient with severe hypotension rule out toxemia with severe symptoms to [facility] for further evaluation and management.     Estimated Due Date: Estimated Date of Delivery: 9/10/22    PRENATAL CARE:    Complicated by:   Patient Active Problem List   Diagnosis Code    Twin pregnancy, twins discordant, antepartum O35.36, O41.80   [place] Dichorionic diamniotic twin gestation O33.1    Dichorionic diamniotic twin pregnancy in third trimester O34.200    34 weeks gestation of pregnancy Z3A.34    Normal pregnancy in third trimester Z34.93    Normal pregnancy in second trimester Z34.92    Normal labor and delivery O80    Hypertensive urgency I16.0    JERAMY (acute kidney injury) ([facility] [address] U70.5    Complicated pregnancy, unspecified trimester O26.90       PAST OB HISTORY  OB History        15    Para   6    Term   6       0    AB   8    Living   7       SAB   3    IAB   4    Ectopic   0    Molar   0    Multiple   1    Live Births   7          Obstetric Comments    viable female at 12pm-Apgars 9/10 ,Wt. 5-6   Low Vacuum Extraction Twin B at 1422pm Apgars 9/9 Wt. 5-9             Past Medical History:        Diagnosis Date    Abnormal Pap smear     JERAMY (acute kidney injury) ([facility] [address]     Complication of anesthesia 2011    States hhas spinal curvature and had one-sided Epidural    Herpes simplex without mention of complication     Hypertensive urgency 3/27/2021     Past Surgical History:        Procedure Laterality Date    IR BIOPSY KIDNEY PERCUTANEOUS  3/29/2021    IR BIOPSY KIDNEY PERCUTANEOUS 3/29/2021 [facility]     Allergies:  Patient has no known allergies.   Social History:    Social History     Socioeconomic History    Marital status: Single     Spouse name: Not on file    Number of children: Not on file    Years of education: Not on file    Highest education level: Not on file   Occupational History    Not on file   Tobacco Use    Smoking status: Current Every Day Smoker     Packs/day: 0.25     Years: 4.00     Pack years: 1.00     Types: Cigarettes    Smokeless tobacco: Never Used   Vaping Use    Vaping Use: Never used   Substance and Sexual Activity    Alcohol use: Not Currently     Comment: rare    Drug use: Not Currently     Types: Marijuana Fronie Renée), Cocaine     Comment: several positives during this pregnancy    Sexual activity: Yes     Partners: Male   Other Topics Concern    Not on file   Social History Narrative    Not on file     Social Determinants of Health     Financial Resource Strain:     Difficulty of Paying Living Expenses: Not on file   Food Insecurity:     Worried About Running Out of Food in the Last Year: Not on file    Madai of Food in the Last Year: Not on file   Transportation Needs:     Lack of Transportation (Medical): Not on file    Lack of Transportation (Non-Medical):  Not on file   Physical Activity:     Days of Exercise per Week: Not on file    Minutes of Exercise per Session: Not on file   Stress:     Feeling of Stress : Not on file   Social Connections:     Frequency of Communication with Friends and Family: Not on file    Frequency of Social Gatherings with Friends and Family: Not on file    Attends Yarsani Services: Not on file    Active Member of [address] [place] [address] [employer] or Organizations: Not on file    Attends Club or Organization Meetings: Not on file    Marital Status: Not on file   Intimate Partner Violence:     Fear of Current or Ex-Partner: Not on file    Emotionally Abused: Not on file    Physically Abused: Not on file    Sexually Abused: Not on file   Housing Stability:     Unable to Pay for Housing in the Last Year: Not on file    Number of [place] in the Last Year: Not on file    Unstable Housing in the Last Year: Not on file     Family History:       Problem Relation Age of Onset    Hearing Loss Sister     Hypertension Sister     Heart Disease Sister     Heart Surgery Sister     Diabetes Maternal Grandfather      Medications Prior to Admission:  Medications Prior to Admission: labetalol (NORMODYNE) 200 MG tablet, Take 200 mg by mouth 2 times daily  aspirin 81 MG EC tablet, Take 81 mg by mouth daily  Prenatal Vit-Fe Fumarate-FA (PRENATAL VITAMIN) 27-0.8 MG TABS, Take 1 tablet by mouth daily    REVIEW OF SYSTEMS:    CONSTITUTIONAL:  negative  RESPIRATORY:  negative  CARDIOVASCULAR:  negative  GASTROINTESTINAL: Negative  GENITOURINARY: Negative  ALLERGIC/IMMUNOLOGIC:  negative  NEUROLOGICAL:  negative  BEHAVIOR/PSYCH:  negative    PHYSICAL EXAM:  Blood pressure (!) 149/95, pulse 82, temperature 97.6 °F (36.4 °C), temperature source Oral, resp. rate 16, last menstrual period 01/18/2022, unknown if currently breastfeeding. General appearance:  awake, alert, cooperative, no apparent distress, and appears stated age  Abdomen:  Gravid  uterus, consistent with her gestational age 33w11d, uterus nontender, no mass or fullness palpable, bowel sounds are good,CVA Tenderness No  Fetal heart rate:  Reassuring.  140,  Extremities: nontender bilaterally,edema1+    DATA:  Labs:  CBC:   Lab Results   Component Value Date    WBC 6.4 07/28/2021    RBC 3.72 07/28/2021    HGB 10.3 07/28/2021    HCT 33.6 07/28/2021    MCV 90.3 07/28/2021    RDW 14.9 07/28/2021     07/28/2021     CMP:    Lab Results   Component Value Date     07/28/2021    K 4.3 07/28/2021    K 4.0 03/28/2021     07/28/2021    CO2 25 07/28/2021    BUN 20 07/28/2021    PROT 7.4 07/28/2021     U/A:  No components found for: Esther Deena, USPGRAV, UPH, UPROTEIN, UGLUCOSE, UKETONE, UBILI, UBLOOD, UNITRITE, UUROBIL, ULEUKEST, USQEPI, URENEPI, UWBC, URBC, Synchari, UHYALINE  TSH:  No results found for: TSH  RPR:  No results found for: RPR  RUBELLA: No results found for: RUBELLAIGG  HEPBSAG:   Lab Results   Component Value Date    HBSAGI Non-Reactive 03/29/2021     HIV:  No results found for: HIV  HgBA1c:  No components found for: HGBA1C  Blood Type:  No results found for: ABOINT  RH:    Lab Results   Component Value Date    C3 150 03/29/2021    C4 30 03/29/2021     Antibody Screen:  No components found for: ABSCINT  Gonorrhea:  No components found for: PRBCHLGC  Chlamydia:  No components found for: CCHLAM  gbs-STREP B SCREEN:   Lab Results   Component Value Date    GBSAG not isolated 05/09/2012       No orders to display       No results found for this visit on 06/16/22. ASSESSMENT AND PLAN:27 week5 days pregnancy,      Chronic hypertension, preeclampsia with severe symptoms,anemia,, trichomoniasis  Principal Problem:    Complicated pregnancy, unspecified trimester  Resolved Problems:    * No resolved hospital problems. *        UDS is pending  US to check presentation and complete anatomy LINCOLN EFW . Labor: OBSERVATION,   Fetus: Reassuring  Labs are drawn and will check. IV fluids started at 80 cc an hour and mag sulfate 4 g bolus and then 2 g/h for cerebrovascular protection as well as neuro prophylaxis. And Celestone 12 mg intramuscularly given full E catheter in place and once patient is stable with a stable blood pressure patient will be transferred to [facility] via mobile ambulance  Nephrology office notes are enclosed with the chart.     Electronically signed by Celina Dietz MD on 6/16/2022 at 1:51 PM

## 2022-06-16 NOTE — PROGRESS NOTES
Called and [facility] of request for transfer to [address] [place] [address] and Delivery.

## 2022-06-17 ENCOUNTER — ANCILLARY PROCEDURE (OUTPATIENT)
Dept: OBGYN CLINIC | Age: 32
DRG: 566 | End: 2022-06-17
Payer: COMMERCIAL

## 2022-06-17 PROBLEM — O35.5XX0 SUSPECTED DAMAGE TO FETUS FROM MATERNAL DRUG USE: Status: ACTIVE | Noted: 2022-06-17

## 2022-06-17 PROBLEM — Z34.93 NORMAL PREGNANCY IN THIRD TRIMESTER: Status: RESOLVED | Noted: 2019-08-18 | Resolved: 2022-06-17

## 2022-06-17 PROBLEM — Z3A.34 34 WEEKS GESTATION OF PREGNANCY: Status: RESOLVED | Noted: 2019-07-20 | Resolved: 2022-06-17

## 2022-06-17 PROBLEM — F12.10 CANNABIS ABUSE: Status: ACTIVE | Noted: 2022-06-17

## 2022-06-17 PROBLEM — Z34.92 NORMAL PREGNANCY IN SECOND TRIMESTER: Status: RESOLVED | Noted: 2021-01-16 | Resolved: 2022-06-17

## 2022-06-17 PROBLEM — F15.10 METHAMPHETAMINE USE (HCC): Status: ACTIVE | Noted: 2022-06-17

## 2022-06-17 PROBLEM — F14.10 COCAINE ABUSE (HCC): Status: ACTIVE | Noted: 2022-06-17

## 2022-06-17 PROBLEM — N17.9 AKI (ACUTE KIDNEY INJURY) (HCC): Status: RESOLVED | Noted: 2021-03-27 | Resolved: 2022-06-17

## 2022-06-17 PROBLEM — I16.0 HYPERTENSIVE URGENCY: Status: RESOLVED | Noted: 2021-03-27 | Resolved: 2022-06-17

## 2022-06-17 PROBLEM — O26.20 RECURRENT PREGNANCY LOSS, CURRENTLY PREGNANT: Status: ACTIVE | Noted: 2022-06-17

## 2022-06-17 LAB
ALBUMIN SERPL-MCNC: 3.6 G/DL (ref 3.5–5.2)
ALP BLD-CCNC: 82 U/L (ref 35–104)
ALT SERPL-CCNC: 5 U/L (ref 0–32)
AMPHETAMINE QUANTITATIVE URINE: <100
AST SERPL-CCNC: 13 U/L (ref 0–31)
BENZOYLECGONINE, QUANTITATIVE, URINE: 935.9
BILIRUB SERPL-MCNC: 0.3 MG/DL (ref 0–1.2)
BILIRUBIN DIRECT: <0.2 MG/DL (ref 0–0.3)
BILIRUBIN, INDIRECT: NORMAL MG/DL (ref 0–1)
BUN BLDV-MCNC: 9 MG/DL (ref 6–20)
COMMENT: NORMAL
CREAT SERPL-MCNC: 0.6 MG/DL (ref 0.5–1)
CREATININE URINE: 51 MG/DL (ref 29–226)
EPHEDRINE, QUANTITATIVE, URINE: <100
FENTANYL, URN, QUANT: <5
GFR AFRICAN AMERICAN: >60
GFR NON-AFRICAN AMERICAN: >60 ML/MIN/1.73
HCT VFR BLD CALC: 32.5 % (ref 34–48)
HEMOGLOBIN: 10.7 G/DL (ref 11.5–15.5)
MCH RBC QN AUTO: 29.3 PG (ref 26–35)
MCHC RBC AUTO-ENTMCNC: 32.9 % (ref 32–34.5)
MCV RBC AUTO: 89 FL (ref 80–99.9)
MDA, QUANTITATIVE, URINE: <100
MDEA, QUANTITATIVE, URINE: <100
MDMA, QUANTITATIVE, URINE: <100
METHAMPHETAMINE QUANTITATIVE URINE: <100
NORFENTANYL, URN, QUANT: <10
PDW BLD-RTO: 14.2 FL (ref 11.5–15)
PHENTERMINE, URINE, QUANTITATIVE: <100
PLATELET # BLD: 248 E9/L (ref 130–450)
PMV BLD AUTO: 10.2 FL (ref 7–12)
PROTEIN PROTEIN: 6 MG/DL (ref 0–12)
PROTEIN/CREAT RATIO: 0.1
PROTEIN/CREAT RATIO: 0.1 (ref 0–0.2)
RBC # BLD: 3.65 E12/L (ref 3.5–5.5)
TOTAL PROTEIN: 6.8 G/DL (ref 6.4–8.3)
TROPONIN, HIGH SENSITIVITY: 8 NG/L (ref 0–9)
URIC ACID, SERUM: 4.9 MG/DL (ref 2.4–5.7)
WBC # BLD: 9.1 E9/L (ref 4.5–11.5)

## 2022-06-17 PROCEDURE — 6360000002 HC RX W HCPCS: Performed by: OBSTETRICS & GYNECOLOGY

## 2022-06-17 PROCEDURE — 76805 OB US >/= 14 WKS SNGL FETUS: CPT | Performed by: OBSTETRICS & GYNECOLOGY

## 2022-06-17 PROCEDURE — 2500000003 HC RX 250 WO HCPCS: Performed by: OBSTETRICS & GYNECOLOGY

## 2022-06-17 PROCEDURE — 80076 HEPATIC FUNCTION PANEL: CPT

## 2022-06-17 PROCEDURE — 84156 ASSAY OF PROTEIN URINE: CPT

## 2022-06-17 PROCEDURE — 2580000003 HC RX 258: Performed by: OBSTETRICS & GYNECOLOGY

## 2022-06-17 PROCEDURE — 76821 MIDDLE CEREBRAL ARTERY ECHO: CPT | Performed by: OBSTETRICS & GYNECOLOGY

## 2022-06-17 PROCEDURE — 6360000002 HC RX W HCPCS

## 2022-06-17 PROCEDURE — 36415 COLL VENOUS BLD VENIPUNCTURE: CPT

## 2022-06-17 PROCEDURE — 99252 IP/OBS CONSLTJ NEW/EST SF 35: CPT | Performed by: OBSTETRICS & GYNECOLOGY

## 2022-06-17 PROCEDURE — 6370000000 HC RX 637 (ALT 250 FOR IP): Performed by: OBSTETRICS & GYNECOLOGY

## 2022-06-17 PROCEDURE — 76820 UMBILICAL ARTERY ECHO: CPT | Performed by: OBSTETRICS & GYNECOLOGY

## 2022-06-17 PROCEDURE — 84484 ASSAY OF TROPONIN QUANT: CPT

## 2022-06-17 PROCEDURE — 84520 ASSAY OF UREA NITROGEN: CPT

## 2022-06-17 PROCEDURE — 6370000000 HC RX 637 (ALT 250 FOR IP)

## 2022-06-17 PROCEDURE — 1220000000 HC SEMI PRIVATE OB R&B

## 2022-06-17 PROCEDURE — 85027 COMPLETE CBC AUTOMATED: CPT

## 2022-06-17 PROCEDURE — 99254 IP/OBS CNSLTJ NEW/EST MOD 60: CPT | Performed by: INTERNAL MEDICINE

## 2022-06-17 PROCEDURE — 76819 FETAL BIOPHYS PROFIL W/O NST: CPT | Performed by: OBSTETRICS & GYNECOLOGY

## 2022-06-17 PROCEDURE — 84550 ASSAY OF BLOOD/URIC ACID: CPT

## 2022-06-17 PROCEDURE — 82570 ASSAY OF URINE CREATININE: CPT

## 2022-06-17 PROCEDURE — 93005 ELECTROCARDIOGRAM TRACING: CPT | Performed by: INTERNAL MEDICINE

## 2022-06-17 PROCEDURE — 82565 ASSAY OF CREATININE: CPT

## 2022-06-17 RX ORDER — BUTALBITAL, ACETAMINOPHEN AND CAFFEINE 50; 325; 40 MG/1; MG/1; MG/1
1 TABLET ORAL EVERY 4 HOURS PRN
Status: DISCONTINUED | OUTPATIENT
Start: 2022-06-17 | End: 2022-06-17

## 2022-06-17 RX ORDER — SODIUM CHLORIDE 0.9 % (FLUSH) 0.9 %
5-40 SYRINGE (ML) INJECTION EVERY 12 HOURS SCHEDULED
Status: DISCONTINUED | OUTPATIENT
Start: 2022-06-17 | End: 2022-06-18 | Stop reason: HOSPADM

## 2022-06-17 RX ORDER — SODIUM CHLORIDE 9 MG/ML
INJECTION, SOLUTION INTRAVENOUS PRN
Status: DISCONTINUED | OUTPATIENT
Start: 2022-06-17 | End: 2022-06-18 | Stop reason: HOSPADM

## 2022-06-17 RX ORDER — NIFEDIPINE 30 MG/1
TABLET, FILM COATED, EXTENDED RELEASE ORAL
Status: COMPLETED
Start: 2022-06-17 | End: 2022-06-17

## 2022-06-17 RX ORDER — SODIUM CHLORIDE 0.9 % (FLUSH) 0.9 %
5-40 SYRINGE (ML) INJECTION PRN
Status: DISCONTINUED | OUTPATIENT
Start: 2022-06-17 | End: 2022-06-18 | Stop reason: HOSPADM

## 2022-06-17 RX ORDER — LABETALOL HYDROCHLORIDE 5 MG/ML
40 INJECTION, SOLUTION INTRAVENOUS
Status: COMPLETED | OUTPATIENT
Start: 2022-06-17 | End: 2022-06-17

## 2022-06-17 RX ORDER — NIFEDIPINE 30 MG/1
30 TABLET, FILM COATED, EXTENDED RELEASE ORAL DAILY
Status: DISCONTINUED | OUTPATIENT
Start: 2022-06-17 | End: 2022-06-18 | Stop reason: HOSPADM

## 2022-06-17 RX ORDER — HYDRALAZINE HYDROCHLORIDE 20 MG/ML
10 INJECTION INTRAMUSCULAR; INTRAVENOUS
Status: ACTIVE | OUTPATIENT
Start: 2022-06-17 | End: 2022-06-17

## 2022-06-17 RX ORDER — DIPHENHYDRAMINE HCL 25 MG
50 TABLET ORAL EVERY 6 HOURS PRN
Status: DISCONTINUED | OUTPATIENT
Start: 2022-06-17 | End: 2022-06-18 | Stop reason: HOSPADM

## 2022-06-17 RX ORDER — LABETALOL HYDROCHLORIDE 5 MG/ML
20 INJECTION, SOLUTION INTRAVENOUS
Status: ACTIVE | OUTPATIENT
Start: 2022-06-17 | End: 2022-06-17

## 2022-06-17 RX ORDER — LABETALOL HYDROCHLORIDE 5 MG/ML
10 INJECTION, SOLUTION INTRAVENOUS ONCE
Status: COMPLETED | OUTPATIENT
Start: 2022-06-17 | End: 2022-06-17

## 2022-06-17 RX ORDER — LABETALOL HYDROCHLORIDE 5 MG/ML
80 INJECTION, SOLUTION INTRAVENOUS
Status: ACTIVE | OUTPATIENT
Start: 2022-06-17 | End: 2022-06-17

## 2022-06-17 RX ORDER — BETAMETHASONE SODIUM PHOSPHATE AND BETAMETHASONE ACETATE 3; 3 MG/ML; MG/ML
INJECTION, SUSPENSION INTRA-ARTICULAR; INTRALESIONAL; INTRAMUSCULAR; SOFT TISSUE
Status: COMPLETED
Start: 2022-06-17 | End: 2022-06-17

## 2022-06-17 RX ORDER — LABETALOL HYDROCHLORIDE 5 MG/ML
20 INJECTION, SOLUTION INTRAVENOUS
Status: COMPLETED | OUTPATIENT
Start: 2022-06-17 | End: 2022-06-17

## 2022-06-17 RX ORDER — LABETALOL HYDROCHLORIDE 5 MG/ML
40 INJECTION, SOLUTION INTRAVENOUS
Status: ACTIVE | OUTPATIENT
Start: 2022-06-17 | End: 2022-06-17

## 2022-06-17 RX ORDER — DIPHENHYDRAMINE HCL 50 MG
50 CAPSULE ORAL EVERY 6 HOURS PRN
Status: DISCONTINUED | OUTPATIENT
Start: 2022-06-17 | End: 2022-06-17 | Stop reason: SDUPTHER

## 2022-06-17 RX ORDER — SODIUM CHLORIDE, SODIUM LACTATE, POTASSIUM CHLORIDE, CALCIUM CHLORIDE 600; 310; 30; 20 MG/100ML; MG/100ML; MG/100ML; MG/100ML
INJECTION, SOLUTION INTRAVENOUS CONTINUOUS
Status: DISCONTINUED | OUTPATIENT
Start: 2022-06-17 | End: 2022-06-18 | Stop reason: SDUPTHER

## 2022-06-17 RX ORDER — LANOLIN ALCOHOL/MO/W.PET/CERES
400 CREAM (GRAM) TOPICAL 2 TIMES DAILY
Status: DISCONTINUED | OUTPATIENT
Start: 2022-06-17 | End: 2022-06-18 | Stop reason: HOSPADM

## 2022-06-17 RX ORDER — LABETALOL HYDROCHLORIDE 5 MG/ML
80 INJECTION, SOLUTION INTRAVENOUS
Status: COMPLETED | OUTPATIENT
Start: 2022-06-17 | End: 2022-06-17

## 2022-06-17 RX ADMIN — MAGNESIUM SULFATE HEPTAHYDRATE 2000 MG/HR: 40 INJECTION, SOLUTION INTRAVENOUS at 02:24

## 2022-06-17 RX ADMIN — BETAMETHASONE SODIUM PHOSPHATE AND BETAMETHASONE ACETATE 30 MG: 3; 3 INJECTION, SUSPENSION INTRA-ARTICULAR; INTRALESIONAL; INTRAMUSCULAR at 15:41

## 2022-06-17 RX ADMIN — DIPHENHYDRAMINE HCL 50 MG: 25 TABLET ORAL at 11:08

## 2022-06-17 RX ADMIN — ONDANSETRON 4 MG: 2 INJECTION INTRAMUSCULAR; INTRAVENOUS at 04:11

## 2022-06-17 RX ADMIN — NIFEDIPINE 30 MG: 30 TABLET, EXTENDED RELEASE ORAL at 11:17

## 2022-06-17 RX ADMIN — Medication 400 MG: at 08:21

## 2022-06-17 RX ADMIN — LABETALOL HYDROCHLORIDE 20 MG: 5 INJECTION INTRAVENOUS at 12:35

## 2022-06-17 RX ADMIN — LABETALOL HYDROCHLORIDE 10 MG: 5 INJECTION INTRAVENOUS at 13:05

## 2022-06-17 RX ADMIN — MAGNESIUM SULFATE HEPTAHYDRATE 2000 MG/HR: 40 INJECTION, SOLUTION INTRAVENOUS at 12:46

## 2022-06-17 RX ADMIN — ACETAMINOPHEN 1000 MG: 500 TABLET ORAL at 15:57

## 2022-06-17 RX ADMIN — Medication 400 MG: at 21:00

## 2022-06-17 RX ADMIN — LABETALOL HYDROCHLORIDE 40 MG: 5 INJECTION INTRAVENOUS at 07:14

## 2022-06-17 RX ADMIN — SODIUM CHLORIDE, POTASSIUM CHLORIDE, SODIUM LACTATE AND CALCIUM CHLORIDE: 600; 310; 30; 20 INJECTION, SOLUTION INTRAVENOUS at 04:27

## 2022-06-17 RX ADMIN — SODIUM CHLORIDE, POTASSIUM CHLORIDE, SODIUM LACTATE AND CALCIUM CHLORIDE: 600; 310; 30; 20 INJECTION, SOLUTION INTRAVENOUS at 05:57

## 2022-06-17 RX ADMIN — LABETALOL HYDROCHLORIDE 200 MG: 100 TABLET, FILM COATED ORAL at 21:01

## 2022-06-17 RX ADMIN — LABETALOL HYDROCHLORIDE 200 MG: 100 TABLET, FILM COATED ORAL at 08:21

## 2022-06-17 RX ADMIN — ACETAMINOPHEN 1000 MG: 500 TABLET ORAL at 08:23

## 2022-06-17 RX ADMIN — ACETAMINOPHEN 1000 MG: 500 TABLET ORAL at 02:22

## 2022-06-17 RX ADMIN — LABETALOL HYDROCHLORIDE 200 MG: 100 TABLET, FILM COATED ORAL at 14:56

## 2022-06-17 RX ADMIN — LABETALOL HYDROCHLORIDE 20 MG: 5 INJECTION INTRAVENOUS at 06:38

## 2022-06-17 ASSESSMENT — PAIN DESCRIPTION - LOCATION
LOCATION: HEAD

## 2022-06-17 ASSESSMENT — PAIN SCALES - GENERAL
PAINLEVEL_OUTOF10: 8
PAINLEVEL_OUTOF10: 3
PAINLEVEL_OUTOF10: 10

## 2022-06-17 ASSESSMENT — PAIN DESCRIPTION - DESCRIPTORS
DESCRIPTORS: ACHING
DESCRIPTORS: ACHING
DESCRIPTORS: DISCOMFORT

## 2022-06-17 NOTE — PROGRESS NOTES
Called dr Kelly Carter pt c/o h/a and request pain medicine. Pt bp elevated this am 160/100 and started labetolol pathway gave 20 then 40 . Pt complains that magdrip is causing h/a.  Pt  Lab results given to , orders recieved

## 2022-06-17 NOTE — CONSULTS
[facility]   [address] [place] [address].  [address] [place] [address]. [address]  Ph: [phone]    Fax: [phone]  June 17, 2022    RE: Karly Turner   [birth year]  Dear Dr. Calixto Come: We saw  Ms. Zimmerman  for consultation and ultrasound  on the Antepartum Unit at [facility] in [place], [place]  on 6/17/22. REASON FOR CONSULTATION: 30yo G 16 [id number] @ 27w6d . Transferred  from [facility] in [place] -  Chronic HTN, Drug use, headache     ~Note- reassuring/ WNL CT Head scan this admission     · COVID 19 this pregnancy Z86.16  - Nov 2020   · Smell, Taste disturbance   R43  · Post COVID sequelae U09.9   · Exhaustion/Fatigue/ Pregnancy o26.81  · Fetal Exposure- drugs o35.5  - actively using   · Marijuana F12  · Cocaine F 14  · Fentanyl  · Methamphetamine   · Fetal Growth and Development  o36.90x0  · Grand Multiparity o09.40  · High risk pregnancy o09.89  · History of pregnancy complications H72.35  · HSV O98.31  · Kidney Dz/ pregnancy  o26.83  ·  ANCA Pos 1:160   · Late Prenatal Care o09.33  · Preexisting Hypertension o10.0  · Prior losses o26.2  · Size>/<Dates  o26.84  ·  Unsure LMP ( Nov?)   Pt reports ~ 5w SAB mid- October 2021   ·  No D&C-- [facility] [place]   · Smoking o99.33    ·   · Her chart was reviewed, her medical, surgical , and OBG history was examined along with any supporting documents available to me . · Her recent clinical visits and notes were reviewed. · Her recent laboratory and pathology  testing was reviewed  · Review of Systems :  Headache \" Terrible! \"  while talking on phone during exam   · CONSTITUTIONAL : No fever, no chills . Itching, scratching arms, hands   · HEENT : +  headache, no visual changes, no sore throat   · GASTROINTESTINAL : No N/V, no D/C, no abdominal pain   · GENITOURINARY : No dysuria, no vaginal bleeding or fluid leaking or discharge   · She reports good fetal movement   · PHYSICAL EXAMINATION: VSS- Afebrile   · General Appearance: Responsive, alert , no acute distress. · Eyes: No pallor, no icterus, no photophobia. · Back: No CVA tenderness. · Abdomen: Soft, non-tender. · Extremities: No pretibial pitting edema  ·   ·   · An ultrasound evaluation was done today. Please refer to the enclosed copy of the ultrasound report for further detailed information. · ULTRASOUND IMPRESSION:    ·  Vertex Female   fetus @  27w 6d   · The fetus is measuring SMALL for gestational age. ·  Unsure LMP/  Recent SAB in October 10/2021-   ·  May be younger-/ poor health conditions suggest IUGR   · Active, responsive baby. The amniotic fluid volume appears normal.   · Fetal anatomy was reviewed and appears normal.  · Transabdominal views of cervix appears to be closed ,long, without significant funneling upon Valsalva. · Soft markers for aneuploidy are examined and found to be favorable. · ~Many of the important findings cannot be fully assessed at this gestational age  · The biophysical profile is reassuring with a score of 8/8. · Umbilical artery Doppler studies are reassuring with good end-diastolic flow. · Middle Cerebral Artery flows show reduced resistance ith appropriate impedance and adaptation. · Cerebroplacental ratio shows Brain Sparing Flow (  <1.0)   · Monitor strip reviewed- Class 1 .   · Fetal Heart Rate Baseline 140 bpm  · Periodic Changes - Accelerations Present, No decelerations noted. Noted + beat to beat variability- Mag S04   · ~Pt notes fetal movement, no cramping or contraction. No undue tenderness or distress during exam.   · Overall cautious/  favorable ultrasound report today.   · Impression : 28yo [address] @ 25-27w EGA   · Hypertension +/- renal component-  IM adjusting betablocker/ CCb/ apresoline   · Years long drug addiction/abuse- currently using   · GBS positive by Hx  · LABS- no  HELLP; no sepsis, minimal proteinuria     BP currently under reasonable control 140-150/ 86-90   Baby not in distress  Not in labor     RECOMMENDATIONS:  Discussed concerns  w/ pt - agrees to stay/ finish Mag So4, repeated steroids then reassess. Likely to leave soon thereafter. ( per pt)   Offered help with her life and drug problems,. warned about abruption, fentanyl and unknown additive  exposure. She will take it under advisement . Reviewed findings and recommendations with referring physician    Once again, thank you for allowing us to participate in the care of this patient and if we can be of any further assistance to you, please do not hesitate to contact us. Sincerely,  Porfirio Tipton MD    I spent 41 minutes with the patient of which greater than 50% of the time was used to  the patient, discuss complications and problems related to her pregnancy, or coordinating her care. I answered all of her questions to her satisfaction.

## 2022-06-17 NOTE — PROGRESS NOTES
Called dr Breana Nazario bp 156/81 pt c/o h/a 9/10 mid forehead. No blurred vision.  Dr Rochelle Bey ordered ct scan of head r/o bleed

## 2022-06-17 NOTE — PROGRESS NOTES
Patient resting in bed. Denies headache, n/v, vision changes, or epigastric pain. No voiced complaints at this time.

## 2022-06-17 NOTE — CONSULTS
[facility]   Consult for Medical Management      Reason for Consult:  Medical management    History of Present Illness:     22-year-old female with past medical history of abnormal Pap smear, herpes simplex, and chronic hypertension presents to the hospital as a transfer from [address] [place] [address] for preeclampsia. At home she uses labetalol 200 mg p.o. twice daily. Initially patient denied any drug use however urine drug screen was positive for cocaine, amphetamines, and fentanyl. Patient is adamant that she did not use amphetamines and fentanyl. She does state that she used cocaine about 2 weeks ago. At [facility] she received magnesium sulfate as well as Celestone. Currently patient complains of 5 out of 10 headache. Blood pressures are improved from initial admission. She has been placed on her labetalol however its been changed to 3 times daily. Patient also has complaints of hot and cold, nausea, abdominal pain, anxiety. Patient is also requesting a check for STD. Informant(s) for H&P:     REVIEW OF SYSTEMS:  Complete ROS performed with patient, pertinent positives and negatives are listed in the HPI. PMH:  Past Medical History:   Diagnosis Date    Abnormal Pap smear     JERAMY (acute kidney injury) ([facility] [address] 6/66/9527    Complication of anesthesia 2011    States hhas spinal curvature and had one-sided Epidural    Herpes simplex without mention of complication     Hypertensive urgency 3/27/2021       Surgical History:  Past Surgical History:   Procedure Laterality Date    IR BIOPSY KIDNEY PERCUTANEOUS  3/29/2021    IR BIOPSY KIDNEY PERCUTANEOUS 3/29/2021 [facility]       Medications Prior to Admission:    Prior to Admission medications    Medication Sig Start Date End Date Taking?  Authorizing Provider   labetalol (NORMODYNE) 200 MG tablet Take 200 mg by mouth 2 times daily    Historical Provider, MD   aspirin 81 MG EC tablet Take 81 mg by mouth daily Historical Provider, MD   Prenatal Vit-Fe Fumarate-FA (PRENATAL VITAMIN) 27-0.8 MG TABS Take 1 tablet by mouth daily 4/24/22   Lindsay Wong DO       Allergies:    Patient has no known allergies. Social History:    reports that she has been smoking cigarettes. She has a 1.00 pack-year smoking history. She has never used smokeless tobacco. She reports previous alcohol use. She reports previous drug use. Drugs: Marijuana (Weed) and Cocaine.     Family History:       Problem Relation Age of Onset    Hearing Loss Sister     Hypertension Sister     Heart Disease Sister     Heart Surgery Sister     Diabetes Maternal Grandfather          PHYSICAL EXAM:  Vitals:  BP (!) 146/87   Pulse 86   Temp 98 °F (36.7 °C)   Resp 16   Ht 5' 7\" (1.702 m)   Wt 201 lb (91.2 kg)   LMP 01/18/2022   SpO2 98%   BMI 31.48 kg/m²   General Appearance: alert and oriented to person, place and time, well developed and well- nourished, in no acute distress  Skin: warm and dry, no rash or erythema  Head: normocephalic and atraumatic  Eyes: pupils equal, round, and reactive to light, extraocular eye movements intact, conjunctivae normal  ENT: tympanic membrane, external ear and ear canal normal bilaterally, nose without deformity, nasal mucosa and turbinates normal without polyps  Neck: supple and non-tender without mass, no thyromegaly or thyroid nodules, no cervical lymphadenopathy  Pulmonary/Chest: clear to auscultation bilaterally- no wheezes, rales or rhonchi, normal air movement, no respiratory distress  Cardiovascular: normal rate, regular rhythm, normal S1 and S2, no murmurs, rubs, clicks, or gallops, distal pulses intact, no carotid bruits  Abdomen: soft, non-tender, non-distended, normal bowel sounds, no masses or organomegaly  Extremities: no cyanosis, clubbing or edema  Musculoskeletal: normal range of motion, no joint swelling, deformity or tenderness  Neurologic: reflexes normal and symmetric, no cranial nerve deficit, gait, coordination and speech normal      LABS:  Recent Labs     06/16/22  1342 06/17/22  0522     --    K 3.7  --      --    CO2 23  --    BUN 10 9   CREATININE 0.8 0.6   GLUCOSE 106*  --    CALCIUM 9.0  --        Recent Labs     06/16/22  1342 06/17/22  0522   WBC 5.4 9.1   RBC 3.56 3.65   HGB 10.5* 10.7*   HCT 32.0* 32.5*   MCV 89.9 89.0   MCH 29.5 29.3   MCHC 32.8 32.9   RDW 14.1 14.2    248   MPV 10.2 10.2       No results for input(s): POCGLU in the last 72 hours. Radiology:   CT HEAD WO CONTRAST   Final Result   No acute intracranial abnormality. ASSESSMENT:      Principal Problem:    27 weeks gestation of pregnancy  Active Problems:    PIH (pregnancy induced hypertension), first trimester  Resolved Problems:    * No resolved hospital problems. *      PLAN:    1. Accelerated hypertension with history of essential hypertension -patient takes at home labetalol 200 mg p.o. 3 times daily as well as magnesium oxide 400 mg oral twice daily. Elevated blood pressure possibly due to withdrawal symptoms. Continue labetalol and will add nifedipine 30 mg extended release p.o. daily. 2.  Polysubstance abuse -UDS positive for amphetamines, cocaine, and fentanyl. We will continue to monitor for withdrawal symptoms. Unable to give Ativan for possible fentanyl withdrawal as patient is pregnant. Recommend continuing IV fluids. 3.  Trichomoniasis - Defer to OB/GYN    Thank you very much for this interesting consult and we will continue to follow along. Feel free to call with any questions at [phone]. Electronically signed by Sinan Orona DO on 6/17/2022 at 10:26 AM    NOTE: This report was transcribed using voice recognition software.  Every effort was made to ensure accuracy; however, inadvertent computerized transcription errors may be present.

## 2022-06-17 NOTE — PROGRESS NOTES
Pt states since she has drank 2 cans of pepsi this afternoon her headache is almost gone and the tylenol has helped.

## 2022-06-17 NOTE — PROGRESS NOTES
Spoke to Dr Antionette Lee regarding patient's positive drug screen and anxiety.  Dr Antionette Lee would like to consult internal medicine to come evaluate patient for her drug use and possible withdrawal.

## 2022-06-18 ENCOUNTER — APPOINTMENT (OUTPATIENT)
Dept: GENERAL RADIOLOGY | Age: 32
DRG: 566 | End: 2022-06-18
Payer: COMMERCIAL

## 2022-06-18 ENCOUNTER — ANCILLARY PROCEDURE (OUTPATIENT)
Dept: OBGYN CLINIC | Age: 32
DRG: 566 | End: 2022-06-18
Payer: COMMERCIAL

## 2022-06-18 VITALS
WEIGHT: 201 LBS | SYSTOLIC BLOOD PRESSURE: 141 MMHG | RESPIRATION RATE: 18 BRPM | OXYGEN SATURATION: 94 % | HEART RATE: 93 BPM | TEMPERATURE: 98.8 F | DIASTOLIC BLOOD PRESSURE: 90 MMHG | HEIGHT: 67 IN | BODY MASS INDEX: 31.55 KG/M2

## 2022-06-18 LAB
ALBUMIN SERPL-MCNC: 3.6 G/DL (ref 3.5–5.2)
ALP BLD-CCNC: 80 U/L (ref 35–104)
ALT SERPL-CCNC: 6 U/L (ref 0–32)
AST SERPL-CCNC: 12 U/L (ref 0–31)
BILIRUB SERPL-MCNC: <0.2 MG/DL (ref 0–1.2)
BILIRUBIN DIRECT: <0.2 MG/DL (ref 0–0.3)
BILIRUBIN, INDIRECT: NORMAL MG/DL (ref 0–1)
BUN BLDV-MCNC: 12 MG/DL (ref 6–20)
CREAT SERPL-MCNC: 0.7 MG/DL (ref 0.5–1)
CREATININE URINE: 40 MG/DL (ref 29–226)
D DIMER: 573 NG/ML DDU
FIBRINOGEN: 374 MG/DL (ref 200–400)
GFR AFRICAN AMERICAN: >60
GFR NON-AFRICAN AMERICAN: >60 ML/MIN/1.73
HCT VFR BLD CALC: 31.4 % (ref 34–48)
HEMOGLOBIN: 10.2 G/DL (ref 11.5–15.5)
INR BLD: 0.9
MCH RBC QN AUTO: 29.8 PG (ref 26–35)
MCHC RBC AUTO-ENTMCNC: 32.5 % (ref 32–34.5)
MCV RBC AUTO: 91.8 FL (ref 80–99.9)
PDW BLD-RTO: 14.6 FL (ref 11.5–15)
PLATELET # BLD: 264 E9/L (ref 130–450)
PMV BLD AUTO: 10 FL (ref 7–12)
PROCALCITONIN: 0.08 NG/ML (ref 0–0.08)
PROTEIN PROTEIN: 15 MG/DL (ref 0–12)
PROTEIN/CREAT RATIO: 0.4
PROTEIN/CREAT RATIO: 0.4 (ref 0–0.2)
PROTHROMBIN TIME: 9.7 SEC (ref 9.3–12.4)
RBC # BLD: 3.42 E12/L (ref 3.5–5.5)
TOTAL PROTEIN: 6.7 G/DL (ref 6.4–8.3)
URIC ACID, SERUM: 3.7 MG/DL (ref 2.4–5.7)
WBC # BLD: 12.6 E9/L (ref 4.5–11.5)

## 2022-06-18 PROCEDURE — 82570 ASSAY OF URINE CREATININE: CPT

## 2022-06-18 PROCEDURE — 36415 COLL VENOUS BLD VENIPUNCTURE: CPT

## 2022-06-18 PROCEDURE — 6370000000 HC RX 637 (ALT 250 FOR IP): Performed by: INTERNAL MEDICINE

## 2022-06-18 PROCEDURE — 80076 HEPATIC FUNCTION PANEL: CPT

## 2022-06-18 PROCEDURE — 82565 ASSAY OF CREATININE: CPT

## 2022-06-18 PROCEDURE — 6370000000 HC RX 637 (ALT 250 FOR IP): Performed by: OBSTETRICS & GYNECOLOGY

## 2022-06-18 PROCEDURE — 99232 SBSQ HOSP IP/OBS MODERATE 35: CPT | Performed by: OBSTETRICS & GYNECOLOGY

## 2022-06-18 PROCEDURE — 93005 ELECTROCARDIOGRAM TRACING: CPT | Performed by: INTERNAL MEDICINE

## 2022-06-18 PROCEDURE — 99233 SBSQ HOSP IP/OBS HIGH 50: CPT | Performed by: INTERNAL MEDICINE

## 2022-06-18 PROCEDURE — 2500000003 HC RX 250 WO HCPCS: Performed by: STUDENT IN AN ORGANIZED HEALTH CARE EDUCATION/TRAINING PROGRAM

## 2022-06-18 PROCEDURE — 76821 MIDDLE CEREBRAL ARTERY ECHO: CPT | Performed by: OBSTETRICS & GYNECOLOGY

## 2022-06-18 PROCEDURE — 2500000003 HC RX 250 WO HCPCS

## 2022-06-18 PROCEDURE — 85378 FIBRIN DEGRADE SEMIQUANT: CPT

## 2022-06-18 PROCEDURE — 76819 FETAL BIOPHYS PROFIL W/O NST: CPT | Performed by: OBSTETRICS & GYNECOLOGY

## 2022-06-18 PROCEDURE — 84145 PROCALCITONIN (PCT): CPT

## 2022-06-18 PROCEDURE — 76815 OB US LIMITED FETUS(S): CPT | Performed by: OBSTETRICS & GYNECOLOGY

## 2022-06-18 PROCEDURE — 6360000002 HC RX W HCPCS: Performed by: STUDENT IN AN ORGANIZED HEALTH CARE EDUCATION/TRAINING PROGRAM

## 2022-06-18 PROCEDURE — 85384 FIBRINOGEN ACTIVITY: CPT

## 2022-06-18 PROCEDURE — 99213 OFFICE O/P EST LOW 20 MIN: CPT

## 2022-06-18 PROCEDURE — 6370000000 HC RX 637 (ALT 250 FOR IP): Performed by: STUDENT IN AN ORGANIZED HEALTH CARE EDUCATION/TRAINING PROGRAM

## 2022-06-18 PROCEDURE — 84550 ASSAY OF BLOOD/URIC ACID: CPT

## 2022-06-18 PROCEDURE — 85610 PROTHROMBIN TIME: CPT

## 2022-06-18 PROCEDURE — 71045 X-RAY EXAM CHEST 1 VIEW: CPT

## 2022-06-18 PROCEDURE — 85027 COMPLETE CBC AUTOMATED: CPT

## 2022-06-18 PROCEDURE — 84520 ASSAY OF UREA NITROGEN: CPT

## 2022-06-18 PROCEDURE — 84156 ASSAY OF PROTEIN URINE: CPT

## 2022-06-18 PROCEDURE — 76820 UMBILICAL ARTERY ECHO: CPT | Performed by: OBSTETRICS & GYNECOLOGY

## 2022-06-18 RX ORDER — NIFEDIPINE 10 MG/1
10 CAPSULE ORAL
Status: COMPLETED | OUTPATIENT
Start: 2022-06-18 | End: 2022-06-18

## 2022-06-18 RX ORDER — MAGNESIUM SULFATE HEPTAHYDRATE 40 MG/ML
4000 INJECTION, SOLUTION INTRAVENOUS ONCE
Status: COMPLETED | OUTPATIENT
Start: 2022-06-18 | End: 2022-06-18

## 2022-06-18 RX ORDER — CALCIUM GLUCONATE 94 MG/ML
1000 INJECTION, SOLUTION INTRAVENOUS PRN
Status: DISCONTINUED | OUTPATIENT
Start: 2022-06-18 | End: 2022-06-18 | Stop reason: HOSPADM

## 2022-06-18 RX ORDER — METRONIDAZOLE 500 MG/1
500 TABLET ORAL EVERY 12 HOURS SCHEDULED
Status: DISCONTINUED | OUTPATIENT
Start: 2022-06-18 | End: 2022-06-18 | Stop reason: HOSPADM

## 2022-06-18 RX ORDER — LABETALOL HYDROCHLORIDE 5 MG/ML
40 INJECTION, SOLUTION INTRAVENOUS
Status: COMPLETED | OUTPATIENT
Start: 2022-06-18 | End: 2022-06-18

## 2022-06-18 RX ORDER — LABETALOL 100 MG/1
400 TABLET, FILM COATED ORAL EVERY 8 HOURS SCHEDULED
Status: DISCONTINUED | OUTPATIENT
Start: 2022-06-18 | End: 2022-06-18 | Stop reason: HOSPADM

## 2022-06-18 RX ORDER — LABETALOL HYDROCHLORIDE 5 MG/ML
20 INJECTION, SOLUTION INTRAVENOUS
Status: COMPLETED | OUTPATIENT
Start: 2022-06-18 | End: 2022-06-18

## 2022-06-18 RX ORDER — NIFEDIPINE 10 MG/1
20 CAPSULE ORAL
Status: DISCONTINUED | OUTPATIENT
Start: 2022-06-18 | End: 2022-06-18 | Stop reason: HOSPADM

## 2022-06-18 RX ORDER — LABETALOL HYDROCHLORIDE 5 MG/ML
INJECTION, SOLUTION INTRAVENOUS
Status: COMPLETED
Start: 2022-06-18 | End: 2022-06-18

## 2022-06-18 RX ORDER — LABETALOL HYDROCHLORIDE 5 MG/ML
20 INJECTION, SOLUTION INTRAVENOUS
Status: DISCONTINUED | OUTPATIENT
Start: 2022-06-18 | End: 2022-06-18 | Stop reason: HOSPADM

## 2022-06-18 RX ORDER — NIFEDIPINE 30 MG/1
30 TABLET, FILM COATED, EXTENDED RELEASE ORAL DAILY
Qty: 30 TABLET | Refills: 0 | Status: ON HOLD | OUTPATIENT
Start: 2022-06-19 | End: 2022-07-22

## 2022-06-18 RX ORDER — LABETALOL HYDROCHLORIDE 5 MG/ML
80 INJECTION, SOLUTION INTRAVENOUS
Status: DISCONTINUED | OUTPATIENT
Start: 2022-06-18 | End: 2022-06-18

## 2022-06-18 RX ORDER — SODIUM CHLORIDE, SODIUM LACTATE, POTASSIUM CHLORIDE, CALCIUM CHLORIDE 600; 310; 30; 20 MG/100ML; MG/100ML; MG/100ML; MG/100ML
INJECTION, SOLUTION INTRAVENOUS CONTINUOUS
Status: DISCONTINUED | OUTPATIENT
Start: 2022-06-18 | End: 2022-06-18 | Stop reason: HOSPADM

## 2022-06-18 RX ORDER — METRONIDAZOLE 500 MG/1
2000 TABLET ORAL ONCE
Status: COMPLETED | OUTPATIENT
Start: 2022-06-18 | End: 2022-06-18

## 2022-06-18 RX ORDER — LABETALOL 200 MG/1
400 TABLET, FILM COATED ORAL EVERY 8 HOURS SCHEDULED
Qty: 60 TABLET | Refills: 0 | Status: ON HOLD | OUTPATIENT
Start: 2022-06-18 | End: 2022-07-24 | Stop reason: SDUPTHER

## 2022-06-18 RX ORDER — HYDRALAZINE HYDROCHLORIDE 20 MG/ML
10 INJECTION INTRAMUSCULAR; INTRAVENOUS
Status: DISCONTINUED | OUTPATIENT
Start: 2022-06-18 | End: 2022-06-18 | Stop reason: HOSPADM

## 2022-06-18 RX ADMIN — LABETALOL HYDROCHLORIDE 20 MG: 5 INJECTION INTRAVENOUS at 06:47

## 2022-06-18 RX ADMIN — Medication 400 MG: at 07:45

## 2022-06-18 RX ADMIN — MAGNESIUM SULFATE HEPTAHYDRATE 4000 MG: 40 INJECTION, SOLUTION INTRAVENOUS at 06:54

## 2022-06-18 RX ADMIN — NIFEDIPINE 20 MG: 10 CAPSULE ORAL at 07:54

## 2022-06-18 RX ADMIN — LABETALOL HYDROCHLORIDE 200 MG: 100 TABLET, FILM COATED ORAL at 07:45

## 2022-06-18 RX ADMIN — LABETALOL HYDROCHLORIDE 40 MG: 5 INJECTION INTRAVENOUS at 07:02

## 2022-06-18 RX ADMIN — NIFEDIPINE 10 MG: 10 CAPSULE ORAL at 07:26

## 2022-06-18 RX ADMIN — METRONIDAZOLE 2000 MG: 500 TABLET ORAL at 10:35

## 2022-06-18 RX ADMIN — LABETALOL HYDROCHLORIDE 20 MG: 5 INJECTION, SOLUTION INTRAVENOUS at 06:47

## 2022-06-18 RX ADMIN — NIFEDIPINE 30 MG: 30 TABLET, EXTENDED RELEASE ORAL at 07:45

## 2022-06-18 RX ADMIN — LABETALOL HYDROCHLORIDE 400 MG: 100 TABLET, FILM COATED ORAL at 14:02

## 2022-06-18 NOTE — PROGRESS NOTES
aMrin Brewster is a 33 y.o. [id number] at [id number] here for superimposed preeclampsia. This morning patient with severe range pressures again. She reports No HA, changes in vision, RUQ pain. No Ucx, LOF, VB. +FM    -no need to restart Mg, recently completed  -labatelol protocol until pressures normalize, after discussion with  change to Nifedipine  -s/p celestone  -repeated pih labs   -FHRT notable for periods of decels, spontanesous accels and mod variability    #Dispo: Antepartum    D/w .  Signedo ut to Vida Oliver MD, MPH

## 2022-06-18 NOTE — PROGRESS NOTES
Care of patient taken over. Patient resting comfortably in bed. Call light within reach. Report from [facility]. Patient denies leaking of fluid, CTX, HA, blurred vision. Reports +FM and that she had some spotting on her toilet paper when she wiped last night.

## 2022-06-18 NOTE — PROGRESS NOTES
Dr Kulwinder Bowles at nurses station and updated on patients labs results this AM. No new orders at this time.

## 2022-06-18 NOTE — PROGRESS NOTES
Dr Jong Ahuja at bedside. Patient receiving 40 of labetalol per protocol. States he talked to Dr Alia Salgado and if blood pressure is still elevated in 10 minutes to start nifedipine protocol instead of labetalol. Also states magnesium can be DC'd unless delivery.

## 2022-06-18 NOTE — PROGRESS NOTES
Updated Dr Julia Dover on patients FHR tracing and current blood pressures. Patient asymptomatic at this time. Labetalol protocol ordered and restart magnesium 4g to 2g.

## 2022-06-18 NOTE — PROGRESS NOTES
Internal med Dr Anderson Figures at nurses station. Order for another EKG to be done, Chest xray, and procalcitonin level to be drawn. Labetalol dose increased.

## 2022-06-18 NOTE — PROGRESS NOTES
Discharge Note    HD#2     S:  Patient without complaints. Seen and discharged to home by MFM, see note. O: BP (!) 141/90   Pulse 93   Temp 98.8 °F (37.1 °C) (Oral)   Resp 18   Ht 5' 7\" (1.702 m)   Wt 201 lb (91.2 kg)   LMP 01/18/2022   SpO2 94%   BMI 31.48 kg/m²          IMP:  1. IUP @ 28 weeks           2. Pregnancy Induced Hypertension  Patient Active Problem List   Diagnosis    Complicated pregnancy, unspecified trimester    27 weeks gestation of pregnancy    PIH (pregnancy induced hypertension), first trimester    Suspected damage to fetus from maternal drug use    Cocaine abuse ([address]    Methamphetamine use ([address]    Cannabis abuse    Recurrent pregnancy loss, currently pregnant          Plan: 1. Discharge to home           2.   Follow-up with Dr Vega Espinoza as recommended

## 2022-06-18 NOTE — PROGRESS NOTES
[facility]                       [address] [place] [address].  [address] [address]  Ph: [phone]    Fax: [phone]  June 18, 2022     RE: Kei Booker   [birth year]  Dear Dr. Rayne Hernández: We saw  Ms. Zimmerman  for consultation and ultrasound  [facility] [address]  on 6/17 & 18 /22. REASON FOR CONSULTATION: 28yo G 16 [id number] @ 28w0d . Transferred  from [facility] in [place] on 6/17 -  Chronic HTN, Drug use, headache      ~Note- reassuring/ WNL CT Head scan this admission   CXR WNL today      · COVID 19 this pregnancy Z86.16  - Nov 2020   ? Smell, Taste disturbance   R43  ? Post COVID sequelae U09.9   · Exhaustion/Fatigue/ Pregnancy o26.81  · Fetal Exposure- drugs o35.5  - actively using   ? Marijuana F12  ? Cocaine F 14  ? Fentanyl  ? Methamphetamine   · Fetal Growth and Development  o36.90x0  · Grand Multiparity o09.40  · High risk pregnancy o09.89  · History of pregnancy complications W28.76  · HSV O98.31  · Kidney Dz/ pregnancy  o26.83  ·             ANCA Pos 1:160   · Late Prenatal Care o09.33  · Preexisting Hypertension o10.0  · Prior losses o26.2  · Size>/<Dates  o26.84  ·             Unsure LMP ( Nov?)   Pt reports ~ 5w SAB mid- October 2021   ·             No D&C-- [facility]   · Smoking o99.33     ·  · Her chart was reviewed, her medical, surgical , and OBG history was examined along with any supporting documents available to me . · Her recent clinical visits and notes were reviewed. · Her recent laboratory and pathology  testing was reviewed    · Review of Systems :  Headache - better today   · CONSTITUTIONAL : No fever, no chills .  Itching, scratching arms, hands   · HEENT : +/-  headache, no visual changes, no sore throat   · GASTROINTESTINAL : No N/V, no D/C, no abdominal pain   · GENITOURINARY : No dysuria, no vaginal bleeding or fluid leaking or discharge   · She reports good fetal movement   · PHYSICAL EXAMINATION: VSS- Afebrile   BP Labile-  stable now on Labetalol 400mg BID; ProcardiaXL 30mg QD   · General Appearance: Responsive, alert , no acute distress. · Eyes: No pallor, no icterus, no photophobia. · Back: No CVA tenderness. · Abdomen: Soft, non-tender. · Extremities: No pretibial pitting edema  LABS: ·    ·  Results for Dago Love (MRN [id number]) as of 6/18/2022 15:10   Ref. Range 6/18/2022 07:08   BUN,BUNPL Latest Ref Range: 6 - 20 mg/dL 12   Creatinine Latest Ref Range: 0.5 - 1.0 mg/dL 0.7   GFR Non-African American Latest Ref Range: >=60 mL/min/1.73 >60   GFR African American Unknown >60   Total Protein Latest Ref Range: 6.4 - 8.3 g/dL 6.7   Uric Acid, Serum Latest Ref Range: 2.4 - 5.7 mg/dL 3.7   Albumin Latest Ref Range: 3.5 - 5.2 g/dL 3.6   Alk Phos Latest Ref Range: 35 - 104 U/L 80   ALT Latest Ref Range: 0 - 32 U/L 6   AST Latest Ref Range: 0 - 31 U/L 12   Bilirubin Latest Ref Range: 0.0 - 1.2 mg/dL <0.2   Bilirubin, Direct Latest Ref Range: 0.0 - 0.3 mg/dL <0.2   Bilirubin, Indirect Latest Ref Range: 0.0 - 1.0 mg/dL see below   WBC Latest Ref Range: 4.5 - 11.5 E9/L 12.6 (H)   RBC Latest Ref Range: 3.50 - 5.50 E12/L 3.42 (L)   Hemoglobin Quant Latest Ref Range: 11.5 - 15.5 g/dL 10.2 (L)   Hematocrit Latest Ref Range: 34.0 - 48.0 % 31.4 (L)   MCV Latest Ref Range: 80.0 - 99.9 fL 91.8   MCH Latest Ref Range: 26.0 - 35.0 pg 29.8   MCHC Latest Ref Range: 32.0 - 34.5 % 32.5   MPV Latest Ref Range: 7.0 - 12.0 fL 10.0   RDW Latest Ref Range: 11.5 - 15.0 fL 14.6   Platelet Count Latest Ref Range: 130 - 450 E9/L 264   Prothrombin Time Latest Ref Range: 9.3 - 12.4 sec 9.7   INR Unknown 0.9   Fibrinogen Latest Ref Range: 200 - 400 mg/dL 374   D-Dimer, Quant Latest Units: ng/mL    · An ultrasound evaluation was done today. Please refer to the enclosed copy of the ultrasound report for further detailed information.   · ULTRASOUND IMPRESSION:    ·  Vertex Female   fetus @  28w 0d   · The fetus is measuring SMALL for gestational age.   ·       Unsure LMP/  Recent SAB in October 10/2021-   ·       May be younger-/ poor health conditions suggest IUGR   · Active, responsive baby. The amniotic fluid volume appears normal.   ? Fetal anatomy was reviewed and appears normal.  ? Transabdominal views of cervix appears to be closed ,long, without significant funneling upon Valsalva. ? Soft markers for aneuploidy are examined and found to be favorable. § ~Many of the important findings cannot be fully assessed at this gestational age  · The biophysical profile is reassuring with a score of 8/8. · Umbilical artery Doppler studies are reassuring with good end-diastolic flow. · Middle Cerebral Artery flows show reduced resistance ith appropriate impedance and adaptation. · Cerebroplacental ratio shows Brain Sparing Flow (  <1.0)   · Monitor strip reviewed- Class 1 .   · Fetal Heart Rate Baseline 138 bpm  · Periodic Changes - Accelerations Present, No decelerations noted. Noted + beat to beat variability- Mag S04   · ~Pt notes fetal movement, no cramping or contraction. No undue tenderness or distress during exam.   ·   Overall cautious/  favorable ultrasound report today. · Impression : 30yo Uriel 13 @ 25-27w EGA   · Hypertension +/- renal component-  IM adjusting betablocker/ CCb/ apresoline   · Years long drug addiction/abuse- currently using   · GBS positive by Hx  ~Trichomonas · LABS- no  HELLP; no sepsis, minimal proteinuria      BP currently under better  control 140-150/ 86-90 on Labetalol  200mg PO BID  plus Nifedipine 30mg XR QD   Baby not in distress    · 1. Discussed concerns  w/ pt  -Not in labor, not in distress, no HELLP/ BP reasonably well controlled. ·  2. finished Mag So4 6.18   3. repeated steroids 6.17-18     ·   ·4. Offered again  help with her life and drug problems,. warned about abruption, stroke, fentanyl and unknown additive  exposure. ·             She will take it under advisement . RECOMMENDATIONS:  Reviewed ouptpt recommendations - Yes HTN meds, yes Flagyl for Trich , Yes PNV, yes f/u Dr Mast [place] next week. We can help follow in M office in [facility] as well. · Reviewed findings and recommendations with referring physician   ·  Once again, thank you for allowing us to participate in the care of this patient and if we can be of any further assistance to you, please do not hesitate to contact us. · Sincerely,  · Rudy Judge MD  ·    I spent 16  minutes with the patient of which greater than 50% of the time was used to  the patient, discuss complications and problems related to her pregnancy, or coordinating her care. I answered all of her questions to her satisfaction.

## 2022-06-18 NOTE — PROGRESS NOTES
Baby replaced on monitor, pt endorses good fetal movement pt medicated per [facility]

## 2022-06-18 NOTE — DISCHARGE SUMMARY
Obstetric Discharge Summary    Patient Name:  Eliot Marmolejo    Medical Record Number:  [id number]    Attending:  Dr Rashi Zambrano    Date of Admission:  2022    Date of Discharge:        Admitting Diagnosis     OB History        16    Para   6    Term   6       0    AB   9    Living   7       SAB   3    IAB   4    Ectopic   0    Molar   0    Multiple   1    Live Births   7          Obstetric Comments    viable female at 12pm-Apgars 9/10 ,Wt.  5-6   Low Vacuum Extraction Twin B at 1422pm Apgars 9/9 Wt. 5-9             Reasons for Admission on 2022  5:26 PM  27 weeks gestation of pregnancy [Z3A.27]  PIH (pregnancy induced hypertension), first trimester [O13.1]                          Discharge Meds:       Medication List      START taking these medications    NIFEdipine 30 MG extended release tablet  Commonly known as: ADALAT CC  Take 1 tablet by mouth daily  Start taking on: 2022        CHANGE how you take these medications    labetalol 200 MG tablet  Commonly known as: NORMODYNE  Take 2 tablets by mouth every 8 hours  What changed:   · how much to take  · when to take this        CONTINUE taking these medications    aspirin 81 MG EC tablet     Prenatal Vitamin 27-0.8 MG Tabs  Take 1 tablet by mouth daily           Where to Get Your Medications      You can get these medications from any pharmacy    Bring a paper prescription for each of these medications  · labetalol 200 MG tablet  · NIFEdipine 30 MG extended release tablet         Discharge Information  Current Discharge Medication List      START taking these medications    Details   NIFEdipine (ADALAT CC) 30 MG extended release tablet Take 1 tablet by mouth daily  Qty: 30 tablet, Refills: 0         CONTINUE these medications which have CHANGED    Details   labetalol (NORMODYNE) 200 MG tablet Take 2 tablets by mouth every 8 hours  Qty: 60 tablet, Refills: 0         CONTINUE these medications which have NOT CHANGED    Details   aspirin

## 2022-06-20 LAB
EKG ATRIAL RATE: 94 BPM
EKG ATRIAL RATE: 99 BPM
EKG P AXIS: 62 DEGREES
EKG P AXIS: 65 DEGREES
EKG P-R INTERVAL: 120 MS
EKG P-R INTERVAL: 170 MS
EKG Q-T INTERVAL: 380 MS
EKG Q-T INTERVAL: 406 MS
EKG QRS DURATION: 106 MS
EKG QRS DURATION: 110 MS
EKG QTC CALCULATION (BAZETT): 487 MS
EKG QTC CALCULATION (BAZETT): 507 MS
EKG R AXIS: 28 DEGREES
EKG R AXIS: 30 DEGREES
EKG T AXIS: -132 DEGREES
EKG T AXIS: -15 DEGREES
EKG VENTRICULAR RATE: 94 BPM
EKG VENTRICULAR RATE: 99 BPM

## 2022-07-22 ENCOUNTER — HOSPITAL ENCOUNTER (INPATIENT)
Age: 32
LOS: 2 days | Discharge: HOME | DRG: 560 | End: 2022-07-24
Attending: OBSTETRICS & GYNECOLOGY | Admitting: OBSTETRICS & GYNECOLOGY
Payer: COMMERCIAL

## 2022-07-22 ENCOUNTER — APPOINTMENT (OUTPATIENT)
Dept: ULTRASOUND IMAGING | Age: 32
DRG: 560 | End: 2022-07-22
Payer: COMMERCIAL

## 2022-07-22 PROBLEM — O36.4XX1 FETAL DEMISE, GREATER THAN 22 WEEKS, ANTEPARTUM, FETUS 1: Status: ACTIVE | Noted: 2022-07-22

## 2022-07-22 PROBLEM — O26.93 COMPLICATED PREGNANCY, THIRD TRIMESTER: Status: ACTIVE | Noted: 2022-07-22

## 2022-07-22 LAB
ABO/RH: NORMAL
ALBUMIN SERPL-MCNC: 3.2 G/DL (ref 3.5–5.2)
ALP BLD-CCNC: 146 U/L (ref 35–104)
ALT SERPL-CCNC: 40 U/L (ref 0–32)
ANION GAP SERPL CALCULATED.3IONS-SCNC: 8 MMOL/L (ref 7–16)
ANTIBODY SCREEN: NORMAL
APTT: 27.6 SEC (ref 24.5–35.1)
AST SERPL-CCNC: 44 U/L (ref 0–31)
BACTERIA: ABNORMAL /HPF
BASOPHILS ABSOLUTE: 0.03 E9/L (ref 0–0.2)
BASOPHILS RELATIVE PERCENT: 0.3 % (ref 0–2)
BILIRUB SERPL-MCNC: 0.3 MG/DL (ref 0–1.2)
BILIRUBIN URINE: NEGATIVE
BLOOD, URINE: NEGATIVE
BUN BLDV-MCNC: 14 MG/DL (ref 6–20)
CALCIUM SERPL-MCNC: 9 MG/DL (ref 8.6–10.2)
CHLORIDE BLD-SCNC: 103 MMOL/L (ref 98–107)
CLARITY: CLEAR
CO2: 23 MMOL/L (ref 22–29)
COLOR: YELLOW
CREAT SERPL-MCNC: 0.8 MG/DL (ref 0.5–1)
D DIMER: 3103 NG/ML DDU
EOSINOPHILS ABSOLUTE: 0.33 E9/L (ref 0.05–0.5)
EOSINOPHILS RELATIVE PERCENT: 3.7 % (ref 0–6)
EPITHELIAL CELLS, UA: ABNORMAL /HPF
FIBRINOGEN: 498 MG/DL (ref 200–400)
GFR AFRICAN AMERICAN: >60
GFR NON-AFRICAN AMERICAN: >60 ML/MIN/1.73
GLUCOSE BLD-MCNC: 105 MG/DL (ref 74–99)
GLUCOSE URINE: NEGATIVE MG/DL
HCT VFR BLD CALC: 33.1 % (ref 34–48)
HEMOGLOBIN: 10.9 G/DL (ref 11.5–15.5)
IMMATURE GRANULOCYTES #: 0.08 E9/L
IMMATURE GRANULOCYTES %: 0.9 % (ref 0–5)
INR BLD: 0.8
KETONES, URINE: ABNORMAL MG/DL
LACTATE DEHYDROGENASE: 399 U/L (ref 135–214)
LEUKOCYTE ESTERASE, URINE: ABNORMAL
LYMPHOCYTES ABSOLUTE: 1.93 E9/L (ref 1.5–4)
LYMPHOCYTES RELATIVE PERCENT: 21.6 % (ref 20–42)
MCH RBC QN AUTO: 29.6 PG (ref 26–35)
MCHC RBC AUTO-ENTMCNC: 32.9 % (ref 32–34.5)
MCV RBC AUTO: 89.9 FL (ref 80–99.9)
MONOCYTES ABSOLUTE: 0.57 E9/L (ref 0.1–0.95)
MONOCYTES RELATIVE PERCENT: 6.4 % (ref 2–12)
NEUTROPHILS ABSOLUTE: 6 E9/L (ref 1.8–7.3)
NEUTROPHILS RELATIVE PERCENT: 67.1 % (ref 43–80)
NITRITE, URINE: NEGATIVE
PDW BLD-RTO: 15.3 FL (ref 11.5–15)
PH UA: 6.5 (ref 5–9)
PLATELET # BLD: 136 E9/L (ref 130–450)
PMV BLD AUTO: 11.3 FL (ref 7–12)
POTASSIUM SERPL-SCNC: 3.8 MMOL/L (ref 3.5–5)
PROTEIN UA: 30 MG/DL
PROTHROMBIN TIME: 9.7 SEC (ref 9.3–12.4)
RBC # BLD: 3.68 E12/L (ref 3.5–5.5)
RBC UA: ABNORMAL /HPF (ref 0–2)
SODIUM BLD-SCNC: 134 MMOL/L (ref 132–146)
SPECIFIC GRAVITY UA: 1.01 (ref 1–1.03)
TOTAL PROTEIN: 6.5 G/DL (ref 6.4–8.3)
URIC ACID, SERUM: 5.4 MG/DL (ref 2.4–5.7)
UROBILINOGEN, URINE: 2 E.U./DL
WBC # BLD: 8.9 E9/L (ref 4.5–11.5)
WBC UA: ABNORMAL /HPF (ref 0–5)

## 2022-07-22 PROCEDURE — 85610 PROTHROMBIN TIME: CPT

## 2022-07-22 PROCEDURE — 83615 LACTATE (LD) (LDH) ENZYME: CPT

## 2022-07-22 PROCEDURE — 36415 COLL VENOUS BLD VENIPUNCTURE: CPT

## 2022-07-22 PROCEDURE — 76815 OB US LIMITED FETUS(S): CPT

## 2022-07-22 PROCEDURE — 84550 ASSAY OF BLOOD/URIC ACID: CPT

## 2022-07-22 PROCEDURE — 85730 THROMBOPLASTIN TIME PARTIAL: CPT

## 2022-07-22 PROCEDURE — 85378 FIBRIN DEGRADE SEMIQUANT: CPT

## 2022-07-22 PROCEDURE — 86900 BLOOD TYPING SEROLOGIC ABO: CPT

## 2022-07-22 PROCEDURE — 86850 RBC ANTIBODY SCREEN: CPT

## 2022-07-22 PROCEDURE — 85025 COMPLETE CBC W/AUTO DIFF WBC: CPT

## 2022-07-22 PROCEDURE — 86901 BLOOD TYPING SEROLOGIC RH(D): CPT

## 2022-07-22 PROCEDURE — 85384 FIBRINOGEN ACTIVITY: CPT

## 2022-07-22 PROCEDURE — 1220000001 HC SEMI PRIVATE L&D R&B

## 2022-07-22 PROCEDURE — 80053 COMPREHEN METABOLIC PANEL: CPT

## 2022-07-22 PROCEDURE — 2500000003 HC RX 250 WO HCPCS: Performed by: OBSTETRICS & GYNECOLOGY

## 2022-07-22 PROCEDURE — 81001 URINALYSIS AUTO W/SCOPE: CPT

## 2022-07-22 PROCEDURE — 2580000003 HC RX 258: Performed by: OBSTETRICS & GYNECOLOGY

## 2022-07-22 PROCEDURE — 6360000002 HC RX W HCPCS: Performed by: OBSTETRICS & GYNECOLOGY

## 2022-07-22 RX ORDER — MAGNESIUM SULFATE IN WATER 40 MG/ML
4000 INJECTION, SOLUTION INTRAVENOUS ONCE
Status: COMPLETED | OUTPATIENT
Start: 2022-07-22 | End: 2022-07-22

## 2022-07-22 RX ORDER — ONDANSETRON 2 MG/ML
4 INJECTION INTRAMUSCULAR; INTRAVENOUS EVERY 6 HOURS PRN
Status: DISCONTINUED | OUTPATIENT
Start: 2022-07-22 | End: 2022-07-24 | Stop reason: HOSPADM

## 2022-07-22 RX ORDER — CARBOPROST TROMETHAMINE 250 UG/ML
250 INJECTION, SOLUTION INTRAMUSCULAR PRN
Status: DISCONTINUED | OUTPATIENT
Start: 2022-07-22 | End: 2022-07-24 | Stop reason: HOSPADM

## 2022-07-22 RX ORDER — LABETALOL HYDROCHLORIDE 5 MG/ML
20 INJECTION, SOLUTION INTRAVENOUS
Status: COMPLETED | OUTPATIENT
Start: 2022-07-22 | End: 2022-07-22

## 2022-07-22 RX ORDER — SODIUM CHLORIDE 0.9 % (FLUSH) 0.9 %
5-40 SYRINGE (ML) INJECTION EVERY 12 HOURS SCHEDULED
Status: DISCONTINUED | OUTPATIENT
Start: 2022-07-22 | End: 2022-07-24 | Stop reason: HOSPADM

## 2022-07-22 RX ORDER — LABETALOL HYDROCHLORIDE 5 MG/ML
40 INJECTION, SOLUTION INTRAVENOUS
Status: COMPLETED | OUTPATIENT
Start: 2022-07-22 | End: 2022-07-22

## 2022-07-22 RX ORDER — MISOPROSTOL 200 UG/1
800 TABLET ORAL PRN
Status: DISCONTINUED | OUTPATIENT
Start: 2022-07-22 | End: 2022-07-24 | Stop reason: HOSPADM

## 2022-07-22 RX ORDER — HYDRALAZINE HYDROCHLORIDE 20 MG/ML
10 INJECTION INTRAMUSCULAR; INTRAVENOUS
Status: COMPLETED | OUTPATIENT
Start: 2022-07-22 | End: 2022-07-22

## 2022-07-22 RX ORDER — DOCUSATE SODIUM 100 MG/1
100 CAPSULE, LIQUID FILLED ORAL 2 TIMES DAILY
Status: DISCONTINUED | OUTPATIENT
Start: 2022-07-22 | End: 2022-07-23

## 2022-07-22 RX ORDER — HYDRALAZINE HYDROCHLORIDE 20 MG/ML
5 INJECTION INTRAMUSCULAR; INTRAVENOUS
Status: COMPLETED | OUTPATIENT
Start: 2022-07-22 | End: 2022-07-22

## 2022-07-22 RX ORDER — SODIUM CHLORIDE 9 MG/ML
INJECTION, SOLUTION INTRAVENOUS PRN
Status: DISCONTINUED | OUTPATIENT
Start: 2022-07-22 | End: 2022-07-23

## 2022-07-22 RX ORDER — METHYLERGONOVINE MALEATE 0.2 MG/ML
200 INJECTION INTRAVENOUS PRN
Status: DISCONTINUED | OUTPATIENT
Start: 2022-07-22 | End: 2022-07-24 | Stop reason: HOSPADM

## 2022-07-22 RX ORDER — SODIUM CHLORIDE 0.9 % (FLUSH) 0.9 %
5-40 SYRINGE (ML) INJECTION PRN
Status: DISCONTINUED | OUTPATIENT
Start: 2022-07-22 | End: 2022-07-24 | Stop reason: HOSPADM

## 2022-07-22 RX ORDER — NALBUPHINE HCL 10 MG/ML
10 AMPUL (ML) INJECTION
Status: DISCONTINUED | OUTPATIENT
Start: 2022-07-22 | End: 2022-07-24 | Stop reason: HOSPADM

## 2022-07-22 RX ORDER — SODIUM CHLORIDE, SODIUM LACTATE, POTASSIUM CHLORIDE, CALCIUM CHLORIDE 600; 310; 30; 20 MG/100ML; MG/100ML; MG/100ML; MG/100ML
INJECTION, SOLUTION INTRAVENOUS CONTINUOUS
Status: DISCONTINUED | OUTPATIENT
Start: 2022-07-22 | End: 2022-07-24 | Stop reason: HOSPADM

## 2022-07-22 RX ADMIN — MAGNESIUM SULFATE HEPTAHYDRATE 2000 MG/HR: 40 INJECTION, SOLUTION INTRAVENOUS at 17:47

## 2022-07-22 RX ADMIN — Medication 2 MILLI-UNITS/MIN: at 18:36

## 2022-07-22 RX ADMIN — SODIUM CHLORIDE, POTASSIUM CHLORIDE, SODIUM LACTATE AND CALCIUM CHLORIDE: 600; 310; 30; 20 INJECTION, SOLUTION INTRAVENOUS at 16:42

## 2022-07-22 RX ADMIN — HYDRALAZINE HYDROCHLORIDE 10 MG: 20 INJECTION, SOLUTION INTRAMUSCULAR; INTRAVENOUS at 18:59

## 2022-07-22 RX ADMIN — HYDRALAZINE HYDROCHLORIDE 5 MG: 20 INJECTION, SOLUTION INTRAMUSCULAR; INTRAVENOUS at 18:21

## 2022-07-22 RX ADMIN — NALBUPHINE HYDROCHLORIDE 10 MG: 10 INJECTION, SOLUTION INTRAMUSCULAR; INTRAVENOUS; SUBCUTANEOUS at 23:10

## 2022-07-22 RX ADMIN — MAGNESIUM SULFATE HEPTAHYDRATE 4000 MG: 40 INJECTION, SOLUTION INTRAVENOUS at 16:43

## 2022-07-22 RX ADMIN — LABETALOL HYDROCHLORIDE 40 MG: 5 INJECTION, SOLUTION INTRAVENOUS at 20:13

## 2022-07-22 RX ADMIN — LABETALOL HYDROCHLORIDE 20 MG: 5 INJECTION, SOLUTION INTRAVENOUS at 19:42

## 2022-07-22 ASSESSMENT — PAIN DESCRIPTION - DESCRIPTORS: DESCRIPTORS: CRAMPING

## 2022-07-22 ASSESSMENT — PAIN DESCRIPTION - LOCATION: LOCATION: ABDOMEN;BACK

## 2022-07-22 ASSESSMENT — PAIN SCALES - GENERAL: PAINLEVEL_OUTOF10: 8

## 2022-07-22 ASSESSMENT — PAIN DESCRIPTION - ORIENTATION: ORIENTATION: LOWER;MID

## 2022-07-22 NOTE — PROGRESS NOTES
Dr. Jaqueline Hernandez updated on patient's blood pressure readings since last conversation. Aware that both doses of hydralazine have been administered per the hydralazine algorithm, and labetalol is set to be administered pending patient's next blood pressure reading. Also aware that patient is not experiencing any pain at this time and that pitocin is infusing at 4mu/min. Notified that SRINIVAS Darden RN will be assuming care of patient. Dr. Jaqueline Hernandez requesting toco be placed on patient.

## 2022-07-22 NOTE — PROGRESS NOTES
Patient presents to unit from physician office with no fetal movement, no FHR confirmed in office by [address]. Patient states light brown spotting x 1 week, no leaking fluid or contractions. Spot check for FHT per patient request, no heartbeat. Mother of patient at bedside, reassurance given, questions answered. States history of hypertension with noncompliance in taking her medications.

## 2022-07-22 NOTE — PROGRESS NOTES
Dr. Surya Garcia notified of lab results and blood pressure readings. Aware that patient declines UDS. Orders received. Per Dr. Murdock Rings not necessary at this time.

## 2022-07-23 LAB
AMPHETAMINE SCREEN, URINE: NOT DETECTED
BARBITURATE SCREEN URINE: NOT DETECTED
BENZODIAZEPINE SCREEN, URINE: NOT DETECTED
CANNABINOID SCREEN URINE: NOT DETECTED
COCAINE METABOLITE SCREEN URINE: NOT DETECTED
FENTANYL SCREEN, URINE: NOT DETECTED
Lab: NORMAL
MAGNESIUM: 4.6 MG/DL (ref 1.6–2.6)
MAGNESIUM: 4.8 MG/DL (ref 1.6–2.6)
MAGNESIUM: 6.1 MG/DL (ref 1.6–2.6)
METHADONE SCREEN, URINE: NOT DETECTED
OPIATE SCREEN URINE: NOT DETECTED
OXYCODONE URINE: NOT DETECTED
PHENCYCLIDINE SCREEN URINE: NOT DETECTED

## 2022-07-23 PROCEDURE — 6360000002 HC RX W HCPCS: Performed by: OBSTETRICS & GYNECOLOGY

## 2022-07-23 PROCEDURE — 83735 ASSAY OF MAGNESIUM: CPT

## 2022-07-23 PROCEDURE — 2580000003 HC RX 258: Performed by: OBSTETRICS & GYNECOLOGY

## 2022-07-23 PROCEDURE — 10907ZC DRAINAGE OF AMNIOTIC FLUID, THERAPEUTIC FROM PRODUCTS OF CONCEPTION, VIA NATURAL OR ARTIFICIAL OPENING: ICD-10-PCS | Performed by: OBSTETRICS & GYNECOLOGY

## 2022-07-23 PROCEDURE — 7200000001 HC VAGINAL DELIVERY

## 2022-07-23 PROCEDURE — 36415 COLL VENOUS BLD VENIPUNCTURE: CPT

## 2022-07-23 PROCEDURE — 80307 DRUG TEST PRSMV CHEM ANLYZR: CPT

## 2022-07-23 PROCEDURE — 3E033VJ INTRODUCTION OF OTHER HORMONE INTO PERIPHERAL VEIN, PERCUTANEOUS APPROACH: ICD-10-PCS | Performed by: OBSTETRICS & GYNECOLOGY

## 2022-07-23 PROCEDURE — 6370000000 HC RX 637 (ALT 250 FOR IP): Performed by: OBSTETRICS & GYNECOLOGY

## 2022-07-23 PROCEDURE — 1220000001 HC SEMI PRIVATE L&D R&B

## 2022-07-23 PROCEDURE — 88307 TISSUE EXAM BY PATHOLOGIST: CPT

## 2022-07-23 RX ORDER — ACETAMINOPHEN 325 MG/1
650 TABLET ORAL EVERY 4 HOURS PRN
Status: DISCONTINUED | OUTPATIENT
Start: 2022-07-23 | End: 2022-07-24 | Stop reason: HOSPADM

## 2022-07-23 RX ORDER — FERROUS SULFATE 325(65) MG
325 TABLET ORAL 2 TIMES DAILY WITH MEALS
Status: DISCONTINUED | OUTPATIENT
Start: 2022-07-23 | End: 2022-07-24 | Stop reason: HOSPADM

## 2022-07-23 RX ORDER — LABETALOL 200 MG/1
200 TABLET, FILM COATED ORAL EVERY 12 HOURS SCHEDULED
Status: DISCONTINUED | OUTPATIENT
Start: 2022-07-23 | End: 2022-07-24 | Stop reason: HOSPADM

## 2022-07-23 RX ORDER — SODIUM CHLORIDE 0.9 % (FLUSH) 0.9 %
5-40 SYRINGE (ML) INJECTION EVERY 12 HOURS SCHEDULED
Status: DISCONTINUED | OUTPATIENT
Start: 2022-07-23 | End: 2022-07-24 | Stop reason: HOSPADM

## 2022-07-23 RX ORDER — SODIUM CHLORIDE 0.9 % (FLUSH) 0.9 %
5-40 SYRINGE (ML) INJECTION PRN
Status: DISCONTINUED | OUTPATIENT
Start: 2022-07-23 | End: 2022-07-24 | Stop reason: HOSPADM

## 2022-07-23 RX ORDER — DOCUSATE SODIUM 100 MG/1
100 CAPSULE, LIQUID FILLED ORAL DAILY
Status: DISCONTINUED | OUTPATIENT
Start: 2022-07-23 | End: 2022-07-24 | Stop reason: HOSPADM

## 2022-07-23 RX ORDER — HYDRALAZINE HYDROCHLORIDE 20 MG/ML
5 INJECTION INTRAMUSCULAR; INTRAVENOUS
Status: COMPLETED | OUTPATIENT
Start: 2022-07-23 | End: 2022-07-23

## 2022-07-23 RX ORDER — HYDRALAZINE HYDROCHLORIDE 20 MG/ML
10 INJECTION INTRAMUSCULAR; INTRAVENOUS
Status: DISCONTINUED | OUTPATIENT
Start: 2022-07-23 | End: 2022-07-23 | Stop reason: ALTCHOICE

## 2022-07-23 RX ORDER — MODIFIED LANOLIN
OINTMENT (GRAM) TOPICAL PRN
Status: DISCONTINUED | OUTPATIENT
Start: 2022-07-23 | End: 2022-07-24 | Stop reason: HOSPADM

## 2022-07-23 RX ORDER — SODIUM CHLORIDE 9 MG/ML
INJECTION, SOLUTION INTRAVENOUS PRN
Status: DISCONTINUED | OUTPATIENT
Start: 2022-07-23 | End: 2022-07-24 | Stop reason: HOSPADM

## 2022-07-23 RX ADMIN — SODIUM CHLORIDE, POTASSIUM CHLORIDE, SODIUM LACTATE AND CALCIUM CHLORIDE: 600; 310; 30; 20 INJECTION, SOLUTION INTRAVENOUS at 06:21

## 2022-07-23 RX ADMIN — HYDRALAZINE HYDROCHLORIDE 5 MG: 20 INJECTION, SOLUTION INTRAMUSCULAR; INTRAVENOUS at 06:34

## 2022-07-23 RX ADMIN — DOCUSATE SODIUM 100 MG: 100 CAPSULE, LIQUID FILLED ORAL at 17:18

## 2022-07-23 RX ADMIN — MAGNESIUM SULFATE HEPTAHYDRATE 2000 MG/HR: 40 INJECTION, SOLUTION INTRAVENOUS at 04:03

## 2022-07-23 RX ADMIN — ACETAMINOPHEN 650 MG: 325 TABLET ORAL at 09:38

## 2022-07-23 RX ADMIN — NALBUPHINE HYDROCHLORIDE 10 MG: 10 INJECTION, SOLUTION INTRAMUSCULAR; INTRAVENOUS; SUBCUTANEOUS at 05:17

## 2022-07-23 RX ADMIN — LABETALOL HYDROCHLORIDE 200 MG: 200 TABLET, FILM COATED ORAL at 09:19

## 2022-07-23 ASSESSMENT — PAIN DESCRIPTION - ORIENTATION
ORIENTATION: LOWER;MID
ORIENTATION: DISTAL

## 2022-07-23 ASSESSMENT — PAIN SCALES - GENERAL
PAINLEVEL_OUTOF10: 10
PAINLEVEL_OUTOF10: 8

## 2022-07-23 ASSESSMENT — PAIN DESCRIPTION - DESCRIPTORS: DESCRIPTORS: ACHING;CRAMPING;GNAWING

## 2022-07-23 ASSESSMENT — PAIN DESCRIPTION - LOCATION
LOCATION: ABDOMEN
LOCATION: ABDOMEN;BACK;HEAD

## 2022-07-23 ASSESSMENT — PAIN - FUNCTIONAL ASSESSMENT
PAIN_FUNCTIONAL_ASSESSMENT: ACTIVITIES ARE NOT PREVENTED
PAIN_FUNCTIONAL_ASSESSMENT: PREVENTS OR INTERFERES SOME ACTIVE ACTIVITIES AND ADLS

## 2022-07-23 NOTE — PROGRESS NOTES
Examined and evaluated this morning blood pressures are high and as needed hydralazine which improved the blood pressure. Abdomen is soft  Vaginal exam done cervix is 4 to 5 cm with bulging bag artificial rupture membranes done with amnio hook and MAC stained dark fluid noticed. Will continue observation.   Discussed with RN

## 2022-07-23 NOTE — PLAN OF CARE
Problem: Pain  Goal: Verbalizes/displays adequate comfort level or baseline comfort level  Outcome: Progressing  Flowsheets  Taken 7/23/2022 0300 by Akhil Dickey RN  Verbalizes/displays adequate comfort level or baseline comfort level:   Encourage patient to monitor pain and request assistance   Assess pain using appropriate pain scale  Taken 7/23/2022 0200 by Akhil Dickey RN  Verbalizes/displays adequate comfort level or baseline comfort level: Assess pain using appropriate pain scale  Taken 7/23/2022 0000 by Akhil Dickey RN  Verbalizes/displays adequate comfort level or baseline comfort level:   Encourage patient to monitor pain and request assistance   Assess pain using appropriate pain scale

## 2022-07-23 NOTE — PROGRESS NOTES
Dr. Tanner Ross notified of patient status and vital signs @ [phone]. New orders received to initiate hydralazine algorithm.

## 2022-07-23 NOTE — PROGRESS NOTES
Assumed care of pt for this shift. Patient verbalized understanding. Call light within reach, no other concerns or needs expressed at this time.

## 2022-07-23 NOTE — CARE COORDINATION
Peer Recovery Support Note     Name: Chiara Avery  Date: 7/23/2022          Chief Complaint   Patient presents with    Bleeding during pregnancy       Still birth         Peer Support met with patient. [x] Support and education provided  [] Resources provided   [] Treatment referral:   [] Other:   [] Patient declined peer recovery services      Referred By:      Notes:  PRS met with Eliane to provide support during this extremely difficult time. Peer was emotional but kept saying \"I'm okay\". PRS encouraged peer to process her feelings and not hold them in. Peer shared with PRS about the death of her mackenzie father in January of 2021, she said \"This feel so different and so much worse\" PRS gave supportive and empathetic feedback when necessary. Ara urine screen at the time of birth was negative for substances. When asked about this, peer shared \"Honestly I do cocaine, so like me being positive for heroin, and especially amphetamines I don't even know what those are. I did stop using after the last time I was here, I washed my hands\". Upon last admission peer was positive for Amphetamines, Fentanyl, and Cocaine. Sadly, no referral appears to have been placed for PRS/social work intervention/assistance at the time of the positive test taking place in June. PRS encouraged peer to utilize her support system, her mother and 7 children. PRS will be back and will follow peer while at [facility] to assist with proper placement/resources.

## 2022-07-23 NOTE — H&P
O26.20    Fetal demise, greater than 22 weeks, antepartum, fetus 1 O36. 4XX1    Complicated pregnancy, third trimester O26.93       PAST OB HISTORY  OB History          16    Para   6    Term   6       0    AB   9    Living   7         SAB   3    IAB   4    Ectopic   0    Molar   0    Multiple   1    Live Births   7          Obstetric Comments    viable female at 12pm-Apgars 9/10 ,Wt. 5-6   Low Vacuum Extraction Twin B at 1422pm Apgars 9/9 Wt. 5-9               Past Medical History:        Diagnosis Date    Abnormal Pap smear     JERAMY (acute kidney injury) ([facility] 3/27/2021    Cannabis abuse 2022    Cocaine abuse ([facility] [address] 3/09/3802    Complication of anesthesia 2011    States hhas spinal curvature and had one-sided Epidural    Herpes simplex without mention of complication     Hypertensive urgency 3/27/2021    Methamphetamine use ([facility] [address] 2022    Recurrent pregnancy loss, currently pregnant 2022    Suspected damage to fetus from maternal drug use 2022    Chronic, repeated use      Past Surgical History:        Procedure Laterality Date    IR BIOPSY KIDNEY PERCUTANEOUS  3/29/2021    IR BIOPSY KIDNEY PERCUTANEOUS 3/29/2021 [facility] PROCEDURES     Allergies:  Patient has no known allergies.   Social History:    Social History     Socioeconomic History    Marital status: Single     Spouse name: Not on file    Number of children: Not on file    Years of education: Not on file    Highest education level: Not on file   Occupational History    Not on file   Tobacco Use    Smoking status: Every Day     Packs/day: 0.25     Years: 4.00     Pack years: 1.00     Types: Cigarettes    Smokeless tobacco: Never   Vaping Use    Vaping Use: Never used   Substance and Sexual Activity    Alcohol use: Not Currently     Comment: rare    Drug use: Not Currently     Types: Marijuana Samy Jock), Cocaine     Comment: several positives during this pregnancy    Sexual activity: Yes     Partners: Male   Other Topics Concern    Not on file   Social History Narrative    Not on file     Social Determinants of Health     Financial Resource Strain: Not on file   Food Insecurity: Not on file   Transportation Needs: Not on file   Physical Activity: Not on file   Stress: Not on file   Social Connections: Not on file   Intimate Partner Violence: Not on file   Housing Stability: Not on file     Family History:       Problem Relation Age of Onset    Hearing Loss Sister     Hypertension Sister     Heart Disease Sister     Heart Surgery Sister     Diabetes Maternal Grandfather      Medications Prior to Admission:  Medications Prior to Admission: labetalol (NORMODYNE) 200 MG tablet, Take 2 tablets by mouth every 8 hours  [DISCONTINUED] NIFEdipine (ADALAT CC) 30 MG extended release tablet, Take 1 tablet by mouth daily  aspirin 81 MG EC tablet, Take 81 mg by mouth daily  Prenatal Vit-Fe Fumarate-FA (PRENATAL VITAMIN) 27-0.8 MG TABS, Take 1 tablet by mouth daily    REVIEW OF SYSTEMS:    CONSTITUTIONAL:  negative  RESPIRATORY:  negative  CARDIOVASCULAR:  negative  GASTROINTESTINAL:  Negative  GENITOURINARY: Negative  ALLERGIC/IMMUNOLOGIC:  negative  NEUROLOGICAL:  negative  BEHAVIOR/PSYCH:  negative    PHYSICAL EXAM:  Blood pressure 137/85, pulse 71, temperature 97.9 °F (36.6 °C), temperature source Oral, resp. rate 20, last menstrual period 01/18/2022, SpO2 98 %, unknown if currently breastfeeding. General appearance:  awake, alert, cooperative, no apparent distress, and appears stated age  Abdomen:  Gravid  uterus, consistent with her gestational age 28w0d, irregular uterine contractions. , CVA tenderness No      Fetal heart rate: absent  Pelvis:  Adequate pelvis  Cervix: soft   Closed   50%      Presentation: CEPHALIC  Membranes:  intact  Extremities: nontender bilaterally,edema1+    DATA:  Labs:  CBC:   Lab Results   Component Value Date/Time    WBC 8.9 07/22/2022 04:35 PM    RBC 3.68 07/22/2022 04:35 PM    HGB 10.9 07/22/2022 04:35 PM HCT 33.1 07/22/2022 04:35 PM    MCV 89.9 07/22/2022 04:35 PM    RDW 15.3 07/22/2022 04:35 PM     07/22/2022 04:35 PM     CMP:    Lab Results   Component Value Date/Time     07/22/2022 04:35 PM    K 3.8 07/22/2022 04:35 PM    K 4.0 03/28/2021 07:56 AM     07/22/2022 04:35 PM    CO2 23 07/22/2022 04:35 PM    BUN 14 07/22/2022 04:35 PM    PROT 6.5 07/22/2022 04:35 PM     U/A:  No components found for: Buster Marrow, USPGRAV, UPH, UPROTEIN, UGLUCOSE, UKETONE, UBILI, UBLOOD, UNITRITE, UUROBIL, ULEUKEST, USQEPI, URENEPI, UWBC, URBC, UBACTERIA, UHYALINE  TSH:  No results found for: TSH  RPR:  No results found for: RPR  RUBELLA: No results found for: RUBELLAIGG  HEPBSAG:   Lab Results   Component Value Date/Time    HBSAGI Non-Reactive 03/29/2021 06:39 AM     HIV:  No results found for: HIV  HgBA1c:  No components found for: HGBA1C  Blood Type:  No results found for: ABOINT  RH:    Lab Results   Component Value Date/Time    C3 150 03/29/2021 06:39 AM    C4 30 03/29/2021 06:39 AM     Antibody Screen:  No components found for: ABSCINT  Gonorrhea:  No components found for: PRBCHLGC  Chlamydia:  No components found for: CCHLAM  gbs-STREP B SCREEN:   Lab Results   Component Value Date/Time    GBSAG not isolated 05/09/2012 12:00 AM       US OB 1 OR MORE FETUS LIMITED   Final Result   Single intrauterine gestation with sonographic gestational age of 28 weeks 1   day +/-2 weeks and 0 day. There is absence of fetal cardiac activity and   motion, highly compatible with fetal demise. In addition, there has been   lack of appropriate interval growth since prior study on June 16, 2022 which   demonstrated fetal sonographic gestational age of 32 weeks and 0 days; this   finding further supports fetal demise. Critical results were called by Dr. Juanita Cleveland to Dr. Justin King On 7/22/2022 at   17:51.              Results for orders placed or performed during the hospital encounter of 07/22/22   CBC with Auto Differential Result Value Ref Range    WBC 8.9 4.5 - 11.5 E9/L    RBC 3.68 3.50 - 5.50 E12/L    Hemoglobin 10.9 (L) 11.5 - 15.5 g/dL    Hematocrit 33.1 (L) 34.0 - 48.0 %    MCV 89.9 80.0 - 99.9 fL    MCH 29.6 26.0 - 35.0 pg    MCHC 32.9 32.0 - 34.5 %    RDW 15.3 (H) 11.5 - 15.0 fL    Platelets 009 330 - 686 E9/L    MPV 11.3 7.0 - 12.0 fL    Neutrophils % 67.1 43.0 - 80.0 %    Immature Granulocytes % 0.9 0.0 - 5.0 %    Lymphocytes % 21.6 20.0 - 42.0 %    Monocytes % 6.4 2.0 - 12.0 %    Eosinophils % 3.7 0.0 - 6.0 %    Basophils % 0.3 0.0 - 2.0 %    Neutrophils Absolute 6.00 1.80 - 7.30 E9/L    Immature Granulocytes # 0.08 E9/L    Lymphocytes Absolute 1.93 1.50 - 4.00 E9/L    Monocytes Absolute 0.57 0.10 - 0.95 E9/L    Eosinophils Absolute 0.33 0.05 - 0.50 E9/L    Basophils Absolute 0.03 0.00 - 0.20 E9/L   Comprehensive Metabolic Panel   Result Value Ref Range    Sodium 134 132 - 146 mmol/L    Potassium 3.8 3.5 - 5.0 mmol/L    Chloride 103 98 - 107 mmol/L    CO2 23 22 - 29 mmol/L    Anion Gap 8 7 - 16 mmol/L    Glucose 105 (H) 74 - 99 mg/dL    BUN 14 6 - 20 mg/dL    Creatinine 0.8 0.5 - 1.0 mg/dL    GFR Non-African American >60 >=60 mL/min/1.73    GFR African American >60     Calcium 9.0 8.6 - 10.2 mg/dL    Total Protein 6.5 6.4 - 8.3 g/dL    Albumin 3.2 (L) 3.5 - 5.2 g/dL    Total Bilirubin 0.3 0.0 - 1.2 mg/dL    Alkaline Phosphatase 146 (H) 35 - 104 U/L    ALT 40 (H) 0 - 32 U/L    AST 44 (H) 0 - 31 U/L   Uric Acid   Result Value Ref Range    Uric Acid, Serum 5.4 2.4 - 5.7 mg/dL   Lactate Dehydrogenase   Result Value Ref Range     (H) 135 - 214 U/L   Urinalysis   Result Value Ref Range    Color, UA Yellow Straw/Yellow    Clarity, UA Clear Clear    Glucose, Ur Negative Negative mg/dL    Bilirubin Urine Negative Negative    Ketones, Urine TRACE (A) Negative mg/dL    Specific Gravity, UA 1.015 1.005 - 1.030    Blood, Urine Negative Negative    pH, UA 6.5 5.0 - 9.0    Protein, UA 30 (A) Negative mg/dL    Urobilinogen, Urine 2.0 (A) <2.0 E.U./dL    Nitrite, Urine Negative Negative    Leukocyte Esterase, Urine TRACE (A) Negative   Fibrinogen   Result Value Ref Range    Fibrinogen 498 (H) 200 - 400 mg/dL   D-Dimer, Quantitative   Result Value Ref Range    D-Dimer, Quant 3103 ng/mL DDU   Protime-INR   Result Value Ref Range    Protime 9.7 9.3 - 12.4 sec    INR 0.8    APTT   Result Value Ref Range    aPTT 27.6 24.5 - 35.1 sec   Microscopic Urinalysis   Result Value Ref Range    WBC, UA 1-3 0 - 5 /HPF    RBC, UA NONE 0 - 2 /HPF    Epithelial Cells, UA RARE /HPF    Bacteria, UA RARE (A) None Seen /HPF   TYPE AND SCREEN   Result Value Ref Range    ABO/Rh B POS     Antibody Screen NEG        ASSESSMENT AND PLAN:33 week pregnancy,      Fetal demise      Severe hypertension      Superimposed toxemia with severe symptoms       Vaginal spotting  Principal Problem:    Fetal demise, greater than 22 weeks, antepartum, fetus 1  Active Problems:    Complicated pregnancy, third trimester  Resolved Problems:    * No resolved hospital problems.  *  IV fluids at total 125 cc an hour, mag sulfate IV 4 g loading dose and then 2 g/h, Diaz catheter in place start Pitocin infusion per protocol, also hypertension protocol per hydralazine and followed by labetalol if necessary  Follow magnesium sulfate protocol  Labor: OBSERVATION, anticipate normal delivery, routine labor orders  Fetus:  GBS:   [id number],KWOS,SVTNDSOFLM/BFKVXHME as needed        Electronically signed by Priyank Choudhary MD on 7/23/2022 at 6:19 AM

## 2022-07-23 NOTE — PROGRESS NOTES
Dr. Aditya Mayes updated @ 2105 of Labetalol algorithm completion with 2nd dose given. BP of 145/87 @ 2100, Patient reports of starting to feel contractions reported, Pitocin level of 6 also told to DR. Aditya Mayes. No new orders given at this time.

## 2022-07-23 NOTE — CARE COORDINATION
PRS is following. Please contact [phone] with any questions or needs. PRS will be in to see patient later this afternoon/tomorrow morning.

## 2022-07-23 NOTE — PROGRESS NOTES
Assumed care of pt for this shift. Report received from SWATHI Myrick RN. Patient is a IUFD 32w6d  verified by ultrasound in office and hospital. Pt reports vaginal bleeding. Plan of care discussed with pt. TOCO applied per Dr. Hamilton Feeling order. Labetalol algorithm initiated. Pt verbalizes understanding without questions at this time. Rest encouraged. Call light within reach.

## 2022-07-23 NOTE — CARE COORDINATION
2022: SS Note:  SS Consult noted regarding 33 week \"fetal demise\", sw met with pt, Eliane Zimmerman, sw provided supportive counseling and emotional support regarding the loss of her baby daughter, Judith Carvalho, pt was holding her baby, she accepted [facility] brochure, pt says she has good support from her mother who was present for her delivery and is currently at her home caring for her other children. Pt reports having no money to pay for  expenses and has decided on a cremation, pt prefers her baby's ashes be placed an urn, reviewed  home options, pt prefers [facility], in [place], [place] spoke with Latanya Soares,  who requested nursing call him when they are ready for baby to be picked up and pt will need to call to arrange to meet and sign papers for cremation but there will be no cost for their services or baby's urn, pt's nurse [facility] notified. Electronically signed by ASH Love on 2022 at 10:29 AM

## 2022-07-23 NOTE — L&D DELIVERY SUMMARY NOTE
Department of Obstetrics and Gynecology  Spontaneous Vaginal Delivery Note  Eliane GONZALES,[id number]    Pre-operative Diagnosis: 33 weeks intrauterine pregnancy with fetal demise, severe hypertension with toxemia with severe symptoms, vaginal bleeding for 1 week, lack of fetal movements for 1 week, noncompliance with the visits, noncompliance with the medications, history of use of cocaine during this pregnancy    Post-operative Diagnosis:  Stillborn infant(s) and Female    Information for the patient's :  Gildardo Weiss Pending [address] [[id number]]             Female infant    No gross abnormality noticed except figure-of-eight umbilical cord around both the legs at the level of ankle    Infant Wt:   Information for the patient's :  Gildardo Weiss Pending Eliane STONER [[id number]]          2 pound 8 ounce  APGARS:     Information for the patient's :  Gildardo Weiss Pending  [facility] [[id number]]      0     Anesthesia:  none    Application and Delivery:  Absent    Delivery Summary:   Patient is admitted to R and placed on external monitors. patient is started on mag sulfate IV and antihypertensives and Pitocin induction. Contractions are regular,Pt received analgesics IV and Progress of labor as anticipated ,.artificial rupture of membranes done at 4 to 5 cm dilation and now fully dilated cervix. With subsequent contractions she started pushing and delivered vaginally spontaneously without any complications ,Placenta and cord and membranes delivered spontaneously. Pt is given pitocin in IV fluids. Vaginal walls,cervix,perineum,rectum intact. Uterus is well contracted. Pt is watched for next 1 hour. mother  stable and doing well. Routine postpartum care we will continue mag sulfate, and place her on p.o. antihypertensives as she was on  No gross abnormalities on the female infant, and placenta to pathology.   Specimen:  Placenta sent to pathology Yes    Estimated blood loss: <300 cc             Condition:  mother stable                   Zaira Neal MD,MCARMEN 7/23/2022 7:29 AM    Ethyl Nicolasa Edwards

## 2022-07-23 NOTE — PROGRESS NOTES
of nonliving female  at 0 Dr. Olson Pack at the bedside for delivery. APGARS 0/0. 2 pounds 8 ounce weight.

## 2022-07-24 VITALS
DIASTOLIC BLOOD PRESSURE: 83 MMHG | HEART RATE: 86 BPM | OXYGEN SATURATION: 99 % | SYSTOLIC BLOOD PRESSURE: 135 MMHG | TEMPERATURE: 98 F | RESPIRATION RATE: 18 BRPM

## 2022-07-24 LAB
HCT VFR BLD CALC: 31.2 % (ref 34–48)
HEMOGLOBIN: 9.7 G/DL (ref 11.5–15.5)
MAGNESIUM: 4.4 MG/DL (ref 1.6–2.6)
MAGNESIUM: 4.4 MG/DL (ref 1.6–2.6)

## 2022-07-24 PROCEDURE — 36415 COLL VENOUS BLD VENIPUNCTURE: CPT

## 2022-07-24 PROCEDURE — 85018 HEMOGLOBIN: CPT

## 2022-07-24 PROCEDURE — 6360000002 HC RX W HCPCS: Performed by: OBSTETRICS & GYNECOLOGY

## 2022-07-24 PROCEDURE — 85014 HEMATOCRIT: CPT

## 2022-07-24 PROCEDURE — 6370000000 HC RX 637 (ALT 250 FOR IP): Performed by: OBSTETRICS & GYNECOLOGY

## 2022-07-24 PROCEDURE — 83735 ASSAY OF MAGNESIUM: CPT

## 2022-07-24 RX ORDER — LABETALOL 200 MG/1
200 TABLET, FILM COATED ORAL EVERY 8 HOURS SCHEDULED
Qty: 84 TABLET | Refills: 2 | OUTPATIENT
Start: 2022-07-24 | End: 2022-09-04

## 2022-07-24 RX ORDER — PNV NO.95/FERROUS FUM/FOLIC AC 28MG-0.8MG
1 TABLET ORAL DAILY
Qty: 30 TABLET | Refills: 0 | OUTPATIENT
Start: 2022-07-24

## 2022-07-24 RX ADMIN — MAGNESIUM SULFATE HEPTAHYDRATE 1000 MG/HR: 40 INJECTION, SOLUTION INTRAVENOUS at 00:05

## 2022-07-24 RX ADMIN — FERROUS SULFATE TAB 325 MG (65 MG ELEMENTAL FE) 325 MG: 325 (65 FE) TAB at 08:40

## 2022-07-24 RX ADMIN — LABETALOL HYDROCHLORIDE 200 MG: 200 TABLET, FILM COATED ORAL at 08:40

## 2022-07-24 NOTE — DISCHARGE SUMMARY
Patient ID:  Yesi Soto  [id number]  89 y.o.  1990         Discharge Summary      Admit date: 2022    Discharge date and time:   2022    Admitting Physician: Ritu Cooper MD,MEbonie D. FACOG     Diagnoses: Fetal demise, greater than 22 weeks, antepartum, fetus 1 [L74.6LF7]  Complicated pregnancy, third trimester [O26.93] 35 weeks IUP with a fetal demise, vaginal delivery with Pitocin induction, possible cause due to umbilical cord accident with a true knots around both the ankles of the baby. Female infant, severe hypertension with superimposed toxemia with severe symptoms. Discharged Condition: good    Indication for Admission: 33 y.o. y.o. [id number] admitted at  33 weeks pregnancy with fetal demise induced with Pitocin into labor without complications    Procedures Performed:      female      Information for the patient's :  Chetna Guerra [[id number]]    . apgars   Information for the patient's :  Chetna Guerra [[id number]]        . Tight figure-of-eight umbilical cord around the both the ankles  Hospital Course: Patient was admitted on the day  and underwent an uncomplicated procedure. Pt received analgesics IV and /or epidural anesthesia and delivered without comps. postpartum care was uncomplicated. H/H stable,Vital stable,,Uterus nontender,perineum healing well/ incision and wound dry no bleeding or oozing, Moderate lochia,voided without probs and passed flatus. Discharge Exam:  /83   Pulse 86   Temp 98 °F (36.7 °C) (Oral)   Resp 18   LMP 2022   SpO2 99%   General appearance: alert, appears stated age, and cooperative  Abdomen: soft, non-tender; bowel sounds normal; no masses,  no organomegaly  Extremities: extremities normal, atraumatic, no cyanosis or edema    Disposition: home    Patient Instructions:    Activity: activity as tolerated  Diet: regular diet  Wound Care: none needed    Discharge Medication:      Medication List        CHANGE how you take these medications      labetalol 200 MG tablet  Commonly known as: NORMODYNE  Take 1 tablet by mouth in the morning and 1 tablet at noon and 1 tablet before bedtime. What changed: how much to take            CONTINUE taking these medications      aspirin 81 MG EC tablet     Prenatal Vitamin 27-0.8 MG Tabs  Take 1 tablet by mouth daily               Where to Get Your Medications        These medications were sent to [address] [place] [address] 32 [address] [facility], [address] [address]      Phone: [phone]   labetalol 200 MG tablet  Prenatal Vitamin 27-0.8 MG Tabs          Follow-up with Jose Bro MD, M.D. in 1 week.     Signed:  Jose Bro MD,MFLOYD.  7/24/2022  10:41 AM

## 2022-07-24 NOTE — PROGRESS NOTES
Assumed care of patient for the 7pm-7am shift. Plan of care for night discussed, patient verbalizes understanding. Call light placed within reach.

## 2022-07-24 NOTE — PROGRESS NOTES
Assumed care of pt for this shift. Discussed plan of care for the shift. Pt verbalizes understanding without questions at this time. Pt denies any pain or discomfort. Rest encouraged. Patient expresses that she is upset being here and wants to go home.

## 2022-07-24 NOTE — PROGRESS NOTES
Subjective:     Postpartum Day 1:   The patient feels well. The patient denies emotional concerns. Pain is well controlled with current medications. Urinary output is adequate. The patient is ambulating well. The patient is tolerating a normal diet. Flatus has been passed. Objective:        Blood pressure 135/83, pulse 86, temperature 98 °F (36.7 °C), temperature source Oral, resp. rate 18, last menstrual period 01/18/2022, SpO2 99 %, unknown if currently breastfeeding. No intake/output data recorded. Lab Results   Component Value Date    WBC 8.9 07/22/2022    HGB 9.7 (L) 07/24/2022    HCT 31.2 (L) 07/24/2022    MCV 89.9 07/22/2022     07/22/2022       General:    alert, appears stated age, and cooperative   Lungs:    Lochia:  appropriate   Uterine    firmnontender   Back         no pain to palpation   DVT Evaluation:  No evidence of DVT seen on physical exam.  Negative Felisa's sign.   @ LABS@    Assessment:     Postpartum day 1 doing well. s/p severe hypertension/toxemia with severe symptoms  Principal Problem:    Fetal demise, greater than 22 weeks, antepartum, fetus 1  Active Problems:    Complicated pregnancy, third trimester  Resolved Problems:    * No resolved hospital problems.  *    Plan:     Discharge home with standard precautions and return to clinic in in 1 week, continue labetalol 200 mg twice a day  Pain meds as needed, routine postpartum care  Prenatal vitamin once a day      Madhavi Delacruz MD,M.D. 7/24/2022 10:31 AM    Jocelyn Webb  [id number]

## 2023-01-14 ENCOUNTER — APPOINTMENT (OUTPATIENT)
Dept: CT IMAGING | Age: 33
DRG: 134 | End: 2023-01-14
Payer: COMMERCIAL

## 2023-01-14 ENCOUNTER — APPOINTMENT (OUTPATIENT)
Dept: ULTRASOUND IMAGING | Age: 33
DRG: 134 | End: 2023-01-14
Payer: COMMERCIAL

## 2023-01-14 ENCOUNTER — APPOINTMENT (OUTPATIENT)
Dept: GENERAL RADIOLOGY | Age: 33
DRG: 134 | End: 2023-01-14
Payer: COMMERCIAL

## 2023-01-14 ENCOUNTER — HOSPITAL ENCOUNTER (INPATIENT)
Age: 33
LOS: 3 days | Discharge: HOME | DRG: 134 | End: 2023-01-17
Attending: EMERGENCY MEDICINE | Admitting: STUDENT IN AN ORGANIZED HEALTH CARE EDUCATION/TRAINING PROGRAM
Payer: COMMERCIAL

## 2023-01-14 DIAGNOSIS — R06.02 SHORTNESS OF BREATH: ICD-10-CM

## 2023-01-14 DIAGNOSIS — I26.99 ACUTE PULMONARY EMBOLISM WITHOUT ACUTE COR PULMONALE, UNSPECIFIED PULMONARY EMBOLISM TYPE (HCC): Primary | ICD-10-CM

## 2023-01-14 DIAGNOSIS — R07.9 ACUTE CHEST PAIN: ICD-10-CM

## 2023-01-14 DIAGNOSIS — I50.20 HFREF (HEART FAILURE WITH REDUCED EJECTION FRACTION) (HCC): ICD-10-CM

## 2023-01-14 PROBLEM — I10 UNCONTROLLED HYPERTENSION: Chronic | Status: ACTIVE | Noted: 2023-01-14

## 2023-01-14 PROBLEM — F14.10 COCAINE ABUSE (HCC): Chronic | Status: ACTIVE | Noted: 2022-06-17

## 2023-01-14 LAB
ACETAMINOPHEN LEVEL: <5 MCG/ML (ref 10–30)
ALBUMIN SERPL-MCNC: 3.9 G/DL (ref 3.5–5.2)
ALP BLD-CCNC: 87 U/L (ref 35–104)
ALT SERPL-CCNC: 8 U/L (ref 0–32)
AMPHETAMINE SCREEN, URINE: NOT DETECTED
ANION GAP SERPL CALCULATED.3IONS-SCNC: 12 MMOL/L (ref 7–16)
AST SERPL-CCNC: 10 U/L (ref 0–31)
BACTERIA: ABNORMAL /HPF
BARBITURATE SCREEN URINE: NOT DETECTED
BASOPHILS ABSOLUTE: 0.02 E9/L (ref 0–0.2)
BASOPHILS RELATIVE PERCENT: 0.2 % (ref 0–2)
BENZODIAZEPINE SCREEN, URINE: NOT DETECTED
BILIRUB SERPL-MCNC: <0.2 MG/DL (ref 0–1.2)
BILIRUBIN URINE: NEGATIVE
BLOOD, URINE: NEGATIVE
BUN BLDV-MCNC: 10 MG/DL (ref 6–20)
CALCIUM SERPL-MCNC: 9.4 MG/DL (ref 8.6–10.2)
CANNABINOID SCREEN URINE: NOT DETECTED
CHLORIDE BLD-SCNC: 102 MMOL/L (ref 98–107)
CLARITY: CLEAR
CO2: 22 MMOL/L (ref 22–29)
COCAINE METABOLITE SCREEN URINE: POSITIVE
COLOR: YELLOW
CREAT SERPL-MCNC: 0.9 MG/DL (ref 0.5–1)
EKG ATRIAL RATE: 120 BPM
EKG P AXIS: 58 DEGREES
EKG P-R INTERVAL: 142 MS
EKG Q-T INTERVAL: 338 MS
EKG QRS DURATION: 92 MS
EKG QTC CALCULATION (BAZETT): 477 MS
EKG R AXIS: 14 DEGREES
EKG T AXIS: 135 DEGREES
EKG VENTRICULAR RATE: 120 BPM
EOSINOPHILS ABSOLUTE: 0.4 E9/L (ref 0.05–0.5)
EOSINOPHILS RELATIVE PERCENT: 3.6 % (ref 0–6)
ETHANOL: <10 MG/DL (ref 0–0.08)
FENTANYL SCREEN, URINE: NOT DETECTED
GFR SERPL CREATININE-BSD FRML MDRD: >60 ML/MIN/1.73
GLUCOSE BLD-MCNC: 189 MG/DL (ref 74–99)
GLUCOSE URINE: NEGATIVE MG/DL
HCG, URINE, POC: NEGATIVE
HCT VFR BLD CALC: 32 % (ref 34–48)
HEMOGLOBIN: 9.6 G/DL (ref 11.5–15.5)
IMMATURE GRANULOCYTES #: 0.04 E9/L
IMMATURE GRANULOCYTES %: 0.4 % (ref 0–5)
KETONES, URINE: ABNORMAL MG/DL
LACTIC ACID: 2 MMOL/L (ref 0.5–2.2)
LEUKOCYTE ESTERASE, URINE: NEGATIVE
LIPASE: 41 U/L (ref 13–60)
LV EF: 33 %
LVEF MODALITY: NORMAL
LYMPHOCYTES ABSOLUTE: 1.67 E9/L (ref 1.5–4)
LYMPHOCYTES RELATIVE PERCENT: 15.2 % (ref 20–42)
Lab: ABNORMAL
Lab: NORMAL
MCH RBC QN AUTO: 24.6 PG (ref 26–35)
MCHC RBC AUTO-ENTMCNC: 30 % (ref 32–34.5)
MCV RBC AUTO: 81.8 FL (ref 80–99.9)
METHADONE SCREEN, URINE: NOT DETECTED
MONOCYTES ABSOLUTE: 0.66 E9/L (ref 0.1–0.95)
MONOCYTES RELATIVE PERCENT: 6 % (ref 2–12)
NEGATIVE QC PASS/FAIL: NORMAL
NEUTROPHILS ABSOLUTE: 8.21 E9/L (ref 1.8–7.3)
NEUTROPHILS RELATIVE PERCENT: 74.6 % (ref 43–80)
NITRITE, URINE: NEGATIVE
OPIATE SCREEN URINE: NOT DETECTED
OXYCODONE URINE: NOT DETECTED
PDW BLD-RTO: 17 FL (ref 11.5–15)
PH UA: 5.5 (ref 5–9)
PHENCYCLIDINE SCREEN URINE: NOT DETECTED
PLATELET # BLD: 438 E9/L (ref 130–450)
PMV BLD AUTO: 9.7 FL (ref 7–12)
POSITIVE QC PASS/FAIL: NORMAL
POTASSIUM SERPL-SCNC: 3.8 MMOL/L (ref 3.5–5)
PROTEIN UA: NEGATIVE MG/DL
RBC # BLD: 3.91 E12/L (ref 3.5–5.5)
RBC UA: ABNORMAL /HPF (ref 0–2)
SALICYLATE, SERUM: <0.3 MG/DL (ref 0–30)
SODIUM BLD-SCNC: 136 MMOL/L (ref 132–146)
SPECIFIC GRAVITY UA: >=1.03 (ref 1–1.03)
TOTAL CK: 63 U/L (ref 20–180)
TOTAL PROTEIN: 7.4 G/DL (ref 6.4–8.3)
TRICYCLIC ANTIDEPRESSANTS SCREEN SERUM: NEGATIVE NG/ML
TROPONIN, HIGH SENSITIVITY: 9 NG/L (ref 0–9)
UROBILINOGEN, URINE: 0.2 E.U./DL
WBC # BLD: 11 E9/L (ref 4.5–11.5)
WBC UA: ABNORMAL /HPF (ref 0–5)

## 2023-01-14 PROCEDURE — 93010 ELECTROCARDIOGRAM REPORT: CPT | Performed by: INTERNAL MEDICINE

## 2023-01-14 PROCEDURE — 83690 ASSAY OF LIPASE: CPT

## 2023-01-14 PROCEDURE — 80143 DRUG ASSAY ACETAMINOPHEN: CPT

## 2023-01-14 PROCEDURE — 2060000000 HC ICU INTERMEDIATE R&B

## 2023-01-14 PROCEDURE — 6360000002 HC RX W HCPCS: Performed by: STUDENT IN AN ORGANIZED HEALTH CARE EDUCATION/TRAINING PROGRAM

## 2023-01-14 PROCEDURE — 82077 ASSAY SPEC XCP UR&BREATH IA: CPT

## 2023-01-14 PROCEDURE — 96374 THER/PROPH/DIAG INJ IV PUSH: CPT

## 2023-01-14 PROCEDURE — 93308 TTE F-UP OR LMTD: CPT

## 2023-01-14 PROCEDURE — 85025 COMPLETE CBC W/AUTO DIFF WBC: CPT

## 2023-01-14 PROCEDURE — 99285 EMERGENCY DEPT VISIT HI MDM: CPT

## 2023-01-14 PROCEDURE — 71045 X-RAY EXAM CHEST 1 VIEW: CPT

## 2023-01-14 PROCEDURE — 6360000004 HC RX CONTRAST MEDICATION: Performed by: RADIOLOGY

## 2023-01-14 PROCEDURE — 6370000000 HC RX 637 (ALT 250 FOR IP): Performed by: INTERNAL MEDICINE

## 2023-01-14 PROCEDURE — 80179 DRUG ASSAY SALICYLATE: CPT

## 2023-01-14 PROCEDURE — 71275 CT ANGIOGRAPHY CHEST: CPT

## 2023-01-14 PROCEDURE — 2580000003 HC RX 258: Performed by: STUDENT IN AN ORGANIZED HEALTH CARE EDUCATION/TRAINING PROGRAM

## 2023-01-14 PROCEDURE — 93005 ELECTROCARDIOGRAM TRACING: CPT | Performed by: EMERGENCY MEDICINE

## 2023-01-14 PROCEDURE — 93970 EXTREMITY STUDY: CPT

## 2023-01-14 PROCEDURE — 99222 1ST HOSP IP/OBS MODERATE 55: CPT | Performed by: STUDENT IN AN ORGANIZED HEALTH CARE EDUCATION/TRAINING PROGRAM

## 2023-01-14 PROCEDURE — 80307 DRUG TEST PRSMV CHEM ANLYZR: CPT

## 2023-01-14 PROCEDURE — 84484 ASSAY OF TROPONIN QUANT: CPT

## 2023-01-14 PROCEDURE — 96361 HYDRATE IV INFUSION ADD-ON: CPT

## 2023-01-14 PROCEDURE — 80053 COMPREHEN METABOLIC PANEL: CPT

## 2023-01-14 PROCEDURE — 6360000002 HC RX W HCPCS: Performed by: INTERNAL MEDICINE

## 2023-01-14 PROCEDURE — 81001 URINALYSIS AUTO W/SCOPE: CPT

## 2023-01-14 PROCEDURE — 6370000000 HC RX 637 (ALT 250 FOR IP): Performed by: STUDENT IN AN ORGANIZED HEALTH CARE EDUCATION/TRAINING PROGRAM

## 2023-01-14 PROCEDURE — 82550 ASSAY OF CK (CPK): CPT

## 2023-01-14 PROCEDURE — 83605 ASSAY OF LACTIC ACID: CPT

## 2023-01-14 RX ORDER — BENZONATATE 100 MG/1
100 CAPSULE ORAL 3 TIMES DAILY PRN
Status: DISCONTINUED | OUTPATIENT
Start: 2023-01-14 | End: 2023-01-17 | Stop reason: HOSPADM

## 2023-01-14 RX ORDER — ENOXAPARIN SODIUM 100 MG/ML
1 INJECTION SUBCUTANEOUS 2 TIMES DAILY
Status: DISCONTINUED | OUTPATIENT
Start: 2023-01-14 | End: 2023-01-14

## 2023-01-14 RX ORDER — 0.9 % SODIUM CHLORIDE 0.9 %
1000 INTRAVENOUS SOLUTION INTRAVENOUS ONCE
Status: COMPLETED | OUTPATIENT
Start: 2023-01-14 | End: 2023-01-14

## 2023-01-14 RX ORDER — SODIUM CHLORIDE 0.9 % (FLUSH) 0.9 %
5-40 SYRINGE (ML) INJECTION EVERY 12 HOURS SCHEDULED
Status: DISCONTINUED | OUTPATIENT
Start: 2023-01-14 | End: 2023-01-17 | Stop reason: HOSPADM

## 2023-01-14 RX ORDER — MORPHINE SULFATE 2 MG/ML
2 INJECTION, SOLUTION INTRAMUSCULAR; INTRAVENOUS EVERY 6 HOURS PRN
Status: DISCONTINUED | OUTPATIENT
Start: 2023-01-14 | End: 2023-01-17 | Stop reason: HOSPADM

## 2023-01-14 RX ORDER — KETOROLAC TROMETHAMINE 30 MG/ML
30 INJECTION, SOLUTION INTRAMUSCULAR; INTRAVENOUS EVERY 6 HOURS PRN
Status: DISCONTINUED | OUTPATIENT
Start: 2023-01-14 | End: 2023-01-17 | Stop reason: HOSPADM

## 2023-01-14 RX ORDER — MORPHINE SULFATE 4 MG/ML
4 INJECTION, SOLUTION INTRAMUSCULAR; INTRAVENOUS ONCE
Status: COMPLETED | OUTPATIENT
Start: 2023-01-14 | End: 2023-01-14

## 2023-01-14 RX ORDER — HYDROCODONE BITARTRATE AND ACETAMINOPHEN 7.5; 325 MG/1; MG/1
1 TABLET ORAL EVERY 6 HOURS PRN
Status: DISCONTINUED | OUTPATIENT
Start: 2023-01-14 | End: 2023-01-17 | Stop reason: HOSPADM

## 2023-01-14 RX ORDER — ONDANSETRON 4 MG/1
4 TABLET, ORALLY DISINTEGRATING ORAL EVERY 8 HOURS PRN
Status: DISCONTINUED | OUTPATIENT
Start: 2023-01-14 | End: 2023-01-17 | Stop reason: HOSPADM

## 2023-01-14 RX ORDER — ACETAMINOPHEN 650 MG/1
650 SUPPOSITORY RECTAL EVERY 6 HOURS PRN
Status: DISCONTINUED | OUTPATIENT
Start: 2023-01-14 | End: 2023-01-17 | Stop reason: HOSPADM

## 2023-01-14 RX ORDER — SODIUM CHLORIDE 0.9 % (FLUSH) 0.9 %
5-40 SYRINGE (ML) INJECTION PRN
Status: DISCONTINUED | OUTPATIENT
Start: 2023-01-14 | End: 2023-01-17 | Stop reason: HOSPADM

## 2023-01-14 RX ORDER — SODIUM CHLORIDE 9 MG/ML
INJECTION, SOLUTION INTRAVENOUS CONTINUOUS
Status: DISCONTINUED | OUTPATIENT
Start: 2023-01-14 | End: 2023-01-14

## 2023-01-14 RX ORDER — ACETAMINOPHEN 325 MG/1
650 TABLET ORAL EVERY 6 HOURS PRN
Status: DISCONTINUED | OUTPATIENT
Start: 2023-01-14 | End: 2023-01-17 | Stop reason: HOSPADM

## 2023-01-14 RX ORDER — POLYETHYLENE GLYCOL 3350 17 G/17G
17 POWDER, FOR SOLUTION ORAL DAILY PRN
Status: DISCONTINUED | OUTPATIENT
Start: 2023-01-14 | End: 2023-01-17 | Stop reason: HOSPADM

## 2023-01-14 RX ORDER — ONDANSETRON 2 MG/ML
4 INJECTION INTRAMUSCULAR; INTRAVENOUS EVERY 6 HOURS PRN
Status: DISCONTINUED | OUTPATIENT
Start: 2023-01-14 | End: 2023-01-17 | Stop reason: HOSPADM

## 2023-01-14 RX ORDER — ENOXAPARIN SODIUM 100 MG/ML
INJECTION SUBCUTANEOUS
Status: DISPENSED
Start: 2023-01-14 | End: 2023-01-14

## 2023-01-14 RX ORDER — LIDOCAINE 4 G/G
1 PATCH TOPICAL DAILY
Status: DISCONTINUED | OUTPATIENT
Start: 2023-01-14 | End: 2023-01-17 | Stop reason: HOSPADM

## 2023-01-14 RX ORDER — LABETALOL 200 MG/1
200 TABLET, FILM COATED ORAL EVERY 8 HOURS SCHEDULED
Status: DISCONTINUED | OUTPATIENT
Start: 2023-01-14 | End: 2023-01-17 | Stop reason: HOSPADM

## 2023-01-14 RX ORDER — SODIUM CHLORIDE 9 MG/ML
INJECTION, SOLUTION INTRAVENOUS PRN
Status: DISCONTINUED | OUTPATIENT
Start: 2023-01-14 | End: 2023-01-17 | Stop reason: HOSPADM

## 2023-01-14 RX ADMIN — MORPHINE SULFATE 2 MG: 2 INJECTION, SOLUTION INTRAMUSCULAR; INTRAVENOUS at 16:08

## 2023-01-14 RX ADMIN — SODIUM CHLORIDE 1000 ML: 9 INJECTION, SOLUTION INTRAVENOUS at 01:07

## 2023-01-14 RX ADMIN — Medication 10 ML: at 20:55

## 2023-01-14 RX ADMIN — KETOROLAC TROMETHAMINE 30 MG: 30 INJECTION, SOLUTION INTRAMUSCULAR; INTRAVENOUS at 20:54

## 2023-01-14 RX ADMIN — HYDROCODONE BITARTRATE AND ACETAMINOPHEN 1 TABLET: 7.5; 325 TABLET ORAL at 23:56

## 2023-01-14 RX ADMIN — MORPHINE SULFATE 2 MG: 2 INJECTION, SOLUTION INTRAMUSCULAR; INTRAVENOUS at 09:27

## 2023-01-14 RX ADMIN — SODIUM CHLORIDE: 9 INJECTION, SOLUTION INTRAVENOUS at 04:56

## 2023-01-14 RX ADMIN — KETOROLAC TROMETHAMINE 30 MG: 30 INJECTION, SOLUTION INTRAMUSCULAR; INTRAVENOUS at 12:14

## 2023-01-14 RX ADMIN — HYDROCODONE BITARTRATE AND ACETAMINOPHEN 1 TABLET: 7.5; 325 TABLET ORAL at 05:46

## 2023-01-14 RX ADMIN — APIXABAN 10 MG: 5 TABLET, FILM COATED ORAL at 20:55

## 2023-01-14 RX ADMIN — LABETALOL HYDROCHLORIDE 200 MG: 200 TABLET, FILM COATED ORAL at 14:26

## 2023-01-14 RX ADMIN — HYDROCODONE BITARTRATE AND ACETAMINOPHEN 1 TABLET: 7.5; 325 TABLET ORAL at 12:12

## 2023-01-14 RX ADMIN — APIXABAN 10 MG: 5 TABLET, FILM COATED ORAL at 12:12

## 2023-01-14 RX ADMIN — IOPAMIDOL 75 ML: 755 INJECTION, SOLUTION INTRAVENOUS at 03:07

## 2023-01-14 RX ADMIN — LABETALOL HYDROCHLORIDE 200 MG: 200 TABLET, FILM COATED ORAL at 09:27

## 2023-01-14 RX ADMIN — MORPHINE SULFATE 4 MG: 4 INJECTION, SOLUTION INTRAMUSCULAR; INTRAVENOUS at 01:08

## 2023-01-14 RX ADMIN — LABETALOL HYDROCHLORIDE 200 MG: 200 TABLET, FILM COATED ORAL at 20:55

## 2023-01-14 ASSESSMENT — PAIN SCALES - GENERAL
PAINLEVEL_OUTOF10: 0
PAINLEVEL_OUTOF10: 10
PAINLEVEL_OUTOF10: 9
PAINLEVEL_OUTOF10: 6
PAINLEVEL_OUTOF10: 10
PAINLEVEL_OUTOF10: 9
PAINLEVEL_OUTOF10: 9
PAINLEVEL_OUTOF10: 10
PAINLEVEL_OUTOF10: 10
PAINLEVEL_OUTOF10: 3

## 2023-01-14 ASSESSMENT — PAIN DESCRIPTION - FREQUENCY
FREQUENCY: CONTINUOUS

## 2023-01-14 ASSESSMENT — PAIN DESCRIPTION - LOCATION
LOCATION: CHEST
LOCATION: CHEST
LOCATION: BACK
LOCATION: CHEST

## 2023-01-14 ASSESSMENT — PAIN DESCRIPTION - ORIENTATION: ORIENTATION: LEFT

## 2023-01-14 ASSESSMENT — PAIN DESCRIPTION - DESCRIPTORS
DESCRIPTORS: ACHING
DESCRIPTORS: ACHING
DESCRIPTORS: SHARP
DESCRIPTORS: SHARP
DESCRIPTORS: ACHING
DESCRIPTORS: SHARP

## 2023-01-14 ASSESSMENT — PAIN DESCRIPTION - PAIN TYPE
TYPE: ACUTE PAIN
TYPE: ACUTE PAIN

## 2023-01-14 ASSESSMENT — ENCOUNTER SYMPTOMS
NAUSEA: 0
VOMITING: 0
DIARRHEA: 0
COUGH: 0
RHINORRHEA: 0
ABDOMINAL PAIN: 0
SHORTNESS OF BREATH: 1

## 2023-01-14 ASSESSMENT — PAIN - FUNCTIONAL ASSESSMENT: PAIN_FUNCTIONAL_ASSESSMENT: 0-10

## 2023-01-14 NOTE — PROGRESS NOTES
2 d echo ordered as a limited but did complete echo due to reason for echo and decreased Darrall Inoue RCS

## 2023-01-14 NOTE — ED NOTES
Report called and given to Emelia Mendez on 5th floor. No questions at this time.  Requested 10 min to bring pt      Shelltracie Goncalves, [address]  01/14/23 4368

## 2023-01-14 NOTE — H&P
[facility] History and Physical          ASSESSMENT:      Principal Problem:    Pulmonary embolism on left [facility])  Active Problems:    Cocaine abuse ([facility] [address]    Acute pulmonary embolism without acute cor pulmonale, unspecified pulmonary embolism type ([facility] [address]    Uncontrolled hypertension  Resolved Problems:    * No resolved hospital problems. *      PLAN:    - Admit patient  - Continue lovenox 1mg/kg q12hr  - Echocardiogram  - Pulm consult  - LE Duplex US  - Factor 5 leiden testing  - Pain control  - Cough suppressant    Code Status: Full code  DVT prophylaxis:lovenox 1mg/kg q12hr      CHIEF COMPLAINT:    Chief Complaint   Patient presents with    Chest Pain    Shortness of Breath        History of Present Illness:  Patient is a 27 y.o. female with a past medical history significant for hypertension,polysubstance abuse who presents to the emergency department with complaint of left sided chest pain and shortness of breath. Patient reports sudden onset chest pain that started while taking a shower last night. Chest pain was associated with left mid back pain and radiation to left lower chest with each inspiration. Patient was in usual state prior to through today. Patient denies history of MI, CVA, PE/DVT or anticoagulation use. There is family history of DVT/PE in her sister. Patient reports that she ran out of her BP medication and did not have refills went she went to the pharmacy. It has been a few months since she took her BP medications. Patient used cocaine 3 days ago while \"partying\". Patient denied any fevers, chills,abdominal pain, nausea, vomiting, diarrhea, constipation, bloody stools, bloody urine, lower extremity swelling, numbness/tingling of extremities, focal weakness, recent injuries or loss of consciousness. Informant(s) for H&P:Patient    Initial ED vitals significant for tachycardia of 144, /112, Sat 91% on RA.  Patient did not require supplemental oxygen. Labwork :troponin negative, Utox cocaine+, BUN/Cr 10/0.9, lactic acid normal    REVIEW OF SYSTEMS:  A comprehensive review of systems was negative except for: what is in the HPI      PMH:  Past Medical History:   Diagnosis Date    Abnormal Pap smear     JERAMY (acute kidney injury) ([facility] [address] 3/27/2021    Cannabis abuse 6/17/2022    Cocaine abuse ([facility] [address] 7/96/3292    Complication of anesthesia 2011    States hhas spinal curvature and had one-sided Epidural    Herpes simplex without mention of complication     Hypertensive urgency 3/27/2021    Methamphetamine use ([facility] [address] 6/17/2022    Recurrent pregnancy loss, currently pregnant 6/17/2022    Suspected damage to fetus from maternal drug use 6/17/2022    Chronic, repeated use        Surgical History:  Past Surgical History:   Procedure Laterality Date    IR BIOPSY KIDNEY PERCUTANEOUS  3/29/2021    IR BIOPSY KIDNEY PERCUTANEOUS 3/29/2021 [facility] PROC[facility]       Medications Prior to Admission:    Prior to Admission medications    Medication Sig Start Date End Date Taking? Authorizing Provider   labetalol (NORMODYNE) 200 MG tablet Take 1 tablet by mouth in the morning and 1 tablet at noon and 1 tablet before bedtime. 7/24/22 9/4/22  Marcie Bryan MD   Prenatal Vit-Fe Fumarate-FA (PRENATAL VITAMIN) 27-0.8 MG TABS Take 1 tablet by mouth daily 7/24/22   Marcie Bryan MD   aspirin 81 MG EC tablet Take 81 mg by mouth daily    Historical Provider, MD       Allergies:    Patient has no known allergies. Social History:    reports that she has been smoking cigarettes. She has a 1.00 pack-year smoking history. She has never used smokeless tobacco. She reports that she does not currently use alcohol. She reports that she does not currently use drugs after having used the following drugs: Marijuana (Weed) and Cocaine. Family History:   family history includes Diabetes in her maternal grandfather; Hearing Loss in her sister; Heart Disease in her sister;  Heart Surgery in her sister; Hypertension in her sister. PHYSICAL EXAM:  Vitals:  BP (!) 156/112   Pulse (!) 110   Temp 97.9 °F (36.6 °C)   Resp 19   Ht 5' 7.5\" (1.715 m)   Wt 190 lb (86.2 kg)   LMP 01/04/2023 (Approximate)   SpO2 98%   BMI 29.32 kg/m²     General Appearance:  in no acute distress,   Skin: warm and dry  Head: normocephalic and atraumatic  Eyes: pupils equal, round, and reactive to light, extraocular eye movements intact, conjunctivae normal  Neck: neck supple and non tender without mass   Pulmonary/Chest: clear to auscultation bilaterally- no wheezes, rales or rhonchi, normal air movement, no respiratory distress  Cardiovascular: tachycadia, normal S1 and S2 and no carotid bruits  Abdomen: soft, non-tender, non-distended, normal bowel sounds, no masses or organomegaly  Extremities: Strength 5/5 in upper and lower bilateral extremities, no cyanosis, no clubbing and no edema, 2+ dorsalis pedis pulses bilaterally  Neurologic: alert and oriented to person, place and time ,no cranial nerve deficit and speech normal        LABS:  Recent Labs     01/14/23  0036      K 3.8      CO2 22   BUN 10   CREATININE 0.9   GLUCOSE 189*   CALCIUM 9.4       Recent Labs     01/14/23  0036   WBC 11.0   RBC 3.91   HGB 9.6*   HCT 32.0*   MCV 81.8   MCH 24.6*   MCHC 30.0*   RDW 17.0*      MPV 9.7       No results for input(s): POCGLU in the last 72 hours. Radiology: XR CHEST PORTABLE    Result Date: 1/14/2023  No acute process. CTA PULMONARY W CONTRAST    Result Date: 1/14/2023  Left lower lobe segmental pulmonary embolism. Wedge-shaped consolidation in this region, concerning for developing pulmonary infarct. No evidence of right heart strain. Critical results were called by Dr. Kath Newberry MD to Dr. Marisol Purdy on 1/14/2023 at 02:54. NOTE: This report was transcribed using voice recognition software.  Every effort was made to ensure accuracy; however, inadvertent computerized transcription errors may be present.     Cecily Butts MD   1/14/2023, 3:59 AM

## 2023-01-14 NOTE — CONSULTS
Assessment and Plan  Patient is a 27 y.o. female with the following medical Problems:   Lower lobe pulmonary embolism with strong family history of VTEs (patient's sister had blood clot at age 23)  Pulmonary infarction  Cocaine abuse     Plan of care:  1. Switch Lovenox to Eliquis  2. Patient qualifies for lifelong anticoagulation as long as there is no contraindication  3. Follow-up as outpatient with a hematology service. 4.  PT OT and ambulate  5. Ultrasound venous of bilateral lower extremities to rule out DVT. 6.  Continue with pain management medications and add Lidoderm and as needed Toradol. Nonsteroidal anti-inflammatory drugs tend to work very well for pulmonary infarction. History of Present Illness:    Patient is a 75-year-old woman with above-mentioned medical problems who presented to [facility] emergency room with left-sided chest pain and shortness of breath. Pain has of sudden onset started while the patient taking shower. CTA showed left lower lobe pulmonary embolism with pulmonary infarction. Urine toxicology was positive for cocaine. Patient's sister had a blood clot at age 23. Patient is currently not on any birth control pills.     Past Medical History:  Past Medical History:   Diagnosis Date    Abnormal Pap smear     JERAMY (acute kidney injury) ([address] 3/27/2021    Cannabis abuse 6/17/2022    Cocaine abuse ([address] 8/16/3234    Complication of anesthesia 2011    States hhas spinal curvature and had one-sided Epidural    Herpes simplex without mention of complication     Hypertensive urgency 3/27/2021    Methamphetamine use ([facility] [address] 6/17/2022    Recurrent pregnancy loss, currently pregnant 6/17/2022    Suspected damage to fetus from maternal drug use 6/17/2022    Chronic, repeated use       Past Surgical History:   Procedure Laterality Date    IR BIOPSY KIDNEY PERCUTANEOUS  3/29/2021    IR BIOPSY KIDNEY PERCUTANEOUS 3/29/2021 [facility]       Family History:   Family History Problem Relation Age of Onset    Hearing Loss Sister     Hypertension Sister     Heart Disease Sister     Heart Surgery Sister     Pulmonary Embolism Sister     Deep Vein Thrombosis Sister     Diabetes Maternal Grandfather        Allergies:         Patient has no known allergies.     Social history:  Social History     Socioeconomic History    Marital status: Single     Spouse name: Not on file    Number of children: Not on file    Years of education: Not on file    Highest education level: Not on file   Occupational History    Not on file   Tobacco Use    Smoking status: Every Day     Packs/day: 0.25     Years: 4.00     Pack years: 1.00     Types: Cigarettes    Smokeless tobacco: Never   Vaping Use    Vaping Use: Never used   Substance and Sexual Activity    Alcohol use: Not Currently     Comment: rare    Drug use: Not Currently     Types: Marijuana Anna Dinning), Cocaine     Comment: several positives during this pregnancy    Sexual activity: Yes     Partners: Male   Other Topics Concern    Not on file   Social History Narrative    Not on file     Social Determinants of Health     Financial Resource Strain: Not on file   Food Insecurity: Not on file   Transportation Needs: Not on file   Physical Activity: Not on file   Stress: Not on file   Social Connections: Not on file   Intimate Partner Violence: Not on file   Housing Stability: Not on file       Current Medications:     Current Facility-Administered Medications:     enoxaparin (LOVENOX) 100 MG/ML injection, , , ,     labetalol (NORMODYNE) tablet 200 mg, 200 mg, Oral, 3 times per day, Guevara Weinstein MD, 200 mg at 01/14/23 0935    sodium chloride flush 0.9 % injection 5-40 mL, 5-40 mL, IntraVENous, 2 times per day, Guevara Weinstein MD    sodium chloride flush 0.9 % injection 5-40 mL, 5-40 mL, IntraVENous, PRN, Guevara Weinstein MD    0.9 % sodium chloride infusion, , IntraVENous, PRN, Guevara Weinstein MD    ondansetron (ZOFRAN-ODT) disintegrating tablet 4 mg, 4 mg, Oral, Q8H PRN **OR** ondansetron (ZOFRAN) injection 4 mg, 4 mg, IntraVENous, Q6H PRN, Naveen Lopez MD    polyethylene glycol (GLYCOLAX) packet 17 g, 17 g, Oral, Daily PRN, Naveen Lopez MD    acetaminophen (TYLENOL) tablet 650 mg, 650 mg, Oral, Q6H PRN **OR** acetaminophen (TYLENOL) suppository 650 mg, 650 mg, Rectal, Q6H PRN, Kirsten Diaz MD    enoxaparin (LOVENOX) injection 90 mg, 1 mg/kg, SubCUTAneous, BID, Naveen Lopez MD    perflutren lipid microspheres (DEFINITY) injection 1.5 mL, 1.5 mL, IntraVENous, ONCE PRN, Naveen Lopez MD    morphine (PF) injection 2 mg, 2 mg, IntraVENous, Q6H PRN, Naveen Lopez MD, 2 mg at 01/14/23 0927    benzonatate (TESSALON) capsule 100 mg, 100 mg, Oral, TID PRN, Naveen Lopez MD    0.9 % sodium chloride infusion, , IntraVENous, Continuous, Kirsten Diaz MD, Last Rate: 100 mL/hr at 01/14/23 0456, New Bag at 01/14/23 0456    HYDROcodone-acetaminophen (NORCO) 7.5-325 MG per tablet 1 tablet, 1 tablet, Oral, Q6H PRN, Naveen Lopez MD, 1 tablet at 01/14/23 0546      Review of Systems:   General: denies weight gain, denies loss of appetite, fever, chills, night sweats. HEENT: denies headaches, dizziness, head trauma, visual changes, eye pain, tinnitus, nosebleeds, hoarseness or throat pain    Respiratory: denies chest pain, dyspnea, cough and hemoptysis  Cardiovascular: denies orthopnea, paroxysmal nocturnal dyspnea, leg swelling, and previous heart attack. Gastrointestinal: denies pain, nausea vomiting, diarrhea, constipation, melena or bleeding.   Genitourinary: denies hematuria, frequency, urgency or dysuria  Neurology: denies syncope, seizures, paralysis, paraesthesia   Endocrine: denies polyuria, polydipsia, skin or hair changes, and heat or cold intolerance  Musculoskeletal: denies joint pain, swelling, arthritis or myalgia  Hematologic: denies bleeding, adenopathy and easy bruising  Skin: denies rashes and skin discoloration  Psychiatry: denies depression    Physical Exam:   Vital Signs:  BP (!) 152/118   Pulse 92   Temp 98.2 °F (36.8 °C) (Oral)   Resp 21   Ht 5' 7.5\" (1.715 m)   Wt 190 lb (86.2 kg)   LMP 01/04/2023 (Approximate)   SpO2 98%   BMI 29.32 kg/m²     Input/Output:  No intake/output data recorded. Oxygen requirements:       General appearance: Well developed, not in pain or distress, in no respiratory distress    HEENT: Atraumatic/normocephalic, EOMI, JIM, pharynx clear, moist mucosa, redness of the uvula appreciated,   Neck: Supple, no jugular venous distension, lymphadenopathy, thyromegaly or carotid bruits  Chest: Equal normal breath sounds, no wheezing, no crackles and no tenderness over ribs   Cardiovascular: Normal S1 , S2, regular rate and rhythm, no murmur, rub or gallop  Abdomen: Normal sounds present, soft, lax with no tenderness, no hepatosplenomegaly, and no masses  Extremities: No edema. Pulses are equally present. Skin: intact, no rashes   Neurologic: Alert and oriented x 3, No focal deficit     Investigations:  Labs, radiological imaging and cardiac work up were reviewed        STAFF PHYSICIAN NOTE OF PERSONAL INVOLVEMENT IN CARE  As the attending physician, I certify that I personally reviewed the patient's history and personally examined the patient to confirm the physical findings described above, and that I reviewed the relevant imaging studies and available reports. I also discussed the differential diagnosis and all of the proposed management plans with the patient and individuals accompanying the patient to this visit. They had the opportunity to ask questions about the proposed management plans and to have those questions answered.       Electronically signed by Ky Tang MD on 1/14/2023 at 11:32 AM

## 2023-01-14 NOTE — ED PROVIDER NOTES
[facility] EMERGENCY DEPARTMENT  EMERGENCY DEPARTMENT ENCOUNTER        Pt Name: Eliane Zimmerman  MRN: [id number]  Birthdate 1990  Date of evaluation: 1/14/2023  Provider: Shayan Duffy MD  PCP: Genna Paige DO  Note Started: 3:33 AM EST 1/14/23    CHIEF COMPLAINT       Chief Complaint   Patient presents with    Chest Pain    Shortness of Breath       HISTORY OF PRESENT ILLNESS: 1 or more Elements   History From: Patient    Limitations to history : None    Eliane Zimmerman is a 32 y.o. female with past medical history of hypertension on labetalol who presents to the emergency department with complaint of chest pain and shortness of breath.  Patient stating that through today she has been having left-sided chest pain that has been progressively increasing.  Patient states that it is to the left lower as well as left lateral chest that significantly increases on deep inspiration.  Patient endorses shortness of breath, difficulty catching her breath.  Patient denies any fevers or chills, any abdominal symptoms, any GI symptoms.  Patient denies history of PE or DVT.  Patient is not on anticoagulation.  Patient denies any cough or URI symptoms.  Patient states she is generally sedentary during the day.  Patient denies any calf swelling or calf pain.  Patient denies any diaphoresis, nausea or vomiting.  Patient states she has a history of hypertension, is generally noncompliant with her medications.  Patient also endorses that she uses cocaine, last use was 3 days ago    Nursing Notes were all reviewed and agreed with or any disagreements were addressed in the HPI.    REVIEW OF SYSTEMS :      Review of Systems   Constitutional:  Negative for chills and fever.   HENT:  Negative for congestion and rhinorrhea.    Respiratory:  Positive for shortness of breath. Negative for cough.    Cardiovascular:  Positive for chest pain. Negative for palpitations.   Gastrointestinal:  Negative for abdominal  pain, diarrhea, nausea and vomiting. Genitourinary:  Negative for dysuria and hematuria. Musculoskeletal:  Negative for arthralgias and myalgias. Skin:  Negative for rash and wound. Neurological:  Negative for dizziness, syncope, weakness, light-headedness, numbness and headaches. Psychiatric/Behavioral:  Negative for confusion. The patient is nervous/anxious. Positives and Pertinent negatives as per HPI. SURGICAL HISTORY     Past Surgical History:   Procedure Laterality Date    IR BIOPSY KIDNEY PERCUTANEOUS  3/29/2021    IR BIOPSY KIDNEY PERCUTANEOUS 3/29/2021 [id number] PROC[facility]       CURRENTMEDICATIONS       Previous Medications    ASPIRIN 81 MG EC TABLET    Take 81 mg by mouth daily    LABETALOL (NORMODYNE) 200 MG TABLET    Take 1 tablet by mouth in the morning and 1 tablet at noon and 1 tablet before bedtime. PRENATAL VIT-FE FUMARATE-FA (PRENATAL VITAMIN) 27-0.8 MG TABS    Take 1 tablet by mouth daily       ALLERGIES     Patient has no known allergies.     FAMILYHISTORY       Family History   Problem Relation Age of Onset    Hearing Loss Sister     Hypertension Sister     Heart Disease Sister     Heart Surgery Sister     Diabetes Maternal Grandfather         SOCIAL HISTORY       Social History     Tobacco Use    Smoking status: Every Day     Packs/day: 0.25     Years: 4.00     Pack years: 1.00     Types: Cigarettes    Smokeless tobacco: Never   Vaping Use    Vaping Use: Never used   Substance Use Topics    Alcohol use: Not Currently     Comment: rare    Drug use: Not Currently     Types: Marijuana [place]), Cocaine     Comment: several positives during this pregnancy       SCREENINGS        Alex Coma Scale  Eye Opening: Spontaneous  Best Verbal Response: Oriented  Best Motor Response: Obeys commands  Alex Coma Scale Score: 15                CIWA Assessment  BP: (!) 156/112  Heart Rate: (!) 110           PHYSICAL EXAM  1 or more Elements     ED Triage Vitals   BP Temp Temp src Heart Rate Resp SpO2 Height Weight   01/14/23 0029 01/14/23 0012 -- 01/14/23 0012 01/14/23 0029 01/14/23 0012 01/14/23 0029 01/14/23 0029   (!) 149/125 97.9 °F (36.6 °C)  (!) 144 20 91 % 5' 7.5\" (1.715 m) 190 lb (86.2 kg)       Physical Exam  Vitals and nursing note reviewed. Constitutional:       General: She is in acute distress. Appearance: She is not ill-appearing. HENT:      Head: Normocephalic and atraumatic. Mouth/Throat:      Mouth: Mucous membranes are moist.   Eyes:      Pupils: Pupils are equal, round, and reactive to light. Cardiovascular:      Rate and Rhythm: Normal rate and regular rhythm. Pulses: Normal pulses. Radial pulses are 2+ on the right side and 2+ on the left side. Heart sounds: Normal heart sounds. No murmur heard. Pulmonary:      Effort: Pulmonary effort is normal.      Breath sounds: Normal breath sounds. Chest:      Chest wall: Tenderness present. Comments: Patient with reproducible chest pain to the left lateral wall. Abdominal:      Palpations: Abdomen is soft. Tenderness: There is no abdominal tenderness. Musculoskeletal:         General: Normal range of motion. Cervical back: Normal range of motion and neck supple. Right lower leg: No edema. Left lower leg: No edema. Skin:     General: Skin is warm and dry. Capillary Refill: Capillary refill takes less than 2 seconds. Neurological:      General: No focal deficit present. Mental Status: She is alert and oriented to person, place, and time. Psychiatric:         Mood and Affect: Mood is anxious.       Comments: Patient is anxious, speaking in full sentences, calmed with passive measures        DIAGNOSTIC RESULTS   LABS:    Labs Reviewed   CBC WITH AUTO DIFFERENTIAL - Abnormal; Notable for the following components:       Result Value    Hemoglobin 9.6 (*)     Hematocrit 32.0 (*)     MCH 24.6 (*)     MCHC 30.0 (*)     RDW 17.0 (*)     Lymphocytes % 15.2 (*) Neutrophils Absolute 8.21 (*)     All other components within normal limits   COMPREHENSIVE METABOLIC PANEL - Abnormal; Notable for the following components:    Glucose 189 (*)     All other components within normal limits   URINALYSIS WITH MICROSCOPIC - Abnormal; Notable for the following components:    Ketones, Urine TRACE (*)     All other components within normal limits   URINE DRUG SCREEN - Abnormal; Notable for the following components:    Cocaine Metabolite Screen, Urine POSITIVE (*)     All other components within normal limits   SERUM DRUG SCREEN - Abnormal; Notable for the following components:    Acetaminophen Level <5.0 (*)     All other components within normal limits   TROPONIN   CK   LACTIC ACID   LIPASE   POC PREGNANCY UR-QUAL     As interpreted by me, the above displayed labs are abnormal. All other labs obtained during this visit were within normal range or not returned as of this dictation. EKG Interpretation  Interpreted by emergency department physician, Jess Santos MD    EKG: This EKG is signed and interpreted by me. Rate: 120  Rhythm: Sinus tachycardia  Interpretation: sinus rhythm, normal OK interval, normal QRS, prolonged QT interval, no acute ST or T wave changes  Comparison: changes compared to previous EKG     RADIOLOGY:   Non-plain film images such as CT, Ultrasound and MRI are read by the radiologist. Plain radiographic images are visualized and preliminarily interpreted by the ED Provider with the below findings:    Interpretation per the Radiologist below, if available at the time of this note:    CTA PULMONARY W CONTRAST   Final Result   Left lower lobe segmental pulmonary embolism. Wedge-shaped consolidation in   this region, concerning for developing pulmonary infarct. No evidence of   right heart strain. Critical results were called by Dr. Aleida Rich MD to Dr. Mery Dominguez on   1/14/2023 at 02:54. XR CHEST PORTABLE   Final Result   No acute process. XR CHEST PORTABLE    Result Date: 1/14/2023  EXAMINATION: ONE XRAY VIEW OF THE CHEST 1/14/2023 12:46 am COMPARISON: 06/18/2022 HISTORY: ORDERING SYSTEM PROVIDED HISTORY: tachycardia TECHNOLOGIST PROVIDED HISTORY: Reason for exam:->tachycardia FINDINGS: The lungs are without acute focal process. There is no effusion or pneumothorax. The cardiomediastinal silhouette is without acute process. The osseous structures are without acute process. No acute process. CTA PULMONARY W CONTRAST    Result Date: 1/14/2023  EXAMINATION: CTA OF THE CHEST 1/14/2023 12:35 am TECHNIQUE: CTA of the chest was performed after the administration of intravenous contrast.  Multiplanar reformatted images are provided for review. MIP images are provided for review. Automated exposure control, iterative reconstruction, and/or weight based adjustment of the mA/kV was utilized to reduce the radiation dose to as low as reasonably achievable. COMPARISON: None. HISTORY: ORDERING SYSTEM PROVIDED HISTORY: r/o PE TECHNOLOGIST PROVIDED HISTORY: Reason for exam:->r/o PE Decision Support Exception - unselect if not a suspected or confirmed emergency medical condition->Emergency Medical Condition (MA) FINDINGS: Pulmonary Arteries: Pulmonary arteries are adequately opacified for evaluation. There is a filling defect within a left lower lobe segmental pulmonary artery extending distally. Main pulmonary artery is normal in caliber. Mediastinum: No evidence of mediastinal lymphadenopathy. The heart and pericardium demonstrate no acute abnormality. There is no acute abnormality of the thoracic aorta. Lungs/pleura: Wedge-shaped consolidative opacity in the medial left lower lobe. Small left pleural effusion. No pneumothorax. Upper Abdomen:  Limited images of the upper abdomen demonstrate no acute abnormality. Soft Tissues/Bones: No acute bone or soft tissue abnormality. Left lower lobe segmental pulmonary embolism.   Wedge-shaped consolidation in this region, concerning for developing pulmonary infarct. No evidence of right heart strain. Critical results were called by Dr. Kath Newberry MD to Dr. Marisol Purdy on 1/14/2023 at 02:54. No results found. PROCEDURES   Unless otherwise noted below, none      CRITICAL CARE TIME (.cct)   Accordingly this patient received 31 minutes of critical care time, excluding separately billable procedures. PAST MEDICAL HISTORY/Chronic Conditions Affecting Care      has a past medical history of Abnormal Pap smear, JERAMY (acute kidney injury) ([facility] [address] (3/27/2021), Cannabis abuse (6/17/2022), Cocaine abuse ([facility] [address] (7/71/5963), Complication of anesthesia (2011), Herpes simplex without mention of complication, Hypertensive urgency (3/27/2021), Methamphetamine use ([facility] [address] (6/17/2022), Recurrent pregnancy loss, currently pregnant (6/17/2022), and Suspected damage to fetus from maternal drug use (6/17/2022).      EMERGENCY DEPARTMENT COURSE    Vitals:    Vitals:    01/14/23 0012 01/14/23 0029 01/14/23 0328   BP:  (!) 149/125 (!) 156/112   Pulse: (!) 144 (!) 120 (!) 110   Resp:  20 19   Temp: 97.9 °F (36.6 °C)     SpO2: 91% 98% 98%   Weight:  190 lb (86.2 kg)    Height:  5' 7.5\" (1.715 m)        Patient was given the following medications:  Medications   enoxaparin (LOVENOX) 100 MG/ML injection (has no administration in time range)   0.9 % sodium chloride bolus (0 mLs IntraVENous Stopped 1/14/23 0325)   morphine injection 4 mg (4 mg IntraVENous Given 1/14/23 0108)   iopamidol (ISOVUE-370) 76 % injection 75 mL (75 mLs IntraVENous Given 1/14/23 0307)       Medical Decision Making/Differential Diagnosis:    CC/HPI Summary, Social Determinants of health, Records Reviewed, DDx, testing done/not done, ED Course, Reassessment, disposition considerations/shared decision making with patient, consults, disposition:      Patient is a 77-year-old female with a history of hypertension, cocaine abuse who presents to the emergency department with left-sided chest pain, tachycardia. Patient is very anxious on arrival, moving about the room, not sitting on the bed. Patient speaking in full sentences, obvious discomfort. Patient presented awake, alert. Vital signs showing tachycardia, no hypotension, afebrile. Physical exam shows reproducible chest pain to the left lateral chest.  Patient states that she is generally sedentary, had sudden onset of chest pain, is tachycardic without palpitations. Patient endorses shortness of breath. Patient was treated with morphine IV, IV fluids. Work-up including labs is thickened for cocaine in urine drug screen. Based on patient's presentation there is strong concern for pulmonary embolism, also considered ACS, musculoskeletal.  Patient was sent from CTA which confirmed left lower lobe segmental pulmonary embolism. Additional findings include wedge-shaped consolidation concerning for infarct, no right heart strain. Patient on reevaluation remains tachycardic, afebrile. Troponin was negative. Work-up was discussed with patient, she is agreeable to plan of admission. Patient was given Lovenox subcutaneous for blood clot. Patient was discussed with hospitalist and was accepted. Patient was resting comfortably in bed, and she will remain in the emergency department till time the bed is available on the floor. CONSULTS: (Who and What was discussed)  None    FINAL IMPRESSION      1. Acute pulmonary embolism without acute cor pulmonale, unspecified pulmonary embolism type ([address]    2. Acute chest pain    3. Shortness of breath          DISPOSITION/PLAN     DISPOSITION Admitted 01/14/2023 03:27:00 AM    PATIENT REFERRED TO:  No follow-up provider specified.     DISCHARGE MEDICATIONS:  New Prescriptions    No medications on file       DISCONTINUED MEDICATIONS:  Discontinued Medications    No medications on file     (Please note that portions of this note were completed with a voice recognition program.  Efforts were made to edit the dictations but occasionally words are mis-transcribed.)    Lenny Garcia MD, PGY 3       Lenny Garcia MD  Resident  01/14/23 9869          ATTENDING PROVIDER ATTESTATION:     Jacob Angeles presented to the emergency department for evaluation of Chest Pain and Shortness of Breath    I have reviewed and discussed the case, including pertinent history (medical, surgical, family and social) and exam findings with the Resident and the Nurse assigned to Jacob Angeles. I have personally performed and/or participated in the history, exam, medical decision making, and procedures and agree with all pertinent clinical information. I have reviewed my findings and recommendations with Jacob Angeles and members of family present at the time of disposition. I have personally reviewed all laboratory radiology results from today's visit. I have personally reviewed and agree with resident interpretation of EKG for all EKGs from today's visit. MDM:     I, Dr. Leopoldo Fern am the primary provider of record    Medical Decision Making  Amount and/or Complexity of Data Reviewed  Labs: ordered. Decision-making details documented in ED Course. Radiology: ordered and independent interpretation performed. Decision-making details documented in ED Course. Details: CXR - no PTX  ECG/medicine tests: ordered and independent interpretation performed. Decision-making details documented in ED Course. Risk  Prescription drug management. Decision regarding hospitalization. Critical Care  Total time providing critical care: 30-74 minutes (Please note that the withdrawal or failure to initiate urgent interventions for this patient would likely result in a life threatening deterioration or permanent disability.       Accordingly this patient received 31 minutes of critical care time, excluding separately billable procedures.  )        My findings/plan: The primary encounter diagnosis was Acute pulmonary embolism without acute cor pulmonale, unspecified pulmonary embolism type ([facility] [address] Diagnoses of Acute chest pain and Shortness of breath were also pertinent to this visit.   New Prescriptions    No medications on file     Royer Elder 58, DO  01/14/23 4149

## 2023-01-15 LAB
ANION GAP SERPL CALCULATED.3IONS-SCNC: 10 MMOL/L (ref 7–16)
BASOPHILS ABSOLUTE: 0.02 E9/L (ref 0–0.2)
BASOPHILS RELATIVE PERCENT: 0.3 % (ref 0–2)
BUN BLDV-MCNC: 9 MG/DL (ref 6–20)
CALCIUM SERPL-MCNC: 8.5 MG/DL (ref 8.6–10.2)
CHLORIDE BLD-SCNC: 103 MMOL/L (ref 98–107)
CO2: 22 MMOL/L (ref 22–29)
CREAT SERPL-MCNC: 0.8 MG/DL (ref 0.5–1)
EOSINOPHILS ABSOLUTE: 0.32 E9/L (ref 0.05–0.5)
EOSINOPHILS RELATIVE PERCENT: 4.3 % (ref 0–6)
GFR SERPL CREATININE-BSD FRML MDRD: >60 ML/MIN/1.73
GLUCOSE BLD-MCNC: 154 MG/DL (ref 74–99)
HCT VFR BLD CALC: 27.7 % (ref 34–48)
HEMOGLOBIN: 8.1 G/DL (ref 11.5–15.5)
IMMATURE GRANULOCYTES #: 0.03 E9/L
IMMATURE GRANULOCYTES %: 0.4 % (ref 0–5)
LYMPHOCYTES ABSOLUTE: 1.43 E9/L (ref 1.5–4)
LYMPHOCYTES RELATIVE PERCENT: 19.2 % (ref 20–42)
MAGNESIUM: 1.9 MG/DL (ref 1.6–2.6)
MCH RBC QN AUTO: 24.2 PG (ref 26–35)
MCHC RBC AUTO-ENTMCNC: 29.2 % (ref 32–34.5)
MCV RBC AUTO: 82.7 FL (ref 80–99.9)
MONOCYTES ABSOLUTE: 0.5 E9/L (ref 0.1–0.95)
MONOCYTES RELATIVE PERCENT: 6.7 % (ref 2–12)
NEUTROPHILS ABSOLUTE: 5.14 E9/L (ref 1.8–7.3)
NEUTROPHILS RELATIVE PERCENT: 69.1 % (ref 43–80)
PDW BLD-RTO: 16.7 FL (ref 11.5–15)
PLATELET # BLD: 378 E9/L (ref 130–450)
PMV BLD AUTO: 9.5 FL (ref 7–12)
POTASSIUM REFLEX MAGNESIUM: 3.5 MMOL/L (ref 3.5–5)
RBC # BLD: 3.35 E12/L (ref 3.5–5.5)
SODIUM BLD-SCNC: 135 MMOL/L (ref 132–146)
WBC # BLD: 7.4 E9/L (ref 4.5–11.5)

## 2023-01-15 PROCEDURE — 2060000000 HC ICU INTERMEDIATE R&B

## 2023-01-15 PROCEDURE — 6370000000 HC RX 637 (ALT 250 FOR IP): Performed by: INTERNAL MEDICINE

## 2023-01-15 PROCEDURE — 6360000002 HC RX W HCPCS: Performed by: INTERNAL MEDICINE

## 2023-01-15 PROCEDURE — 81240 F2 GENE: CPT

## 2023-01-15 PROCEDURE — 6360000002 HC RX W HCPCS: Performed by: STUDENT IN AN ORGANIZED HEALTH CARE EDUCATION/TRAINING PROGRAM

## 2023-01-15 PROCEDURE — 6370000000 HC RX 637 (ALT 250 FOR IP): Performed by: STUDENT IN AN ORGANIZED HEALTH CARE EDUCATION/TRAINING PROGRAM

## 2023-01-15 PROCEDURE — 83735 ASSAY OF MAGNESIUM: CPT

## 2023-01-15 PROCEDURE — 81241 F5 GENE: CPT

## 2023-01-15 PROCEDURE — 99233 SBSQ HOSP IP/OBS HIGH 50: CPT | Performed by: INTERNAL MEDICINE

## 2023-01-15 PROCEDURE — 80048 BASIC METABOLIC PNL TOTAL CA: CPT

## 2023-01-15 PROCEDURE — 36415 COLL VENOUS BLD VENIPUNCTURE: CPT

## 2023-01-15 PROCEDURE — 85025 COMPLETE CBC W/AUTO DIFF WBC: CPT

## 2023-01-15 PROCEDURE — 2580000003 HC RX 258: Performed by: STUDENT IN AN ORGANIZED HEALTH CARE EDUCATION/TRAINING PROGRAM

## 2023-01-15 RX ADMIN — POLYETHYLENE GLYCOL 3350 17 G: 17 POWDER, FOR SOLUTION ORAL at 13:35

## 2023-01-15 RX ADMIN — APIXABAN 10 MG: 5 TABLET, FILM COATED ORAL at 20:38

## 2023-01-15 RX ADMIN — HYDROCODONE BITARTRATE AND ACETAMINOPHEN 1 TABLET: 7.5; 325 TABLET ORAL at 18:26

## 2023-01-15 RX ADMIN — MORPHINE SULFATE 2 MG: 2 INJECTION, SOLUTION INTRAMUSCULAR; INTRAVENOUS at 08:46

## 2023-01-15 RX ADMIN — KETOROLAC TROMETHAMINE 30 MG: 30 INJECTION, SOLUTION INTRAMUSCULAR; INTRAVENOUS at 04:01

## 2023-01-15 RX ADMIN — KETOROLAC TROMETHAMINE 30 MG: 30 INJECTION, SOLUTION INTRAMUSCULAR; INTRAVENOUS at 13:30

## 2023-01-15 RX ADMIN — LABETALOL HYDROCHLORIDE 200 MG: 200 TABLET, FILM COATED ORAL at 20:38

## 2023-01-15 RX ADMIN — APIXABAN 10 MG: 5 TABLET, FILM COATED ORAL at 08:45

## 2023-01-15 RX ADMIN — LABETALOL HYDROCHLORIDE 200 MG: 200 TABLET, FILM COATED ORAL at 13:30

## 2023-01-15 RX ADMIN — Medication 10 ML: at 08:52

## 2023-01-15 RX ADMIN — Medication 10 ML: at 20:38

## 2023-01-15 RX ADMIN — KETOROLAC TROMETHAMINE 30 MG: 30 INJECTION, SOLUTION INTRAMUSCULAR; INTRAVENOUS at 20:38

## 2023-01-15 RX ADMIN — LABETALOL HYDROCHLORIDE 200 MG: 200 TABLET, FILM COATED ORAL at 06:10

## 2023-01-15 ASSESSMENT — PAIN DESCRIPTION - ORIENTATION
ORIENTATION: LEFT

## 2023-01-15 ASSESSMENT — PAIN DESCRIPTION - FREQUENCY: FREQUENCY: CONTINUOUS

## 2023-01-15 ASSESSMENT — PAIN SCALES - GENERAL
PAINLEVEL_OUTOF10: 2
PAINLEVEL_OUTOF10: 7
PAINLEVEL_OUTOF10: 6
PAINLEVEL_OUTOF10: 8
PAINLEVEL_OUTOF10: 10
PAINLEVEL_OUTOF10: 7
PAINLEVEL_OUTOF10: 9
PAINLEVEL_OUTOF10: 6

## 2023-01-15 ASSESSMENT — PAIN DESCRIPTION - DESCRIPTORS
DESCRIPTORS: CRAMPING
DESCRIPTORS: ACHING;DULL
DESCRIPTORS: CRAMPING
DESCRIPTORS: CRAMPING;SORE

## 2023-01-15 ASSESSMENT — PAIN DESCRIPTION - ONSET: ONSET: ON-GOING

## 2023-01-15 ASSESSMENT — PAIN DESCRIPTION - LOCATION
LOCATION: CHEST
LOCATION: CHEST;BREAST
LOCATION: BREAST
LOCATION: CHEST;BREAST

## 2023-01-15 ASSESSMENT — PAIN DESCRIPTION - PAIN TYPE: TYPE: ACUTE PAIN

## 2023-01-15 NOTE — PROGRESS NOTES
Pulmonary/Critical Care Progress Note    We are following patient for HFrEF, pulmonary embolism with pulmonary infarct, cocaine use, COPD, nicotine addiction, probable hypercoagulable state    SUBJECTIVE:  The patient is still having some pleuritic chest pain consistent with pulmonary infarction but in general is improving. However, the patient's echocardiogram shows a significant reduction in left ventricular ejection fraction of 35%. This could at least in part if not mostly be explained by her many years of cocaine use. She will require a hypercoagulable work-up, especially considering a sister has antiphospholipid syndrome. With a very low EF at a young age, she may be a good candidate [facility]. We will leave this for the cardiology service    MEDICATIONS:   apixaban  10 mg Oral BID    Followed by    Jacquelin Mcmahan ON 1/21/2023] apixaban  5 mg Oral BID    labetalol  200 mg Oral 3 times per day    sodium chloride flush  5-40 mL IntraVENous 2 times per day    lidocaine  1 patch TransDERmal Daily      sodium chloride       sodium chloride flush, sodium chloride, ondansetron **OR** ondansetron, polyethylene glycol, acetaminophen **OR** acetaminophen, perflutren lipid microspheres, morphine, benzonatate, HYDROcodone-acetaminophen, ketorolac      REVIEW OF SYSTEMS:  Constitutional: Denies fever, weight loss, night sweats, and fatigue  Skin: Denies pigmentation, dark lesions, and rashes   HEENT: Denies hearing loss, tinnitus, ear drainage, epistaxis, sore throat, and hoarseness. Cardiovascular: Denies palpitations, chest pain, and chest pressure. Respiratory: Denies cough, dyspnea at rest, hemoptysis, apnea, and choking.   Gastrointestinal: Denies nausea, vomiting, poor appetite, diarrhea, heartburn or reflux  Genitourinary: Denies dysuria, frequency, urgency or hematuria  Musculoskeletal: Denies myalgias, muscle weakness, and bone pain  Neurological: Denies dizziness, vertigo, headache, and focal weakness  Psychological: Denies anxiety and depression  Endocrine: Denies heat intolerance and cold intolerance  Hematopoietic/Lymphatic: Denies bleeding problems and blood transfusions    OBJECTIVE:  Vitals:    01/15/23 0916   BP:    Pulse:    Resp: 18   Temp:    SpO2:            O2 Device: None (Room air)    PHYSICAL EXAM:  Constitutional: No fever, chills, diaphoresis  Skin: No skin rash, no skin breakdown  HEENT: Unremarkable  Neck: No JVD, lymphadenopathy, thyromegaly  Cardiovascular: S1, S2 regular. No S3 or rubs present  Respiratory: Crackles left lower lung field consistent with pulmonary infarction  Gastrointestinal: Soft, obese, nontender  Genitourinary: No CVA tenderness  Extremities: Clubbing, cyanosis, or edema  Neurological: Awake, alert, oriented x3. No evidence of focal motor or sensory deficits  Psychological: Appropriate affect.     LABS:  WBC   Date Value Ref Range Status   01/15/2023 7.4 4.5 - 11.5 E9/L Final   01/14/2023 11.0 4.5 - 11.5 E9/L Final   07/22/2022 8.9 4.5 - 11.5 E9/L Final     Hemoglobin   Date Value Ref Range Status   01/15/2023 8.1 (L) 11.5 - 15.5 g/dL Final   01/14/2023 9.6 (L) 11.5 - 15.5 g/dL Final   07/24/2022 9.7 (L) 11.5 - 15.5 g/dL Final     Hematocrit   Date Value Ref Range Status   01/15/2023 27.7 (L) 34.0 - 48.0 % Final   01/14/2023 32.0 (L) 34.0 - 48.0 % Final   07/24/2022 31.2 (L) 34.0 - 48.0 % Final     MCV   Date Value Ref Range Status   01/15/2023 82.7 80.0 - 99.9 fL Final   01/14/2023 81.8 80.0 - 99.9 fL Final   07/22/2022 89.9 80.0 - 99.9 fL Final     Platelets   Date Value Ref Range Status   01/15/2023 378 130 - 450 E9/L Final   01/14/2023 438 130 - 450 E9/L Final   07/22/2022 136 130 - 450 E9/L Final     Sodium   Date Value Ref Range Status   01/15/2023 135 132 - 146 mmol/L Final   01/14/2023 136 132 - 146 mmol/L Final   07/22/2022 134 132 - 146 mmol/L Final     Potassium   Date Value Ref Range Status   01/14/2023 3.8 3.5 - 5.0 mmol/L Final   07/22/2022 3.8 3.5 - 5.0 mmol/L Final   06/16/2022 3.7 3.5 - 5.0 mmol/L Final     Potassium reflex Magnesium   Date Value Ref Range Status   01/15/2023 3.5 3.5 - 5.0 mmol/L Final   03/28/2021 4.0 3.5 - 5.0 mmol/L Final   03/27/2021 3.7 3.5 - 5.0 mmol/L Final     Chloride   Date Value Ref Range Status   01/15/2023 103 98 - 107 mmol/L Final   01/14/2023 102 98 - 107 mmol/L Final   07/22/2022 103 98 - 107 mmol/L Final     CO2   Date Value Ref Range Status   01/15/2023 22 22 - 29 mmol/L Final   01/14/2023 22 22 - 29 mmol/L Final   07/22/2022 23 22 - 29 mmol/L Final     BUN   Date Value Ref Range Status   01/15/2023 9 6 - 20 mg/dL Final   01/14/2023 10 6 - 20 mg/dL Final   07/22/2022 14 6 - 20 mg/dL Final     Creatinine   Date Value Ref Range Status   01/15/2023 0.8 0.5 - 1.0 mg/dL Final   01/14/2023 0.9 0.5 - 1.0 mg/dL Final   07/22/2022 0.8 0.5 - 1.0 mg/dL Final     Glucose   Date Value Ref Range Status   01/15/2023 154 (H) 74 - 99 mg/dL Final   01/14/2023 189 (H) 74 - 99 mg/dL Final   07/22/2022 105 (H) 74 - 99 mg/dL Final   11/08/2010 90 70 - 110 mg/dL Final     Calcium   Date Value Ref Range Status   01/15/2023 8.5 (L) 8.6 - 10.2 mg/dL Final   01/14/2023 9.4 8.6 - 10.2 mg/dL Final   07/22/2022 9.0 8.6 - 10.2 mg/dL Final     Total Protein   Date Value Ref Range Status   01/14/2023 7.4 6.4 - 8.3 g/dL Final   07/22/2022 6.5 6.4 - 8.3 g/dL Final   06/18/2022 6.7 6.4 - 8.3 g/dL Final     Albumin   Date Value Ref Range Status   01/14/2023 3.9 3.5 - 5.2 g/dL Final   07/22/2022 3.2 (L) 3.5 - 5.2 g/dL Final   06/18/2022 3.6 3.5 - 5.2 g/dL Final     Total Bilirubin   Date Value Ref Range Status   01/14/2023 <0.2 0.0 - 1.2 mg/dL Final   07/22/2022 0.3 0.0 - 1.2 mg/dL Final   06/18/2022 <0.2 0.0 - 1.2 mg/dL Final     Alkaline Phosphatase   Date Value Ref Range Status   01/14/2023 87 35 - 104 U/L Final   07/22/2022 146 (H) 35 - 104 U/L Final   06/18/2022 80 35 - 104 U/L Final     AST   Date Value Ref Range Status   01/14/2023 10 0 - 31 U/L Final 07/22/2022 44 (H) 0 - 31 U/L Final   06/18/2022 12 0 - 31 U/L Final     ALT   Date Value Ref Range Status   01/14/2023 8 0 - 32 U/L Final   07/22/2022 40 (H) 0 - 32 U/L Final   06/18/2022 6 0 - 32 U/L Final     Est, Glom Filt Rate   Date Value Ref Range Status   01/15/2023 >60 >=60 mL/min/1.73 Final     Comment:     Pediatric calculator link  [email or web address]  Effective Oct 3, 2022  These results are not intended for use in patients  <25years of age. eGFR results are calculated without  a race factor using the 2021 CKD-EPI equation. Careful  clinical correlation is recommended, particularly when  comparing to results calculated using previous equations. The CKD-EPI equation is less accurate in patients with  extremes of muscle mass, extra-renal metabolism of  creatinine, excessive creatinine ingestion, or following  therapy that affects renal tubular secretion. 01/14/2023 >60 >=60 mL/min/1.73 Final     Comment:     Pediatric calculator link  [email or web address]  Effective Oct 3, 2022  These results are not intended for use in patients  <25years of age. eGFR results are calculated without  a race factor using the 2021 CKD-EPI equation. Careful  clinical correlation is recommended, particularly when  comparing to results calculated using previous equations. The CKD-EPI equation is less accurate in patients with  extremes of muscle mass, extra-renal metabolism of  creatinine, excessive creatinine ingestion, or following  therapy that affects renal tubular secretion. GFR Non-   Date Value Ref Range Status   07/22/2022 >60 >=60 mL/min/1.73 Final     Comment:     Chronic Kidney Disease: less than 60 ml/min/1.73 sq.m. Kidney Failure: less than 15 ml/min/1.73 sq.m. Results valid for patients 18 years and older.      06/18/2022 >60 >=60 mL/min/1.73 Final     Comment:     Chronic Kidney Disease: less than 60 ml/min/1.73 sq.m. Kidney Failure: less than 15 ml/min/1.73 sq.m. Results valid for patients 18 years and older. 06/17/2022 >60 >=60 mL/min/1.73 Final     Comment:     Chronic Kidney Disease: less than 60 ml/min/1.73 sq.m. Kidney Failure: less than 15 ml/min/1.73 sq.m. Results valid for patients 18 years and older. GFR    Date Value Ref Range Status   07/22/2022 >60  Final   06/18/2022 >60  Final   06/17/2022 >60  Final     Magnesium   Date Value Ref Range Status   01/15/2023 1.9 1.6 - 2.6 mg/dL Final   07/24/2022 4.4 (H) 1.6 - 2.6 mg/dL Final   07/24/2022 4.4 (H) 1.6 - 2.6 mg/dL Final     Phosphorus   Date Value Ref Range Status   03/31/2021 5.5 (H) 2.5 - 4.5 mg/dL Final   03/30/2021 5.1 (H) 2.5 - 4.5 mg/dL Final   03/29/2021 4.9 (H) 2.5 - 4.5 mg/dL Final     No results for input(s): PH, PO2, PCO2, HCO3, BE, O2SAT in the last 72 hours. RADIOLOGY:  US DUP LOWER EXTREMITIES BILATERAL VENOUS   Final Result   No evidence of DVT in either lower extremity. CTA PULMONARY W CONTRAST   Final Result   Left lower lobe segmental pulmonary embolism. Wedge-shaped consolidation in   this region, concerning for developing pulmonary infarct. No evidence of   right heart strain. Critical results were called by Dr. Aleida Rich MD to Dr. Mery Dominguez on   1/14/2023 at 02:54. XR CHEST PORTABLE   Final Result   No acute process. PROBLEM LIST:  Principal Problem:    Pulmonary embolism on left [facility])  Active Problems:    Cocaine abuse ([facility] [address]    Acute pulmonary embolism without acute cor pulmonale, unspecified pulmonary embolism type ([facility] [address]    Uncontrolled hypertension  Resolved Problems:    * No resolved hospital problems.  *      IMPRESSION:  Left lower lobe pulmonary embolus with infarction  Likely hypercoagulable state  COPD  HFrEF with LVEF 35%  Nicotine addiction  Many years of cocaine use      PLAN:  Consider initiation of Entresto (sacubitril/valsartan)  Lifetime anticoagulation if there is no contraindication  If factor V Leiden phospholipid testing and prothrombin mutation have not been obtained, these will be ordered  Must stop all illicit drug use  Must quit smoking          Electronically signed by Aparna Hartley MD on 1/15/2023 at 4:14 PM

## 2023-01-15 NOTE — PROGRESS NOTES
[facility] Hospitalist   Progress Note    Admitting Date and Time: 1/14/2023 12:18 AM  Admit Dx: Shortness of breath [R06.02]  Acute chest pain [R07.9]  Pulmonary embolism on left (HCC) [I26.99]  Acute pulmonary embolism without acute cor pulmonale, unspecified pulmonary embolism type (HCC) [I26.99]    Subjective:    1/14: Pt feels better as far as pain is concerned. Toradol helps pain better than morphine.  Per RN, remains on room air.    1/15: Pt sitting up in bed eating food from home. She is feeling better but still having pain on left side when she takes a deeper breath.    Per RN:  patient with hemoptysis overnight and Eliquis was to be placed on hold per nocturnist. This morning resumed(did not miss dose)     apixaban  10 mg Oral BID    Followed by    [START ON 1/21/2023] apixaban  5 mg Oral BID    labetalol  200 mg Oral 3 times per day    sodium chloride flush  5-40 mL IntraVENous 2 times per day    lidocaine  1 patch TransDERmal Daily     sodium chloride flush, 5-40 mL, PRN  sodium chloride, , PRN  ondansetron, 4 mg, Q8H PRN   Or  ondansetron, 4 mg, Q6H PRN  polyethylene glycol, 17 g, Daily PRN  acetaminophen, 650 mg, Q6H PRN   Or  acetaminophen, 650 mg, Q6H PRN  perflutren lipid microspheres, 1.5 mL, ONCE PRN  morphine, 2 mg, Q6H PRN  benzonatate, 100 mg, TID PRN  HYDROcodone-acetaminophen, 1 tablet, Q6H PRN  ketorolac, 30 mg, Q6H PRN       Objective:    BP (!) 146/103   Pulse 100   Temp 97.5 °F (36.4 °C) (Oral)   Resp 18   Ht 5' 7.5\" (1.715 m)   Wt 190 lb (86.2 kg)   LMP 01/04/2023 (Approximate)   SpO2 100%   BMI 29.32 kg/m²   General Appearance: alert and oriented to person, place and time and in no acute distress  Skin: warm and dry  Head: normocephalic and atraumatic  Eyes: pupils equal, round, and reactive to light, extraocular eye movements intact, conjunctivae normal  Neck: neck supple and non tender without mass   Pulmonary/Chest: clear to auscultation bilaterally- no  wheezes, rales or rhonchi, normal air movement, no respiratory distress  Cardiovascular: normal rate, normal S1 and S2 and no carotid bruits  Abdomen: soft, non-tender, non-distended, normal bowel sounds, no masses or organomegaly  Extremities: no cyanosis, no clubbing and no edema  Neurologic: no cranial nerve deficit and speech normal      Recent Labs     01/14/23  0036 01/15/23  0453    135   K 3.8 3.5    103   CO2 22 22   BUN 10 9   CREATININE 0.9 0.8   GLUCOSE 189* 154*   CALCIUM 9.4 8.5*         Recent Labs     01/14/23 0036   ALKPHOS 87   PROT 7.4   LABALBU 3.9   BILITOT <0.2   AST 10   ALT 8         Recent Labs     01/14/23  0036 01/15/23  0453   WBC 11.0 7.4   RBC 3.91 3.35*   HGB 9.6* 8.1*   HCT 32.0* 27.7*   MCV 81.8 82.7   MCH 24.6* 24.2*   MCHC 30.0* 29.2*   RDW 17.0* 16.7*    378   MPV 9.7 9.5        TTE procedure:Echo Limited Study, Echo Complete W/Doppler & Color Flow. Procedure Date   Date: 01/14/2023 Start: 08:17 AM     Study Location: Echo Lab   Technical Quality: Adequate visualization     Indications:Pulmonary embolus. Patient Status: Routine     Height: 67 inches Weight: 190 pounds BSA: 1.98 m^2 BMI: 29.76 kg/m^2     Rhythm: Within normal limits HR: 95 bpm BP: 154/100 mmHg      Findings        Left Ventricle    Left ventricle is borderline dilated. LVEDD 5.2 cm. LV systolic function is moderately reduced. Ejection fraction is visually estimated at 30-35%. Grade I diastolic dysfunction. No regional wall motion abnormalities seen; global hypokinesis. Mild left ventricular concentric hypertrophy noted.            Troponin [[id number]] Collected: 01/14/23 0036     Specimen: Blood Updated: 01/14/23 0219      Troponin, High Sensitivity 9 ng/L      CK [[id number]] Collected: 01/14/23 0036     Specimen: Blood Updated: 01/14/23 0219      Total CK 63 U/L        Lipase [[id number]] Collected: 01/14/23 0036     Specimen: Blood Updated: 01/14/23 0219      Lipase 41 U/L      Lactic Acid [[id number]] Collected: 01/14/23 0036     Specimen: Blood Updated: 01/14/23 0219      Lactic Acid 2.0 mmol/L          Urine Drug Screen [[id number]] (Abnormal) Collected: 01/14/23 0036     Specimen: Urine Updated: 01/14/23 0219      Amphetamine Screen, Urine NOT DETECTED      Barbiturate Screen, Ur NOT DETECTED      Benzodiazepine Screen, Urine NOT DETECTED      Cannabinoid Scrn, Ur NOT DETECTED      Cocaine Metabolite Screen, Urine POSITIVE      Opiate Scrn, Ur NOT DETECTED      PCP Screen, Urine NOT DETECTED      Methadone Screen, Urine NOT DETECTED      Oxycodone Urine NOT DETECTED      FENTANYL SCREEN, URINE NOT DETECTED      Drug Screen Comment: see below       Radiology:   US DUP LOWER EXTREMITIES BILATERAL VENOUS   Final Result   No evidence of DVT in either lower extremity. CTA PULMONARY W CONTRAST   Final Result   Left lower lobe segmental pulmonary embolism. Wedge-shaped consolidation in   this region, concerning for developing pulmonary infarct. No evidence of   right heart strain. Critical results were called by Dr. Ayo Lozano MD to Dr. Lesli Ren on   1/14/2023 at 02:54. XR CHEST PORTABLE   Final Result   No acute process. Assessment:  Principal Problem:    Pulmonary embolism on left [facility])  Active Problems:    Cocaine abuse ([facility] [address]    Acute pulmonary embolism without acute cor pulmonale, unspecified pulmonary embolism type ([facility] [address]    Uncontrolled hypertension  Resolved Problems:    * No resolved hospital problems. *      Plan:      Acute pulmonary embolism of LLL with probable pulmonary infarct.  -appreciated pulmonary input. Transitioned Lovenox to Eliquis.  -Lidoderm and Toradol for pain control per pulmonary. -US of legs negative of DVT. -Echo ordered to evaluate for RV strain which was negative for that but showed other findings see below. Of note, CTA chest with no evidence of RV strain.   -Family Hx of clotting with DVT/PE in her sitter. Factor 5 Leiden ordered by monie and has been collected. Patient checked with sister while I was in room and she states her clotting problem was antiphospholipid syndrome. Will draw those labs (beta-2 glycoprotein antibodies, lupus anticoagulant, and anticardiolipin antibodies) in morning    2. Newly diagnosed HFrEF//diastolic function  -likely manifestation of her cocaine usage. Also does binge drink upwards of six 24 oz beers when she uses cocaine. However, patient did have full term pregnancy and delivered stillborn this past July. Alcohol cardiomyopathy maybe playing role as well not to mention uncontrolled HTN. Will consult cardiology for additional input and further work up. 3. Uncontrolled hypertension  -BP uncontrolled on admission  -labetolol 200 mg q8 reordered. Unclear if this best agent for her with ongoing intermittent cocaine usage.  -defer to cardiology as will need to address HFrEF. 3. Cocaine abuse  -cessation counseled. 5. Disposition  -Continue to monitor treatment and response to above interventions. Cardiology consulted. Anticipate discharge in 48-72 hours. Case discussed with Dr Kaylin Barone of pulmonary on the floor. NOTE: This report was transcribed using voice recognition software. Every effort was made to ensure accuracy; however, inadvertent computerized transcription errors may be present.      Electronically signed by Juanita Leonardo MD on 1/15/2023 at 3:11 PM

## 2023-01-16 PROBLEM — I34.0 NONRHEUMATIC MITRAL VALVE REGURGITATION: Status: ACTIVE | Noted: 2023-01-16

## 2023-01-16 PROBLEM — Z72.0 TOBACCO ABUSE: Status: ACTIVE | Noted: 2023-01-16

## 2023-01-16 PROBLEM — I36.1 NONRHEUMATIC TRICUSPID VALVE REGURGITATION: Status: ACTIVE | Noted: 2023-01-16

## 2023-01-16 PROBLEM — I10 PRIMARY HYPERTENSION: Status: ACTIVE | Noted: 2023-01-14

## 2023-01-16 PROBLEM — I50.20 HFREF (HEART FAILURE WITH REDUCED EJECTION FRACTION) (HCC): Status: ACTIVE | Noted: 2023-01-16

## 2023-01-16 PROBLEM — I42.0 DILATED CARDIOMYOPATHY (HCC): Status: ACTIVE | Noted: 2023-01-16

## 2023-01-16 PROBLEM — I51.7 LEFT VENTRICULAR HYPERTROPHY: Status: ACTIVE | Noted: 2023-01-16

## 2023-01-16 PROBLEM — F14.90 COCAINE USE: Status: ACTIVE | Noted: 2023-01-16

## 2023-01-16 LAB
ANION GAP SERPL CALCULATED.3IONS-SCNC: 8 MMOL/L (ref 7–16)
BASOPHILS ABSOLUTE: 0.02 E9/L (ref 0–0.2)
BASOPHILS RELATIVE PERCENT: 0.3 % (ref 0–2)
BUN BLDV-MCNC: 11 MG/DL (ref 6–20)
CALCIUM SERPL-MCNC: 8.8 MG/DL (ref 8.6–10.2)
CHLORIDE BLD-SCNC: 104 MMOL/L (ref 98–107)
CO2: 24 MMOL/L (ref 22–29)
CREAT SERPL-MCNC: 0.7 MG/DL (ref 0.5–1)
EOSINOPHILS ABSOLUTE: 0.36 E9/L (ref 0.05–0.5)
EOSINOPHILS RELATIVE PERCENT: 4.9 % (ref 0–6)
GFR SERPL CREATININE-BSD FRML MDRD: >60 ML/MIN/1.73
GLUCOSE BLD-MCNC: 95 MG/DL (ref 74–99)
HCT VFR BLD CALC: 27.8 % (ref 34–48)
HEMOGLOBIN: 8.4 G/DL (ref 11.5–15.5)
IMMATURE GRANULOCYTES #: 0.02 E9/L
IMMATURE GRANULOCYTES %: 0.3 % (ref 0–5)
LYMPHOCYTES ABSOLUTE: 1.58 E9/L (ref 1.5–4)
LYMPHOCYTES RELATIVE PERCENT: 21.7 % (ref 20–42)
MCH RBC QN AUTO: 24.9 PG (ref 26–35)
MCHC RBC AUTO-ENTMCNC: 30.2 % (ref 32–34.5)
MCV RBC AUTO: 82.5 FL (ref 80–99.9)
MONOCYTES ABSOLUTE: 0.49 E9/L (ref 0.1–0.95)
MONOCYTES RELATIVE PERCENT: 6.7 % (ref 2–12)
NEUTROPHILS ABSOLUTE: 4.81 E9/L (ref 1.8–7.3)
NEUTROPHILS RELATIVE PERCENT: 66.1 % (ref 43–80)
PDW BLD-RTO: 17 FL (ref 11.5–15)
PLATELET # BLD: 379 E9/L (ref 130–450)
PMV BLD AUTO: 9.9 FL (ref 7–12)
POTASSIUM SERPL-SCNC: 3.6 MMOL/L (ref 3.5–5)
RBC # BLD: 3.37 E12/L (ref 3.5–5.5)
SODIUM BLD-SCNC: 136 MMOL/L (ref 132–146)
WBC # BLD: 7.3 E9/L (ref 4.5–11.5)

## 2023-01-16 PROCEDURE — 99232 SBSQ HOSP IP/OBS MODERATE 35: CPT | Performed by: INTERNAL MEDICINE

## 2023-01-16 PROCEDURE — 6370000000 HC RX 637 (ALT 250 FOR IP): Performed by: INTERNAL MEDICINE

## 2023-01-16 PROCEDURE — 2060000000 HC ICU INTERMEDIATE R&B

## 2023-01-16 PROCEDURE — APPSS30 APP SPLIT SHARED TIME 16-30 MINUTES: Performed by: CLINICAL NURSE SPECIALIST

## 2023-01-16 PROCEDURE — 36415 COLL VENOUS BLD VENIPUNCTURE: CPT

## 2023-01-16 PROCEDURE — 85613 RUSSELL VIPER VENOM DILUTED: CPT

## 2023-01-16 PROCEDURE — 99214 OFFICE O/P EST MOD 30 MIN: CPT | Performed by: INTERNAL MEDICINE

## 2023-01-16 PROCEDURE — 2580000003 HC RX 258: Performed by: STUDENT IN AN ORGANIZED HEALTH CARE EDUCATION/TRAINING PROGRAM

## 2023-01-16 PROCEDURE — 6370000000 HC RX 637 (ALT 250 FOR IP): Performed by: STUDENT IN AN ORGANIZED HEALTH CARE EDUCATION/TRAINING PROGRAM

## 2023-01-16 PROCEDURE — 85025 COMPLETE CBC W/AUTO DIFF WBC: CPT

## 2023-01-16 PROCEDURE — 80048 BASIC METABOLIC PNL TOTAL CA: CPT

## 2023-01-16 PROCEDURE — 6360000002 HC RX W HCPCS: Performed by: INTERNAL MEDICINE

## 2023-01-16 PROCEDURE — 86146 BETA-2 GLYCOPROTEIN ANTIBODY: CPT

## 2023-01-16 PROCEDURE — 86147 CARDIOLIPIN ANTIBODY EA IG: CPT

## 2023-01-16 RX ORDER — AMLODIPINE BESYLATE 5 MG/1
5 TABLET ORAL DAILY
Status: DISCONTINUED | OUTPATIENT
Start: 2023-01-17 | End: 2023-01-17 | Stop reason: HOSPADM

## 2023-01-16 RX ORDER — AMLODIPINE BESYLATE 5 MG/1
5 TABLET ORAL DAILY
Qty: 90 TABLET | Refills: 2 | Status: SHIPPED | OUTPATIENT
Start: 2023-01-16

## 2023-01-16 RX ORDER — LISINOPRIL 10 MG/1
10 TABLET ORAL DAILY
Qty: 90 TABLET | Refills: 1 | Status: SHIPPED | OUTPATIENT
Start: 2023-01-16

## 2023-01-16 RX ORDER — AMLODIPINE BESYLATE 10 MG/1
10 TABLET ORAL DAILY
Qty: 30 TABLET | Refills: 0 | Status: SHIPPED | OUTPATIENT
Start: 2023-01-16 | End: 2023-01-16 | Stop reason: SDUPTHER

## 2023-01-16 RX ORDER — LISINOPRIL 5 MG/1
10 TABLET ORAL DAILY
Status: DISCONTINUED | OUTPATIENT
Start: 2023-01-16 | End: 2023-01-17 | Stop reason: HOSPADM

## 2023-01-16 RX ORDER — AMLODIPINE BESYLATE 5 MG/1
10 TABLET ORAL DAILY
Status: DISCONTINUED | OUTPATIENT
Start: 2023-01-16 | End: 2023-01-16

## 2023-01-16 RX ADMIN — LABETALOL HYDROCHLORIDE 200 MG: 200 TABLET, FILM COATED ORAL at 05:57

## 2023-01-16 RX ADMIN — LISINOPRIL 10 MG: 5 TABLET ORAL at 19:29

## 2023-01-16 RX ADMIN — Medication 10 ML: at 09:45

## 2023-01-16 RX ADMIN — KETOROLAC TROMETHAMINE 30 MG: 30 INJECTION, SOLUTION INTRAMUSCULAR; INTRAVENOUS at 05:57

## 2023-01-16 RX ADMIN — KETOROLAC TROMETHAMINE 30 MG: 30 INJECTION, SOLUTION INTRAMUSCULAR; INTRAVENOUS at 21:48

## 2023-01-16 RX ADMIN — APIXABAN 10 MG: 5 TABLET, FILM COATED ORAL at 21:32

## 2023-01-16 RX ADMIN — KETOROLAC TROMETHAMINE 30 MG: 30 INJECTION, SOLUTION INTRAMUSCULAR; INTRAVENOUS at 14:03

## 2023-01-16 RX ADMIN — HYDROCODONE BITARTRATE AND ACETAMINOPHEN 1 TABLET: 7.5; 325 TABLET ORAL at 18:32

## 2023-01-16 RX ADMIN — BENZONATATE 100 MG: 100 CAPSULE ORAL at 09:44

## 2023-01-16 RX ADMIN — APIXABAN 10 MG: 5 TABLET, FILM COATED ORAL at 09:44

## 2023-01-16 RX ADMIN — AMLODIPINE BESYLATE 10 MG: 5 TABLET ORAL at 16:42

## 2023-01-16 RX ADMIN — HYDROCODONE BITARTRATE AND ACETAMINOPHEN 1 TABLET: 7.5; 325 TABLET ORAL at 01:36

## 2023-01-16 RX ADMIN — HYDROCODONE BITARTRATE AND ACETAMINOPHEN 1 TABLET: 7.5; 325 TABLET ORAL at 09:51

## 2023-01-16 RX ADMIN — Medication 10 ML: at 21:00

## 2023-01-16 ASSESSMENT — PAIN DESCRIPTION - DESCRIPTORS
DESCRIPTORS: ACHING
DESCRIPTORS: ACHING
DESCRIPTORS: SORE
DESCRIPTORS: CRAMPING;SORE
DESCRIPTORS: ACHING
DESCRIPTORS: ACHING

## 2023-01-16 ASSESSMENT — PAIN DESCRIPTION - LOCATION
LOCATION: RIB CAGE
LOCATION: CHEST
LOCATION: CHEST

## 2023-01-16 ASSESSMENT — PAIN SCALES - GENERAL
PAINLEVEL_OUTOF10: 7
PAINLEVEL_OUTOF10: 4
PAINLEVEL_OUTOF10: 6
PAINLEVEL_OUTOF10: 7

## 2023-01-16 ASSESSMENT — PAIN DESCRIPTION - ORIENTATION
ORIENTATION: LEFT;LOWER
ORIENTATION: LEFT
ORIENTATION: LEFT;LOWER
ORIENTATION: LEFT
ORIENTATION: LEFT;ANTERIOR
ORIENTATION: LEFT;LOWER

## 2023-01-16 ASSESSMENT — PAIN DESCRIPTION - PAIN TYPE
TYPE: ACUTE PAIN

## 2023-01-16 ASSESSMENT — PAIN DESCRIPTION - FREQUENCY
FREQUENCY: INTERMITTENT
FREQUENCY: CONTINUOUS

## 2023-01-16 ASSESSMENT — PAIN DESCRIPTION - ONSET
ONSET: ON-GOING
ONSET: GRADUAL

## 2023-01-16 ASSESSMENT — PAIN - FUNCTIONAL ASSESSMENT: PAIN_FUNCTIONAL_ASSESSMENT: ACTIVITIES ARE NOT PREVENTED

## 2023-01-16 NOTE — CARE COORDINATION
Peer Recovery Support Note     Name: Babatunde Ivan  Date: 1/16/2023          Chief Complaint   Patient presents with    Chest Pain       SOB         Peer Support met with patient. [x] Support and education provided  [x] Resources provided   [x] Treatment referral: [facility], 1/18/2023 at 10am  [] Other:   [] Patient declined peer recovery services      Referred By: Luis Gloria CM     Notes: PRS met with Eliane who was laying down but sat up to talk with PRS. Intially peer was dismissive of PRS sharing \"I'll probably just go back to [place] it's right down the street from my house\" PRS educated peer on [facility] which is also within walking distance from [facility]. She showed a big interest in this. PRS educated on expectations of IOP treatment, PRS plans to schedule appointment for peer prior to discharge. Peer is agreeable to this plan and expressed an understanding about her health/cocaine abuse affecting her health. PRS talked with peer about some of her struggles, and encouraged her to utilize IOP services/mental health counseling because addiction and mental health/grief go hand in hand, peer expressed an understanding. UPDATE:  Peer is being discharged TODAY 1/16, PRS contacted Tamara Brower at [facility] and confirmed [id number] an appointment for THIS Wednesday 1/18 at 10 am.  Peer was updated about this and agreeable to this plan. Peer given AA resources, DOA resources and PRS contact info with very much encouragement to call PRS with any further DOA needs, peer vocalized an understanding of appointment time and instructions/suggestions.   Liana giron notified

## 2023-01-16 NOTE — PLAN OF CARE
Problem: Discharge Planning  Goal: Discharge to home or other facility with appropriate resources  Outcome: Progressing  Flowsheets  Taken 1/16/2023 1200  Discharge to home or other facility with appropriate resources: Identify barriers to discharge with patient and caregiver  Taken 1/16/2023 0951  Discharge to home or other facility with appropriate resources: Identify barriers to discharge with patient and caregiver     Problem: Pain  Goal: Verbalizes/displays adequate comfort level or baseline comfort level  Outcome: Progressing     Problem: ABCDS Injury Assessment  Goal: Absence of physical injury  Outcome: Progressing     Problem: Chronic Conditions and Co-morbidities  Goal: Patient's chronic conditions and co-morbidity symptoms are monitored and maintained or improved  Outcome: Progressing  Flowsheets  Taken 1/16/2023 1200  Care Plan - Patient's Chronic Conditions and Co-Morbidity Symptoms are Monitored and Maintained or Improved: Monitor and assess patient's chronic conditions and comorbid symptoms for stability, deterioration, or improvement  Taken 1/16/2023 [address] - Patient's Chronic Conditions and Co-Morbidity Symptoms are Monitored and Maintained or Improved: Monitor and assess patient's chronic conditions and comorbid symptoms for stability, deterioration, or improvement

## 2023-01-16 NOTE — CARE COORDINATION
1/16/23 1351 CM note: NO COVID TESTING. UDS (+) COCAINE. Room air. NEW ELIQUIS. Met with patient at the bedside to discuss transition of care at discharge. Pt lives in a 2 floor home with 6 of her children ages 12, 6, 8, 6 y.o twins, 1 and 3 yr old (her 16 y.o does not live in her home, and pt lost a daughter at 26 wks last July). Pt states her mom is caring for her children, except she states one is with their dad, and 1 dad is getting out of [employer] tomorrow and will assist with his kids when he gets out. She is independent with ADLs, drives, and has no DME. Pt states she has been to [facility] in the past, but hasn't been there in a while, but plans to f/u at this site at discharge. Called [facility] and they state patient hasn't been to their office since 2016 and will need to establish as a new patient. Pt was agreeable to this and made an appointment with [facility] for Tuesday 1/24/23 at 11:30. Pts pharmacy is [facility] [address] in [place]. Pt has no hx HHC or SNF. Discussed her (+) UDS for cocaine and patient states she just plans on quitting, but is agreeable to speaking with Peer Recovery. Referral given to [facility]. Pt has a hx at [facility] but states that was more for anger mgmt then drugs, but states that its been awhile since she's been there. Discharge plan is home and pt declines need for HHC. Pts vehicle is in the parking lot and she plans to drive herself home.  Electronically signed by Viviane Mcgregor RN on 1/16/2023 at 2:37 PM

## 2023-01-16 NOTE — PROGRESS NOTES
[address] [place], [place] [address]ist   Progress Note    Admitting Date and Time: 1/14/2023 12:18 AM  Admit Dx: Shortness of breath [R06.02]  Acute chest pain [R07.9]  Pulmonary embolism on left [facility]) [I26.99]  Acute pulmonary embolism without acute cor pulmonale, unspecified pulmonary embolism type ([address] [I26.99]    Subjective/interval history:    1/14: Pt feels better as far as pain is concerned. Toradol helps pain better than morphine. Per RN, remains on room air. 1/15: Pt sitting up in bed eating food from home. She is feeling better but still having pain on left side when she takes a deeper breath. Per RN:  patient with hemoptysis overnight and Eliquis was to be placed on hold per nocturnist. This morning resumed(did not miss dose)    1/16: Patient sleeping but awakens easily to voice. She has mild chest pain with inspiration, but no hemoptysis.      amLODIPine  10 mg Oral Daily    apixaban  10 mg Oral BID    Followed by    Hipolito Aguirre ON 1/21/2023] apixaban  5 mg Oral BID    [Held by provider] labetalol  200 mg Oral 3 times per day    sodium chloride flush  5-40 mL IntraVENous 2 times per day    lidocaine  1 patch TransDERmal Daily     sodium chloride flush, 5-40 mL, PRN  sodium chloride, , PRN  ondansetron, 4 mg, Q8H PRN   Or  ondansetron, 4 mg, Q6H PRN  polyethylene glycol, 17 g, Daily PRN  acetaminophen, 650 mg, Q6H PRN   Or  acetaminophen, 650 mg, Q6H PRN  perflutren lipid microspheres, 1.5 mL, ONCE PRN  morphine, 2 mg, Q6H PRN  benzonatate, 100 mg, TID PRN  HYDROcodone-acetaminophen, 1 tablet, Q6H PRN  ketorolac, 30 mg, Q6H PRN       Objective:    BP (!) 158/98   Pulse 93   Temp 98.4 °F (36.9 °C) (Oral)   Resp 16   Ht 5' 7.5\" (1.715 m)   Wt 190 lb (86.2 kg)   LMP 01/04/2023 (Approximate)   SpO2 100%   BMI 29.32 kg/m²   General Appearance: alert and oriented to person, place and time and in no acute distress  Skin: warm and dry  Head: normocephalic and atraumatic  Eyes: pupils equal, round, and reactive to light, extraocular eye movements intact, conjunctivae normal  Neck: neck supple and non tender without mass   Pulmonary/Chest: Nonlabored on room air. Clear to auscultation bilateral  Cardiovascular: normal rate, normal S1 and S2 and no carotid bruits  Abdomen: soft, non-tender, non-distended, normal bowel sounds, no masses or organomegaly  Extremities: no cyanosis, no clubbing and no edema  Neurologic: no cranial nerve deficit and speech normal      Recent Labs     01/14/23  0036 01/15/23  0453 01/16/23  0555    135 136   K 3.8 3.5 3.6    103 104   CO2 22 22 24   BUN 10 9 11   CREATININE 0.9 0.8 0.7   GLUCOSE 189* 154* 95   CALCIUM 9.4 8.5* 8.8         Recent Labs     01/14/23 0036   ALKPHOS 87   PROT 7.4   LABALBU 3.9   BILITOT <0.2   AST 10   ALT 8         Recent Labs     01/14/23  0036 01/15/23  0453 01/16/23  0555   WBC 11.0 7.4 7.3   RBC 3.91 3.35* 3.37*   HGB 9.6* 8.1* 8.4*   HCT 32.0* 27.7* 27.8*   MCV 81.8 82.7 82.5   MCH 24.6* 24.2* 24.9*   MCHC 30.0* 29.2* 30.2*   RDW 17.0* 16.7* 17.0*    378 379   MPV 9.7 9.5 9.9        TTE procedure:Echo Limited Study, Echo Complete W/Doppler & Color Flow. Procedure Date   Date: 01/14/2023 Start: 08:17 AM     Study Location: Echo Lab   Technical Quality: Adequate visualization     Indications:Pulmonary embolus. Patient Status: Routine     Height: 67 inches Weight: 190 pounds BSA: 1.98 m^2 BMI: 29.76 kg/m^2     Rhythm: Within normal limits HR: 95 bpm BP: 154/100 mmHg      Findings        Left Ventricle    Left ventricle is borderline dilated. LVEDD 5.2 cm. LV systolic function is moderately reduced. Ejection fraction is visually estimated at 30-35%. Grade I diastolic dysfunction. No regional wall motion abnormalities seen; global hypokinesis. Mild left ventricular concentric hypertrophy noted.            Troponin [[id number]] Collected: 01/14/23 0036     Specimen: Blood Updated: 01/14/23 0219      Troponin, High Sensitivity 9 ng/L      CK [[id number]] Collected: 01/14/23 0036     Specimen: Blood Updated: 01/14/23 0219      Total CK 63 U/L        Lipase [[id number]] Collected: 01/14/23 0036     Specimen: Blood Updated: 01/14/23 0219      Lipase 41 U/L      Lactic Acid [[id number]] Collected: 01/14/23 0036     Specimen: Blood Updated: 01/14/23 0219      Lactic Acid 2.0 mmol/L          Urine Drug Screen [[id number]] (Abnormal) Collected: 01/14/23 0036     Specimen: Urine Updated: 01/14/23 0219      Amphetamine Screen, Urine NOT DETECTED      Barbiturate Screen, Ur NOT DETECTED      Benzodiazepine Screen, Urine NOT DETECTED      Cannabinoid Scrn, Ur NOT DETECTED      Cocaine Metabolite Screen, Urine POSITIVE      Opiate Scrn, Ur NOT DETECTED      PCP Screen, Urine NOT DETECTED      Methadone Screen, Urine NOT DETECTED      Oxycodone Urine NOT DETECTED      FENTANYL SCREEN, URINE NOT DETECTED      Drug Screen Comment: see below       Radiology:   US DUP LOWER EXTREMITIES BILATERAL VENOUS   Final Result   No evidence of DVT in either lower extremity. CTA PULMONARY W CONTRAST   Final Result   Left lower lobe segmental pulmonary embolism. Wedge-shaped consolidation in   this region, concerning for developing pulmonary infarct. No evidence of   right heart strain. Critical results were called by Dr. Vita Guerra MD to Dr. Ariana Daley on   1/14/2023 at 02:54. XR CHEST PORTABLE   Final Result   No acute process.              Assessment:  Principal Problem:    Pulmonary embolism on [facility])  Active Problems:    Cocaine abuse ([facility] [address]    Acute pulmonary embolism without acute cor pulmonale, unspecified pulmonary embolism type (HCC)    Primary hypertension    HFrEF (heart failure with reduced ejection fraction) (HCC)    Dilated cardiomyopathy (HCC)    Nonrheumatic mitral valve regurgitation    Nonrheumatic tricuspid valve regurgitation    Tobacco abuse    Left ventricular hypertrophy    Cocaine use  Resolved Problems:    * No resolved hospital problems. *      Plan:      Acute pulmonary embolism of LLL with probable pulmonary infarct.  -appreciated pulmonary input. Transitioned Lovenox to Eliquis. Continue apixaban 10 mg p.o. twice daily for 1 week total followed by 5 mg p.o. twice daily indefinitely  -Lidoderm and Toradol for pain control per pulmonary. -US of legs negative of DVT. -Echo ordered to evaluate for RV strain which was negative for that but showed other findings see below. Of note, CTA chest with no evidence of RV strain.   -Family Hx of clotting with DVT/PE in her sitter. Factor 5 Leiden ordered by monie and has been collected. Patient checked with sister while Dr. Brianna Gilliam was in room and she states her clotting problem was antiphospholipid syndrome. Work-up including (beta-2 glycoprotein antibodies, lupus anticoagulant, and anticardiolipin antibodies) drawn today    2. Newly diagnosed HFrEF/cardiomyopathy  -likely manifestation of her cocaine usage. Also does binge drink upwards of six 24 oz beers when she uses cocaine. However, patient did have full term pregnancy and delivered stillborn this past July. Alcohol cardiomyopathy maybe playing role as well not to mention uncontrolled HTN  -start lisinopril 10 mg p.o. daily and decrease amlodipine to 5 mg p.o. daily per cardiology recommendation    3. Uncontrolled hypertension  -BP uncontrolled on admission  -Labetalol discontinued given cocaine abuse  -Modifications to medication regimen as noted above    3. Cocaine abuse  -cessation counseled. 5. Disposition  -Continue to monitor treatment and response to above interventions. If blood pressure improved tomorrow, possible discharge home with close outpatient follow-up    Case discussed with Dr. Savanah Mann of pulmonology this afternoon   Case discussed with Dr. Ruma العراقي of cardiology this afternoon     NOTE: This report was transcribed using voice recognition software.  Every effort was made to ensure accuracy; however, inadvertent computerized transcription errors may be present.      Electronically signed by Jules Rogers DO on 1/16/2023 at 5:27 PM

## 2023-01-16 NOTE — PLAN OF CARE
Patient's chart updated to reflect:      . - HF care plan, HF education points and HF discharge instructions.  -Orders: 2 gram sodium diet, daily weights, I/O.  -PCP and cardiology follow up appointments to be scheduled within 7 days of hospital discharge. -CHF education session will be provided to the patient prior to hospital discharge.     Neeta Alfaro, RN MSN,RN  Heart Failure Navigator

## 2023-01-16 NOTE — DISCHARGE INSTRUCTIONS
HEART FAILURE  / CONGESTIVE HEART FAILURE  DISCHARGE INSTRUCTIONS:  GUIDELINES TO FOLLOW AT HOME    Self- Managed Care:     MEDICATIONS:  Take your medication as directed. If you are experiencing any side effects, inform your doctor, Do not stop taking any of your medications without letting your doctor know. Check with your doctor before taking any over-the-counter medications / herbal / or dietary supplements. They may interfere with your other medications. Do not take ibuprofen (Advil or Motrin) and naproxen (Aleve) without talking to your doctor first. They could make your heart failure worse. WEIGHT MONITORING:   Weigh yourself everyday (with the same scale) around the same time of the day and write it down. (you can chart them on a calendar or keep track of them on paper. Notify your doctor of a weight gain of 3 pounds or more in 1 day   OR a total of 5 pounds or more in 1 week    Take your weight record to your doctor visits  Also, the same goes if you loose more than 3# in one day, let your heart doctor know. DIET:   Cardiac heart healthy diet- Low saturated / low trans fat, no added salt, caffeine restricted, Low sodium diet-   No more than 2,000mg (2 grams) of salt / sodium per day (which equals to a little less than  a teaspoon of salt)  If your doctor wants you on a fluid restriction. ..it is usually recommended a fluid limit of 2,000cc -  Fluid restriction- 2,000 ml (milliliters) = 64 ounces = you can have 8 glasses of fluid per day (each glass 8 ounces)    Follow a low salt diet - avoid using salt at the table, avoid / limit use of canned soups, processed / packaged foods, salted snacks, olives and pickles. Do not use a salt substitute without checking with your doctor, they may contain a high amount of potassioum. (Mrs. Eliceo March is safe to use).     Limit the use of alcohol       CALL YOUR DOCTOR THE FIRST DAY YOU NOTICE ANY OF THESE   SYMPTOMS:  You have a weight gain of 3 pounds or more in 1 day         OR 5 pounds or more in one week  More shortness of breath  More swelling of your stomach, legs, ankles or feet  Feeling more tired, No energy  Dry hacky cough  Dizziness  More chest pain / discomfort       (CALL 911 IF ANY OF THE FOLLOWING OCCURS  Chest pain (not relieved with nitroglycerine, if you have been prescribed this medication)  Severe shortness of breath  Faint / Pass out  Confusion / cannot think clearly  If symptoms get worse           SMOKING - TOBACCO USE:  * IF YOU SMOKE - STOP! Kick the habit. [address] Program is offered at [facility] and [address] [facility]. Call [phone] extension [phone] for more information. ACTIVITY:   (Ask your doctor when you will be able to return to work and before starting any exercise program.  Do not drive unless unless your doctor has given you permission to do so). Start light exercise. Even if you can only do a small amount, exercise will help you get stronger, have more energy, help manage your weight and decrease  stress. Walking is an easy way to get exercise. Start out slowly and  increase the amount you walk as tolerated  If you become short of breath, dizzy or have chest pain; stop, sit down, and rest.  If you feel \"wiped out\" the day after you exercise, walk at a slower pace or for a shorter distance. You can gradually increase the pace or amount of time. (Do not exercise right after a meal or in extreme temperatures, such as above 85 degrees, if the air is really humid, or wind chill is less than 20 degrees)                                             ADDITIONAL INFORMATION:  Avoid getting sick from colds and the flu. Stay away from friends or family that you know may have a contagious illness  Get plenty of rest   Get a flu shot each year. Get a pneumococcal vaccine shot.  If you have had one before, ask your doctor whether you need another dose. My Goal for Self-management of Heart Failure Includes 5 steps :    1. Notice a change in symptoms ( weight gain, short of breath, leg swelling, decreased activity level, bloating. ...)    2. Evaluate the change: (use the Heart Failure Zones )     3. Decide to take action: decide what your options are, such as: (call your doctor for an extra visit, take a prescribed medication, such as your water pill if your doctor has given you directions to do so, [address])    4. Come up with a strategy:  (now you call the doctor for advice / appointment. This is where you take action!!! Do not wait, catch the symptom early and treat it before it worsens. 5. Evaluate the response: The next day, check your Heart Failure Zones: are you in the GREEN ZONE (safe zone)? Worsening symptoms of YELLOW ZONE? Or have you moved to the RED ZONE and need to call 911 or go to the Emergency Room for evaluation? Call your doctor's office to update them on your symptoms of heart failure.

## 2023-01-16 NOTE — PROGRESS NOTES
Pulmonary/Critical Care Progress Note    We are following patient for HFrEF, pulmonary embolism with pulmonary infarct, cocaine use, COPD, nicotine addiction, probable hypercoagulable state    IMPRESSION:  Left lower lobe pulmonary embolus with infarction  Likely hypercoagulable state  COPD  HFrEF with LVEF 35%  Nicotine addiction  Many years of cocaine use    Lifetime anticoagulation if there is no contraindication  If factor V Leiden phospholipid testing and prothrombin mutation have not been obtained, these will be ordered  Must stop all illicit drug use  Must quit smoking    1/16/2023   Appreciate cardiology consultation  Overall improved  Drug abuse cessation counseling  Issues as above      __________________________________________    Subjective  Improved   Off O2    Events last 24 hr  Cardio consultation appreciated          MEDICATIONS:   amLODIPine  10 mg Oral Daily    apixaban  10 mg Oral BID    Followed by    Tara Olivares ON 1/21/2023] apixaban  5 mg Oral BID    [Held by provider] labetalol  200 mg Oral 3 times per day    sodium chloride flush  5-40 mL IntraVENous 2 times per day    lidocaine  1 patch TransDERmal Daily      sodium chloride       sodium chloride flush, sodium chloride, ondansetron **OR** ondansetron, polyethylene glycol, acetaminophen **OR** acetaminophen, perflutren lipid microspheres, morphine, benzonatate, HYDROcodone-acetaminophen, ketorolac      REVIEW OF SYSTEMS:  Constitutional: Denies fever, weight loss, night sweats, and fatigue  Skin: Denies pigmentation, dark lesions, and rashes   HEENT: Denies hearing loss, tinnitus, ear drainage, epistaxis, sore throat, and hoarseness. Cardiovascular: Denies palpitations, chest pain, and chest pressure. Respiratory: Denies cough, dyspnea at rest, hemoptysis, apnea, and choking.   Gastrointestinal: Denies nausea, vomiting, poor appetite, diarrhea, heartburn or reflux  Genitourinary: Denies dysuria, frequency, urgency or hematuria  Musculoskeletal: Denies myalgias, muscle weakness, and bone pain  Neurological: Denies dizziness, vertigo, headache, and focal weakness  Psychological: Denies anxiety and depression  Endocrine: Denies heat intolerance and cold intolerance  Hematopoietic/Lymphatic: Denies bleeding problems and blood transfusions    OBJECTIVE:  Vitals:    01/16/23 1403   BP:    Pulse:    Resp: 16   Temp:    SpO2:            O2 Device: None (Room air)    PHYSICAL EXAM:  Constitutional: No fever, chills, diaphoresis  Skin: No skin rash, no skin breakdown  HEENT: Unremarkable  Neck: No JVD, lymphadenopathy, thyromegaly  Cardiovascular: S1, S2 regular. No S3 or rubs present  Respiratory: Crackles left lower lung field consistent with pulmonary infarction  Gastrointestinal: Soft, obese, nontender  Genitourinary: No CVA tenderness  Extremities: Clubbing, cyanosis, or edema  Neurological: Awake, alert, oriented x3. No evidence of focal motor or sensory deficits  Psychological: Appropriate affect.     LABS:  WBC   Date Value Ref Range Status   01/16/2023 7.3 4.5 - 11.5 E9/L Final   01/15/2023 7.4 4.5 - 11.5 E9/L Final   01/14/2023 11.0 4.5 - 11.5 E9/L Final     Hemoglobin   Date Value Ref Range Status   01/16/2023 8.4 (L) 11.5 - 15.5 g/dL Final   01/15/2023 8.1 (L) 11.5 - 15.5 g/dL Final   01/14/2023 9.6 (L) 11.5 - 15.5 g/dL Final     Hematocrit   Date Value Ref Range Status   01/16/2023 27.8 (L) 34.0 - 48.0 % Final   01/15/2023 27.7 (L) 34.0 - 48.0 % Final   01/14/2023 32.0 (L) 34.0 - 48.0 % Final     MCV   Date Value Ref Range Status   01/16/2023 82.5 80.0 - 99.9 fL Final   01/15/2023 82.7 80.0 - 99.9 fL Final   01/14/2023 81.8 80.0 - 99.9 fL Final     Platelets   Date Value Ref Range Status   01/16/2023 379 130 - 450 E9/L Final   01/15/2023 378 130 - 450 E9/L Final   01/14/2023 438 130 - 450 E9/L Final     Sodium   Date Value Ref Range Status   01/16/2023 136 132 - 146 mmol/L Final   01/15/2023 135 132 - 146 mmol/L Final   01/14/2023 136 132 - 146 mmol/L Final     Potassium   Date Value Ref Range Status   01/16/2023 3.6 3.5 - 5.0 mmol/L Final   01/14/2023 3.8 3.5 - 5.0 mmol/L Final   07/22/2022 3.8 3.5 - 5.0 mmol/L Final     Potassium reflex Magnesium   Date Value Ref Range Status   01/15/2023 3.5 3.5 - 5.0 mmol/L Final   03/28/2021 4.0 3.5 - 5.0 mmol/L Final   03/27/2021 3.7 3.5 - 5.0 mmol/L Final     Chloride   Date Value Ref Range Status   01/16/2023 104 98 - 107 mmol/L Final   01/15/2023 103 98 - 107 mmol/L Final   01/14/2023 102 98 - 107 mmol/L Final     CO2   Date Value Ref Range Status   01/16/2023 24 22 - 29 mmol/L Final   01/15/2023 22 22 - 29 mmol/L Final   01/14/2023 22 22 - 29 mmol/L Final     BUN   Date Value Ref Range Status   01/16/2023 11 6 - 20 mg/dL Final   01/15/2023 9 6 - 20 mg/dL Final   01/14/2023 10 6 - 20 mg/dL Final     Creatinine   Date Value Ref Range Status   01/16/2023 0.7 0.5 - 1.0 mg/dL Final   01/15/2023 0.8 0.5 - 1.0 mg/dL Final   01/14/2023 0.9 0.5 - 1.0 mg/dL Final     Glucose   Date Value Ref Range Status   01/16/2023 95 74 - 99 mg/dL Final   01/15/2023 154 (H) 74 - 99 mg/dL Final   01/14/2023 189 (H) 74 - 99 mg/dL Final   11/08/2010 90 70 - 110 mg/dL Final     Calcium   Date Value Ref Range Status   01/16/2023 8.8 8.6 - 10.2 mg/dL Final   01/15/2023 8.5 (L) 8.6 - 10.2 mg/dL Final   01/14/2023 9.4 8.6 - 10.2 mg/dL Final     Total Protein   Date Value Ref Range Status   01/14/2023 7.4 6.4 - 8.3 g/dL Final   07/22/2022 6.5 6.4 - 8.3 g/dL Final   06/18/2022 6.7 6.4 - 8.3 g/dL Final     Albumin   Date Value Ref Range Status   01/14/2023 3.9 3.5 - 5.2 g/dL Final   07/22/2022 3.2 (L) 3.5 - 5.2 g/dL Final   06/18/2022 3.6 3.5 - 5.2 g/dL Final     Total Bilirubin   Date Value Ref Range Status   01/14/2023 <0.2 0.0 - 1.2 mg/dL Final   07/22/2022 0.3 0.0 - 1.2 mg/dL Final   06/18/2022 <0.2 0.0 - 1.2 mg/dL Final     Alkaline Phosphatase   Date Value Ref Range Status   01/14/2023 87 35 - 104 U/L Final   07/22/2022 146 (H) 35 - 104 U/L Final 06/18/2022 80 35 - 104 U/L Final     AST   Date Value Ref Range Status   01/14/2023 10 0 - 31 U/L Final   07/22/2022 44 (H) 0 - 31 U/L Final   06/18/2022 12 0 - 31 U/L Final     ALT   Date Value Ref Range Status   01/14/2023 8 0 - 32 U/L Final   07/22/2022 40 (H) 0 - 32 U/L Final   06/18/2022 6 0 - 32 U/L Final     Est, Glom Filt Rate   Date Value Ref Range Status   01/16/2023 >60 >=60 mL/min/1.73 Final     Comment:     Pediatric calculator link  [employer].at. [email or web address]/gfr_calculatorped  Effective Oct 3, 2022  These results are not intended for use in patients  <25years of age. eGFR results are calculated without  a race factor using the 2021 CKD-EPI equation. Careful  clinical correlation is recommended, particularly when  comparing to results calculated using previous equations. The CKD-EPI equation is less accurate in patients with  extremes of muscle mass, extra-renal metabolism of  creatinine, excessive creatinine ingestion, or following  therapy that affects renal tubular secretion. 01/15/2023 >60 >=60 mL/min/1.73 Final     Comment:     Pediatric calculator link  [employer]at. [email or web address]/gfr_calculatorped  Effective Oct 3, 2022  These results are not intended for use in patients  <25years of age. eGFR results are calculated without  a race factor using the 2021 CKD-EPI equation. Careful  clinical correlation is recommended, particularly when  comparing to results calculated using previous equations. The CKD-EPI equation is less accurate in patients with  extremes of muscle mass, extra-renal metabolism of  creatinine, excessive creatinine ingestion, or following  therapy that affects renal tubular secretion. 01/14/2023 >60 >=60 mL/min/1.73 Final     Comment:     Pediatric calculator link  [employer]at. [email or web address]/gfr_calculatorped  Effective Oct 3, 2022  These results are not intended for use in patients  <25years of age.   eGFR results are calculated without  a race factor using the 2021 CKD-EPI equation. Careful  clinical correlation is recommended, particularly when  comparing to results calculated using previous equations. The CKD-EPI equation is less accurate in patients with  extremes of muscle mass, extra-renal metabolism of  creatinine, excessive creatinine ingestion, or following  therapy that affects renal tubular secretion. GFR    Date Value Ref Range Status   07/22/2022 >60  Final   06/18/2022 >60  Final   06/17/2022 >60  Final     Magnesium   Date Value Ref Range Status   01/15/2023 1.9 1.6 - 2.6 mg/dL Final   07/24/2022 4.4 (H) 1.6 - 2.6 mg/dL Final   07/24/2022 4.4 (H) 1.6 - 2.6 mg/dL Final     Phosphorus   Date Value Ref Range Status   03/31/2021 5.5 (H) 2.5 - 4.5 mg/dL Final   03/30/2021 5.1 (H) 2.5 - 4.5 mg/dL Final   03/29/2021 4.9 (H) 2.5 - 4.5 mg/dL Final     No results for input(s): PH, PO2, PCO2, HCO3, BE, O2SAT in the last 72 hours. RADIOLOGY:  US DUP LOWER EXTREMITIES BILATERAL VENOUS   Final Result   No evidence of DVT in either lower extremity. CTA PULMONARY W CONTRAST   Final Result   Left lower lobe segmental pulmonary embolism. Wedge-shaped consolidation in   this region, concerning for developing pulmonary infarct. No evidence of   right heart strain. Critical results were called by Dr. Rosalee Millard MD to Dr. Doug Collazo on   1/14/2023 at 02:54. XR CHEST PORTABLE   Final Result   No acute process. PROBLEM LIST:  Principal Problem:    Pulmonary embolism on left [facility])  Active Problems:    Cocaine abuse ([facility] [address]    Acute pulmonary embolism without acute cor pulmonale, unspecified pulmonary embolism type ([facility] [address]    Uncontrolled hypertension  Resolved Problems:    * No resolved hospital problems.  *              Electronically signed by Sergey Erazo MD on 1/16/2023 at 3:01 PM

## 2023-01-16 NOTE — CONSULTS
CHF NURSE NAVIGATOR CONSULT NOTE:      Patient currently admitted with diagnosis of Systolic heart failure. Patient was awake and alert sitting in bed during the consultation. She was engaged and asked appropriate questions throughout the education session. She is agreeable to symptom monitoring, daily weights, and following a low sodium diet. . Scheduling with PCP (1/24/23 11:30) [facility] Cardiology and the CHF clinic Yes. Inbox Message sent to Manuela Chilel to schedule HFU appointment with  [facility]. Future Appointments   Date Time Provider Jenelle Carrizales   1/18/2023 10:00 AM SCHEDULE, [facility] DUAL INTAKE [facility] DUAL TR Ebonie Juarez   2/1/2023 10:30 AM [facility] CHF ROOM 1 [facility]       Barriers to Care:  Contributing risk factors for Heart Failure are identified as impairment:  substance abuse: cocaine,ETOH overwhelmed by complexity of regimen, medication side effects, and multiple comorbidites -Anemia, VHD, substance abuse, tobacco abuse, HTN. Pt admitted with fatigue, SOB with minimal activity. Pt given HF book, HF zones with weight tracker and CHF clinic information. Pt does not have a scale but will purchase one at discharge. Pt cooks and shops for herself, she has reliable transportation to her appointments. Denies problems obtaining and taking medications at this time. Reinforced importance of daily weights, taking medications as prescribed, following a low sodium diet, smoking cessation and abstaining from drug and alcohol use and keeping follow up appointments. Smoking cessation education on discharge AVS. Pt is following up with [facility] for IOP. She is interested in establishing with the [address] [place] [address] CHF clinic. The patient is ordered:  Diet: ADULT DIET; Regular; 4 carb choices (60 gm/meal);  Low Sodium (2 gm)   Sodium controlled diet Yes  Fluid restriction daily ordered (fluid restriction recommended if patient is hyponatremic and/or diuretic is initiated or increased) No  FR: Daily Weights: Patient Vitals for the past 96 hrs (Last 3 readings):   Weight   01/14/23 0029 190 lb (86.2 kg)     I/O:   Intake/Output Summary (Last 24 hours) at 1/16/2023 1728  Last data filed at 1/16/2023 0604  Gross per 24 hour   Intake 240 ml   Output --   Net 240 ml              We reviewed the introduction to Heart Failure, the HF zones, signs and symptoms to report on day 1 of onset, medications, medication compliance, the importance of obtaining daily weights, following a low sodium diet, reading food labels for the sodium content, keeping physician appointments, and smoking cessation. We discussed writing / tracking daily weights on a calendar / log because a 5 pound gain in 1 week can sneak up if you are not tracking it. I advised patient they can reduce the risk for Heart Failure exacerbations by modifying / controlling the risk factors. We discussed self-managed care which includes the following:  to take medications as prescribed, report any intolerable side effects of medications to the cardiologist / doctor, do not just stop taking the medication; follow a cardiac heart healthy / low sodium diet; weigh yourself daily, exercise regularly- per doctor recommendation and not to smoke or use an excess amount of alcohol. We discussed calling the cardiologist / doctor with a weight gain of 3 pounds in one day or a total of 5 pounds or more in one week. Also, if you should have a significant weight loss of 3# or more in one day to call the doctor, they may need to decrease or hold the diuretic dose. On days you feels nauseated and not eating / drinking, having emesis or diarrhea,  informed to call the cardiologist  / doctor, they may need to decrease or hold diuretic to avoid dehydration. I stressed the importance of informing their cardiologist the first day of onset of any of the signs and symptoms in the \"Yellow Zone\" of the HF Zones. Patient verbalizes understanding.   Greater than 30 minutes was spent educating patient.      The Heart Failure Booklet given to the patient with additional patient education addressing:  What is Heart Failure?  Things You Can Do to Live Well with HF  How to Take Your Medications  How to Eat Less Salt  [place] its Salt?  Exercising Well with Heart Failure  Signs and symptoms of HF to report  Weight Yourself Each Day  Home Self Management- activity, weight tracking, taking medications as prescribed, meals /diet planning (sodium and fluid restriction), how to read food labels, keeping physician follow ups, smoking cessation, follow the “Heart Failure Zones”  The Heart Failure zones  Every Dose Every Day      Instructed  to call 911 if you have any of the following symptoms:       Struggling to breathe unrelieved with rest,     Having chest pain     Have confusion or can’t think clearly      Neeta Alfaro MSN, RN  Heart Failure Navigator       CONGESTIVE HEART FAILURE (CHF) AHA GUIDELINES  (Must be completed for Primary Diagnosis CHF or History of CHF)    Discharge Plan:  I placed the Heart Failure Home Instructions in patient's discharge instructions.   Per Heart Failure GWTG, the patient should have a follow-up appointment made within 7 days of discharge.    New Diagnosis Yes    ECHO Results most recent: 1/14/23 EF 30-35%  Lab Results   Component Value Date    LVEF 33 01/14/2023                                        Social History     Tobacco Use   Smoking Status Every Day    Packs/day: 0.25    Years: 4.00    Pack years: 1.00    Types: Cigarettes   Smokeless Tobacco Never        There is no immunization history for the selected administration types on file for this patient.     HFrEF  Angiotensin-Converting-Enzyme (ACE) inhibitor ordered:  [] Yes Lisinopril   [x] No (specify contraindication):    [] Contraindicated  [] Hypotensive patient who was at immediate risk of cardiogenic shock  [] Hospitalized patient who experienced marked azotemia  [] Other  Contraindications  [] Not Eligible  [] Not Tolerant  [] Patient Reason  [] System Reason  [] Other Reason    Angiotensin II receptor blockers (ARB) ordered:  [] Yes  [x] No (specify contraindication):    [] Contraindicated  [] Hypotensive patient who was at immediate risk of cardiogenic shock  [] Hospitalized patient who experienced marked azotemia  [] Other Contraindications    ARNI - Angiotensin Receptor Neprilysin Inhibitor ordered:  [] Yes  [x] No (specify contraindication):    [] ACE inhibitor use within the prior 36 hours  [] Allergy  [] Hyperkalemia  [] Hypotension  [] Renal dysfunction defined as creatinine > 2.5 mg/dL in men or > 2.0 mg/dL in women  [] Other Contraindications  [] Not Eligible  [] Not Tolerant  [] Patient Reason  []System Reason  []Other Reason      Beta Blocker ordered:    [] Yes  [x] No (specify contraindication):    [x] Contraindicated  [] Asthma  [] Fluid Overload  [] Low Blood Pressure  [] Patient recently treated with an intravenous positive inotropic agent  [] Other Contraindications  [] Not Eligible  [] Not Tolerant  [] Patient Reason  [] System Reason    SGLT2 Inhibitor ordered:  [] Yes  [x] No (specify contraindication):    [] Contraindicated  [] Patient currently on dialysis  [] Ketoacidosis  [] Known hypersensitivity to the medication  [] Type I diabetes (not approved for use in patients with Type I diabetes due to increased risk of ketoacidosis)  [] Other Contraindications  [] Not Eligible  [] Not Tolerant  [] Patient Reason  [] System Reason  [x] Other Reason    Aldosterone Antagonist ordered:  [] Yes  [x] No (specify contraindication):    [] Contraindicated  [] Allergy due to aldosterone receptor antagonist  [] Hyperkalemia  [] Renal dysfunction defined as creatinine >2.5 mg/dL in men or >2.0 mg/dL in women.   [] Other contraindications  [] Not Eligible  [] Not Tolerant  [] Patient Reason  [] System Reason  [x] Other Reason

## 2023-01-16 NOTE — CONSULTS
Inpatient Cardiology Consultation      Reason for Consult:  echo with new reduced LV function - patient with hx of cocaine abuse     Consulting Physician: Dr. Falk Daughters    Requesting Physician:  Dr. Joel Todd     Date of Consultation: 1/16/2023    HISTORY OF PRESENT ILLNESS:   Ms. Solitario Zepeda is a 27 yr old female, not previously known to [facility] Card[facility]. PMH:  G 12 L 7  Recently pregnant - delivered 7/2022 (baby passed away) not currently breastfeeding, Preeclampsia during pregnancy, Polysubstance abuse, Tobacco abuse, HTN, HSV, noncompliance     [facility] ED 1/14/2023 0012 via car for SOB / L sided Chest Pain  ED Arrival Vitals: T 97.9 RR 20  - 120 149/125 SPO2 91% RA BMI 29.3   ED Treatment: NS IV 1 L, Morphine IV 4 mg    Significant Labs: HS Trop 9 CK total 63 BUN 11 Cr 0.7 K 3.6 Mag 1.9 Alb 3.9 WBC 7.3 H/H 9.6 / 32.0 >> 8.4 / 27.8 Plt 379   Urine drug screen + cocaine   / Urine Pregnancy negative   RAD: CTA Chest: LLL PE + Wedge-shaped consolidation in this region, concerning for developing pulmonary infarct. No evidence of right heart strain. CXR negative  US BLE negative for DVT    Patient seen & examined:  Most recent vitals: T 98.4 RR 16 HR 82 /91 SPO2 98% RA  Patient tells me that for about 2 weeks PTA patient was bed bound due to extreme fatigue, body aches, cough, headache. The last few days PTA she noted severe ASKEW with minimal activity to BR. She tells me she last used cocaine 1 week ago. Denies having specific chest pain, palpitations, dizziness, orthopnea, PND, lower extremity or abdominal swelling. She is currently in no distress at rest.        Please note: past medical records were reviewed per electronic medical record (EMR) - see detailed reports under Past Medical/ Surgical History. Past Medical History:    TTE  limited  1/14/2023 Dr Christina Albright: Summary  Left ventricle is borderline dilated. LVEDD 5.2 cm. LV systolic function is moderately reduced.   Ejection fraction is visually estimated at 30-35%. Grade I diastolic dysfunction. No regional wall motion abnormalities seen; global hypokinesis. Mild left ventricular concentric hypertrophy noted. Right ventricle global systolic function is mildly reduced. Mild mitral regurgitation. Mild tricuspid regurgitation. Polysubstance Abuse - cocaine - recent, marijuana - when a teen, meth per records - patient denies   Tobacco abuse 0.25 PPD for 4 years (1 pack year)  HTN  HSV  Noncompliance   JERAMY   - kidney biopsy 3/29/2021  - Cr was 3.0   Chronic anemia    16 Abortions 9 Living 7 (1 set of twins) Recently pregnant - delivered 2022 (baby passed away) not currently breastfeeding, Preeclampsia during pregnancy  Covid infection       Past Surgical History:    Past Surgical History:   Procedure Laterality Date    IR BIOPSY KIDNEY PERCUTANEOUS  3/29/2021    IR BIOPSY KIDNEY PERCUTANEOUS 3/29/2021 [facility] SPECIAL PROCEDURES         Medications Prior to admit:  Prior to Admission medications    Medication Sig Start Date End Date Taking? Authorizing Provider   labetalol (NORMODYNE) 200 MG tablet Take 1 tablet by mouth in the morning and 1 tablet at noon and 1 tablet before bedtime.  22  Rai Maldonado MD   Prenatal Vit-Fe Fumarate-FA (PRENATAL VITAMIN) 27-0.8 MG TABS Take 1 tablet by mouth daily 22   Rai Maldonado MD   aspirin 81 MG EC tablet Take 81 mg by mouth daily    Historical Provider, MD       Current Medications:    Current Facility-Administered Medications: apixaban (ELIQUIS) tablet 10 mg, 10 mg, Oral, BID **FOLLOWED BY** [START ON 2023] apixaban (ELIQUIS) tablet 5 mg, 5 mg, Oral, BID  labetalol (NORMODYNE) tablet 200 mg, 200 mg, Oral, 3 times per day  sodium chloride flush 0.9 % injection 5-40 mL, 5-40 mL, IntraVENous, 2 times per day  sodium chloride flush 0.9 % injection 5-40 mL, 5-40 mL, IntraVENous, PRN  0.9 % sodium chloride infusion, , IntraVENous, PRN  ondansetron (ZOFRAN-ODT) disintegrating tablet 4 mg, 4 mg, Oral, Q8H PRN **OR** ondansetron (ZOFRAN) injection 4 mg, 4 mg, IntraVENous, Q6H PRN  polyethylene glycol (GLYCOLAX) packet 17 g, 17 g, Oral, Daily PRN  acetaminophen (TYLENOL) tablet 650 mg, 650 mg, Oral, Q6H PRN **OR** acetaminophen (TYLENOL) suppository 650 mg, 650 mg, Rectal, Q6H PRN  perflutren lipid microspheres (DEFINITY) injection 1.5 mL, 1.5 mL, IntraVENous, ONCE PRN  morphine (PF) injection 2 mg, 2 mg, IntraVENous, Q6H PRN  benzonatate (TESSALON) capsule 100 mg, 100 mg, Oral, TID PRN  HYDROcodone-acetaminophen (NORCO) 7.5-325 MG per tablet 1 tablet, 1 tablet, Oral, Q6H PRN  ketorolac (TORADOL) injection 30 mg, 30 mg, IntraVENous, Q6H PRN  lidocaine 4 % external patch 1 patch, 1 patch, TransDERmal, Daily    Allergies:  Patient has no known allergies. Social History:    Polysubstance Abuse - cocaine - recent, marijuana - when a teen, meth per records - patient denies   Tobacco abuse 0.25 PPD for 4 years (1 pack year)    Lives with 7 children - 1 has Autism spectrum disorder / just lost a baby in July 2022 after delivery. Normally active - takes care of children     Family History:   Family History   Problem Relation Age of Onset    Hearing Loss Sister     Hypertension Sister     Heart Disease \"hole in heart\"  Sister     Heart Surgery Sister     Pulmonary Embolism Sister     Deep Vein Thrombosis Sister     Diabetes Maternal Grandfather    Mother alive - she is unsure of health  Dad - alive but unknown to patient     REVIEW OF SYSTEMS:     Constitutional: See HPI  Eyes: Denies visual changes or drainage  ENT: Denies headaches or hearing loss. No mouth sores or sore throat. No epistaxis   Cardiovascular: Denies chest pain, pressure or palpitations. No lower extremity swelling. Respiratory: See HPI - denies cough, orthopnea or PND. No hemoptysis   Gastrointestinal: Denies hematemesis or anorexia.  No hematochezia or melena  / intermittent constipation   Genitourinary: Denies urgency, dysuria or hematuria. GYN - Recently pregnant - delivered 7/2022 (baby passed away) not currently breastfeeding  LMP 1/4/2023 approximately - no current vaginal bleeding   Musculoskeletal: Denies gait disturbance, weakness or joint complaints  Integumentary: Denies rash, hives or pruritis   Neurological: Denies dizziness, headaches or seizures. No numbness or tingling  Psychiatric: Denies anxiety or depression. Endocrine: Denies temperature intolerance. No recent weight change. .  Hematologic/Lymphatic: Denies abnormal bruising or bleeding. No swollen lymph nodes    PHYSICAL EXAM:      BP (!) 134/91   Pulse 82   Temp 98.4 °F (36.9 °C) (Oral)   Resp 16   Ht 5' 7.5\" (1.715 m)   Wt 190 lb (86.2 kg)   LMP 01/04/2023 (Approximate)   SpO2 98%   BMI 29.32 kg/m²   CONST:  Well developed, well nourished young black female who appears of stated age. Awake, alert and cooperative. No apparent distress. HEENT:   Head- Normocephalic, atraumatic   Eyes- Conjunctivae pink  Throat- Oral mucosa pink and moist  Neck-  No stridor, trachea midline, no jugular venous distention. No carotid bruit. CHEST: Chest symmetrical and non-tender to palpation. No accessory muscle use or intercostal retractions  RESPIRATORY: Lung sounds - diminished throughout fields   CARDIOVASCULAR:     Heart Inspection- shows no noted pulsations  Heart Palpation- no heaves or thrills  Heart Ausculation- Regular rate and rhythm, no murmur. No s3, s4 or rub   PV: No lower extremity edema. No varicosities. Pedal pulses palpable, no clubbing or cyanosis   ABDOMEN: Soft, non-tender to light palpation. Bowel sounds present. No palpable masses   MS: Good muscle strength and tone. No atrophy or abnormal movements. : Deferred  SKIN: Warm and dry no statis dermatitis or ulcers   NEURO / PSYCH: Oriented to person, place and time. Speech clear and appropriate. Follows all commands.  Pleasant affect     DATA:    12 lead ECG:  bpm nonspecific lateral t wave inversion    Tele strips:  - NSR 80's     Diagnostic: see HPI       Intake/Output Summary (Last 24 hours) at 1/16/2023 1040  Last data filed at 1/16/2023 0604  Gross per 24 hour   Intake 360 ml   Output --   Net 360 ml       Labs:   CBC:   Recent Labs     01/15/23  0453 01/16/23  0555   WBC 7.4 7.3   HGB 8.1* 8.4*   HCT 27.7* 27.8*    379     BMP:   Recent Labs     01/15/23  0453 01/16/23  0555    136   K 3.5 3.6   CO2 22 24   BUN 9 11   CREATININE 0.8 0.7   LABGLOM >60 >60   CALCIUM 8.5* 8.8     Mag:   Recent Labs     01/15/23  0453   MG 1.9     CARDIAC ENZYMES:  Recent Labs     01/14/23 0036   CKTOTAL 63   TROPHS 9      FASTING LIPID PANEL:No results found for: CHOL, HDL, LDLDIRECT, LDLCALC, TRIG  LIVER PROFILE:  Recent Labs     01/14/23 0036   AST 10   ALT 8   LABALBU 3.9     Discussed with patient need for Cocaine & Tobacco cessation. Assessment & Plan: Per Dr. Daniela Hernandez     Electronically signed by MATILDE Charles on 1/16/2023 at 10:40 AM      [id number] [facility] Cardiology Consult       Fora Body    I have personally participated in a face-to-face and personally obtained history and performed physical exam on the date of service. I reviewed chart, vitals, labs and radiologic studies. I also participated in medical decision making with MATILDE Charles on the date of service All of the assessments and recommendations are from me and I agree with all of the pertinent clinical information, assessment and treatment plan. I have reviewed and edited the note above based on my findings during my history, exam, and decision making. Please see my additional contributions to the history, physical exam, assessment, and recommendations below. HISTORY OF PRESENT ILLNESS:     Reviewed, As above    Past medical history:  Reviewed, as above. Past surgical history:  Reviewed, as above.     Current medications:  Reviewed, as above    Allergies:  Reviewed, as above    Social history:  Reviewed, as above    Family medical history:  Reviewed, as above. REVIEW OF SYSTEMS:   Reviewed, as above. PHYSICAL EXAM:   CONSTITUTIONAL:  awake, alert, cooperative, no apparent distress, and appears stated age  EYES:  lids and lashes normal and pupils equal, round and reactive to light, anicteric sclerae  HEAD:  normocepalic, without obvious abnormality, atraumatic, pink, moist mucous membranes. NECK:  Supple, symmetrical, trachea midline, no adenopathy, thyroid symmetric, not enlarged and no tenderness, skin normal  HEMATOLOGIC/LYMPHATICS:  no cervical lymphadenopathy and no supraclavicular lymphadenopathy  LUNGS:  No increased work of breathing, good air exchange, clear to auscultation bilaterally, no crackles or wheezing  CARDIOVASCULAR:  Normal apical impulse, regular rate and rhythm, normal S1 and S2, no S3 or S4, and no murmur noted and no JVD, no carotid bruit, no pedal edema, good carotid upstroke bilaterally. ABDOMEN:  Soft, nontender, no masses, no hepatomegaly or splenomegaly, BS+  CHEST: nontender to palpation, expands symmetrically  MUSCULOSKELETAL:  No clubbing no cyanosis. there is no redness, warmth, or swelling of the joints  full range of motion noted  NEUROLOGIC:  Alert, awake,oriented x3  SKIN:  no bruising or bleeding, normal skin color, texture, turgor and no redness, warmth, or swelling    BP (!) 134/91   Pulse 82   Temp 98.4 °F (36.9 °C) (Oral)   Resp 16   Ht 5' 7.5\" (1.715 m)   Wt 190 lb (86.2 kg)   LMP 01/04/2023 (Approximate)   SpO2 98%   BMI 29.32 kg/m²       I/O last 3 completed shifts: In: 720 [P.O.:720]  Out: -   No intake/output data recorded. DATA:   I personally reviewed the admission EKG with the following interpretation: Sinus tachycardia, LVH by voltage criteria, possible left atrial enlargement    ECHO: 1/14/2023,  Summary   Left ventricle is borderline dilated. LVEDD 5.2 cm. LV systolic function is moderately reduced.    Ejection fraction is visually estimated at 30-35%. Grade I diastolic dysfunction. No regional wall motion abnormalities seen; global hypokinesis. Mild left ventricular concentric hypertrophy noted. Right ventricle global systolic function is mildly reduced. Mild mitral regurgitation. Mild tricuspid regurgitation. Stress Test: Not performed to date  Angiography: Not performed to date  Cardiology Labs: BMP:    Lab Results   Component Value Date/Time     01/16/2023 05:55 AM    K 3.6 01/16/2023 05:55 AM    K 3.5 01/15/2023 04:53 AM     01/16/2023 05:55 AM    CO2 24 01/16/2023 05:55 AM    BUN 11 01/16/2023 05:55 AM     CMP:    Lab Results   Component Value Date/Time     01/16/2023 05:55 AM    K 3.6 01/16/2023 05:55 AM    K 3.5 01/15/2023 04:53 AM     01/16/2023 05:55 AM    CO2 24 01/16/2023 05:55 AM    BUN 11 01/16/2023 05:55 AM    PROT 7.4 01/14/2023 12:36 AM     CBC:    Lab Results   Component Value Date/Time    WBC 7.3 01/16/2023 05:55 AM    RBC 3.37 01/16/2023 05:55 AM    HGB 8.4 01/16/2023 05:55 AM    HCT 27.8 01/16/2023 05:55 AM    MCV 82.5 01/16/2023 05:55 AM    RDW 17.0 01/16/2023 05:55 AM     01/16/2023 05:55 AM     PT/INR:  No results found for: PTINR  PT/INR Warfarin:  No components found for: PTPATWAR, PTINRWAR  PTT:    Lab Results   Component Value Date/Time    APTT 27.6 07/22/2022 04:35 PM     PTT Heparin:  No components found for: APTTHEP  Magnesium:    Lab Results   Component Value Date/Time    MG 1.9 01/15/2023 04:53 AM     TSH:  No results found for: TSH  TROPONIN:  No components found for: TROP  BNP:  No results found for: BNP  FASTING LIPID PANEL:  No results found for: CHOL, HDL, TRIG  US DUP LOWER EXTREMITIES BILATERAL VENOUS   Final Result   No evidence of DVT in either lower extremity. CTA PULMONARY W CONTRAST   Final Result   Left lower lobe segmental pulmonary embolism. Wedge-shaped consolidation in   this region, concerning for developing pulmonary infarct.   No evidence of   right heart strain. Critical results were called by Dr. Zee Sandoval MD to Dr. Tanya Dennis on   1/14/2023 at 02:54. XR CHEST PORTABLE   Final Result   No acute process. I have personally reviewed the laboratory, cardiac diagnostic and radiographic testing as outlined above:    IMPRESSION:  Cardiomyopathy: Suspect secondary to substance abuse, compensated, optimizing current treatment is complicated by her noncompliance, substance abuse, she stated that she is planning on getting tubal ligation later this month with Dr. Deisi Sommer   mitral valve regurgitation: Mild  Tricuspid valve regurgitation:  Left ventricular hypertrophy echocardiogram  Cocaine use: Patient was counseled regarding abstinence  Tobacco abuse: Patient was counseled regarding smoke cessation  Noncompliance with medications and medical advice  Hypertension: Not well controlled, will adjust medications  Pulmonary embolism: Started on Eliquis    RECOMMENDATIONS:   Norvasc 5 mg daily  Lisinopril 10 mg 1 by mouth daily  Will avoid beta-blocker given his continuing cocaine use  Echocardiogram in 3 months to reevaluate EF  Recheck basic metabolic panel when she is seen at CHF clinic, if kidney function and potassium level okay will start Aldactone  Discussed at length with the patient the teratogenic effects of ACE inhibitor and Aldactone  Follow-up with the CHF clinic  No further cardiac work-up is planned at this point in time    I have reviewed my findings and recommendations with patient    Thank you for the consult  Electronically signed by Rosemarie Reyna MD on 1/16/2023 at 4:29 PM    All of the above was discussed with the patient  reviewing the above stated recommendations. I directly participated in the medical-decision making, ordering tests, and medication adjustments as documented today.  I contributed >50% to the overall encounter time including face-to-face time providing counseling and or coordination of care with the other providers, reviewing records/tests, counseling/education of the patient, ordering medications/tests/procedures, coordinating care, and documenting clinical information in the EHR. I also discussed the differential diagnosis and all of the proposed management plans with the patient and individuals accompanying the patient. NOTE: This report was transcribed using voice recognition software.  Every effort was made to ensure accuracy; however, inadvertent computerized transcription errors may be present

## 2023-01-17 VITALS
RESPIRATION RATE: 16 BRPM | BODY MASS INDEX: 28.79 KG/M2 | HEIGHT: 68 IN | DIASTOLIC BLOOD PRESSURE: 86 MMHG | TEMPERATURE: 97.5 F | HEART RATE: 92 BPM | WEIGHT: 190 LBS | SYSTOLIC BLOOD PRESSURE: 132 MMHG | OXYGEN SATURATION: 98 %

## 2023-01-17 LAB
ANION GAP SERPL CALCULATED.3IONS-SCNC: 11 MMOL/L (ref 7–16)
BASOPHILS ABSOLUTE: 0.03 E9/L (ref 0–0.2)
BASOPHILS RELATIVE PERCENT: 0.3 % (ref 0–2)
BUN BLDV-MCNC: 11 MG/DL (ref 6–20)
CALCIUM SERPL-MCNC: 8.8 MG/DL (ref 8.6–10.2)
CHLORIDE BLD-SCNC: 102 MMOL/L (ref 98–107)
CO2: 24 MMOL/L (ref 22–29)
CREAT SERPL-MCNC: 0.7 MG/DL (ref 0.5–1)
EOSINOPHILS ABSOLUTE: 0.34 E9/L (ref 0.05–0.5)
EOSINOPHILS RELATIVE PERCENT: 3.6 % (ref 0–6)
GFR SERPL CREATININE-BSD FRML MDRD: >60 ML/MIN/1.73
GLUCOSE BLD-MCNC: 126 MG/DL (ref 74–99)
HCT VFR BLD CALC: 30 % (ref 34–48)
HEMOGLOBIN: 8.8 G/DL (ref 11.5–15.5)
IMMATURE GRANULOCYTES #: 0.03 E9/L
IMMATURE GRANULOCYTES %: 0.3 % (ref 0–5)
LUPUS ANTICOAG DVVT: NEGATIVE
LYMPHOCYTES ABSOLUTE: 1.05 E9/L (ref 1.5–4)
LYMPHOCYTES RELATIVE PERCENT: 11.3 % (ref 20–42)
MCH RBC QN AUTO: 24.2 PG (ref 26–35)
MCHC RBC AUTO-ENTMCNC: 29.3 % (ref 32–34.5)
MCV RBC AUTO: 82.4 FL (ref 80–99.9)
MONOCYTES ABSOLUTE: 0.48 E9/L (ref 0.1–0.95)
MONOCYTES RELATIVE PERCENT: 5.1 % (ref 2–12)
NEUTROPHILS ABSOLUTE: 7.4 E9/L (ref 1.8–7.3)
NEUTROPHILS RELATIVE PERCENT: 79.4 % (ref 43–80)
PDW BLD-RTO: 17 FL (ref 11.5–15)
PLATELET # BLD: 407 E9/L (ref 130–450)
PMV BLD AUTO: 9.9 FL (ref 7–12)
POTASSIUM SERPL-SCNC: 3.6 MMOL/L (ref 3.5–5)
RBC # BLD: 3.64 E12/L (ref 3.5–5.5)
SODIUM BLD-SCNC: 137 MMOL/L (ref 132–146)
WBC # BLD: 9.3 E9/L (ref 4.5–11.5)

## 2023-01-17 PROCEDURE — 6370000000 HC RX 637 (ALT 250 FOR IP): Performed by: INTERNAL MEDICINE

## 2023-01-17 PROCEDURE — 36415 COLL VENOUS BLD VENIPUNCTURE: CPT

## 2023-01-17 PROCEDURE — 80048 BASIC METABOLIC PNL TOTAL CA: CPT

## 2023-01-17 PROCEDURE — 6360000002 HC RX W HCPCS: Performed by: INTERNAL MEDICINE

## 2023-01-17 PROCEDURE — 6370000000 HC RX 637 (ALT 250 FOR IP): Performed by: STUDENT IN AN ORGANIZED HEALTH CARE EDUCATION/TRAINING PROGRAM

## 2023-01-17 PROCEDURE — 85025 COMPLETE CBC W/AUTO DIFF WBC: CPT

## 2023-01-17 PROCEDURE — 99239 HOSP IP/OBS DSCHRG MGMT >30: CPT | Performed by: INTERNAL MEDICINE

## 2023-01-17 RX ADMIN — KETOROLAC TROMETHAMINE 30 MG: 30 INJECTION, SOLUTION INTRAMUSCULAR; INTRAVENOUS at 08:06

## 2023-01-17 RX ADMIN — HYDROCODONE BITARTRATE AND ACETAMINOPHEN 1 TABLET: 7.5; 325 TABLET ORAL at 03:34

## 2023-01-17 RX ADMIN — BENZONATATE 100 MG: 100 CAPSULE ORAL at 08:07

## 2023-01-17 RX ADMIN — APIXABAN 10 MG: 5 TABLET, FILM COATED ORAL at 09:59

## 2023-01-17 RX ADMIN — LISINOPRIL 10 MG: 5 TABLET ORAL at 09:59

## 2023-01-17 RX ADMIN — AMLODIPINE BESYLATE 5 MG: 5 TABLET ORAL at 09:59

## 2023-01-17 ASSESSMENT — PAIN DESCRIPTION - ORIENTATION
ORIENTATION: OTHER (COMMENT)
ORIENTATION: LEFT;UPPER
ORIENTATION: LEFT;LOWER

## 2023-01-17 ASSESSMENT — PAIN DESCRIPTION - LOCATION
LOCATION: RIB CAGE
LOCATION: BACK
LOCATION: ABDOMEN

## 2023-01-17 ASSESSMENT — PAIN - FUNCTIONAL ASSESSMENT
PAIN_FUNCTIONAL_ASSESSMENT: ACTIVITIES ARE NOT PREVENTED
PAIN_FUNCTIONAL_ASSESSMENT: ACTIVITIES ARE NOT PREVENTED

## 2023-01-17 ASSESSMENT — PAIN SCALES - GENERAL
PAINLEVEL_OUTOF10: 1
PAINLEVEL_OUTOF10: 7

## 2023-01-17 ASSESSMENT — PAIN DESCRIPTION - DESCRIPTORS
DESCRIPTORS: SHARP;SHOOTING
DESCRIPTORS: SHARP;SHOOTING
DESCRIPTORS: ACHING

## 2023-01-17 ASSESSMENT — PAIN DESCRIPTION - PAIN TYPE: TYPE: ACUTE PAIN

## 2023-01-17 NOTE — DISCHARGE SUMMARY
[facility] Physician Discharge Summary       Fer Bradshaw MD  [address]  [phone]    Follow up on 1/24/2023  [facility] located at [address] Phone: [phone] on Tuesday January 24, 2023 at 11:30 am. Please arrive 15 minutes early to complete paperwork and bring photo ID, insurance card, and medication list with you. [facility]  [address] [facility]        [place], [address] [facility] [address]  [phone]    Call today  hospital follow up, to schedule appointment within 5- 7 days, Bring all pill bottles to appointment  Call [phone] to verify appointment    [address]  [address] [address]  [phone]  Go on 2/1/2023  Appointment scheduled on 02/01/2023 at 10:30am, Heart failure follow up and education, Bring all pill bottles to appointment      Activity level: As tolerated    Diet: Low-sodium diet    Labs: Routine labs per primary care physician    Condition at discharge: Stable    Dispo: Home      Patient ID:  Yarely Vega  [id number]  6 y.o.  1990    Admit date: 1/14/2023    Discharge date and time:  1/17/2023  8:55 AM    Admission Diagnoses: Principal Problem:    Pulmonary embolism on [facility])  Active Problems:    Cocaine abuse ([address]    Acute pulmonary embolism without acute cor pulmonale, unspecified pulmonary embolism type ([address]    Primary hypertension    HFrEF (heart failure with reduced ejection fraction) ([address]    Dilated cardiomyopathy ([facility]    Nonrheumatic mitral valve regurgitation    Nonrheumatic tricuspid valve regurgitation    Tobacco abuse    Left ventricular hypertrophy    Cocaine use  Resolved Problems:    * No resolved hospital problems.  *      Discharge Diagnoses: Principal Problem:    Pulmonary embolism on [facility])  Active Problems:    Cocaine abuse (HCC)    Acute pulmonary embolism without acute cor pulmonale, unspecified pulmonary embolism type (HCC)    Primary hypertension    HFrEF (heart failure with reduced ejection fraction) (HCC)    Dilated cardiomyopathy (HCC)    Nonrheumatic mitral valve regurgitation    Nonrheumatic tricuspid valve regurgitation    Tobacco abuse    Left ventricular hypertrophy    Cocaine use  Resolved Problems:    * No resolved hospital problems. *      Consults:  IP CONSULT TO PULMONOLOGY  IP CONSULT TO CARDIOLOGY  IP CONSULT TO HEART FAILURE NURSE/COORDINATOR  IP CONSULT TO SOCIAL WORK    Procedures: None    Hospital Course: This is a 49-year-old female with a history significant for polysubstance abuse including cocaine, methamphetamine, and alcohol that was admitted to the hospital with left lower lobe pulmonary embolism and new onset cardiomyopathy. CTA of the chest done in the ED showed above finding with possible developing pulmonary infarct. Patient was started on apixaban 10 mg p.o. twice daily which she will continue for 1 week followed by 5 mg p.o. twice daily indefinitely. She does have a family history of clotting disorder; hypercoagulable work-up was sent. Echocardiogram did not show significant right heart strain, but did reveal new finding of cardiomyopathy with ejection fraction 30 to 35% with global hypokinesis. Cardiology was consulted and recommended reducing dose of amlodipine and starting lisinopril, and close outpatient follow-up. She will need repeat echocardiogram in about 3 months and further work-up from there. Stable for discharge home with close outpatient follow-up on 1/17/2023. Discussed seeking immediate medical attention for any worsening chest pain, hemoptysis, shortness of breath, dizziness, lightheadedness, etc.  She states understanding.     Discharge Exam:  Vitals:    01/16/23 0951 01/16/23 1403 01/16/23 1640 01/17/23 0000   BP:   (!) 158/98 (!) 141/87   Pulse:   93 100   Resp: 16 16 16 16   Temp:    99 °F (37.2 °C)   TempSrc:    Oral   SpO2:   100% 100%   Weight:       Height:           General Appearance: alert and oriented to person, place and time and in no acute distress  Skin: warm and dry  Head: normocephalic and atraumatic  Eyes: pupils equal, round, and reactive to light, extraocular eye movements intact, conjunctivae normal  Neck: neck supple and non tender without mass   Pulmonary/Chest: Nonlabored on room air.  Clear to auscultation bilateral  Cardiovascular: normal rate, normal S1 and S2 and no carotid bruits  Abdomen: soft, non-tender, non-distended, normal bowel sounds, no masses or organomegaly  Extremities: no cyanosis, no clubbing and no edema  Neurologic: no cranial nerve deficit and speech normal  I/O last 3 completed shifts:  In: 240 [P.O.:240]  Out: -   No intake/output data recorded.      LABS:  Recent Labs     01/15/23  0453 01/16/23  0555    136   K 3.5 3.6    104   CO2 22 24   BUN 9 11   CREATININE 0.8 0.7   GLUCOSE 154* 95   CALCIUM 8.5* 8.8       Recent Labs     01/15/23  0453 01/16/23  0555   WBC 7.4 7.3   RBC 3.35* 3.37*   HGB 8.1* 8.4*   HCT 27.7* 27.8*   MCV 82.7 82.5   MCH 24.2* 24.9*   MCHC 29.2* 30.2*   RDW 16.7* 17.0*    379   MPV 9.5 9.9         Imaging:  XR CHEST PORTABLE    Result Date: 1/14/2023  EXAMINATION: ONE XRAY VIEW OF THE CHEST 1/14/2023 12:46 am COMPARISON: 06/18/2022 HISTORY: ORDERING SYSTEM PROVIDED HISTORY: tachycardia TECHNOLOGIST PROVIDED HISTORY: Reason for exam:->tachycardia FINDINGS: The lungs are without acute focal process.  There is no effusion or pneumothorax. The cardiomediastinal silhouette is without acute process. The osseous structures are without acute process.     No acute process.     CTA PULMONARY W CONTRAST    Result Date: 1/14/2023  EXAMINATION: CTA OF THE CHEST 1/14/2023 12:35 am TECHNIQUE: CTA of the chest was performed after the administration of intravenous contrast.  Multiplanar reformatted images are provided for review.  MIP images  are provided for review. Automated exposure control, iterative reconstruction, and/or weight based adjustment of the mA/kV was utilized to reduce the radiation dose to as low as reasonably achievable. COMPARISON: None. HISTORY: ORDERING SYSTEM PROVIDED HISTORY: r/o PE TECHNOLOGIST PROVIDED HISTORY: Reason for exam:->r/o PE Decision Support Exception - unselect if not a suspected or confirmed emergency medical condition->Emergency Medical Condition (MA) FINDINGS: Pulmonary Arteries: Pulmonary arteries are adequately opacified for evaluation. There is a filling defect within a left lower lobe segmental pulmonary artery extending distally. Main pulmonary artery is normal in caliber. Mediastinum: No evidence of mediastinal lymphadenopathy. The heart and pericardium demonstrate no acute abnormality. There is no acute abnormality of the thoracic aorta. Lungs/pleura: Wedge-shaped consolidative opacity in the medial left lower lobe. Small left pleural effusion. No pneumothorax. Upper Abdomen:  Limited images of the upper abdomen demonstrate no acute abnormality. Soft Tissues/Bones: No acute bone or soft tissue abnormality. Left lower lobe segmental pulmonary embolism. Wedge-shaped consolidation in this region, concerning for developing pulmonary infarct. No evidence of right heart strain. Critical results were called by Dr. Meka Gómez MD to Dr. Tamara Krishnamurthy on 1/14/2023 at 02:54.     US DUP LOWER EXTREMITIES BILATERAL VENOUS    Result Date: 1/14/2023  EXAMINATION: DUPLEX VENOUS ULTRASOUND OF THE BILATERAL LOWER EXTREMITIES1/14/2023 5:08 pm TECHNIQUE: Duplex ultrasound using B-mode/gray scaled imaging, Doppler spectral analysis and color flow Doppler was obtained of the deep venous structures of the lower bilateral extremities.  COMPARISON: CT PE study from the same day HISTORY: ORDERING SYSTEM PROVIDED HISTORY: NEW PE TECHNOLOGIST PROVIDED HISTORY: Reason for exam:->NEW PE What reading provider will be dictating this exam?->CRC FINDINGS: Patient had a positive PE study on the same day. The visualized veins of the bilateral lower extremities are patent and free of echogenic thrombus. The veins demonstrate good compressibility with normal color flow study and spectral analysis. No evidence of DVT in either lower extremity. Patient Instructions:   Current Discharge Medication List        START taking these medications    Details   acetaminophen (TYLENOL) 500 MG CAPS capsule Take 1 capsule by mouth every 6 hours as needed for Pain  Qty: 60 capsule, Refills: 0      apixaban (ELIQUIS) 5 MG TABS tablet Take 2 tablets by mouth twice daily for 5 days, then 1 tablet by mouth twice daily thereafter  Qty: 60 tablet, Refills: 2      amLODIPine (NORVASC) 5 MG tablet Take 1 tablet by mouth daily  Qty: 90 tablet, Refills: 2      lisinopril (PRINIVIL;ZESTRIL) 10 MG tablet Take 1 tablet by mouth daily  Qty: 90 tablet, Refills: 1           STOP taking these medications       labetalol (NORMODYNE) 200 MG tablet Comments:   Reason for Stopping:         Prenatal Vit-Fe Fumarate-FA (PRENATAL VITAMIN) 27-0.8 MG TABS Comments:   Reason for Stopping:         aspirin 81 MG EC tablet Comments:   Reason for Stopping:                 Note that greater than 30 minutes was spent in preparing discharge papers, discussing discharge with patient, medication review, etc.    NOTE: This report was transcribed using voice recognition software. Every effort was made to ensure accuracy; however, inadvertent computerized transcription errors may be present.      Signed:  Electronically signed by Jodi Marcano DO on 1/17/2023 at 8:55 AM

## 2023-01-17 NOTE — PLAN OF CARE
Problem: Discharge Planning  Goal: Discharge to home or other facility with appropriate resources  Outcome: Adequate for Discharge     Problem: Pain  Goal: Verbalizes/displays adequate comfort level or baseline comfort level  Outcome: Adequate for Discharge  Flowsheets (Taken 1/17/2023 9382)  Verbalizes/displays adequate comfort level or baseline comfort level:   Encourage patient to monitor pain and request assistance   Assess pain using appropriate pain scale     Problem: ABCDS Injury Assessment  Goal: Absence of physical injury  Outcome: Adequate for Discharge     Problem: Chronic Conditions and Co-morbidities  Goal: Patient's chronic conditions and co-morbidity symptoms are monitored and maintained or improved  Outcome: Adequate for Discharge

## 2023-01-18 LAB
ANTICARDIOLIPIN IGA ANTIBODY: <10 APL
ANTICARDIOLIPIN IGG ANTIBODY: <10 GPL
BETA-2 GLYCOPROTEIN 1 IGG ANTIBODY: <10 SGU
BETA-2 GLYCOPROTEIN 1 IGM ANTIBODY: <10 SMU
CARDIOLIPIN AB IGM: 11 MPL

## 2023-01-20 LAB
FACTOR V LEIDEN: NEGATIVE
PROTHROMBIN G20210A MUTATION: NEGATIVE
PT PCR SPECIMEN: NORMAL
SPECIMEN: NORMAL

## 2023-02-01 ENCOUNTER — HOSPITAL ENCOUNTER (OUTPATIENT)
Dept: OTHER | Age: 33
Setting detail: THERAPIES SERIES
Discharge: HOME | End: 2023-02-01
Payer: COMMERCIAL

## 2023-02-01 VITALS
RESPIRATION RATE: 16 BRPM | BODY MASS INDEX: 28.55 KG/M2 | WEIGHT: 185 LBS | DIASTOLIC BLOOD PRESSURE: 98 MMHG | HEART RATE: 86 BPM | SYSTOLIC BLOOD PRESSURE: 162 MMHG | OXYGEN SATURATION: 100 %

## 2023-02-01 LAB
ANION GAP SERPL CALCULATED.3IONS-SCNC: 11 MMOL/L (ref 7–16)
BUN BLDV-MCNC: 18 MG/DL (ref 6–20)
CALCIUM SERPL-MCNC: 8.8 MG/DL (ref 8.6–10.2)
CHLORIDE BLD-SCNC: 107 MMOL/L (ref 98–107)
CO2: 19 MMOL/L (ref 22–29)
CREAT SERPL-MCNC: 1 MG/DL (ref 0.5–1)
GFR SERPL CREATININE-BSD FRML MDRD: >60 ML/MIN/1.73
GLUCOSE BLD-MCNC: 105 MG/DL (ref 74–99)
POTASSIUM SERPL-SCNC: 4 MMOL/L (ref 3.5–5)
PRO-BNP: 394 PG/ML (ref 0–125)
SODIUM BLD-SCNC: 137 MMOL/L (ref 132–146)

## 2023-02-01 PROCEDURE — 36415 COLL VENOUS BLD VENIPUNCTURE: CPT

## 2023-02-01 PROCEDURE — 83880 ASSAY OF NATRIURETIC PEPTIDE: CPT

## 2023-02-01 PROCEDURE — 99214 OFFICE O/P EST MOD 30 MIN: CPT

## 2023-02-01 PROCEDURE — 80048 BASIC METABOLIC PNL TOTAL CA: CPT

## 2023-02-01 PROCEDURE — 99204 OFFICE O/P NEW MOD 45 MIN: CPT

## 2023-02-01 NOTE — PROGRESS NOTES
Congestive Heart Failure [address]   1990          Referring Provider: Dr. Cecily Chin   Primary Care Physician: Dr. Quita Penny  Cardiologist: Dr. Cecily Chin  Nephrologist: [facility]         History of Present Illness:     Bartolome Navarrete is a 27 y.o. female with a history of HFrEF, most recent EF 30-35% on 1/14/23. Patient Story:    She does not  have dyspnea with exertion, shortness of breath, or decline in overall functional capacity. She does have orthopnea, PND, nocturnal cough or hemoptysis. She does have abdominal distention or bloating, early satiety, anorexia/change in appetite. She  does not take a water pill. She does not have  lower extremity edema. She denies lightheadedness, dizziness. She does have occasional palpitations \"fluttering in chest\" when she is at rest, syncope or near syncope. She does not complain of chest pain, pressure, discomfort. No Known Allergies      No outpatient medications have been marked as taking for the 2/1/23 encounter [facility] Encounter) with [facility] CHF ROOM 1. Current Outpatient Medications on File Prior to Encounter   Medication Sig Dispense Refill    acetaminophen (TYLENOL) 500 MG CAPS capsule Take 1 capsule by mouth every 6 hours as needed for Pain 60 capsule 0    apixaban (ELIQUIS) 5 MG TABS tablet Take 2 tablets by mouth twice daily for 5 days, then 1 tablet by mouth twice daily thereafter 60 tablet 2    amLODIPine (NORVASC) 5 MG tablet Take 1 tablet by mouth daily 90 tablet 2    lisinopril (PRINIVIL;ZESTRIL) 10 MG tablet Take 1 tablet by mouth daily 90 tablet 1     No current facility-administered medications on file prior to encounter.              Guideline directed medical:  ARNI/ACE I/ARB: Yes Lisinopril 10 mg daily   Beta blocker:  No- pt is not to take   Aldosterone antagonist:  No  SGLT2i:  No        Physical Examination:     BP (!) 162/98   Pulse 86   Resp 16   Wt 185 lb (83.9 kg)   LMP 01/04/2023 (Approximate)   SpO2 100%   BMI 28.55 kg/m²                                             Rhythm Interpretation  Heart Rate: 86                                                                           Heart Rate: 86                     LAB DATA:    Last 3 BMP      Sodium (mmol/L)   Date Value   02/01/2023 137   01/17/2023 137   01/16/2023 136     Potassium (mmol/L)   Date Value   02/01/2023 4.0   01/17/2023 3.6   01/16/2023 3.6     Potassium reflex Magnesium (mmol/L)   Date Value   01/15/2023 3.5   03/28/2021 4.0   03/27/2021 3.7     Chloride (mmol/L)   Date Value   02/01/2023 107   01/17/2023 102   01/16/2023 104     CO2 (mmol/L)   Date Value   02/01/2023 19 (L)   01/17/2023 24   01/16/2023 24     BUN (mg/dL)   Date Value   02/01/2023 18   01/17/2023 11   01/16/2023 11     Glucose (mg/dL)   Date Value   02/01/2023 105 (H)   01/17/2023 126 (H)   01/16/2023 95   11/08/2010 90     Calcium (mg/dL)   Date Value   02/01/2023 8.8   01/17/2023 8.8   01/16/2023 8.8       Last 3 BNP       Pro-BNP (pg/mL)   Date Value   02/01/2023 394 (H)          CBC: No results for input(s): WBC, HGB, PLT in the last 72 hours. BMP:    Recent Labs     02/01/23  1513      K 4.0      CO2 19*   BUN 18   CREATININE 1.0   GLUCOSE 105*     Hepatic: No results for input(s): AST, ALT, ALB, BILITOT, ALKPHOS in the last 72 hours. Troponin: No results for input(s): TROPONINI in the last 72 hours. BNP: No results for input(s): BNP in the last 72 hours. Lipids: No results for input(s): CHOL, HDL in the last 72 hours. Invalid input(s): LDLCALCU  INR: No results for input(s): INR in the last 72 hours. WEIGHTS:    Wt Readings from Last 3 Encounters:   02/01/23 185 lb (83.9 kg)   01/14/23 190 lb (86.2 kg)   06/16/22 201 lb (91.2 kg)         TELEMETRY:  Cardiac Regularity: Regular  Cardiac Rhythm/Interpretation: Sinus Rhythm         ASSESSMENT:  Jackie Pardo is  at her first visit with CHF clinic today. With a baseline weight of 185#. Patient presented with by herself today for her initial visit after hospital discharge. Discussed with patient the purpose of CHF clinic and importance of daily weights and doing a self check every day to monitor for changes. Went over the three heart failure zones and to call cardiologist if in yellow zone immediately to prevent any further decline. Patient does have a working scale, but has been weighing herself at her moms house. Patient is agreeable to future CHF clinic visits. Upon assessment lungs were clear, denies SOB or ASKEW. States feels bloated but is on her period. She does not have any lower extremity edema. Pt Saw Dr. Samantha Brumfield last week 1/24/23 for HFU. Pt states that her BP was elevated at the visit and he called in a prescription for Labetalol 200 mg BID [facility] that she did not . Dr Neil Arnold notified of today's /102 and he will call in a script for Norvasc 10 mg daily to pt pharmacy at [facility]. Informed patient that she is not to  or take the labetalol. Pt has a history of cocaine use and attended IOP yesterday 1/31/23. Interventions completed this visit:  IV diuretics given no  Lab work obtained yes, BNP BMP    Reviewed currently prescribed medications with patient, educated on importance of compliance and answered any questions regarding their medication  Educated on signs and symptoms of HF  Educated on low sodium diet    Follow up in 1 week for close monitoring. PLAN:  Scheduled to follow up in CHF clinic on   Future Appointments   Date Time Provider Jenelle Carrizales   2/8/2023 10:00 AM [facility] CHF ROOM 1 [facility]   2/14/2023 11:15 AM Cm Brooke MD [facility]     Given clinic phone number and aware of signs and symptoms to call with any HF change in symptoms.

## 2023-03-02 ENCOUNTER — APPOINTMENT (OUTPATIENT)
Dept: GENERAL RADIOLOGY | Age: 33
End: 2023-03-02
Payer: COMMERCIAL

## 2023-03-02 ENCOUNTER — HOSPITAL ENCOUNTER (EMERGENCY)
Age: 33
Discharge: HOME | End: 2023-03-02
Attending: FAMILY MEDICINE
Payer: COMMERCIAL

## 2023-03-02 VITALS
SYSTOLIC BLOOD PRESSURE: 151 MMHG | RESPIRATION RATE: 16 BRPM | TEMPERATURE: 97.5 F | HEART RATE: 106 BPM | DIASTOLIC BLOOD PRESSURE: 86 MMHG | OXYGEN SATURATION: 100 %

## 2023-03-02 DIAGNOSIS — S91.331A PUNCTURE WOUND OF RIGHT FOOT, INITIAL ENCOUNTER: Primary | ICD-10-CM

## 2023-03-02 PROCEDURE — 90714 TD VACC NO PRESV 7 YRS+ IM: CPT

## 2023-03-02 PROCEDURE — 73630 X-RAY EXAM OF FOOT: CPT

## 2023-03-02 PROCEDURE — 99284 EMERGENCY DEPT VISIT MOD MDM: CPT

## 2023-03-02 PROCEDURE — 90471 IMMUNIZATION ADMIN: CPT

## 2023-03-02 PROCEDURE — 6360000002 HC RX W HCPCS

## 2023-03-02 RX ORDER — CEPHALEXIN 500 MG/1
500 CAPSULE ORAL 4 TIMES DAILY
Qty: 40 CAPSULE | Refills: 0 | Status: SHIPPED | OUTPATIENT
Start: 2023-03-02 | End: 2023-03-12

## 2023-03-02 RX ADMIN — CLOSTRIDIUM TETANI TOXOID ANTIGEN (FORMALDEHYDE INACTIVATED) AND CORYNEBACTERIUM DIPHTHERIAE TOXOID ANTIGEN (FORMALDEHYDE INACTIVATED) 0.5 ML: 5; 2 INJECTION, SUSPENSION INTRAMUSCULAR at 17:42

## 2023-03-02 ASSESSMENT — PAIN - FUNCTIONAL ASSESSMENT: PAIN_FUNCTIONAL_ASSESSMENT: 0-10

## 2023-03-02 ASSESSMENT — PAIN DESCRIPTION - LOCATION: LOCATION: FOOT

## 2023-03-02 ASSESSMENT — PAIN DESCRIPTION - ORIENTATION: ORIENTATION: RIGHT

## 2023-03-02 ASSESSMENT — PAIN SCALES - GENERAL: PAINLEVEL_OUTOF10: 8

## 2023-03-02 NOTE — ED PROVIDER NOTES
HPI:  3/2/23,   Time: 5:37 PM NIRMAL Chaidez is a 27 y.o. female presenting to the ED for injury to the right foot, she was moving an old wooden toy box is unsure whether the nails or part of the wood punctured into the top of her right foot, she did pull out the some wood splinters but is concerned that there still may be some foreign body in her foot. She complains of an aching type pain of moderate intensity in her tetanus immunization status is not up-to-date. ROS:   Pertinent positives and negatives are stated within HPI, all other systems reviewed and are negative.  --------------------------------------------- PAST HISTORY ---------------------------------------------  Past Medical History:  has a past medical history of Abnormal Pap smear, JERAMY (acute kidney injury) ([facility] Cannabis abuse, Cocaine abuse ([facility] Complication of anesthesia, Herpes simplex without mention of complication, Hypertensive urgency, Methamphetamine use ([facility] Recurrent pregnancy loss, currently pregnant, and Suspected damage to fetus from maternal drug use. Past Surgical History:  has a past surgical history that includes IR BIOPSY KIDNEY PERCUTANEOUS (3/29/2021). Social History:  reports that she has been smoking cigarettes. She has a 1.00 pack-year smoking history. She has never used smokeless tobacco. She reports that she does not currently use alcohol. She reports that she does not currently use drugs after having used the following drugs: Marijuana (Weed) and Cocaine. Family History: family history includes Deep Vein Thrombosis in her sister; Diabetes in her maternal grandfather; Hearing Loss in her sister; Heart Disease in her sister; Heart Surgery in her sister; Hypertension in her sister; Pulmonary Embolism in her sister. The patients home medications have been reviewed. Allergies: Patient has no known allergies.     -------------------------------------------------- RESULTS -------------------------------------------------  All laboratory and radiology results have been personally reviewed by myself   LABS:  No results found for this visit on 03/02/23. RADIOLOGY:  Interpreted by Radiologist.  XR FOOT RIGHT (MIN 3 VIEWS)    (Results Pending)       ------------------------- NURSING NOTES AND VITALS REVIEWED ---------------------------   The nursing notes within the ED encounter and vital signs as below have been reviewed. BP (!) 151/86   Pulse (!) 106   Temp 97.5 °F (36.4 °C) (Temporal)   Resp 16   LMP 01/24/2023 (Exact Date)   SpO2 100%   Oxygen Saturation Interpretation: Normal      ---------------------------------------------------PHYSICAL EXAM--------------------------------------    Constitutional/General: Alert and oriented x3, well appearing, non toxic in NAD  Head: NC/AT  Eyes: PERRL, EOMI  Mouth: Oropharynx clear, handling secretions, no trismus  Neck: Supple, full ROM, no meningeal signs  Pulmonary: Lungs clear to auscultation bilaterally, no wheezes, rales, or rhonchi. Not in respiratory distress  Cardiovascular:  Regular rate and rhythm, no murmurs, gallops, or rubs. 2+ distal pulses  Abdomen: Soft, non tender, non distended,   Extremities: Moves all extremities x 4. Warm and well perfused;  Right foot:  There is swelling of the dorsum of the right foot with a puncture wound site noted, mild erythema noted and it is moderately tender to palpation. Skin: warm and dry without rash  Neurologic: GCS 15,  Psych: Normal Affect      ------------------------------ ED COURSE/MEDICAL DECISION MAKING----------------------  Medications   tetanus & diphtheria toxoids (adult) 5-2 LFU injection 0.5 mL (has no administration in time range)   tetanus & diphtheria toxoids (adult) 5-2 LFU injection (0.5 mLs IntraMUSCular Given 3/2/23 1742)         Medical Decision Making:      This X-Ray is independently viewed and interpreted by me:   - Study: Right foot   - Number of Views: 3   - Findings: No foreign body or bone abnormality visualized. X-ray of the right foot results: Radiology reading she is some very minute radiolucent material possibly representing foreign body. Counseling: The emergency provider has spoken with the patient and discussed todays results, in addition to providing specific details for the plan of care and counseling regarding the diagnosis and prognosis. Questions are answered at this time and they are agreeable with the plan.      --------------------------------- IMPRESSION AND DISPOSITION ---------------------------------    IMPRESSION  1.  Puncture wound of right foot, initial encounter        DISPOSITION  Disposition: Discharge to home  Patient condition is good                 Mirela Graves MD  03/02/23 2019

## 2023-03-07 ENCOUNTER — TELEPHONE (OUTPATIENT)
Dept: OTHER | Age: 33
End: 2023-03-07

## 2023-03-07 NOTE — TELEPHONE ENCOUNTER
3:16 PM Patient NO call/ NO show from today's CHF clinic appt. Attempted to call patient however phone line ringing busy.     Electronically signed by Sabino Morris RN on 3/7/2023 at 3:16 PM

## 2023-03-31 ENCOUNTER — APPOINTMENT (OUTPATIENT)
Dept: ULTRASOUND IMAGING | Age: 33
End: 2023-03-31
Payer: COMMERCIAL

## 2023-03-31 ENCOUNTER — HOSPITAL ENCOUNTER (EMERGENCY)
Age: 33
Discharge: HOME | End: 2023-03-31
Payer: COMMERCIAL

## 2023-03-31 VITALS
HEART RATE: 109 BPM | SYSTOLIC BLOOD PRESSURE: 155 MMHG | DIASTOLIC BLOOD PRESSURE: 90 MMHG | TEMPERATURE: 97.6 F | RESPIRATION RATE: 20 BRPM | OXYGEN SATURATION: 99 %

## 2023-03-31 DIAGNOSIS — O20.9 VAGINAL BLEEDING IN PREGNANCY, FIRST TRIMESTER: Primary | ICD-10-CM

## 2023-03-31 LAB
ABO + RH BLD: NORMAL
ALBUMIN SERPL-MCNC: 4 G/DL (ref 3.5–5.2)
ALP SERPL-CCNC: 70 U/L (ref 35–104)
ALT SERPL-CCNC: 9 U/L (ref 0–32)
ANION GAP SERPL CALCULATED.3IONS-SCNC: 11 MMOL/L (ref 7–16)
AST SERPL-CCNC: 13 U/L (ref 0–31)
BACTERIA URNS QL MICRO: ABNORMAL /HPF
BASOPHILS # BLD: 0.02 E9/L (ref 0–0.2)
BASOPHILS NFR BLD: 0.2 % (ref 0–2)
BILIRUB SERPL-MCNC: 0.4 MG/DL (ref 0–1.2)
BILIRUB UR QL STRIP: NEGATIVE
BUN SERPL-MCNC: 6 MG/DL (ref 6–20)
CALCIUM SERPL-MCNC: 9.2 MG/DL (ref 8.6–10.2)
CHLORIDE SERPL-SCNC: 101 MMOL/L (ref 98–107)
CLARITY UR: ABNORMAL
CO2 SERPL-SCNC: 25 MMOL/L (ref 22–29)
COLOR UR: ABNORMAL
CREAT SERPL-MCNC: 0.7 MG/DL (ref 0.5–1)
EOSINOPHIL # BLD: 0.5 E9/L (ref 0.05–0.5)
EOSINOPHIL NFR BLD: 4.7 % (ref 0–6)
ERYTHROCYTE [DISTWIDTH] IN BLOOD BY AUTOMATED COUNT: 17.2 FL (ref 11.5–15)
GLUCOSE SERPL-MCNC: 95 MG/DL (ref 74–99)
GLUCOSE UR STRIP-MCNC: NEGATIVE MG/DL
GONADOTROPIN, CHORIONIC (HCG) QUANT: ABNORMAL MIU/ML
HCG, URINE, POC: POSITIVE
HCT VFR BLD AUTO: 33.4 % (ref 34–48)
HGB BLD-MCNC: 10.4 G/DL (ref 11.5–15.5)
HGB UR QL STRIP: ABNORMAL
IMM GRANULOCYTES # BLD: 0.03 E9/L
IMM GRANULOCYTES NFR BLD: 0.3 % (ref 0–5)
KETONES UR STRIP-MCNC: NEGATIVE MG/DL
LACTATE BLDV-SCNC: 1.1 MMOL/L (ref 0.5–2.2)
LEUKOCYTE ESTERASE UR QL STRIP: ABNORMAL
LYMPHOCYTES # BLD: 1.21 E9/L (ref 1.5–4)
LYMPHOCYTES NFR BLD: 11.5 % (ref 20–42)
Lab: NORMAL
MCH RBC QN AUTO: 24.8 PG (ref 26–35)
MCHC RBC AUTO-ENTMCNC: 31.1 % (ref 32–34.5)
MCV RBC AUTO: 79.5 FL (ref 80–99.9)
MONOCYTES # BLD: 0.6 E9/L (ref 0.1–0.95)
MONOCYTES NFR BLD: 5.7 % (ref 2–12)
NEGATIVE QC PASS/FAIL: NORMAL
NEUTROPHILS # BLD: 8.18 E9/L (ref 1.8–7.3)
NEUTS SEG NFR BLD: 77.6 % (ref 43–80)
NITRITE UR QL STRIP: NEGATIVE
PH UR STRIP: 8 [PH] (ref 5–9)
PLATELET # BLD AUTO: 354 E9/L (ref 130–450)
PMV BLD AUTO: 9.7 FL (ref 7–12)
POSITIVE QC PASS/FAIL: NORMAL
POTASSIUM SERPL-SCNC: 3.6 MMOL/L (ref 3.5–5)
PROT SERPL-MCNC: 7.8 G/DL (ref 6.4–8.3)
PROT UR STRIP-MCNC: ABNORMAL MG/DL
RBC # BLD AUTO: 4.2 E12/L (ref 3.5–5.5)
RBC #/AREA URNS HPF: >20 /HPF (ref 0–2)
SODIUM SERPL-SCNC: 137 MMOL/L (ref 132–146)
SP GR UR STRIP: 1.01 (ref 1–1.03)
UROBILINOGEN UR STRIP-ACNC: 1 E.U./DL
WBC # BLD: 10.5 E9/L (ref 4.5–11.5)
WBC #/AREA URNS HPF: ABNORMAL /HPF (ref 0–5)

## 2023-03-31 PROCEDURE — 76817 TRANSVAGINAL US OBSTETRIC: CPT

## 2023-03-31 PROCEDURE — 99284 EMERGENCY DEPT VISIT MOD MDM: CPT

## 2023-03-31 PROCEDURE — 86901 BLOOD TYPING SEROLOGIC RH(D): CPT

## 2023-03-31 PROCEDURE — 86900 BLOOD TYPING SEROLOGIC ABO: CPT

## 2023-03-31 PROCEDURE — 83605 ASSAY OF LACTIC ACID: CPT

## 2023-03-31 PROCEDURE — 6370000000 HC RX 637 (ALT 250 FOR IP): Performed by: PHYSICIAN ASSISTANT

## 2023-03-31 PROCEDURE — 81001 URINALYSIS AUTO W/SCOPE: CPT

## 2023-03-31 PROCEDURE — 80053 COMPREHEN METABOLIC PANEL: CPT

## 2023-03-31 PROCEDURE — 85025 COMPLETE CBC W/AUTO DIFF WBC: CPT

## 2023-03-31 PROCEDURE — 87088 URINE BACTERIA CULTURE: CPT

## 2023-03-31 PROCEDURE — 2580000003 HC RX 258: Performed by: PHYSICIAN ASSISTANT

## 2023-03-31 PROCEDURE — 84702 CHORIONIC GONADOTROPIN TEST: CPT

## 2023-03-31 RX ORDER — SODIUM CHLORIDE 0.9 % (FLUSH) 0.9 %
SYRINGE (ML) INJECTION
Status: DISCONTINUED
Start: 2023-03-31 | End: 2023-03-31 | Stop reason: HOSPADM

## 2023-03-31 RX ORDER — ACETAMINOPHEN 500 MG
1000 TABLET ORAL ONCE
Status: COMPLETED | OUTPATIENT
Start: 2023-03-31 | End: 2023-03-31

## 2023-03-31 RX ORDER — 0.9 % SODIUM CHLORIDE 0.9 %
1000 INTRAVENOUS SOLUTION INTRAVENOUS ONCE
Status: COMPLETED | OUTPATIENT
Start: 2023-03-31 | End: 2023-03-31

## 2023-03-31 RX ADMIN — SODIUM CHLORIDE 1000 ML: 9 INJECTION, SOLUTION INTRAVENOUS at 11:59

## 2023-03-31 RX ADMIN — ACETAMINOPHEN 1000 MG: 500 TABLET, FILM COATED ORAL at 11:58

## 2023-03-31 ASSESSMENT — LIFESTYLE VARIABLES
HOW MANY STANDARD DRINKS CONTAINING ALCOHOL DO YOU HAVE ON A TYPICAL DAY: PATIENT DOES NOT DRINK
HOW OFTEN DO YOU HAVE A DRINK CONTAINING ALCOHOL: NEVER

## 2023-03-31 ASSESSMENT — PAIN SCALES - GENERAL: PAINLEVEL_OUTOF10: 10

## 2023-03-31 ASSESSMENT — PAIN DESCRIPTION - LOCATION: LOCATION: ABDOMEN

## 2023-03-31 NOTE — DISCHARGE INSTRUCTIONS
You are still pregnant at this time. Intrauterine pregnancy, 8 weeks 2 days with good fetal heart tones.

## 2023-03-31 NOTE — ED PROVIDER NOTES
a 1.00 pack-year smoking history. She has never used smokeless tobacco. She reports that she does not currently use alcohol. She reports that she does not currently use drugs after having used the following drugs: Marijuana (Weed) and Cocaine. Family History: family history includes Deep Vein Thrombosis in her sister; Diabetes in her maternal grandfather; Hearing Loss in her sister; Heart Disease in her sister; Heart Surgery in her sister; Hypertension in her sister; Pulmonary Embolism in her sister. Allergies: Patient has no known allergies. CURRENT MEDICATIONS       Previous Medications    ACETAMINOPHEN (TYLENOL) 500 MG CAPS CAPSULE    Take 1 capsule by mouth every 6 hours as needed for Pain    AMLODIPINE (NORVASC) 5 MG TABLET    Take 1 tablet by mouth daily    APIXABAN (ELIQUIS) 5 MG TABS TABLET    Take 2 tablets by mouth twice daily for 5 days, then 1 tablet by mouth twice daily thereafter    LISINOPRIL (PRINIVIL;ZESTRIL) 10 MG TABLET    Take 1 tablet by mouth daily       SCREENINGS               CIWA Assessment  BP: (!) 155/90  Heart Rate: (!) 109       PHYSICAL EXAM   Oxygen Saturation Interpretation: Normal on room air analysis. ED Triage Vitals   BP Temp Temp src Heart Rate Resp SpO2 Height Weight   03/31/23 1025 03/31/23 0958 -- 03/31/23 0958 03/31/23 1025 03/31/23 0958 -- --   (!) 155/90 97.6 °F (36.4 °C)  (!) 109 20 99 %           Physical Exam  Constitutional/General: Alert and oriented x3, well appearing. Mild distress, C/O pain  HEENT:  NC/NT. PERRLA,  Airway patent. Neck: Supple, full ROM, non tender to palpation in the midline. Respiratory: Lungs clear to auscultation bilaterally, no wheezes, rales, or rhonchi. Not in respiratory distress  CV:  Regular rate. Regular rhythm. No murmurs, gallops, or rubs. 2+ distal pulses  Chest: No chest wall tenderness  GI:  Abdomen Soft, +BS. Distended lower abdomen.    Mild midline suprapubic discomfort on exam.   No rebound, guarding, or a visit       NEW MEDICATIONS     Discharge Medication List as of 3/31/2023  1:04 PM        Electronically signed by Ayana Sanchez PA-C   DD: 3/31/23  **This report was transcribed using voice recognition software. Every effort was made to ensure accuracy; however, inadvertent computerized transcription errors may be present.   END OF ED PROVIDER NOTE       Ayana Sanchez PA-C  03/31/23 100 Maricel Franz PA-C  04/04/23 1234

## 2023-04-02 ENCOUNTER — HOSPITAL ENCOUNTER (EMERGENCY)
Age: 33
Discharge: HOME | End: 2023-04-02
Attending: EMERGENCY MEDICINE
Payer: COMMERCIAL

## 2023-04-02 ENCOUNTER — APPOINTMENT (OUTPATIENT)
Dept: ULTRASOUND IMAGING | Age: 33
End: 2023-04-02
Payer: COMMERCIAL

## 2023-04-02 VITALS
DIASTOLIC BLOOD PRESSURE: 100 MMHG | TEMPERATURE: 98 F | SYSTOLIC BLOOD PRESSURE: 149 MMHG | OXYGEN SATURATION: 97 % | RESPIRATION RATE: 22 BRPM | HEART RATE: 106 BPM

## 2023-04-02 DIAGNOSIS — O03.9 MISCARRIAGE: Primary | ICD-10-CM

## 2023-04-02 LAB
ALBUMIN SERPL-MCNC: 3.9 G/DL (ref 3.5–5.2)
ALP SERPL-CCNC: 86 U/L (ref 35–104)
ALT SERPL-CCNC: 9 U/L (ref 0–32)
AMPHET UR QL SCN: NOT DETECTED
ANION GAP SERPL CALCULATED.3IONS-SCNC: 12 MMOL/L (ref 7–16)
AST SERPL-CCNC: 14 U/L (ref 0–31)
BACTERIA UR CULT: NORMAL
BACTERIA URNS QL MICRO: ABNORMAL /HPF
BARBITURATES UR QL SCN: NOT DETECTED
BASOPHILS # BLD: 0.03 E9/L (ref 0–0.2)
BASOPHILS NFR BLD: 0.2 % (ref 0–2)
BENZODIAZ UR QL SCN: NOT DETECTED
BILIRUB SERPL-MCNC: 0.4 MG/DL (ref 0–1.2)
BILIRUB UR QL STRIP: NEGATIVE
BUN SERPL-MCNC: 11 MG/DL (ref 6–20)
CALCIUM SERPL-MCNC: 9.2 MG/DL (ref 8.6–10.2)
CANNABINOIDS UR QL SCN: NOT DETECTED
CHLORIDE SERPL-SCNC: 99 MMOL/L (ref 98–107)
CLARITY UR: ABNORMAL
CO2 SERPL-SCNC: 21 MMOL/L (ref 22–29)
COCAINE UR QL SCN: POSITIVE
COLOR UR: YELLOW
CREAT SERPL-MCNC: 0.8 MG/DL (ref 0.5–1)
DRUG SCREEN COMMENT UR-IMP: ABNORMAL
EOSINOPHIL # BLD: 0.18 E9/L (ref 0.05–0.5)
EOSINOPHIL NFR BLD: 1.1 % (ref 0–6)
EPI CELLS #/AREA URNS HPF: ABNORMAL /HPF
ERYTHROCYTE [DISTWIDTH] IN BLOOD BY AUTOMATED COUNT: 17.2 FL (ref 11.5–15)
FENTANYL SCREEN, URINE: NOT DETECTED
GLUCOSE SERPL-MCNC: 122 MG/DL (ref 74–99)
GLUCOSE UR STRIP-MCNC: NEGATIVE MG/DL
GONADOTROPIN, CHORIONIC (HCG) QUANT: ABNORMAL MIU/ML
HCT VFR BLD AUTO: 32.7 % (ref 34–48)
HGB BLD-MCNC: 10.2 G/DL (ref 11.5–15.5)
HGB UR QL STRIP: ABNORMAL
IMM GRANULOCYTES # BLD: 0.09 E9/L
IMM GRANULOCYTES NFR BLD: 0.6 % (ref 0–5)
KETONES UR STRIP-MCNC: NEGATIVE MG/DL
LACTATE BLDV-SCNC: 1.4 MMOL/L (ref 0.5–2.2)
LEUKOCYTE ESTERASE UR QL STRIP: ABNORMAL
LIPASE: 19 U/L (ref 13–60)
LYMPHOCYTES # BLD: 0.61 E9/L (ref 1.5–4)
LYMPHOCYTES NFR BLD: 3.8 % (ref 20–42)
MCH RBC QN AUTO: 24.8 PG (ref 26–35)
MCHC RBC AUTO-ENTMCNC: 31.2 % (ref 32–34.5)
MCV RBC AUTO: 79.4 FL (ref 80–99.9)
METHADONE UR QL SCN: NOT DETECTED
MONOCYTES # BLD: 0.6 E9/L (ref 0.1–0.95)
MONOCYTES NFR BLD: 3.8 % (ref 2–12)
NEUTROPHILS # BLD: 14.35 E9/L (ref 1.8–7.3)
NEUTS SEG NFR BLD: 90.5 % (ref 43–80)
NITRITE UR QL STRIP: NEGATIVE
OPIATES UR QL SCN: NOT DETECTED
OXYCODONE URINE: NOT DETECTED
PCP UR QL SCN: NOT DETECTED
PH UR STRIP: 5.5 [PH] (ref 5–9)
PLATELET # BLD AUTO: 359 E9/L (ref 130–450)
PMV BLD AUTO: 10.4 FL (ref 7–12)
POTASSIUM SERPL-SCNC: 4 MMOL/L (ref 3.5–5)
PROT SERPL-MCNC: 7.6 G/DL (ref 6.4–8.3)
PROT UR STRIP-MCNC: 30 MG/DL
RBC # BLD AUTO: 4.12 E12/L (ref 3.5–5.5)
RBC #/AREA URNS HPF: ABNORMAL /HPF (ref 0–2)
SODIUM SERPL-SCNC: 132 MMOL/L (ref 132–146)
SP GR UR STRIP: >=1.03 (ref 1–1.03)
UROBILINOGEN UR STRIP-ACNC: 0.2 E.U./DL
WBC # BLD: 15.9 E9/L (ref 4.5–11.5)
WBC #/AREA URNS HPF: ABNORMAL /HPF (ref 0–5)

## 2023-04-02 PROCEDURE — 6360000002 HC RX W HCPCS: Performed by: STUDENT IN AN ORGANIZED HEALTH CARE EDUCATION/TRAINING PROGRAM

## 2023-04-02 PROCEDURE — 83690 ASSAY OF LIPASE: CPT

## 2023-04-02 PROCEDURE — 80053 COMPREHEN METABOLIC PANEL: CPT

## 2023-04-02 PROCEDURE — 81001 URINALYSIS AUTO W/SCOPE: CPT

## 2023-04-02 PROCEDURE — 76705 ECHO EXAM OF ABDOMEN: CPT

## 2023-04-02 PROCEDURE — 84702 CHORIONIC GONADOTROPIN TEST: CPT

## 2023-04-02 PROCEDURE — 85025 COMPLETE CBC W/AUTO DIFF WBC: CPT

## 2023-04-02 PROCEDURE — 99284 EMERGENCY DEPT VISIT MOD MDM: CPT

## 2023-04-02 PROCEDURE — 83605 ASSAY OF LACTIC ACID: CPT

## 2023-04-02 PROCEDURE — 76817 TRANSVAGINAL US OBSTETRIC: CPT

## 2023-04-02 PROCEDURE — 6370000000 HC RX 637 (ALT 250 FOR IP): Performed by: EMERGENCY MEDICINE

## 2023-04-02 PROCEDURE — 6370000000 HC RX 637 (ALT 250 FOR IP): Performed by: STUDENT IN AN ORGANIZED HEALTH CARE EDUCATION/TRAINING PROGRAM

## 2023-04-02 PROCEDURE — 80307 DRUG TEST PRSMV CHEM ANLYZR: CPT

## 2023-04-02 PROCEDURE — 96374 THER/PROPH/DIAG INJ IV PUSH: CPT

## 2023-04-02 PROCEDURE — 2580000003 HC RX 258: Performed by: STUDENT IN AN ORGANIZED HEALTH CARE EDUCATION/TRAINING PROGRAM

## 2023-04-02 RX ORDER — ONDANSETRON 4 MG/1
4 TABLET, ORALLY DISINTEGRATING ORAL ONCE
Status: COMPLETED | OUTPATIENT
Start: 2023-04-02 | End: 2023-04-02

## 2023-04-02 RX ORDER — DICYCLOMINE HYDROCHLORIDE 10 MG/1
10 CAPSULE ORAL ONCE
Status: COMPLETED | OUTPATIENT
Start: 2023-04-02 | End: 2023-04-02

## 2023-04-02 RX ORDER — OXYCODONE HYDROCHLORIDE AND ACETAMINOPHEN 5; 325 MG/1; MG/1
1 TABLET ORAL EVERY 8 HOURS PRN
Qty: 12 TABLET | Refills: 0 | Status: SHIPPED | OUTPATIENT
Start: 2023-04-02 | End: 2023-04-12

## 2023-04-02 RX ORDER — 0.9 % SODIUM CHLORIDE 0.9 %
1000 INTRAVENOUS SOLUTION INTRAVENOUS ONCE
Status: COMPLETED | OUTPATIENT
Start: 2023-04-02 | End: 2023-04-02

## 2023-04-02 RX ORDER — ONDANSETRON 2 MG/ML
4 INJECTION INTRAMUSCULAR; INTRAVENOUS ONCE
Status: DISCONTINUED | OUTPATIENT
Start: 2023-04-02 | End: 2023-04-02

## 2023-04-02 RX ORDER — OXYCODONE HYDROCHLORIDE 5 MG/1
5 TABLET ORAL ONCE
Status: COMPLETED | OUTPATIENT
Start: 2023-04-02 | End: 2023-04-02

## 2023-04-02 RX ORDER — KETOROLAC TROMETHAMINE 30 MG/ML
30 INJECTION, SOLUTION INTRAMUSCULAR; INTRAVENOUS ONCE
Status: COMPLETED | OUTPATIENT
Start: 2023-04-02 | End: 2023-04-02

## 2023-04-02 RX ORDER — ACETAMINOPHEN 500 MG
1000 TABLET ORAL ONCE
Status: COMPLETED | OUTPATIENT
Start: 2023-04-02 | End: 2023-04-02

## 2023-04-02 RX ADMIN — OXYCODONE HYDROCHLORIDE 5 MG: 5 TABLET ORAL at 20:04

## 2023-04-02 RX ADMIN — DICYCLOMINE HYDROCHLORIDE 10 MG: 10 CAPSULE ORAL at 16:33

## 2023-04-02 RX ADMIN — SODIUM CHLORIDE 1000 ML: 9 INJECTION, SOLUTION INTRAVENOUS at 17:20

## 2023-04-02 RX ADMIN — ONDANSETRON 4 MG: 4 TABLET, ORALLY DISINTEGRATING ORAL at 16:34

## 2023-04-02 RX ADMIN — ACETAMINOPHEN 1000 MG: 500 TABLET, FILM COATED ORAL at 16:33

## 2023-04-02 RX ADMIN — KETOROLAC TROMETHAMINE 30 MG: 30 INJECTION, SOLUTION INTRAMUSCULAR at 20:04

## 2023-04-02 ASSESSMENT — PAIN DESCRIPTION - LOCATION: LOCATION: ABDOMEN

## 2023-04-02 ASSESSMENT — PAIN SCALES - GENERAL
PAINLEVEL_OUTOF10: 10
PAINLEVEL_OUTOF10: 10

## 2023-04-02 ASSESSMENT — PAIN DESCRIPTION - DESCRIPTORS: DESCRIPTORS: CRAMPING

## 2023-04-02 NOTE — ED NOTES
Labs drawn/sent, urine sent, unsuc at [facility] and dr Maggy Ernandez advised, okay to give po meds and okay to forgo iv fluids/iv access at this time     Nneka Yates RN  04/02/23 7650

## 2023-04-03 NOTE — DISCHARGE INSTRUCTIONS
Take pain medication as needed  Follow-up with OB/GYN soon as possible  If you notice any new worrisome symptoms such as excessive bleeding please return to emergency department for evaluation

## 2023-04-05 NOTE — ED PROVIDER NOTES
echogenicity without evidence of intrahepatic biliary ductal dilatation. BILIARY SYSTEM:  Gallbladder is unremarkable without evidence of pericholecystic fluid, wall thickening or stones. Negative sonographic Hodge's sign. Common bile duct is within normal limits measuring 4 mm. Kidneys: No hydronephrosis. PANCREAS:  Visualized portions of the pancreas are unremarkable. OTHER: No evidence of right upper quadrant ascites. No splenomegaly. The appendix was not definitely seen. Unremarkable exam as visualized. No results found. PROCEDURES   Unless otherwise noted below, none       PAST MEDICAL HISTORY/Chronic Conditions Affecting Care    has a past medical history of Abnormal Pap smear, JERAMY (acute kidney injury) ([facility] [address] (3/27/2021), Cannabis abuse (6/17/2022), Cocaine abuse ([facility] [address] (0/90/1368), Complication of anesthesia (2011), Herpes simplex without mention of complication, Hypertensive urgency (3/27/2021), Methamphetamine use ([facility] [address] (6/17/2022), Recurrent pregnancy loss, currently pregnant (6/17/2022), and Suspected damage to fetus from maternal drug use (6/17/2022). EMERGENCY DEPARTMENT COURSE    Vitals:    Vitals:    04/02/23 1446 04/02/23 1517 04/02/23 1627   BP:  (!) 149/100    Pulse: (!) 120  (!) 106   Resp:  22    Temp: 98 °F (36.7 °C)     SpO2: 100%  97%       Patient was given the following medications:  Medications   0.9 % sodium chloride bolus (0 mLs IntraVENous Stopped 4/2/23 2005)   acetaminophen (TYLENOL) tablet 1,000 mg (1,000 mg Oral Given 4/2/23 1633)   dicyclomine (BENTYL) capsule 10 mg (10 mg Oral Given 4/2/23 1633)   ondansetron (ZOFRAN-ODT) disintegrating tablet 4 mg (4 mg Oral Given 4/2/23 1634)   ketorolac (TORADOL) injection 30 mg (30 mg IntraVENous Given 4/2/23 2004)   oxyCODONE (ROXICODONE) immediate release tablet 5 mg (5 mg Oral Given 4/2/23 2004)         Is this patient to be included in the SEP-1 Core Measure due to severe sepsis or septic shock?    No Exclusion criteria -

## 2023-04-06 ENCOUNTER — APPOINTMENT (OUTPATIENT)
Dept: ULTRASOUND IMAGING | Age: 33
End: 2023-04-06
Payer: COMMERCIAL

## 2023-04-06 ENCOUNTER — HOSPITAL ENCOUNTER (EMERGENCY)
Age: 33
Discharge: HOME | End: 2023-04-06
Payer: COMMERCIAL

## 2023-04-06 VITALS
WEIGHT: 184 LBS | HEIGHT: 68 IN | SYSTOLIC BLOOD PRESSURE: 177 MMHG | TEMPERATURE: 97.5 F | HEART RATE: 96 BPM | RESPIRATION RATE: 16 BRPM | BODY MASS INDEX: 27.89 KG/M2 | DIASTOLIC BLOOD PRESSURE: 99 MMHG | OXYGEN SATURATION: 99 %

## 2023-04-06 DIAGNOSIS — O03.9 MISCARRIAGE: Primary | ICD-10-CM

## 2023-04-06 LAB
ABO + RH BLD: NORMAL
ALBUMIN SERPL-MCNC: 3.8 G/DL (ref 3.5–5.2)
ALP SERPL-CCNC: 87 U/L (ref 35–104)
ALT SERPL-CCNC: 7 U/L (ref 0–32)
ANION GAP SERPL CALCULATED.3IONS-SCNC: 9 MMOL/L (ref 7–16)
AST SERPL-CCNC: 11 U/L (ref 0–31)
BASOPHILS # BLD: 0.05 E9/L (ref 0–0.2)
BASOPHILS NFR BLD: 0.5 % (ref 0–2)
BILIRUB SERPL-MCNC: <0.2 MG/DL (ref 0–1.2)
BLD GP AB SCN SERPL QL: NORMAL
BUN SERPL-MCNC: 14 MG/DL (ref 6–20)
CALCIUM SERPL-MCNC: 9.3 MG/DL (ref 8.6–10.2)
CHLORIDE SERPL-SCNC: 108 MMOL/L (ref 98–107)
CO2 SERPL-SCNC: 25 MMOL/L (ref 22–29)
CREAT SERPL-MCNC: 0.9 MG/DL (ref 0.5–1)
EOSINOPHIL # BLD: 0.7 E9/L (ref 0.05–0.5)
EOSINOPHIL NFR BLD: 7 % (ref 0–6)
ERYTHROCYTE [DISTWIDTH] IN BLOOD BY AUTOMATED COUNT: 17.2 FL (ref 11.5–15)
GLUCOSE SERPL-MCNC: 108 MG/DL (ref 74–99)
GONADOTROPIN, CHORIONIC (HCG) QUANT: 1848 MIU/ML
HCT VFR BLD AUTO: 29.2 % (ref 34–48)
HGB BLD-MCNC: 8.6 G/DL (ref 11.5–15.5)
IMM GRANULOCYTES # BLD: 0.08 E9/L
IMM GRANULOCYTES NFR BLD: 0.8 % (ref 0–5)
LYMPHOCYTES # BLD: 1.98 E9/L (ref 1.5–4)
LYMPHOCYTES NFR BLD: 19.8 % (ref 20–42)
MCH RBC QN AUTO: 24.1 PG (ref 26–35)
MCHC RBC AUTO-ENTMCNC: 29.5 % (ref 32–34.5)
MCV RBC AUTO: 81.8 FL (ref 80–99.9)
MONOCYTES # BLD: 0.67 E9/L (ref 0.1–0.95)
MONOCYTES NFR BLD: 6.7 % (ref 2–12)
NEUTROPHILS # BLD: 6.5 E9/L (ref 1.8–7.3)
NEUTS SEG NFR BLD: 65.2 % (ref 43–80)
PLATELET # BLD AUTO: 431 E9/L (ref 130–450)
PMV BLD AUTO: 9.6 FL (ref 7–12)
POTASSIUM SERPL-SCNC: 4.1 MMOL/L (ref 3.5–5)
PROT SERPL-MCNC: 7.5 G/DL (ref 6.4–8.3)
RBC # BLD AUTO: 3.57 E12/L (ref 3.5–5.5)
SODIUM SERPL-SCNC: 142 MMOL/L (ref 132–146)
WBC # BLD: 10 E9/L (ref 4.5–11.5)

## 2023-04-06 PROCEDURE — 85025 COMPLETE CBC W/AUTO DIFF WBC: CPT

## 2023-04-06 PROCEDURE — 86850 RBC ANTIBODY SCREEN: CPT

## 2023-04-06 PROCEDURE — 76817 TRANSVAGINAL US OBSTETRIC: CPT

## 2023-04-06 PROCEDURE — 80053 COMPREHEN METABOLIC PANEL: CPT

## 2023-04-06 PROCEDURE — 86901 BLOOD TYPING SEROLOGIC RH(D): CPT

## 2023-04-06 PROCEDURE — 86900 BLOOD TYPING SEROLOGIC ABO: CPT

## 2023-04-06 PROCEDURE — 84702 CHORIONIC GONADOTROPIN TEST: CPT

## 2023-04-06 RX ORDER — DICYCLOMINE HYDROCHLORIDE 10 MG/1
10 CAPSULE ORAL 4 TIMES DAILY
Qty: 30 CAPSULE | Refills: 0 | Status: SHIPPED | OUTPATIENT
Start: 2023-04-06

## 2023-04-12 ENCOUNTER — HOSPITAL ENCOUNTER (EMERGENCY)
Age: 33
Discharge: HOME | End: 2023-04-12
Attending: EMERGENCY MEDICINE
Payer: COMMERCIAL

## 2023-04-12 VITALS
SYSTOLIC BLOOD PRESSURE: 132 MMHG | HEIGHT: 68 IN | TEMPERATURE: 97.1 F | OXYGEN SATURATION: 100 % | DIASTOLIC BLOOD PRESSURE: 88 MMHG | BODY MASS INDEX: 27.28 KG/M2 | HEART RATE: 106 BPM | RESPIRATION RATE: 16 BRPM | WEIGHT: 180 LBS

## 2023-04-12 DIAGNOSIS — Z32.01 POSITIVE PREGNANCY TEST: ICD-10-CM

## 2023-04-12 DIAGNOSIS — Z71.1 CONCERN ABOUT STD IN FEMALE WITHOUT DIAGNOSIS: Primary | ICD-10-CM

## 2023-04-12 LAB
BACTERIA URNS QL MICRO: ABNORMAL /HPF
BILIRUB UR QL STRIP: ABNORMAL
CLARITY UR: CLEAR
COLOR UR: YELLOW
EPI CELLS #/AREA URNS HPF: ABNORMAL /HPF
GLUCOSE UR STRIP-MCNC: NEGATIVE MG/DL
HCG UR QL: POSITIVE
HGB UR QL STRIP: ABNORMAL
KETONES UR STRIP-MCNC: NEGATIVE MG/DL
LEUKOCYTE ESTERASE UR QL STRIP: ABNORMAL
NITRITE UR QL STRIP: NEGATIVE
PH UR STRIP: 5.5 [PH] (ref 5–9)
PROT UR STRIP-MCNC: 30 MG/DL
RBC #/AREA URNS HPF: ABNORMAL /HPF (ref 0–2)
SP GR UR STRIP: >=1.03 (ref 1–1.03)
UROBILINOGEN UR STRIP-ACNC: 0.2 E.U./DL
WBC #/AREA URNS HPF: ABNORMAL /HPF (ref 0–5)

## 2023-04-12 PROCEDURE — 81025 URINE PREGNANCY TEST: CPT

## 2023-04-12 PROCEDURE — 87591 N.GONORRHOEAE DNA AMP PROB: CPT

## 2023-04-12 PROCEDURE — 99283 EMERGENCY DEPT VISIT LOW MDM: CPT

## 2023-04-12 PROCEDURE — 81001 URINALYSIS AUTO W/SCOPE: CPT

## 2023-04-12 PROCEDURE — 87491 CHLMYD TRACH DNA AMP PROBE: CPT

## 2023-04-12 ASSESSMENT — PAIN - FUNCTIONAL ASSESSMENT: PAIN_FUNCTIONAL_ASSESSMENT: NONE - DENIES PAIN
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
## 2023-04-17 LAB
CHLAMYDIA DNA UR QL NAA+PROBE: NEGATIVE
N GONORRHOEA DNA UR QL NAA+PROBE: ABNORMAL
SPECIMEN SOURCE: ABNORMAL

## 2023-05-01 ENCOUNTER — HOSPITAL ENCOUNTER (EMERGENCY)
Age: 33
Discharge: HOME | End: 2023-05-01
Attending: EMERGENCY MEDICINE
Payer: COMMERCIAL

## 2023-05-01 VITALS
WEIGHT: 180 LBS | HEIGHT: 67 IN | TEMPERATURE: 97.8 F | HEART RATE: 78 BPM | BODY MASS INDEX: 28.25 KG/M2 | DIASTOLIC BLOOD PRESSURE: 100 MMHG | OXYGEN SATURATION: 99 % | SYSTOLIC BLOOD PRESSURE: 144 MMHG | RESPIRATION RATE: 16 BRPM

## 2023-05-01 DIAGNOSIS — A54.9 GONORRHEA: Primary | ICD-10-CM

## 2023-05-01 DIAGNOSIS — Z32.01 POSITIVE PREGNANCY TEST: ICD-10-CM

## 2023-05-01 LAB — HCG UR QL: POSITIVE

## 2023-05-01 PROCEDURE — 99284 EMERGENCY DEPT VISIT MOD MDM: CPT

## 2023-05-01 PROCEDURE — 6360000002 HC RX W HCPCS: Performed by: EMERGENCY MEDICINE

## 2023-05-01 PROCEDURE — 81025 URINE PREGNANCY TEST: CPT

## 2023-05-01 PROCEDURE — 96372 THER/PROPH/DIAG INJ SC/IM: CPT

## 2023-05-01 RX ORDER — CEFTRIAXONE 500 MG/1
500 INJECTION, POWDER, FOR SOLUTION INTRAMUSCULAR; INTRAVENOUS ONCE
Status: COMPLETED | OUTPATIENT
Start: 2023-05-01 | End: 2023-05-01

## 2023-05-01 RX ADMIN — CEFTRIAXONE SODIUM 500 MG: 500 INJECTION, POWDER, FOR SOLUTION INTRAMUSCULAR; INTRAVENOUS at 14:13

## 2023-05-01 NOTE — ED PROVIDER NOTES
HPI:  5/1/23,   Time: 2:11 PM EDT         Ehsan Pham is a 27 y.o. female presenting to the ED for chief complaint: Patient was seen by myself approximately 2 weeks ago/patient was supposed to follow-up with Dr. Charu Maldonado regarding positive pregnancy test despite being in the process of possible miscarriage/patient today found out that her gonorrhea test was positive and is requesting treatment. ROS:   Pertinent positives and negatives are stated within HPI, all other systems reviewed and are negative.  --------------------------------------------- PAST HISTORY ---------------------------------------------  Past Medical History:  has a past medical history of Abnormal Pap smear, JERAMY (acute kidney injury) ([facility] [address] Cannabis abuse, Cocaine abuse ([facility] [address] Complication of anesthesia, Herpes simplex without mention of complication, Hypertensive urgency, Methamphetamine use ([facility] [address] Recurrent pregnancy loss, currently pregnant, and Suspected damage to fetus from maternal drug use. Past Surgical History:  has a past surgical history that includes IR BIOPSY KIDNEY PERCUTANEOUS (3/29/2021). Social History:  reports that she has been smoking cigarettes. She has a 1.00 pack-year smoking history. She has never used smokeless tobacco. She reports that she does not currently use alcohol. She reports that she does not currently use drugs after having used the following drugs: Marijuana (Weed) and Cocaine. Family History: family history includes Deep Vein Thrombosis in her sister; Diabetes in her maternal grandfather; Hearing Loss in her sister; Heart Disease in her sister; Heart Surgery in her sister; Hypertension in her sister; Pulmonary Embolism in her sister. The patients home medications have been reviewed. Allergies: Patient has no known allergies.     -------------------------------------------------- RESULTS -------------------------------------------------  All laboratory and radiology results have been personally

## 2023-05-10 ENCOUNTER — HOSPITAL ENCOUNTER (EMERGENCY)
Age: 33
Discharge: HOME | End: 2023-05-10
Attending: EMERGENCY MEDICINE
Payer: COMMERCIAL

## 2023-05-10 VITALS
SYSTOLIC BLOOD PRESSURE: 175 MMHG | OXYGEN SATURATION: 99 % | HEIGHT: 68 IN | BODY MASS INDEX: 27.28 KG/M2 | TEMPERATURE: 97.9 F | WEIGHT: 180 LBS | DIASTOLIC BLOOD PRESSURE: 123 MMHG | HEART RATE: 95 BPM | RESPIRATION RATE: 16 BRPM

## 2023-05-10 DIAGNOSIS — T14.8XXA INFECTED OPEN WOUND: Primary | ICD-10-CM

## 2023-05-10 DIAGNOSIS — L08.9 INFECTED OPEN WOUND: Primary | ICD-10-CM

## 2023-05-10 PROCEDURE — 99283 EMERGENCY DEPT VISIT LOW MDM: CPT

## 2023-05-10 RX ORDER — DOXYCYCLINE HYCLATE 100 MG
100 TABLET ORAL 2 TIMES DAILY
Qty: 20 TABLET | Refills: 0 | Status: SHIPPED | OUTPATIENT
Start: 2023-05-10 | End: 2023-05-10 | Stop reason: CLARIF

## 2023-05-10 RX ORDER — AMOXICILLIN AND CLAVULANATE POTASSIUM 875; 125 MG/1; MG/1
1 TABLET, FILM COATED ORAL 2 TIMES DAILY
Qty: 20 TABLET | Refills: 0 | Status: SHIPPED | OUTPATIENT
Start: 2023-05-10 | End: 2023-05-20

## 2023-05-10 NOTE — ED PROVIDER NOTES
HPI:  5/10/23,   Time: 12:54 PM EDT         Radha Guallpa is a 27 y.o. female presenting to the ED for complaint: Infected wound on the right foot which has been ongoing for over 2 months. Patient states she was seen here initially and then seen at [facility] but never followed up with primary care physician. ROS:   Pertinent positives and negatives are stated within HPI, all other systems reviewed and are negative.  --------------------------------------------- PAST HISTORY ---------------------------------------------  Past Medical History:  has a past medical history of Abnormal Pap smear, JERAMY (acute kidney injury) ([facility] [address] Cannabis abuse, Cocaine abuse ([facility] [address] Complication of anesthesia, Herpes simplex without mention of complication, Hypertensive urgency, Methamphetamine use ([facility] [address] Recurrent pregnancy loss, currently pregnant, and Suspected damage to fetus from maternal drug use. Past Surgical History:  has a past surgical history that includes IR BIOPSY KIDNEY PERCUTANEOUS (3/29/2021). Social History:  reports that she has been smoking cigarettes. She has a 1.00 pack-year smoking history. She has never used smokeless tobacco. She reports that she does not currently use alcohol. She reports that she does not currently use drugs after having used the following drugs: Marijuana (Weed) and Cocaine. Family History: family history includes Deep Vein Thrombosis in her sister; Diabetes in her maternal grandfather; Hearing Loss in her sister; Heart Disease in her sister; Heart Surgery in her sister; Hypertension in her sister; Pulmonary Embolism in her sister. The patients home medications have been reviewed. Allergies: Patient has no known allergies.     -------------------------------------------------- RESULTS -------------------------------------------------  All laboratory and radiology results have been personally reviewed by myself   LABS:  No results found for this visit on

## 2023-07-06 ENCOUNTER — HOSPITAL ENCOUNTER (EMERGENCY)
Age: 33
Discharge: HOME | End: 2023-07-06
Attending: EMERGENCY MEDICINE
Payer: COMMERCIAL

## 2023-07-06 VITALS
RESPIRATION RATE: 20 BRPM | TEMPERATURE: 97.8 F | DIASTOLIC BLOOD PRESSURE: 78 MMHG | HEIGHT: 67 IN | BODY MASS INDEX: 27.31 KG/M2 | OXYGEN SATURATION: 100 % | HEART RATE: 80 BPM | SYSTOLIC BLOOD PRESSURE: 135 MMHG | WEIGHT: 174 LBS

## 2023-07-06 DIAGNOSIS — T74.21XA SEXUAL ASSAULT OF ADULT, INITIAL ENCOUNTER: Primary | ICD-10-CM

## 2023-07-06 LAB
BACTERIA SPEC QL WET PREP: NORMAL
CLUE CELLS VAG QL WET PREP: NORMAL
EPI CELLS VAG QL WET PREP: NORMAL
HCG, URINE, POC: NEGATIVE
Lab: NORMAL
NEGATIVE QC PASS/FAIL: NORMAL
POSITIVE QC PASS/FAIL: NORMAL
RBC VAG QL: NORMAL
SPECIMEN SOURCE FLD: NORMAL
T VAGINALIS VAG QL WET PREP: NORMAL
WBC VAG QL WET PREP: NORMAL
YEAST VAG QL WET PREP: NORMAL

## 2023-07-06 PROCEDURE — 96372 THER/PROPH/DIAG INJ SC/IM: CPT

## 2023-07-06 PROCEDURE — 87210 SMEAR WET MOUNT SALINE/INK: CPT

## 2023-07-06 PROCEDURE — 99284 EMERGENCY DEPT VISIT MOD MDM: CPT

## 2023-07-06 PROCEDURE — 6370000000 HC RX 637 (ALT 250 FOR IP): Performed by: EMERGENCY MEDICINE

## 2023-07-06 PROCEDURE — 6360000002 HC RX W HCPCS: Performed by: EMERGENCY MEDICINE

## 2023-07-06 PROCEDURE — 2720000011 HC SANE KIT SUPPLY STERILE

## 2023-07-06 RX ORDER — CEFTRIAXONE 500 MG/1
500 INJECTION, POWDER, FOR SOLUTION INTRAMUSCULAR; INTRAVENOUS ONCE
Status: COMPLETED | OUTPATIENT
Start: 2023-07-06 | End: 2023-07-06

## 2023-07-06 RX ORDER — LEVONORGESTREL 1.5 MG/1
1.5 TABLET ORAL ONCE
Status: COMPLETED | OUTPATIENT
Start: 2023-07-06 | End: 2023-07-06

## 2023-07-06 RX ORDER — AZITHROMYCIN 250 MG/1
1000 TABLET, FILM COATED ORAL ONCE
Status: COMPLETED | OUTPATIENT
Start: 2023-07-06 | End: 2023-07-06

## 2023-07-06 RX ORDER — ONDANSETRON 4 MG/1
4 TABLET, ORALLY DISINTEGRATING ORAL ONCE
Status: COMPLETED | OUTPATIENT
Start: 2023-07-06 | End: 2023-07-06

## 2023-07-06 RX ORDER — METRONIDAZOLE 500 MG/1
2000 TABLET ORAL ONCE
Status: COMPLETED | OUTPATIENT
Start: 2023-07-06 | End: 2023-07-06

## 2023-07-06 RX ADMIN — LEVONORGESTREL 1.5 MG: 1.5 TABLET ORAL at 12:56

## 2023-07-06 RX ADMIN — CEFTRIAXONE 500 MG: 500 INJECTION, POWDER, FOR SOLUTION INTRAMUSCULAR; INTRAVENOUS at 12:57

## 2023-07-06 RX ADMIN — ONDANSETRON 4 MG: 4 TABLET, ORALLY DISINTEGRATING ORAL at 12:54

## 2023-07-06 RX ADMIN — AZITHROMYCIN MONOHYDRATE 1000 MG: 250 TABLET ORAL at 12:54

## 2023-07-06 RX ADMIN — METRONIDAZOLE 2000 MG: 500 TABLET ORAL at 12:55

## 2023-07-06 ASSESSMENT — ENCOUNTER SYMPTOMS
VOMITING: 0
CHEST TIGHTNESS: 0
WHEEZING: 0
DIARRHEA: 0
COUGH: 0
NAUSEA: 0
SHORTNESS OF BREATH: 0
BACK PAIN: 0
ABDOMINAL PAIN: 0
SORE THROAT: 0

## 2023-07-06 ASSESSMENT — LIFESTYLE VARIABLES: HOW OFTEN DO YOU HAVE A DRINK CONTAINING ALCOHOL: MONTHLY OR LESS

## 2023-07-06 ASSESSMENT — PAIN - FUNCTIONAL ASSESSMENT
PAIN_FUNCTIONAL_ASSESSMENT: NONE - DENIES PAIN
PAIN_FUNCTIONAL_ASSESSMENT: NONE - DENIES PAIN

## 2023-07-06 NOTE — ED NOTES
THERESA kit started with patient consent. Procedure and chain of command explained to patient and she is in agreement. She has agreed to notify Genoveva SONG on her own time. Consent for exam obtained and signed by patient.   This RN and Fatuma Rascon RN both present for exam.     Josemanuel العلي RN  07/06/23 9817
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
## 2023-07-25 ENCOUNTER — OFFICE VISIT (OUTPATIENT)
Dept: CARDIOLOGY CLINIC | Age: 33
End: 2023-07-25
Payer: COMMERCIAL

## 2023-07-25 ENCOUNTER — HOSPITAL ENCOUNTER (EMERGENCY)
Age: 33
Discharge: HOME | End: 2023-07-25
Attending: STUDENT IN AN ORGANIZED HEALTH CARE EDUCATION/TRAINING PROGRAM
Payer: COMMERCIAL

## 2023-07-25 ENCOUNTER — APPOINTMENT (OUTPATIENT)
Dept: ULTRASOUND IMAGING | Age: 33
End: 2023-07-25
Payer: COMMERCIAL

## 2023-07-25 VITALS
HEART RATE: 105 BPM | WEIGHT: 178 LBS | DIASTOLIC BLOOD PRESSURE: 94 MMHG | TEMPERATURE: 98.4 F | SYSTOLIC BLOOD PRESSURE: 153 MMHG | BODY MASS INDEX: 27.88 KG/M2 | OXYGEN SATURATION: 100 % | RESPIRATION RATE: 17 BRPM

## 2023-07-25 VITALS
HEIGHT: 67 IN | HEART RATE: 94 BPM | SYSTOLIC BLOOD PRESSURE: 146 MMHG | BODY MASS INDEX: 27.94 KG/M2 | RESPIRATION RATE: 16 BRPM | DIASTOLIC BLOOD PRESSURE: 86 MMHG | WEIGHT: 178 LBS

## 2023-07-25 DIAGNOSIS — R82.71 ASYMPTOMATIC BACTERIURIA DURING PREGNANCY: ICD-10-CM

## 2023-07-25 DIAGNOSIS — O46.90 VAGINAL BLEEDING IN PREGNANCY: Primary | ICD-10-CM

## 2023-07-25 DIAGNOSIS — I42.9 CARDIOMYOPATHY, UNSPECIFIED TYPE (HCC): ICD-10-CM

## 2023-07-25 DIAGNOSIS — O99.891 ASYMPTOMATIC BACTERIURIA DURING PREGNANCY: ICD-10-CM

## 2023-07-25 DIAGNOSIS — I50.20 HFREF (HEART FAILURE WITH REDUCED EJECTION FRACTION) (HCC): Primary | ICD-10-CM

## 2023-07-25 LAB
ABO + RH BLD: NORMAL
ALBUMIN SERPL-MCNC: 4.5 G/DL (ref 3.5–5.2)
ALP SERPL-CCNC: 62 U/L (ref 35–104)
ALT SERPL-CCNC: 11 U/L (ref 0–32)
ANION GAP SERPL CALCULATED.3IONS-SCNC: 11 MMOL/L (ref 7–16)
AST SERPL-CCNC: 18 U/L (ref 0–31)
B-HCG SERPL EIA 3RD IS-ACNC: ABNORMAL MIU/ML (ref 0–7)
BACTERIA URNS QL MICRO: ABNORMAL
BASOPHILS # BLD: 0.04 K/UL (ref 0–0.2)
BASOPHILS NFR BLD: 1 % (ref 0–2)
BILIRUB SERPL-MCNC: 0.3 MG/DL (ref 0–1.2)
BILIRUB UR QL STRIP: NEGATIVE
BUN SERPL-MCNC: 12 MG/DL (ref 6–20)
CALCIUM SERPL-MCNC: 8.9 MG/DL (ref 8.6–10.2)
CHLORIDE SERPL-SCNC: 104 MMOL/L (ref 98–107)
CLARITY UR: CLEAR
CO2 SERPL-SCNC: 22 MMOL/L (ref 22–29)
COLOR UR: YELLOW
CREAT SERPL-MCNC: 0.7 MG/DL (ref 0.5–1)
EOSINOPHIL # BLD: 0.2 K/UL (ref 0.05–0.5)
EOSINOPHILS RELATIVE PERCENT: 3 % (ref 0–6)
EPI CELLS #/AREA URNS HPF: ABNORMAL /HPF
ERYTHROCYTE [DISTWIDTH] IN BLOOD BY AUTOMATED COUNT: 18.6 % (ref 11.5–15)
GFR SERPL CREATININE-BSD FRML MDRD: >60 ML/MIN/1.73M2
GLUCOSE SERPL-MCNC: 84 MG/DL (ref 74–99)
GLUCOSE UR STRIP-MCNC: NEGATIVE MG/DL
HCG, URINE, POC: POSITIVE
HCT VFR BLD AUTO: 29.6 % (ref 34–48)
HGB BLD-MCNC: 9.3 G/DL (ref 11.5–15.5)
HGB UR QL STRIP.AUTO: NEGATIVE
IMM GRANULOCYTES # BLD AUTO: <0.03 K/UL (ref 0–0.58)
IMM GRANULOCYTES NFR BLD: 0 % (ref 0–5)
KETONES UR STRIP-MCNC: NEGATIVE MG/DL
LEUKOCYTE ESTERASE UR QL STRIP: NEGATIVE
LYMPHOCYTES NFR BLD: 1.98 K/UL (ref 1.5–4)
LYMPHOCYTES RELATIVE PERCENT: 31 % (ref 20–42)
Lab: NORMAL
MCH RBC QN AUTO: 23 PG (ref 26–35)
MCHC RBC AUTO-ENTMCNC: 31.4 G/DL (ref 32–34.5)
MCV RBC AUTO: 73.1 FL (ref 80–99.9)
MONOCYTES NFR BLD: 0.49 K/UL (ref 0.1–0.95)
MONOCYTES NFR BLD: 8 % (ref 2–12)
NEGATIVE QC PASS/FAIL: NORMAL
NEUTROPHILS NFR BLD: 58 % (ref 43–80)
NEUTS SEG NFR BLD: 3.75 K/UL (ref 1.8–7.3)
NITRITE UR QL STRIP: NEGATIVE
PH UR STRIP: 6 [PH] (ref 5–9)
PLATELET # BLD AUTO: 339 K/UL (ref 130–450)
PMV BLD AUTO: 10.5 FL (ref 7–12)
POSITIVE QC PASS/FAIL: NORMAL
POTASSIUM SERPL-SCNC: 3.9 MMOL/L (ref 3.5–5)
PROT SERPL-MCNC: 7.7 G/DL (ref 6.4–8.3)
PROT UR STRIP-MCNC: NEGATIVE MG/DL
RBC # BLD AUTO: 4.05 M/UL (ref 3.5–5.5)
RBC #/AREA URNS HPF: ABNORMAL /HPF
SODIUM SERPL-SCNC: 137 MMOL/L (ref 132–146)
SP GR UR STRIP: 1.02 (ref 1–1.03)
UROBILINOGEN UR STRIP-ACNC: 0.2 EU/DL (ref 0–1)
WBC #/AREA URNS HPF: ABNORMAL /HPF
WBC OTHER # BLD: 6.5 K/UL (ref 4.5–11.5)

## 2023-07-25 PROCEDURE — 84702 CHORIONIC GONADOTROPIN TEST: CPT

## 2023-07-25 PROCEDURE — G8419 CALC BMI OUT NRM PARAM NOF/U: HCPCS | Performed by: INTERNAL MEDICINE

## 2023-07-25 PROCEDURE — 3079F DIAST BP 80-89 MM HG: CPT | Performed by: INTERNAL MEDICINE

## 2023-07-25 PROCEDURE — 99284 EMERGENCY DEPT VISIT MOD MDM: CPT

## 2023-07-25 PROCEDURE — 86901 BLOOD TYPING SEROLOGIC RH(D): CPT

## 2023-07-25 PROCEDURE — 4004F PT TOBACCO SCREEN RCVD TLK: CPT | Performed by: INTERNAL MEDICINE

## 2023-07-25 PROCEDURE — 3077F SYST BP >= 140 MM HG: CPT | Performed by: INTERNAL MEDICINE

## 2023-07-25 PROCEDURE — 86900 BLOOD TYPING SEROLOGIC ABO: CPT

## 2023-07-25 PROCEDURE — G8427 DOCREV CUR MEDS BY ELIG CLIN: HCPCS | Performed by: INTERNAL MEDICINE

## 2023-07-25 PROCEDURE — 93000 ELECTROCARDIOGRAM COMPLETE: CPT | Performed by: INTERNAL MEDICINE

## 2023-07-25 PROCEDURE — 76817 TRANSVAGINAL US OBSTETRIC: CPT

## 2023-07-25 PROCEDURE — 80053 COMPREHEN METABOLIC PANEL: CPT

## 2023-07-25 PROCEDURE — 81001 URINALYSIS AUTO W/SCOPE: CPT

## 2023-07-25 PROCEDURE — 99214 OFFICE O/P EST MOD 30 MIN: CPT | Performed by: INTERNAL MEDICINE

## 2023-07-25 PROCEDURE — 85027 COMPLETE CBC AUTOMATED: CPT

## 2023-07-25 RX ORDER — CEFDINIR 300 MG/1
300 CAPSULE ORAL 2 TIMES DAILY
Qty: 14 CAPSULE | Refills: 0 | Status: SHIPPED | OUTPATIENT
Start: 2023-07-25 | End: 2023-08-01

## 2023-07-25 RX ORDER — DOXYCYCLINE HYCLATE 100 MG
100 TABLET ORAL 2 TIMES DAILY
COMMUNITY
Start: 2023-06-13

## 2023-07-25 RX ORDER — NICOTINE 4 MG/1
INHALANT RESPIRATORY (INHALATION)
COMMUNITY
Start: 2023-06-13

## 2023-07-25 RX ORDER — TRAMADOL HYDROCHLORIDE 50 MG/1
TABLET ORAL
COMMUNITY
Start: 2023-06-03

## 2023-07-25 RX ORDER — CARVEDILOL 12.5 MG/1
TABLET ORAL
COMMUNITY
Start: 2023-06-13

## 2023-07-25 RX ORDER — MULTIVIT,CALC,MINS/IRON/FOLIC 9MG-400MCG
1 TABLET ORAL DAILY
COMMUNITY
Start: 2023-06-13

## 2023-07-25 RX ORDER — LOSARTAN POTASSIUM 100 MG/1
100 TABLET ORAL DAILY
COMMUNITY
Start: 2023-06-13

## 2023-07-25 RX ORDER — ERGOCALCIFEROL 1.25 MG/1
1 CAPSULE ORAL WEEKLY
COMMUNITY

## 2023-07-25 ASSESSMENT — PAIN - FUNCTIONAL ASSESSMENT: PAIN_FUNCTIONAL_ASSESSMENT: NONE - DENIES PAIN

## 2023-07-25 NOTE — DISCHARGE INSTRUCTIONS
Recommend pelvic rest, no sexual activity, follow up with your OB/GYN  Return to the ER for any new or worsening symptoms including but not limited to pelvic/abdominal pain, fever, or increased/heavy vaginal bleeding  Please have your beta hCG quantitative lab drawn 48 hours from now on 7/27/2023

## 2023-07-25 NOTE — PROGRESS NOTES
04/06/2023 08:24 PM    K 4.1 04/06/2023 08:24 PM    K 4.0 04/02/2023 03:59 PM     04/06/2023 08:24 PM    CO2 25 04/06/2023 08:24 PM    BUN 14 04/06/2023 08:24 PM    CREATININE 0.9 04/06/2023 08:24 PM    PROT 7.5 04/06/2023 08:24 PM     CBC:    Lab Results   Component Value Date/Time    WBC 10.0 04/06/2023 08:24 PM    RBC 3.57 04/06/2023 08:24 PM    HGB 8.6 04/06/2023 08:24 PM    HCT 29.2 04/06/2023 08:24 PM    MCV 81.8 04/06/2023 08:24 PM    RDW 17.2 04/06/2023 08:24 PM     04/06/2023 08:24 PM     PT/INR:  No results found for: PTINR  PT/INR Warfarin:  No components found for: PTPATWAR, PTINRWAR  PTT:    Lab Results   Component Value Date/Time    APTT 27.6 07/22/2022 04:35 PM     PTT Heparin:  No components found for: APTTHEP  Magnesium:    Lab Results   Component Value Date/Time    MG 1.9 01/15/2023 04:53 AM     TSH:  No results found for: TSH  TROPONIN:  No components found for: TROP  BNP:  No results found for: BNP  FASTING LIPID PANEL:  No results found for: CHOL, HDL, TRIG  No orders to display     I have personally reviewed the laboratory, cardiac diagnostic and radiographic testing as outlined above:      IMPRESSION:  Cardiomyopathy: Suspect secondary to substance abuse, compensated, optimizing current treatment is complicated by her noncompliance, substance abuse, and pregnancy, will obtain echocardiogram to evaluate ejection fraction  mitral valve regurgitation: Mild  Tricuspid valve regurgitation: Mild  Left ventricular hypertrophy on echocardiogram  Tobacco abuse: Patient was counseled regarding smoke cessation  History of noncompliance with medications and medical advice  Hypertension: OK controlled  History of pulmonary embolism:     RECOMMENDATIONS:   1.  Echocardiogram to evaluate ejection fraction  2. Continue current treatment  3. Follow-up with Dr. Fay Daily as scheduled  4.   Follow-up with Dr. Diana Purdy after her test    I have reviewed my findings and recommendations with

## 2023-07-26 ASSESSMENT — ENCOUNTER SYMPTOMS
CONSTIPATION: 0
NAUSEA: 0
DIARRHEA: 0
ABDOMINAL PAIN: 0
VOMITING: 0

## 2023-07-30 ENCOUNTER — APPOINTMENT (OUTPATIENT)
Dept: ULTRASOUND IMAGING | Age: 33
End: 2023-07-30
Payer: COMMERCIAL

## 2023-07-30 ENCOUNTER — HOSPITAL ENCOUNTER (EMERGENCY)
Age: 33
Discharge: HOME | End: 2023-07-30
Attending: STUDENT IN AN ORGANIZED HEALTH CARE EDUCATION/TRAINING PROGRAM
Payer: COMMERCIAL

## 2023-07-30 VITALS
DIASTOLIC BLOOD PRESSURE: 90 MMHG | OXYGEN SATURATION: 100 % | TEMPERATURE: 97.9 F | HEIGHT: 67 IN | BODY MASS INDEX: 27.94 KG/M2 | WEIGHT: 178 LBS | SYSTOLIC BLOOD PRESSURE: 138 MMHG | RESPIRATION RATE: 16 BRPM | HEART RATE: 90 BPM

## 2023-07-30 DIAGNOSIS — O03.9 MISCARRIAGE: Primary | ICD-10-CM

## 2023-07-30 LAB
ALBUMIN SERPL-MCNC: 4.1 G/DL (ref 3.5–5.2)
ALP SERPL-CCNC: 66 U/L (ref 35–104)
ALT SERPL-CCNC: 8 U/L (ref 0–32)
AMPHET UR QL SCN: NEGATIVE
ANION GAP SERPL CALCULATED.3IONS-SCNC: 11 MMOL/L (ref 7–16)
AST SERPL-CCNC: 14 U/L (ref 0–31)
B-HCG SERPL EIA 3RD IS-ACNC: ABNORMAL MIU/ML (ref 0–7)
BARBITURATES UR QL SCN: NEGATIVE
BASOPHILS # BLD: 0.02 K/UL (ref 0–0.2)
BASOPHILS NFR BLD: 0 % (ref 0–2)
BENZODIAZ UR QL: NEGATIVE
BILIRUB SERPL-MCNC: 0.4 MG/DL (ref 0–1.2)
BILIRUB UR QL STRIP: NEGATIVE
BUN SERPL-MCNC: 8 MG/DL (ref 6–20)
BUPRENORPHINE UR QL: NEGATIVE
CALCIUM SERPL-MCNC: 9 MG/DL (ref 8.6–10.2)
CANNABINOIDS UR QL SCN: NEGATIVE
CHLORIDE SERPL-SCNC: 102 MMOL/L (ref 98–107)
CLARITY UR: CLEAR
CO2 SERPL-SCNC: 20 MMOL/L (ref 22–29)
COCAINE UR QL SCN: NEGATIVE
COLOR UR: YELLOW
CREAT SERPL-MCNC: 0.7 MG/DL (ref 0.5–1)
EOSINOPHIL # BLD: 0.19 K/UL (ref 0.05–0.5)
EOSINOPHILS RELATIVE PERCENT: 2 % (ref 0–6)
EPI CELLS #/AREA URNS HPF: ABNORMAL /HPF
ERYTHROCYTE [DISTWIDTH] IN BLOOD BY AUTOMATED COUNT: 18.9 % (ref 11.5–15)
FENTANYL UR QL: NEGATIVE
GFR SERPL CREATININE-BSD FRML MDRD: >60 ML/MIN/1.73M2
GLUCOSE SERPL-MCNC: 132 MG/DL (ref 74–107)
GLUCOSE UR STRIP-MCNC: NEGATIVE MG/DL
HCT VFR BLD AUTO: 31.7 % (ref 34–48)
HGB BLD-MCNC: 9.3 G/DL (ref 11.5–15.5)
HGB UR QL STRIP.AUTO: ABNORMAL
IMM GRANULOCYTES # BLD AUTO: 0.03 K/UL (ref 0–0.58)
IMM GRANULOCYTES NFR BLD: 0 % (ref 0–5)
KETONES UR STRIP-MCNC: NEGATIVE MG/DL
LEUKOCYTE ESTERASE UR QL STRIP: ABNORMAL
LYMPHOCYTES NFR BLD: 0.95 K/UL (ref 1.5–4)
LYMPHOCYTES RELATIVE PERCENT: 10 % (ref 20–42)
MCH RBC QN AUTO: 22.6 PG (ref 26–35)
MCHC RBC AUTO-ENTMCNC: 29.3 G/DL (ref 32–34.5)
MCV RBC AUTO: 77.1 FL (ref 80–99.9)
METHADONE UR QL: NEGATIVE
MONOCYTES NFR BLD: 0.51 K/UL (ref 0.1–0.95)
MONOCYTES NFR BLD: 5 % (ref 2–12)
NEUTROPHILS NFR BLD: 83 % (ref 43–80)
NEUTS SEG NFR BLD: 8.28 K/UL (ref 1.8–7.3)
NITRITE UR QL STRIP: NEGATIVE
OPIATES UR QL SCN: NEGATIVE
OXYCODONE UR QL SCN: NEGATIVE
PCP UR QL SCN: NEGATIVE
PH UR STRIP: 6.5 [PH] (ref 5–9)
PLATELET # BLD AUTO: 287 K/UL (ref 130–450)
PMV BLD AUTO: 10.3 FL (ref 7–12)
POTASSIUM SERPL-SCNC: 3.7 MMOL/L (ref 3.5–5)
PROT SERPL-MCNC: 7.4 G/DL (ref 6.4–8.3)
PROT UR STRIP-MCNC: NEGATIVE MG/DL
RBC # BLD AUTO: 4.11 M/UL (ref 3.5–5.5)
RBC #/AREA URNS HPF: ABNORMAL /HPF
SODIUM SERPL-SCNC: 133 MMOL/L (ref 132–146)
SP GR UR STRIP: 1.01 (ref 1–1.03)
TEST INFORMATION: NORMAL
UROBILINOGEN UR STRIP-ACNC: 0.2 EU/DL (ref 0–1)
WBC #/AREA URNS HPF: ABNORMAL /HPF
WBC OTHER # BLD: 10 K/UL (ref 4.5–11.5)

## 2023-07-30 PROCEDURE — 6360000002 HC RX W HCPCS: Performed by: STUDENT IN AN ORGANIZED HEALTH CARE EDUCATION/TRAINING PROGRAM

## 2023-07-30 PROCEDURE — 80307 DRUG TEST PRSMV CHEM ANLYZR: CPT

## 2023-07-30 PROCEDURE — 76817 TRANSVAGINAL US OBSTETRIC: CPT

## 2023-07-30 PROCEDURE — 80053 COMPREHEN METABOLIC PANEL: CPT

## 2023-07-30 PROCEDURE — 96374 THER/PROPH/DIAG INJ IV PUSH: CPT

## 2023-07-30 PROCEDURE — 81001 URINALYSIS AUTO W/SCOPE: CPT

## 2023-07-30 PROCEDURE — 85027 COMPLETE CBC AUTOMATED: CPT

## 2023-07-30 PROCEDURE — 84702 CHORIONIC GONADOTROPIN TEST: CPT

## 2023-07-30 PROCEDURE — 99284 EMERGENCY DEPT VISIT MOD MDM: CPT

## 2023-07-30 RX ORDER — MORPHINE SULFATE 4 MG/ML
4 INJECTION, SOLUTION INTRAMUSCULAR; INTRAVENOUS ONCE
Status: COMPLETED | OUTPATIENT
Start: 2023-07-30 | End: 2023-07-30

## 2023-07-30 RX ORDER — ACETAMINOPHEN 500 MG
500 TABLET ORAL 4 TIMES DAILY PRN
Qty: 120 TABLET | Refills: 0 | Status: SHIPPED | OUTPATIENT
Start: 2023-07-30

## 2023-07-30 RX ADMIN — MORPHINE SULFATE 4 MG: 4 INJECTION, SOLUTION INTRAMUSCULAR; INTRAVENOUS at 22:07

## 2023-07-30 ASSESSMENT — PAIN DESCRIPTION - DESCRIPTORS
DESCRIPTORS: SQUEEZING;CRAMPING
DESCRIPTORS: CRAMPING

## 2023-07-30 ASSESSMENT — PAIN SCALES - GENERAL
PAINLEVEL_OUTOF10: 10
PAINLEVEL_OUTOF10: 10

## 2023-07-30 ASSESSMENT — PAIN DESCRIPTION - LOCATION
LOCATION: ABDOMEN
LOCATION: PELVIS

## 2023-07-30 ASSESSMENT — PAIN - FUNCTIONAL ASSESSMENT: PAIN_FUNCTIONAL_ASSESSMENT: 0-10

## 2023-07-31 ENCOUNTER — HOSPITAL ENCOUNTER (OUTPATIENT)
Age: 33
Setting detail: OBSERVATION
Discharge: HOME | End: 2023-08-01
Attending: OBSTETRICS & GYNECOLOGY | Admitting: OBSTETRICS & GYNECOLOGY
Payer: COMMERCIAL

## 2023-07-31 ENCOUNTER — ANESTHESIA EVENT (OUTPATIENT)
Dept: OPERATING ROOM | Age: 33
End: 2023-07-31
Payer: COMMERCIAL

## 2023-07-31 ENCOUNTER — ANESTHESIA (OUTPATIENT)
Dept: OPERATING ROOM | Age: 33
End: 2023-07-31
Payer: COMMERCIAL

## 2023-07-31 DIAGNOSIS — O02.1 MISSED ABORTION: ICD-10-CM

## 2023-07-31 PROBLEM — Z3A.01 5 WEEKS GESTATION OF PREGNANCY: Status: ACTIVE | Noted: 2023-07-31

## 2023-07-31 LAB
ABO + RH BLD: NORMAL
ARM BAND NUMBER: NORMAL
BLOOD BANK SAMPLE EXPIRATION: NORMAL
BLOOD GROUP ANTIBODIES SERPL: NEGATIVE

## 2023-07-31 PROCEDURE — 2580000003 HC RX 258: Performed by: OBSTETRICS & GYNECOLOGY

## 2023-07-31 PROCEDURE — 76937 US GUIDE VASCULAR ACCESS: CPT

## 2023-07-31 PROCEDURE — 6360000002 HC RX W HCPCS: Performed by: NURSE ANESTHETIST, CERTIFIED REGISTERED

## 2023-07-31 PROCEDURE — 88305 TISSUE EXAM BY PATHOLOGIST: CPT

## 2023-07-31 PROCEDURE — 3700000000 HC ANESTHESIA ATTENDED CARE: Performed by: OBSTETRICS & GYNECOLOGY

## 2023-07-31 PROCEDURE — 86901 BLOOD TYPING SEROLOGIC RH(D): CPT

## 2023-07-31 PROCEDURE — 3600000012 HC SURGERY LEVEL 2 ADDTL 15MIN: Performed by: OBSTETRICS & GYNECOLOGY

## 2023-07-31 PROCEDURE — 36415 COLL VENOUS BLD VENIPUNCTURE: CPT

## 2023-07-31 PROCEDURE — 86850 RBC ANTIBODY SCREEN: CPT

## 2023-07-31 PROCEDURE — 2709999900 HC NON-CHARGEABLE SUPPLY: Performed by: OBSTETRICS & GYNECOLOGY

## 2023-07-31 PROCEDURE — 6360000002 HC RX W HCPCS: Performed by: ANESTHESIOLOGY

## 2023-07-31 PROCEDURE — 86900 BLOOD TYPING SEROLOGIC ABO: CPT

## 2023-07-31 PROCEDURE — 6370000000 HC RX 637 (ALT 250 FOR IP): Performed by: OBSTETRICS & GYNECOLOGY

## 2023-07-31 PROCEDURE — 7100000001 HC PACU RECOVERY - ADDTL 15 MIN: Performed by: OBSTETRICS & GYNECOLOGY

## 2023-07-31 PROCEDURE — 3700000001 HC ADD 15 MINUTES (ANESTHESIA): Performed by: OBSTETRICS & GYNECOLOGY

## 2023-07-31 PROCEDURE — 3600000002 HC SURGERY LEVEL 2 BASE: Performed by: OBSTETRICS & GYNECOLOGY

## 2023-07-31 PROCEDURE — G0378 HOSPITAL OBSERVATION PER HR: HCPCS

## 2023-07-31 PROCEDURE — 7100000000 HC PACU RECOVERY - FIRST 15 MIN: Performed by: OBSTETRICS & GYNECOLOGY

## 2023-07-31 RX ORDER — DIPHENHYDRAMINE HYDROCHLORIDE 50 MG/ML
12.5 INJECTION INTRAMUSCULAR; INTRAVENOUS
Status: DISCONTINUED | OUTPATIENT
Start: 2023-07-31 | End: 2023-07-31 | Stop reason: HOSPADM

## 2023-07-31 RX ORDER — LIDOCAINE HYDROCHLORIDE 20 MG/ML
INJECTION, SOLUTION INTRAVENOUS PRN
Status: DISCONTINUED | OUTPATIENT
Start: 2023-07-31 | End: 2023-07-31 | Stop reason: SDUPTHER

## 2023-07-31 RX ORDER — HYDRALAZINE HYDROCHLORIDE 20 MG/ML
10 INJECTION INTRAMUSCULAR; INTRAVENOUS
Status: DISCONTINUED | OUTPATIENT
Start: 2023-07-31 | End: 2023-07-31 | Stop reason: HOSPADM

## 2023-07-31 RX ORDER — METHYLERGONOVINE MALEATE 0.2 MG/ML
INJECTION INTRAVENOUS PRN
Status: DISCONTINUED | OUTPATIENT
Start: 2023-07-31 | End: 2023-07-31 | Stop reason: SDUPTHER

## 2023-07-31 RX ORDER — IPRATROPIUM BROMIDE AND ALBUTEROL SULFATE 2.5; .5 MG/3ML; MG/3ML
1 SOLUTION RESPIRATORY (INHALATION)
Status: DISCONTINUED | OUTPATIENT
Start: 2023-07-31 | End: 2023-07-31 | Stop reason: HOSPADM

## 2023-07-31 RX ORDER — MIDAZOLAM HYDROCHLORIDE 1 MG/ML
2 INJECTION INTRAMUSCULAR; INTRAVENOUS
Status: DISCONTINUED | OUTPATIENT
Start: 2023-07-31 | End: 2023-07-31 | Stop reason: HOSPADM

## 2023-07-31 RX ORDER — LABETALOL HYDROCHLORIDE 5 MG/ML
10 INJECTION, SOLUTION INTRAVENOUS
Status: DISCONTINUED | OUTPATIENT
Start: 2023-07-31 | End: 2023-07-31 | Stop reason: HOSPADM

## 2023-07-31 RX ORDER — NIFEDIPINE 30 MG/1
30 TABLET, EXTENDED RELEASE ORAL ONCE
Status: COMPLETED | OUTPATIENT
Start: 2023-07-31 | End: 2023-07-31

## 2023-07-31 RX ORDER — DEXAMETHASONE SODIUM PHOSPHATE 10 MG/ML
INJECTION, SOLUTION INTRAMUSCULAR; INTRAVENOUS PRN
Status: DISCONTINUED | OUTPATIENT
Start: 2023-07-31 | End: 2023-07-31 | Stop reason: SDUPTHER

## 2023-07-31 RX ORDER — MORPHINE SULFATE 2 MG/ML
2 INJECTION, SOLUTION INTRAMUSCULAR; INTRAVENOUS EVERY 5 MIN PRN
Status: DISCONTINUED | OUTPATIENT
Start: 2023-07-31 | End: 2023-07-31 | Stop reason: HOSPADM

## 2023-07-31 RX ORDER — KETOROLAC TROMETHAMINE 30 MG/ML
INJECTION, SOLUTION INTRAMUSCULAR; INTRAVENOUS PRN
Status: DISCONTINUED | OUTPATIENT
Start: 2023-07-31 | End: 2023-07-31 | Stop reason: SDUPTHER

## 2023-07-31 RX ORDER — AMLODIPINE BESYLATE 5 MG/1
5 TABLET ORAL DAILY
Status: DISCONTINUED | OUTPATIENT
Start: 2023-07-31 | End: 2023-08-01 | Stop reason: HOSPADM

## 2023-07-31 RX ORDER — ONDANSETRON 2 MG/ML
4 INJECTION INTRAMUSCULAR; INTRAVENOUS
Status: DISCONTINUED | OUTPATIENT
Start: 2023-07-31 | End: 2023-07-31 | Stop reason: HOSPADM

## 2023-07-31 RX ORDER — MIDAZOLAM HYDROCHLORIDE 1 MG/ML
INJECTION INTRAMUSCULAR; INTRAVENOUS PRN
Status: DISCONTINUED | OUTPATIENT
Start: 2023-07-31 | End: 2023-07-31 | Stop reason: SDUPTHER

## 2023-07-31 RX ORDER — PROPOFOL 10 MG/ML
INJECTION, EMULSION INTRAVENOUS PRN
Status: DISCONTINUED | OUTPATIENT
Start: 2023-07-31 | End: 2023-07-31 | Stop reason: SDUPTHER

## 2023-07-31 RX ORDER — MEPERIDINE HYDROCHLORIDE 25 MG/ML
12.5 INJECTION INTRAMUSCULAR; INTRAVENOUS; SUBCUTANEOUS EVERY 5 MIN PRN
Status: COMPLETED | OUTPATIENT
Start: 2023-07-31 | End: 2023-07-31

## 2023-07-31 RX ORDER — SODIUM CHLORIDE, SODIUM LACTATE, POTASSIUM CHLORIDE, CALCIUM CHLORIDE 600; 310; 30; 20 MG/100ML; MG/100ML; MG/100ML; MG/100ML
INJECTION, SOLUTION INTRAVENOUS CONTINUOUS
Status: DISCONTINUED | OUTPATIENT
Start: 2023-07-31 | End: 2023-07-31 | Stop reason: HOSPADM

## 2023-07-31 RX ORDER — DOXYCYCLINE 100 MG/1
100 CAPSULE ORAL 2 TIMES DAILY
Qty: 20 CAPSULE | Refills: 0 | Status: SHIPPED | OUTPATIENT
Start: 2023-07-31 | End: 2023-08-10

## 2023-07-31 RX ORDER — FENTANYL CITRATE 50 UG/ML
INJECTION, SOLUTION INTRAMUSCULAR; INTRAVENOUS PRN
Status: DISCONTINUED | OUTPATIENT
Start: 2023-07-31 | End: 2023-07-31 | Stop reason: SDUPTHER

## 2023-07-31 RX ORDER — PROCHLORPERAZINE EDISYLATE 5 MG/ML
5 INJECTION INTRAMUSCULAR; INTRAVENOUS
Status: DISCONTINUED | OUTPATIENT
Start: 2023-07-31 | End: 2023-07-31 | Stop reason: HOSPADM

## 2023-07-31 RX ORDER — DIPHENHYDRAMINE HYDROCHLORIDE 50 MG/ML
INJECTION INTRAMUSCULAR; INTRAVENOUS PRN
Status: DISCONTINUED | OUTPATIENT
Start: 2023-07-31 | End: 2023-07-31 | Stop reason: SDUPTHER

## 2023-07-31 RX ORDER — LOSARTAN POTASSIUM 100 MG/1
100 TABLET ORAL DAILY
Status: DISCONTINUED | OUTPATIENT
Start: 2023-08-01 | End: 2023-08-01 | Stop reason: HOSPADM

## 2023-07-31 RX ORDER — ONDANSETRON 2 MG/ML
INJECTION INTRAMUSCULAR; INTRAVENOUS PRN
Status: DISCONTINUED | OUTPATIENT
Start: 2023-07-31 | End: 2023-07-31 | Stop reason: SDUPTHER

## 2023-07-31 RX ORDER — KETOROLAC TROMETHAMINE 30 MG/ML
30 INJECTION, SOLUTION INTRAMUSCULAR; INTRAVENOUS
Status: COMPLETED | OUTPATIENT
Start: 2023-07-31 | End: 2023-07-31

## 2023-07-31 RX ORDER — ACETAMINOPHEN 325 MG/1
650 TABLET ORAL EVERY 4 HOURS PRN
Status: DISCONTINUED | OUTPATIENT
Start: 2023-07-31 | End: 2023-08-01 | Stop reason: HOSPADM

## 2023-07-31 RX ORDER — IBUPROFEN 800 MG/1
TABLET ORAL
Qty: 28 TABLET | Refills: 0 | Status: SHIPPED | OUTPATIENT
Start: 2023-07-31

## 2023-07-31 RX ORDER — FENTANYL CITRATE 0.05 MG/ML
25 INJECTION, SOLUTION INTRAMUSCULAR; INTRAVENOUS EVERY 5 MIN PRN
Status: DISCONTINUED | OUTPATIENT
Start: 2023-07-31 | End: 2023-07-31 | Stop reason: HOSPADM

## 2023-07-31 RX ORDER — LISINOPRIL 10 MG/1
10 TABLET ORAL DAILY
Status: DISCONTINUED | OUTPATIENT
Start: 2023-08-01 | End: 2023-08-01 | Stop reason: HOSPADM

## 2023-07-31 RX ORDER — SODIUM CHLORIDE 9 MG/ML
INJECTION, SOLUTION INTRAVENOUS PRN
Status: DISCONTINUED | OUTPATIENT
Start: 2023-07-31 | End: 2023-07-31 | Stop reason: HOSPADM

## 2023-07-31 RX ADMIN — ACETAMINOPHEN 650 MG: 325 TABLET ORAL at 23:34

## 2023-07-31 RX ADMIN — KETOROLAC TROMETHAMINE 30 MG: 30 INJECTION, SOLUTION INTRAMUSCULAR; INTRAVENOUS at 19:00

## 2023-07-31 RX ADMIN — NIFEDIPINE 30 MG: 30 TABLET, FILM COATED, EXTENDED RELEASE ORAL at 16:46

## 2023-07-31 RX ADMIN — LIDOCAINE HYDROCHLORIDE 50 MG: 20 INJECTION, SOLUTION INTRAVENOUS at 17:57

## 2023-07-31 RX ADMIN — MIDAZOLAM 2 MG: 1 INJECTION INTRAMUSCULAR; INTRAVENOUS at 17:53

## 2023-07-31 RX ADMIN — MEPERIDINE HYDROCHLORIDE 12.5 MG: 25 INJECTION, SOLUTION INTRAMUSCULAR; INTRAVENOUS; SUBCUTANEOUS at 18:57

## 2023-07-31 RX ADMIN — LABETALOL HYDROCHLORIDE 10 MG: 5 INJECTION INTRAVENOUS at 19:05

## 2023-07-31 RX ADMIN — METHYLERGONOVINE MALEATE 200 MCG: 0.2 INJECTION, SOLUTION INTRAMUSCULAR; INTRAVENOUS at 18:17

## 2023-07-31 RX ADMIN — FENTANYL CITRATE 50 MCG: 50 INJECTION, SOLUTION INTRAMUSCULAR; INTRAVENOUS at 18:14

## 2023-07-31 RX ADMIN — MEPERIDINE HYDROCHLORIDE 12.5 MG: 25 INJECTION, SOLUTION INTRAMUSCULAR; INTRAVENOUS; SUBCUTANEOUS at 18:52

## 2023-07-31 RX ADMIN — FENTANYL CITRATE 50 MCG: 50 INJECTION, SOLUTION INTRAMUSCULAR; INTRAVENOUS at 18:19

## 2023-07-31 RX ADMIN — SODIUM CHLORIDE, POTASSIUM CHLORIDE, SODIUM LACTATE AND CALCIUM CHLORIDE: 600; 310; 30; 20 INJECTION, SOLUTION INTRAVENOUS at 17:03

## 2023-07-31 RX ADMIN — DEXAMETHASONE SODIUM PHOSPHATE 10 MG: 10 INJECTION, SOLUTION INTRAMUSCULAR; INTRAVENOUS at 18:01

## 2023-07-31 RX ADMIN — ONDANSETRON 4 MG: 2 INJECTION INTRAMUSCULAR; INTRAVENOUS at 18:01

## 2023-07-31 RX ADMIN — Medication 999 ML/HR: at 18:14

## 2023-07-31 RX ADMIN — AMLODIPINE BESYLATE 5 MG: 5 TABLET ORAL at 20:47

## 2023-07-31 RX ADMIN — FENTANYL CITRATE 100 MCG: 50 INJECTION, SOLUTION INTRAMUSCULAR; INTRAVENOUS at 17:57

## 2023-07-31 RX ADMIN — DIPHENHYDRAMINE HYDROCHLORIDE 25 MG: 50 INJECTION, SOLUTION INTRAMUSCULAR; INTRAVENOUS at 17:53

## 2023-07-31 RX ADMIN — PROPOFOL 200 MG: 10 INJECTION, EMULSION INTRAVENOUS at 17:57

## 2023-07-31 RX ADMIN — SODIUM CHLORIDE, POTASSIUM CHLORIDE, SODIUM LACTATE AND CALCIUM CHLORIDE: 600; 310; 30; 20 INJECTION, SOLUTION INTRAVENOUS at 18:15

## 2023-07-31 RX ADMIN — KETOROLAC TROMETHAMINE 30 MG: 30 INJECTION, SOLUTION INTRAMUSCULAR at 18:19

## 2023-07-31 ASSESSMENT — LIFESTYLE VARIABLES: SMOKING_STATUS: 1

## 2023-07-31 ASSESSMENT — PAIN DESCRIPTION - PAIN TYPE: TYPE: SURGICAL PAIN

## 2023-07-31 ASSESSMENT — PAIN DESCRIPTION - LOCATION
LOCATION: ABDOMEN
LOCATION: ABDOMEN

## 2023-07-31 ASSESSMENT — PAIN DESCRIPTION - ORIENTATION
ORIENTATION: MID
ORIENTATION: MID

## 2023-07-31 ASSESSMENT — ENCOUNTER SYMPTOMS
DIARRHEA: 0
SORE THROAT: 0
COUGH: 0
BACK PAIN: 0
ABDOMINAL PAIN: 1
PHOTOPHOBIA: 0
NAUSEA: 0
SHORTNESS OF BREATH: 0

## 2023-07-31 ASSESSMENT — PAIN DESCRIPTION - DESCRIPTORS
DESCRIPTORS: CRAMPING
DESCRIPTORS: CRAMPING

## 2023-07-31 ASSESSMENT — PAIN - FUNCTIONAL ASSESSMENT: PAIN_FUNCTIONAL_ASSESSMENT: ACTIVITIES ARE NOT PREVENTED

## 2023-07-31 ASSESSMENT — PAIN SCALES - GENERAL
PAINLEVEL_OUTOF10: 5
PAINLEVEL_OUTOF10: 7

## 2023-07-31 NOTE — ANESTHESIA POSTPROCEDURE EVALUATION
Department of Anesthesiology  Postprocedure Note    Patient: Anay Blanca  MRN: [id number]  YOB: 1990  Date of evaluation: 2023      Procedure Summary     Date: 23 Room / Location: [facility] [address] / [id number] [address]    Anesthesia Start: 6493 Anesthesia Stop: [place]    Procedure: DILATATION AND CURETTAGE SUCTION Diagnosis:       Missed       (Missed  [O02.1])    Surgeons: Kaila Estes MD Responsible Provider: Nav Harris MD    Anesthesia Type: general ASA Status: 3          Anesthesia Type: No value filed.     Vignesh Phase I: Vignesh Score: 10    Vignesh Phase II:        Anesthesia Post Evaluation    Patient location during evaluation: PACU  Patient participation: complete - patient participated  Level of consciousness: awake  Airway patency: patent  Nausea & Vomiting: no nausea and no vomiting  Complications: no  Cardiovascular status: hemodynamically stable and blood pressure returned to baseline  Respiratory status: acceptable  Hydration status: euvolemic  Pain management: adequate

## 2023-07-31 NOTE — ANESTHESIA PRE PROCEDURE
Department of Anesthesiology  Preprocedure Note       Name:  Israel Michael   Age:  27 y.o.  :  1990                                          MRN:  [id number]         Date:  2023      Surgeon: Trish Lopez):  Sofie Lefort, MD    Procedure: Procedure(s):  DILATATION AND CURETTAGE SUCTION    Medications prior to admission:   Prior to Admission medications    Medication Sig Start Date End Date Taking?  Authorizing Provider   acetaminophen (TYLENOL) 500 MG tablet Take 1 tablet by mouth 4 times daily as needed for Pain 23   Luiisrael Becerra MD   carvedilol (COREG) 12.5 MG tablet TAKE ONE TABLET BY MOUTH TWO TIMES A DAY AT 8AM AND 8PM  Patient not taking: Reported on 2023   Historical Provider, MD   ergocalciferol (ERGOCALCIFEROL) 1.25 MG (46710 UT) capsule Take 1 capsule by mouth once a week  Patient not taking: Reported on 2023    Historical Provider, MD   losartan (COZAAR) 100 MG tablet Take 1 tablet by mouth daily  Patient not taking: Reported on 2023   Historical Provider, MD   Multiple Vitamins-Minerals (THEREMS-M) TABS Take 1 tablet by mouth daily  Patient not taking: Reported on 2023   Historical Provider, MD   mupirocin (BACTROBAN) 2 % ointment APPLY A SMALL AMOUNT TO THE AFFECTED AREA TOPICALLY 3 TIMES EVERY DAY  Patient not taking: Reported on 2023   Historical Provider, MD   traMADol (ULTRAM) 50 MG tablet TAKE 1 TABLET BY MOUTH EVERY 6 HOURS AS NEEDED FOR BREAKTHROUGH PAIN  Patient not taking: Reported on 2023 6/3/23   Historical Provider, MD   doxycycline hyclate (VIBRA-TABS) 100 MG tablet Take 1 tablet by mouth 2 times daily  Patient not taking: Reported on 2023   Historical Provider, MD   NICOTROL 10 MG inhaler INHALE 1 UNIT BY MOUTH 6 TIMES EVERY DAY AS NEEDED  Patient not taking: Reported on 2023   Historical Provider, MD   cefdinir (OMNICEF) 300 MG capsule Take 1 capsule by mouth 2 times daily for

## 2023-07-31 NOTE — ED PROVIDER NOTES
[address] ENCOUNTER        Pt Name: Orquidea Pickens  MRN: [id number]  [address] 1990  Date of evaluation: 2023  Provider: Jackeline Luu MD  PCP: Cesar Durham MD  Note Started: 3:51 AM EDT 23    HPI  27 y.o. female  at estimated 6 weeks gestation presenting for vaginal bleeding in pregnancy. Patient started with vaginal spotting about 4 days ago and was seen in this ED. She had ultrasound obtained at that time which showed intrauterine gestation with no fetal heart activity detected. Since then, she has had increasing bleeding with clots. She went to [facility] today, where she had ultrasound that again showed intrauterine pregnancy with no heartbeat, it also showed uptrending beta quant. Patient sent here for evaluation and treatment. Patient complains of lower abdominal cramping that feels like contractions. She was previously on Eliquis, but states she has not taken it for a month and a half now since she got pregnant.      --------------------------------------------- PAST HISTORY ---------------------------------------------  Past Medical History:  has a past medical history of Abnormal Pap smear, JERAMY (acute kidney injury) ([address]), Cannabis abuse, Cocaine abuse ([address]), Complication of anesthesia, Herpes simplex without mention of complication, Hypertensive urgency, Methamphetamine use ([address]), Recurrent pregnancy loss, currently pregnant, and Suspected damage to fetus from maternal drug use. Past Surgical History:  has a past surgical history that includes IR BIOPSY KIDNEY PERCUTANEOUS (2021); vascular surgery; and Foot surgery (Right, 2023). Social History:  reports that she has been smoking cigarettes. She has a 1.00 pack-year smoking history. She has never used smokeless tobacco. She reports that she does not currently use alcohol.  She reports that she does not currently use

## 2023-07-31 NOTE — OP NOTE
well contracted. blood loss is estimated of   about 200ml mL, complete hemostasis was observed. betadine placed on cervix and vagina. She was then given supine  position. Anesthesia was reversed, and she was transferred to the   recovery room in a stable condition. There were no operative complications.      Elen Busmer     Electronically signed by Lona Pelayo MD on 7/31/2023 at 6:20 PM

## 2023-07-31 NOTE — PROGRESS NOTES
OOB to B/R, voided. HEAL process explained. FOB with and supportive. HEAL door sign placed. Awaiting transport.

## 2023-07-31 NOTE — PROGRESS NOTES
here for missed AB. Pt states she is approximately 4-6 weeks pregnant. Was in the ER last night and sent home. Dr. Glory Chambers notified of pt's arrival and BP. Order's received.

## 2023-07-31 NOTE — ED NOTES
Third phone call with lab regarding patients HCG results. States they still have it on the machine.       Jailyn Costa RN  07/30/23 8530

## 2023-07-31 NOTE — H&P
CBC:   Lab Results   Component Value Date/Time    WBC 10.0 07/30/2023 06:04 PM    RBC 4.11 07/30/2023 06:04 PM    HGB 9.3 07/30/2023 06:04 PM    HCT 31.7 07/30/2023 06:04 PM    MCV 77.1 07/30/2023 06:04 PM    RDW 18.9 07/30/2023 06:04 PM     07/30/2023 06:04 PM     CMP:    Lab Results   Component Value Date/Time     07/30/2023 06:04 PM    K 3.7 07/30/2023 06:04 PM    K 4.0 04/02/2023 03:59 PM     07/30/2023 06:04 PM    CO2 20 07/30/2023 06:04 PM    BUN 8 07/30/2023 06:04 PM    PROT 7.4 07/30/2023 06:04 PM     U/A:  No components found for: Ivan Fails, USPGRAV, UPH, UPROTEIN, UGLUCOSE, UKETONE, UBILI, UBLOOD, UNITRITE, UUROBIL, ULEUKEST, USQEPI, URENEPI, UWBC, URBC, UBACTERIA, UHYALINE  TSH:  No results found for: TSH  RPR:  No results found for: RPR  HIV:  No results found for: HIV  HgBA1c:  No components found for: HGBA1C  Blood Type:  No results found for: ABOINT  RH:    Lab Results   Component Value Date/Time    C3 150 03/29/2021 06:39 AM    C4 30 03/29/2021 06:39 AM     Antibody Screen:  No components found for: ABSCINT  Gonorrhea:  No components found for: PRBCHLGC  Chlamydia:  No components found for: CCHLAM    No orders to display       ASSESSMENT AND PLAN: Missed AB/incomplete AB    Suction D&C    Principal Problem:    5 weeks gestation of pregnancy  Resolved Problems:    * No resolved hospital problems. *      .  Procedure options, risks and benefits reviewed with patient. paqtient expresses understanding. Patient desires to proceed with the surgery understanding the short-term and long-term consequences and complications and side effects.         Electronically signed by Jonnie Lopes MD on 7/31/2023 at 5:53 PM

## 2023-07-31 NOTE — DISCHARGE INSTRUCTIONS
Please diet as tolerated activity ad cali., may have some vaginal bleeding return to office in 2 weeks for follow-up call as needed problems.   Thing per vagina for 2 weeks

## 2023-08-01 VITALS
HEART RATE: 80 BPM | TEMPERATURE: 97.9 F | HEIGHT: 67 IN | SYSTOLIC BLOOD PRESSURE: 134 MMHG | WEIGHT: 178 LBS | OXYGEN SATURATION: 100 % | DIASTOLIC BLOOD PRESSURE: 81 MMHG | RESPIRATION RATE: 14 BRPM | BODY MASS INDEX: 27.94 KG/M2

## 2023-08-01 PROCEDURE — 96361 HYDRATE IV INFUSION ADD-ON: CPT

## 2023-08-01 PROCEDURE — 96360 HYDRATION IV INFUSION INIT: CPT

## 2023-08-01 PROCEDURE — 6370000000 HC RX 637 (ALT 250 FOR IP): Performed by: OBSTETRICS & GYNECOLOGY

## 2023-08-01 PROCEDURE — G0378 HOSPITAL OBSERVATION PER HR: HCPCS

## 2023-08-01 RX ADMIN — AMLODIPINE BESYLATE 5 MG: 5 TABLET ORAL at 09:08

## 2023-08-01 RX ADMIN — LOSARTAN POTASSIUM 100 MG: 100 TABLET, FILM COATED ORAL at 09:08

## 2023-08-01 RX ADMIN — LISINOPRIL 10 MG: 10 TABLET ORAL at 09:08

## 2023-08-01 ASSESSMENT — PAIN - FUNCTIONAL ASSESSMENT
PAIN_FUNCTIONAL_ASSESSMENT: 0-10

## 2023-08-01 NOTE — PROGRESS NOTES
Written and verbal discharge instructions given to patient , verbal and written teaching on high blood pressure given to patient, patient states she will follow up with her family doctor for blood pressure medication , and schedule an appointment in 2 weeks with dr Stephen Witt

## 2023-08-01 NOTE — PROGRESS NOTES
Assumed care of pt for 11-7 shift. First contact with pt. Plan of care for night discussed. Pt verbalizes understanding. Medicated for complaints of uterine cramping. Resting left side. Lights dimmed.

## 2023-08-01 NOTE — PLAN OF CARE
Problem: Pain  Goal: Verbalizes/displays adequate comfort level or baseline comfort level  8/1/2023 0912 by Brittany Garcia RN  Outcome: Adequate for Discharge  7/31/2023 2353 by Sandy Edmondson RN  Outcome: Progressing     Problem: Discharge Planning  Goal: Discharge to home or other facility with appropriate resources  Outcome: Adequate for Discharge     Problem: ABCDS Injury Assessment  Goal: Absence of physical injury  8/1/2023 0912 by Brittany Garcia RN  Outcome: Adequate for Discharge  7/31/2023 2353 by Sandy Edmondson RN  Outcome: Progressing

## 2023-08-01 NOTE — PROGRESS NOTES
Dr Glory Chambers called in and updated on patient status, patient am blood pressure, patient denies discomfort, orders for discharge received, patient to be given instructions on chronic HTN,  follow up with pcp as soon  as possible, 2 week follow up with dr Glory Chambers

## 2023-08-01 NOTE — PROGRESS NOTES
Dr Usha Saez notified of patients arrival back to unit and BP readings. Orders received to resume home medications. Lactation Follow Up for Feeding Difficulty:   Contact time 0920 to 1000.  See lactation flow sheet and educational record.  Family that breast feeding has gotten better with infant nursing more at breast.      Mom reports to having sore nipple.  Breastfeeding observed at this time.  Infant re-latching several times during  feeding, unable to maintain latch at breast.  Tongue extends slightly when latching to breast but not far enough to maintain.          Mom did not pump during the night to stimulate her milk supply and prefers not using nipple shield.  Observing a feeding with the nipple was discussed with the family and pumping more to build milk supply.  Mom does have a Medela breast pump at home.    Supply and demand discussed.      Education discussed includes the following:   · Bring chin closer to the breast to help achieve/sustain a deep latch   · Proper positioning and wide-mouth latch  · Frequent feedings / offering both breast   · Watch for flanged lips if possible  · Start on the least sore side first  · Try positions to nurse which supports infant's head  · Use skin to skin settle fussy infant   · Use of compression during feeding   · Use of comfort aids - lanolin and/or gel pads  · Call for assistance with next feeding, staff or lactation  · How to know if breastfeeding is going well at home  · Maintain a feeding and diaper change log until infant is seen by pediatrician.  · Nipple shield at needed    Denies to having any further questions at this time.  Breastfeeding Support will do a follow up call in 2 days after discharge home.

## 2023-08-04 LAB — SURGICAL PATHOLOGY REPORT: NORMAL

## 2023-09-15 ENCOUNTER — APPOINTMENT (OUTPATIENT)
Dept: GENERAL RADIOLOGY | Age: 33
End: 2023-09-15
Payer: COMMERCIAL

## 2023-09-15 ENCOUNTER — HOSPITAL ENCOUNTER (EMERGENCY)
Age: 33
Discharge: HOME | End: 2023-09-15
Attending: STUDENT IN AN ORGANIZED HEALTH CARE EDUCATION/TRAINING PROGRAM
Payer: COMMERCIAL

## 2023-09-15 VITALS
WEIGHT: 174 LBS | SYSTOLIC BLOOD PRESSURE: 148 MMHG | OXYGEN SATURATION: 100 % | DIASTOLIC BLOOD PRESSURE: 84 MMHG | BODY MASS INDEX: 27.25 KG/M2 | HEART RATE: 89 BPM | RESPIRATION RATE: 17 BRPM | TEMPERATURE: 98.7 F

## 2023-09-15 DIAGNOSIS — R07.9 CHEST PAIN, UNSPECIFIED TYPE: Primary | ICD-10-CM

## 2023-09-15 LAB
ALBUMIN SERPL-MCNC: 4.3 G/DL (ref 3.5–5.2)
ALP SERPL-CCNC: 75 U/L (ref 35–104)
ALT SERPL-CCNC: 12 U/L (ref 0–32)
ANION GAP SERPL CALCULATED.3IONS-SCNC: 9 MMOL/L (ref 7–16)
AST SERPL-CCNC: 18 U/L (ref 0–31)
BASOPHILS # BLD: 0.03 K/UL (ref 0–0.2)
BASOPHILS NFR BLD: 1 % (ref 0–2)
BILIRUB SERPL-MCNC: 0.4 MG/DL (ref 0–1.2)
BUN SERPL-MCNC: 20 MG/DL (ref 6–20)
CALCIUM SERPL-MCNC: 8.8 MG/DL (ref 8.6–10.2)
CHLORIDE SERPL-SCNC: 107 MMOL/L (ref 98–107)
CO2 SERPL-SCNC: 24 MMOL/L (ref 22–29)
CREAT SERPL-MCNC: 0.9 MG/DL (ref 0.5–1)
EKG ATRIAL RATE: 91 BPM
EKG P AXIS: 79 DEGREES
EKG P-R INTERVAL: 154 MS
EKG Q-T INTERVAL: 386 MS
EKG QRS DURATION: 100 MS
EKG QTC CALCULATION (BAZETT): 474 MS
EKG R AXIS: 46 DEGREES
EKG T AXIS: -68 DEGREES
EKG VENTRICULAR RATE: 91 BPM
EOSINOPHIL # BLD: 0.42 K/UL (ref 0.05–0.5)
EOSINOPHILS RELATIVE PERCENT: 10 % (ref 0–6)
ERYTHROCYTE [DISTWIDTH] IN BLOOD BY AUTOMATED COUNT: 18.9 % (ref 11.5–15)
GFR SERPL CREATININE-BSD FRML MDRD: >60 ML/MIN/1.73M2
GLUCOSE SERPL-MCNC: 85 MG/DL (ref 74–99)
HCG, URINE, POC: NEGATIVE
HCT VFR BLD AUTO: 31.7 % (ref 34–48)
HGB BLD-MCNC: 9.3 G/DL (ref 11.5–15.5)
IMM GRANULOCYTES # BLD AUTO: <0.03 K/UL (ref 0–0.58)
IMM GRANULOCYTES NFR BLD: 0 % (ref 0–5)
LYMPHOCYTES NFR BLD: 1.41 K/UL (ref 1.5–4)
LYMPHOCYTES RELATIVE PERCENT: 32 % (ref 20–42)
Lab: NORMAL
MCH RBC QN AUTO: 23.2 PG (ref 26–35)
MCHC RBC AUTO-ENTMCNC: 29.3 G/DL (ref 32–34.5)
MCV RBC AUTO: 79.1 FL (ref 80–99.9)
MONOCYTES NFR BLD: 0.36 K/UL (ref 0.1–0.95)
MONOCYTES NFR BLD: 8 % (ref 2–12)
NEGATIVE QC PASS/FAIL: NORMAL
NEUTROPHILS NFR BLD: 49 % (ref 43–80)
NEUTS SEG NFR BLD: 2.16 K/UL (ref 1.8–7.3)
PLATELET # BLD AUTO: 383 K/UL (ref 130–450)
PMV BLD AUTO: 10.7 FL (ref 7–12)
POSITIVE QC PASS/FAIL: NORMAL
POTASSIUM SERPL-SCNC: 4.8 MMOL/L (ref 3.5–5)
PROT SERPL-MCNC: 7.6 G/DL (ref 6.4–8.3)
RBC # BLD AUTO: 4.01 M/UL (ref 3.5–5.5)
SODIUM SERPL-SCNC: 140 MMOL/L (ref 132–146)
TROPONIN I SERPL HS-MCNC: <6 NG/L (ref 0–9)
WBC OTHER # BLD: 4.4 K/UL (ref 4.5–11.5)

## 2023-09-15 PROCEDURE — 80053 COMPREHEN METABOLIC PANEL: CPT

## 2023-09-15 PROCEDURE — 84484 ASSAY OF TROPONIN QUANT: CPT

## 2023-09-15 PROCEDURE — 85025 COMPLETE CBC W/AUTO DIFF WBC: CPT

## 2023-09-15 PROCEDURE — 93010 ELECTROCARDIOGRAM REPORT: CPT | Performed by: INTERNAL MEDICINE

## 2023-09-15 PROCEDURE — 6360000002 HC RX W HCPCS

## 2023-09-15 PROCEDURE — 99285 EMERGENCY DEPT VISIT HI MDM: CPT

## 2023-09-15 PROCEDURE — 93005 ELECTROCARDIOGRAM TRACING: CPT | Performed by: STUDENT IN AN ORGANIZED HEALTH CARE EDUCATION/TRAINING PROGRAM

## 2023-09-15 PROCEDURE — 71046 X-RAY EXAM CHEST 2 VIEWS: CPT

## 2023-09-15 PROCEDURE — 96372 THER/PROPH/DIAG INJ SC/IM: CPT

## 2023-09-15 RX ORDER — KETOROLAC TROMETHAMINE 30 MG/ML
30 INJECTION, SOLUTION INTRAMUSCULAR; INTRAVENOUS ONCE
Status: COMPLETED | OUTPATIENT
Start: 2023-09-15 | End: 2023-09-15

## 2023-09-15 RX ORDER — KETOROLAC TROMETHAMINE 15 MG/ML
15 INJECTION, SOLUTION INTRAMUSCULAR; INTRAVENOUS ONCE
Status: DISCONTINUED | OUTPATIENT
Start: 2023-09-15 | End: 2023-09-15

## 2023-09-15 RX ORDER — KETOROLAC TROMETHAMINE 30 MG/ML
INJECTION, SOLUTION INTRAMUSCULAR; INTRAVENOUS
Status: COMPLETED
Start: 2023-09-15 | End: 2023-09-15

## 2023-09-15 RX ADMIN — KETOROLAC TROMETHAMINE 30 MG: 30 INJECTION, SOLUTION INTRAMUSCULAR; INTRAVENOUS at 11:45

## 2023-09-15 ASSESSMENT — PAIN - FUNCTIONAL ASSESSMENT: PAIN_FUNCTIONAL_ASSESSMENT: 0-10

## 2023-09-15 ASSESSMENT — PAIN SCALES - GENERAL: PAINLEVEL_OUTOF10: 8

## 2023-09-15 ASSESSMENT — PAIN DESCRIPTION - LOCATION: LOCATION: CHEST

## 2023-09-15 ASSESSMENT — PAIN DESCRIPTION - ORIENTATION: ORIENTATION: LEFT

## 2023-09-15 NOTE — ED PROVIDER NOTES
[address] ENCOUNTER    Pt Name: Park Cornelius  MRN: [id number]  [address] [place] 1990  Date of evaluation: 9/15/2023  Provider: Susan Payne MD  PCP: Tyrel Rivera MD  Note Started: 11:01 AM EDT 9/15/23    HPI     Patient is a 27 y.o. female presents with a chief complaint of   Chief Complaint   Patient presents with    Chest Pain     Pt c/o pain under her left breast and palpitations which started last night. Palpitations   . Patient presents with chest pain. Patient stated that he had chest pain on the left lower side of the chest.  Patient is currently on Eliquis for history of PE. Stated that she has been taking it. Patient states that she is also having some palpitations. No recent surgery, recent travel, history of blood clots in the past.  Patient denies any IV use. Patient does state that she was using cocaine a few days ago. No changes in urinary or bowel habits. No exacerbating or relieving factors other than pushing on her chest is when she develops pain if she pushes on a specific rib. Nursing Notes were all reviewed and agreed with or any disagreements were addressed in the HPI. History From: Patient    Review of Systems   Pertinent positives and negatives as per HPI. Physical Exam  Vitals and nursing note reviewed. Constitutional:       Appearance: She is well-developed. HENT:      Head: Normocephalic and atraumatic. Eyes:      Conjunctiva/sclera: Conjunctivae normal.   Cardiovascular:      Rate and Rhythm: Normal rate and regular rhythm. Heart sounds: Normal heart sounds. No murmur heard. Pulmonary:      Effort: Pulmonary effort is normal. No respiratory distress. Breath sounds: Normal breath sounds. No wheezing or rales. Abdominal:      General: Bowel sounds are normal.      Palpations: Abdomen is soft. Tenderness: There is no abdominal tenderness.  There is no requiring hospitalization or inpatient management. They have remained hemodynamically stable throughout their entire ED visit and are stable for discharge with outpatient follow-up. The plan has been discussed in detail and they are aware of the specific conditions for emergent return, as well as the importance of follow-up. Discharge Medication List as of 9/15/2023  1:50 PM          Diagnosis:  1. Chest pain, unspecified type        Disposition:  Patient's disposition: Discharge to home  Patient's condition is stable.                                                    Sis Valiente MD  09/15/23 [id number]

## 2023-09-19 NOTE — PROGRESS NOTES
CDM Appointment Referral Outreach Attempt     Reason(s) for referral (listed in order): diabetes management     Performed outreach attempt #2 by calling primary phone and leaving voice message with assistance from [place]  Elizabeth (ID#[id number]).      If patient returns call to [phone], please schedule one of the following options below. Schedule at any available time at [facility] or [place], unless otherwise noted.  -No same day visits unless approved by clinician directly        Choose one of the following visit types:     New patient visit types:  • PHARMACIST VISIT NEW (7042) - 60-minute duration  • NEW PATIENT VIRTUAL VISIT (8257) - 60-minute duration      needed? yes     Instructions for  if IN-OFFICE visit type:   -Please indicate if medication / device training is needed (specify name) in the Appt Notes     Instructions for  if VIRTUAL visit type:  -Please indicate if patient prefers VIDEO or TELEPHONE in the Appt Notes  -Please note that medication / device teaching are not appropriate for virtual visits     Instructions for patient:   -Please bring all current medications   -Please bring any devices / supplies / self-test readings if needed   Dr Eladio Oppenheim notified of patient's vitals and contraction pattern. Orders received for FFN, GC/C, GBS, and a SVE.  Will update Dr Eladio Oppenheim when [facility] and UDS results are final.

## 2023-10-24 ENCOUNTER — HOSPITAL ENCOUNTER (OUTPATIENT)
Dept: CARDIOLOGY | Age: 33
Discharge: HOME | End: 2023-10-26
Payer: COMMERCIAL

## 2023-10-24 DIAGNOSIS — I42.9 CARDIOMYOPATHY, UNSPECIFIED TYPE (HCC): ICD-10-CM

## 2023-10-24 DIAGNOSIS — I50.20 HFREF (HEART FAILURE WITH REDUCED EJECTION FRACTION) (HCC): ICD-10-CM

## 2023-10-24 PROCEDURE — 93306 TTE W/DOPPLER COMPLETE: CPT

## 2023-10-25 ENCOUNTER — TELEPHONE (OUTPATIENT)
Dept: CARDIOLOGY CLINIC | Age: 33
End: 2023-10-25

## 2023-10-25 NOTE — TELEPHONE ENCOUNTER
MD Terence Duran MA Hi Shelia:   Would you please let her know that her echocardiogram is the same as it was in January, should continue current medications, I will see in a couple of weeks. Thank you         Called patient gave instructions and also got her scheduled.  Per [facility]

## 2023-10-29 ENCOUNTER — APPOINTMENT (OUTPATIENT)
Dept: CT IMAGING | Age: 33
End: 2023-10-29
Payer: COMMERCIAL

## 2023-10-29 ENCOUNTER — HOSPITAL ENCOUNTER (EMERGENCY)
Age: 33
Discharge: HOME | End: 2023-10-29
Attending: EMERGENCY MEDICINE
Payer: COMMERCIAL

## 2023-10-29 ENCOUNTER — APPOINTMENT (OUTPATIENT)
Dept: GENERAL RADIOLOGY | Age: 33
End: 2023-10-29
Payer: COMMERCIAL

## 2023-10-29 VITALS
DIASTOLIC BLOOD PRESSURE: 67 MMHG | OXYGEN SATURATION: 100 % | RESPIRATION RATE: 18 BRPM | SYSTOLIC BLOOD PRESSURE: 161 MMHG | HEART RATE: 83 BPM | TEMPERATURE: 97.5 F
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
## 2023-10-29 DIAGNOSIS — S00.83XA FACIAL CONTUSION, INITIAL ENCOUNTER: ICD-10-CM

## 2023-10-29 DIAGNOSIS — S16.1XXA STRAIN OF NECK MUSCLE, INITIAL ENCOUNTER: ICD-10-CM

## 2023-10-29 DIAGNOSIS — Z76.0 ENCOUNTER FOR MEDICATION REFILL: ICD-10-CM

## 2023-10-29 PROCEDURE — 6360000002 HC RX W HCPCS: Performed by: EMERGENCY MEDICINE

## 2023-10-29 PROCEDURE — 71046 X-RAY EXAM CHEST 2 VIEWS: CPT

## 2023-10-29 PROCEDURE — 72125 CT NECK SPINE W/O DYE: CPT

## 2023-10-29 PROCEDURE — 70450 CT HEAD/BRAIN W/O DYE: CPT

## 2023-10-29 PROCEDURE — 6370000000 HC RX 637 (ALT 250 FOR IP): Performed by: EMERGENCY MEDICINE

## 2023-10-29 PROCEDURE — 99284 EMERGENCY DEPT VISIT MOD MDM: CPT

## 2023-10-29 PROCEDURE — 70486 CT MAXILLOFACIAL W/O DYE: CPT

## 2023-10-29 PROCEDURE — 96372 THER/PROPH/DIAG INJ SC/IM: CPT

## 2023-10-29 RX ORDER — ORPHENADRINE CITRATE 100 MG/1
100 TABLET, EXTENDED RELEASE ORAL 2 TIMES DAILY
Qty: 20 TABLET | Refills: 0 | Status: SHIPPED | OUTPATIENT
Start: 2023-10-29 | End: 2023-11-08

## 2023-10-29 RX ORDER — CYCLOBENZAPRINE HCL 5 MG
5 TABLET ORAL 2 TIMES DAILY PRN
Qty: 10 TABLET | Refills: 0 | Status: SHIPPED | OUTPATIENT
Start: 2023-10-29 | End: 2023-11-08

## 2023-10-29 RX ORDER — IBUPROFEN 800 MG/1
800 TABLET ORAL ONCE
Status: COMPLETED | OUTPATIENT
Start: 2023-10-29 | End: 2023-10-29

## 2023-10-29 RX ORDER — ORPHENADRINE CITRATE 30 MG/ML
60 INJECTION INTRAMUSCULAR; INTRAVENOUS ONCE
Status: COMPLETED | OUTPATIENT
Start: 2023-10-29 | End: 2023-10-29

## 2023-10-29 RX ORDER — ACETAMINOPHEN 325 MG/1
650 TABLET ORAL ONCE
Status: COMPLETED | OUTPATIENT
Start: 2023-10-29 | End: 2023-10-29

## 2023-10-29 RX ORDER — AMLODIPINE BESYLATE 5 MG/1
5 TABLET ORAL DAILY
Qty: 90 TABLET | Refills: 2 | Status: SHIPPED | OUTPATIENT
Start: 2023-10-29

## 2023-10-29 RX ORDER — CARVEDILOL 12.5 MG/1
TABLET ORAL
Qty: 60 TABLET | Refills: 0 | Status: SHIPPED | OUTPATIENT
Start: 2023-10-29

## 2023-10-29 RX ADMIN — ORPHENADRINE CITRATE 60 MG: 60 INJECTION INTRAMUSCULAR; INTRAVENOUS at 10:44

## 2023-10-29 RX ADMIN — ACETAMINOPHEN 650 MG: 325 TABLET ORAL at 07:50

## 2023-10-29 RX ADMIN — IBUPROFEN 800 MG: 800 TABLET, FILM COATED ORAL at 10:44

## 2023-10-29 ASSESSMENT — PAIN SCALES - GENERAL: PAINLEVEL_OUTOF10: 10

## 2023-10-29 ASSESSMENT — PAIN DESCRIPTION - LOCATION: LOCATION: HEAD
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
## 2023-10-29 NOTE — PROGRESS NOTES
Name: Dinah Lantigua  : 1990  MRN: [id number]    Date: 10/29/2023    Benefits of immediately proceeding with Radiology exam outweigh the risks and therefore the following is being waived:      [x] Pregnancy test LMP 10/13/23-10/18/23    [] Protocol for Iodine allergy    [] MRI questionnaire    [] BUN/Creatinine        Jessika Cease, DO

## 2023-10-29 NOTE — ED NOTES
Discharge instructions with Rx reviewed, patient verbalized understanding.      Katarzyna Terry RN  10/29/23 [id number]

## 2024-02-07 ENCOUNTER — HOSPITAL ENCOUNTER (EMERGENCY)
Age: 34
Discharge: HOME | End: 2024-02-07
Attending: EMERGENCY MEDICINE
Payer: COMMERCIAL

## 2024-02-07 VITALS
OXYGEN SATURATION: 100 % | DIASTOLIC BLOOD PRESSURE: 109 MMHG | WEIGHT: 183 LBS | SYSTOLIC BLOOD PRESSURE: 158 MMHG | RESPIRATION RATE: 16 BRPM | TEMPERATURE: 97.7 F | BODY MASS INDEX: 28.66 KG/M2

## 2024-02-07 DIAGNOSIS — K08.89 PAIN, DENTAL: Primary | ICD-10-CM

## 2024-02-07 PROCEDURE — 99283 EMERGENCY DEPT VISIT LOW MDM: CPT

## 2024-02-07 RX ORDER — NAPROXEN 250 MG/1
250 TABLET ORAL 2 TIMES DAILY
Qty: 14 TABLET | Refills: 0 | Status: SHIPPED | OUTPATIENT
Start: 2024-02-07 | End: 2024-02-14

## 2024-02-07 RX ORDER — CHLORHEXIDINE GLUCONATE ORAL RINSE 1.2 MG/ML
15 SOLUTION DENTAL 2 TIMES DAILY
Qty: 420 ML | Refills: 0 | Status: SHIPPED | OUTPATIENT
Start: 2024-02-07 | End: 2024-02-21

## 2024-02-07 RX ORDER — AMOXICILLIN AND CLAVULANATE POTASSIUM 875; 125 MG/1; MG/1
1 TABLET, FILM COATED ORAL 2 TIMES DAILY
Qty: 20 TABLET | Refills: 0 | Status: SHIPPED | OUTPATIENT
Start: 2024-02-07 | End: 2024-02-17

## 2024-02-07 ASSESSMENT — PAIN DESCRIPTION - DESCRIPTORS: DESCRIPTORS: ACHING

## 2024-02-07 ASSESSMENT — ENCOUNTER SYMPTOMS
ABDOMINAL PAIN: 0
VOMITING: 0
NAUSEA: 0
SHORTNESS OF BREATH: 0
EYE REDNESS: 0

## 2024-02-07 ASSESSMENT — PAIN DESCRIPTION - ORIENTATION: ORIENTATION: LEFT;LOWER

## 2024-02-07 ASSESSMENT — PAIN DESCRIPTION - PAIN TYPE: TYPE: ACUTE PAIN

## 2024-02-07 ASSESSMENT — PAIN - FUNCTIONAL ASSESSMENT
PAIN_FUNCTIONAL_ASSESSMENT: 0-10
PAIN_FUNCTIONAL_ASSESSMENT: 0-10

## 2024-02-07 ASSESSMENT — PAIN DESCRIPTION - LOCATION: LOCATION: TEETH

## 2024-02-07 ASSESSMENT — PAIN SCALES - GENERAL
PAINLEVEL_OUTOF10: 9
PAINLEVEL_OUTOF10: 9

## 2024-02-08 ENCOUNTER — HOSPITAL ENCOUNTER (INPATIENT)
Age: 34
LOS: 1 days | Discharge: HOME | DRG: 566 | End: 2024-02-11
Attending: OBSTETRICS & GYNECOLOGY | Admitting: OBSTETRICS & GYNECOLOGY
Payer: COMMERCIAL

## 2024-02-08 DIAGNOSIS — I51.7 LEFT VENTRICULAR HYPERTROPHY: ICD-10-CM

## 2024-02-08 DIAGNOSIS — O16.1 HYPERTENSION AFFECTING PREGNANCY IN FIRST TRIMESTER: Primary | ICD-10-CM

## 2024-02-08 DIAGNOSIS — I50.20 HFREF (HEART FAILURE WITH REDUCED EJECTION FRACTION) (HCC): ICD-10-CM

## 2024-02-08 DIAGNOSIS — I42.0 DILATED CARDIOMYOPATHY (HCC): ICD-10-CM

## 2024-02-08 DIAGNOSIS — F14.90 COCAINE USE: ICD-10-CM

## 2024-02-08 PROBLEM — O10.919 CHRONIC HYPERTENSION AFFECTING PREGNANCY: Status: ACTIVE | Noted: 2024-02-08

## 2024-02-08 LAB
ALBUMIN SERPL-MCNC: 3.6 G/DL (ref 3.5–5.2)
ALP SERPL-CCNC: 59 U/L (ref 35–104)
ALT SERPL-CCNC: 9 U/L (ref 0–32)
AMPHET UR QL SCN: NEGATIVE
ANION GAP SERPL CALCULATED.3IONS-SCNC: 10 MMOL/L (ref 7–16)
AST SERPL-CCNC: 12 U/L (ref 0–31)
BACTERIA URNS QL MICRO: ABNORMAL
BARBITURATES UR QL SCN: NEGATIVE
BASOPHILS # BLD: 0.02 K/UL (ref 0–0.2)
BASOPHILS NFR BLD: 0 % (ref 0–2)
BENZODIAZ UR QL: NEGATIVE
BILIRUB SERPL-MCNC: <0.2 MG/DL (ref 0–1.2)
BILIRUB UR QL STRIP: NEGATIVE
BUN SERPL-MCNC: 9 MG/DL (ref 6–20)
BUPRENORPHINE UR QL: NEGATIVE
CALCIUM SERPL-MCNC: 8.8 MG/DL (ref 8.6–10.2)
CANNABINOIDS UR QL SCN: NEGATIVE
CHLORIDE SERPL-SCNC: 101 MMOL/L (ref 98–107)
CLARITY UR: CLEAR
CO2 SERPL-SCNC: 21 MMOL/L (ref 22–29)
COCAINE UR QL SCN: POSITIVE
COLOR UR: YELLOW
CREAT SERPL-MCNC: 0.6 MG/DL (ref 0.5–1)
EOSINOPHIL # BLD: 0.38 K/UL (ref 0.05–0.5)
EOSINOPHILS RELATIVE PERCENT: 5 % (ref 0–6)
EPI CELLS #/AREA URNS HPF: ABNORMAL /HPF
ERYTHROCYTE [DISTWIDTH] IN BLOOD BY AUTOMATED COUNT: 18 % (ref 11.5–15)
FENTANYL UR QL: NEGATIVE
GFR SERPL CREATININE-BSD FRML MDRD: >60 ML/MIN/1.73M2
GLUCOSE SERPL-MCNC: 130 MG/DL (ref 74–99)
GLUCOSE UR STRIP-MCNC: 100 MG/DL
HCT VFR BLD AUTO: 28.5 % (ref 34–48)
HGB BLD-MCNC: 9.2 G/DL (ref 11.5–15.5)
HGB UR QL STRIP.AUTO: NEGATIVE
IMM GRANULOCYTES # BLD AUTO: <0.03 K/UL (ref 0–0.58)
IMM GRANULOCYTES NFR BLD: 0 % (ref 0–5)
KETONES UR STRIP-MCNC: ABNORMAL MG/DL
LDH SERPL-CCNC: 180 U/L (ref 135–214)
LEUKOCYTE ESTERASE UR QL STRIP: NEGATIVE
LYMPHOCYTES NFR BLD: 1.24 K/UL (ref 1.5–4)
LYMPHOCYTES RELATIVE PERCENT: 17 % (ref 20–42)
MCH RBC QN AUTO: 27.5 PG (ref 26–35)
MCHC RBC AUTO-ENTMCNC: 32.3 G/DL (ref 32–34.5)
MCV RBC AUTO: 85.3 FL (ref 80–99.9)
METHADONE UR QL: NEGATIVE
MONOCYTES NFR BLD: 0.43 K/UL (ref 0.1–0.95)
MONOCYTES NFR BLD: 6 % (ref 2–12)
NEUTROPHILS NFR BLD: 72 % (ref 43–80)
NEUTS SEG NFR BLD: 5.35 K/UL (ref 1.8–7.3)
NITRITE UR QL STRIP: NEGATIVE
OPIATES UR QL SCN: NEGATIVE
OXYCODONE UR QL SCN: NEGATIVE
PCP UR QL SCN: NEGATIVE
PH UR STRIP: 7 [PH] (ref 5–9)
PLATELET # BLD AUTO: 242 K/UL (ref 130–450)
PMV BLD AUTO: 10.4 FL (ref 7–12)
POTASSIUM SERPL-SCNC: 3.7 MMOL/L (ref 3.5–5)
PROT SERPL-MCNC: 6.3 G/DL (ref 6.4–8.3)
PROT UR STRIP-MCNC: NEGATIVE MG/DL
RBC # BLD AUTO: 3.34 M/UL (ref 3.5–5.5)
RBC #/AREA URNS HPF: ABNORMAL /HPF
SODIUM SERPL-SCNC: 132 MMOL/L (ref 132–146)
SP GR UR STRIP: 1.02 (ref 1–1.03)
TEST INFORMATION: ABNORMAL
URATE SERPL-MCNC: 2.9 MG/DL (ref 2.4–5.7)
UROBILINOGEN UR STRIP-ACNC: 2 EU/DL (ref 0–1)
WBC #/AREA URNS HPF: ABNORMAL /HPF
WBC OTHER # BLD: 7.4 K/UL (ref 4.5–11.5)

## 2024-02-08 PROCEDURE — G0480 DRUG TEST DEF 1-7 CLASSES: HCPCS

## 2024-02-08 PROCEDURE — 83615 LACTATE (LD) (LDH) ENZYME: CPT

## 2024-02-08 PROCEDURE — 2580000003 HC RX 258: Performed by: OBSTETRICS & GYNECOLOGY

## 2024-02-08 PROCEDURE — 85025 COMPLETE CBC W/AUTO DIFF WBC: CPT

## 2024-02-08 PROCEDURE — 81001 URINALYSIS AUTO W/SCOPE: CPT

## 2024-02-08 PROCEDURE — 96374 THER/PROPH/DIAG INJ IV PUSH: CPT

## 2024-02-08 PROCEDURE — 80053 COMPREHEN METABOLIC PANEL: CPT

## 2024-02-08 PROCEDURE — 96376 TX/PRO/DX INJ SAME DRUG ADON: CPT

## 2024-02-08 PROCEDURE — 6370000000 HC RX 637 (ALT 250 FOR IP): Performed by: OBSTETRICS & GYNECOLOGY

## 2024-02-08 PROCEDURE — 6360000002 HC RX W HCPCS: Performed by: OBSTETRICS & GYNECOLOGY

## 2024-02-08 PROCEDURE — 84550 ASSAY OF BLOOD/URIC ACID: CPT

## 2024-02-08 PROCEDURE — G0378 HOSPITAL OBSERVATION PER HR: HCPCS

## 2024-02-08 PROCEDURE — 6370000000 HC RX 637 (ALT 250 FOR IP)

## 2024-02-08 PROCEDURE — 80307 DRUG TEST PRSMV CHEM ANLYZR: CPT

## 2024-02-08 RX ORDER — SODIUM CHLORIDE 9 MG/ML
INJECTION, SOLUTION INTRAVENOUS PRN
Status: DISCONTINUED | OUTPATIENT
Start: 2024-02-08 | End: 2024-02-11 | Stop reason: HOSPADM

## 2024-02-08 RX ORDER — FERROUS SULFATE 325(65) MG
325 TABLET ORAL 2 TIMES DAILY WITH MEALS
Status: DISCONTINUED | OUTPATIENT
Start: 2024-02-08 | End: 2024-02-11 | Stop reason: HOSPADM

## 2024-02-08 RX ORDER — LABETALOL HYDROCHLORIDE 5 MG/ML
20 INJECTION, SOLUTION INTRAVENOUS
Status: COMPLETED | OUTPATIENT
Start: 2024-02-08 | End: 2024-02-08

## 2024-02-08 RX ORDER — LABETALOL 200 MG/1
TABLET, FILM COATED ORAL
Status: COMPLETED
Start: 2024-02-08 | End: 2024-02-08

## 2024-02-08 RX ORDER — LABETALOL HYDROCHLORIDE 5 MG/ML
40 INJECTION, SOLUTION INTRAVENOUS
Status: COMPLETED | OUTPATIENT
Start: 2024-02-08 | End: 2024-02-08

## 2024-02-08 RX ORDER — SODIUM CHLORIDE 0.9 % (FLUSH) 0.9 %
5-40 SYRINGE (ML) INJECTION EVERY 12 HOURS SCHEDULED
Status: DISCONTINUED | OUTPATIENT
Start: 2024-02-08 | End: 2024-02-11 | Stop reason: HOSPADM

## 2024-02-08 RX ORDER — HYDRALAZINE HYDROCHLORIDE 20 MG/ML
5 INJECTION INTRAMUSCULAR; INTRAVENOUS
Status: ACTIVE | OUTPATIENT
Start: 2024-02-08 | End: 2024-02-09

## 2024-02-08 RX ORDER — HYDRALAZINE HYDROCHLORIDE 20 MG/ML
10 INJECTION INTRAMUSCULAR; INTRAVENOUS
Status: ACTIVE | OUTPATIENT
Start: 2024-02-08 | End: 2024-02-09

## 2024-02-08 RX ORDER — LABETALOL 100 MG/1
100 TABLET, FILM COATED ORAL 3 TIMES DAILY
Status: ON HOLD | COMMUNITY
End: 2024-02-11 | Stop reason: HOSPADM

## 2024-02-08 RX ORDER — LABETALOL 200 MG/1
100 TABLET, FILM COATED ORAL 3 TIMES DAILY
Status: DISCONTINUED | OUTPATIENT
Start: 2024-02-08 | End: 2024-02-11 | Stop reason: HOSPADM

## 2024-02-08 RX ORDER — LABETALOL HYDROCHLORIDE 5 MG/ML
80 INJECTION, SOLUTION INTRAVENOUS
Status: COMPLETED | OUTPATIENT
Start: 2024-02-08 | End: 2024-02-08

## 2024-02-08 RX ORDER — PRENATAL WITH FERROUS FUM AND FOLIC ACID 3080; 920; 120; 400; 22; 1.84; 3; 20; 10; 1; 12; 200; 27; 25; 2 [IU]/1; [IU]/1; MG/1; [IU]/1; MG/1; MG/1; MG/1; MG/1; MG/1; MG/1; UG/1; MG/1; MG/1; MG/1; MG/1
1 TABLET ORAL DAILY
Status: DISCONTINUED | OUTPATIENT
Start: 2024-02-09 | End: 2024-02-11 | Stop reason: HOSPADM

## 2024-02-08 RX ORDER — SODIUM CHLORIDE 0.9 % (FLUSH) 0.9 %
5-40 SYRINGE (ML) INJECTION PRN
Status: DISCONTINUED | OUTPATIENT
Start: 2024-02-08 | End: 2024-02-11 | Stop reason: HOSPADM

## 2024-02-08 RX ADMIN — LABETALOL HCL 100 MG: 200 TABLET, FILM COATED ORAL at 20:56

## 2024-02-08 RX ADMIN — LABETALOL HYDROCHLORIDE 20 MG: 5 INJECTION INTRAVENOUS at 22:01

## 2024-02-08 RX ADMIN — LABETALOL HYDROCHLORIDE 40 MG: 5 INJECTION INTRAVENOUS at 22:18

## 2024-02-08 RX ADMIN — LABETALOL HYDROCHLORIDE 80 MG: 5 INJECTION INTRAVENOUS at 22:50

## 2024-02-08 RX ADMIN — LABETALOL HYDROCHLORIDE 100 MG: 200 TABLET, FILM COATED ORAL at 14:54

## 2024-02-08 RX ADMIN — SODIUM CHLORIDE, PRESERVATIVE FREE 10 ML: 5 INJECTION INTRAVENOUS at 22:02

## 2024-02-08 RX ADMIN — LABETALOL HCL 100 MG: 200 TABLET, FILM COATED ORAL at 14:54

## 2024-02-08 NOTE — PROGRESS NOTES
Patient presents to unit from Dr. Canales's office with hypertension, history of hypertension and patient states she has not taken her BP meds x 3 days, needed to get her prescription filled. Patient denies headache or epigastric discomfort or edema, states she's been more tired and occasionally dizzy. Doppler . Denies cramping or contractions or bleeding or leaking fluid. States she has not yet felt much fetal movement this pregnancy, but feeling a \"little\" movement.
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
## 2024-02-08 NOTE — PROGRESS NOTES
Updated Dr. Canales with labs and urine results. Orders received for cardiac consult as patient has history of cardiac issues unresolved from persistent hypertension. Patient sleeping undisturbed with no co's.

## 2024-02-09 ENCOUNTER — APPOINTMENT (OUTPATIENT)
Age: 34
DRG: 566 | End: 2024-02-09
Payer: COMMERCIAL

## 2024-02-09 LAB
BZE UR-MCNC: 840.8 NG/ML
CHOLEST SERPL-MCNC: 218 MG/DL
COMPLIANCE DRUG ANALYSIS, URINE: NORMAL
ECHO AV AREA PEAK VELOCITY: 2.7 CM2
ECHO AV AREA PEAK VELOCITY: 2.7 CM2
ECHO AV AREA PEAK VELOCITY: 2.8 CM2
ECHO AV AREA PEAK VELOCITY: 2.8 CM2
ECHO AV AREA VTI: 2.8 CM2
ECHO AV AREA/BSA VTI: 1.4 CM2/M2
ECHO AV CUSP MM: 2.2 CM
ECHO AV MEAN GRADIENT: 8 MMHG
ECHO AV MEAN VELOCITY: 1.4 M/S
ECHO AV PEAK GRADIENT: 13 MMHG
ECHO AV PEAK GRADIENT: 13 MMHG
ECHO AV PEAK VELOCITY: 1.8 M/S
ECHO AV PEAK VELOCITY: 1.8 M/S
ECHO AV VTI: 31.3 CM
ECHO BSA: 1.98 M2
ECHO EST RA PRESSURE: 3 MMHG
ECHO LA DIAMETER INDEX: 2.26 CM/M2
ECHO LA DIAMETER: 4.4 CM
ECHO LA VOL A-L A2C: 62 ML (ref 22–52)
ECHO LA VOL A-L A4C: 76 ML (ref 22–52)
ECHO LA VOL MOD A2C: 60 ML (ref 22–52)
ECHO LA VOL MOD A4C: 75 ML (ref 22–52)
ECHO LA VOLUME AREA LENGTH: 69 ML
ECHO LA VOLUME INDEX A-L A2C: 32 ML/M2 (ref 16–34)
ECHO LA VOLUME INDEX A-L A4C: 39 ML/M2 (ref 16–34)
ECHO LA VOLUME INDEX AREA LENGTH: 35 ML/M2 (ref 16–34)
ECHO LA VOLUME INDEX MOD A2C: 31 ML/M2 (ref 16–34)
ECHO LA VOLUME INDEX MOD A4C: 38 ML/M2 (ref 16–34)
ECHO LV EDV A2C: 172 ML
ECHO LV EDV A4C: 164 ML
ECHO LV EDV BP: 168 ML (ref 56–104)
ECHO LV EDV INDEX A4C: 84 ML/M2
ECHO LV EDV INDEX BP: 86 ML/M2
ECHO LV EDV NDEX A2C: 88 ML/M2
ECHO LV EJECTION FRACTION A2C: 58 %
ECHO LV EJECTION FRACTION A4C: 61 %
ECHO LV EJECTION FRACTION BIPLANE: 59 % (ref 55–100)
ECHO LV ESV A2C: 73 ML
ECHO LV ESV A4C: 64 ML
ECHO LV ESV BP: 69 ML (ref 19–49)
ECHO LV ESV INDEX A2C: 37 ML/M2
ECHO LV ESV INDEX A4C: 33 ML/M2
ECHO LV ESV INDEX BP: 35 ML/M2
ECHO LV FRACTIONAL SHORTENING: 31 % (ref 28–44)
ECHO LV INTERNAL DIMENSION DIASTOLE INDEX: 2.77 CM/M2
ECHO LV INTERNAL DIMENSION DIASTOLIC MMODE: 5.9 CM (ref 3.9–5.3)
ECHO LV INTERNAL DIMENSION DIASTOLIC: 5.4 CM (ref 3.9–5.3)
ECHO LV INTERNAL DIMENSION SYSTOLIC INDEX: 1.9 CM/M2
ECHO LV INTERNAL DIMENSION SYSTOLIC MMODE: 4 CM
ECHO LV INTERNAL DIMENSION SYSTOLIC: 3.7 CM
ECHO LV IVSD MMODE: 1.4 CM (ref 0.6–0.9)
ECHO LV IVSD: 1.6 CM (ref 0.6–0.9)
ECHO LV IVSS: 2 CM
ECHO LV MASS 2D: 398.8 G (ref 67–162)
ECHO LV MASS INDEX 2D: 204.5 G/M2 (ref 43–95)
ECHO LV POSTERIOR WALL DIASTOLIC MMODE: 1.5 CM (ref 0.6–0.9)
ECHO LV POSTERIOR WALL DIASTOLIC: 1.6 CM (ref 0.6–0.9)
ECHO LV POSTERIOR WALL SYSTOLIC: 1.9 CM
ECHO LV RELATIVE WALL THICKNESS RATIO: 0.59
ECHO LVOT AREA: 4.9 CM2
ECHO LVOT AV VTI INDEX: 0.58
ECHO LVOT DIAM: 2.5 CM
ECHO LVOT MEAN GRADIENT: 3 MMHG
ECHO LVOT PEAK GRADIENT: 4 MMHG
ECHO LVOT PEAK GRADIENT: 5 MMHG
ECHO LVOT PEAK VELOCITY: 1 M/S
ECHO LVOT PEAK VELOCITY: 1.1 M/S
ECHO LVOT STROKE VOLUME INDEX: 46 ML/M2
ECHO LVOT SV: 89.8 ML
ECHO LVOT VTI: 18.3 CM
ECHO MV "A" WAVE DURATION: 133.8 MSEC
ECHO MV A VELOCITY: 1.08 M/S
ECHO MV AREA PHT: 3.8 CM2
ECHO MV AREA VTI: 2.7 CM2
ECHO MV E DECELERATION TIME (DT): 164.2 MS
ECHO MV E VELOCITY: 0.87 M/S
ECHO MV E/A RATIO: 0.81
ECHO MV LVOT VTI INDEX: 1.82
ECHO MV MAX VELOCITY: 1.3 M/S
ECHO MV MEAN GRADIENT: 3 MMHG
ECHO MV MEAN VELOCITY: 0.8 M/S
ECHO MV PEAK GRADIENT: 7 MMHG
ECHO MV PRESSURE HALF TIME (PHT): 57.6 MS
ECHO MV VTI: 33.3 CM
ECHO PV MEAN GRADIENT: 4 MMHG
ECHO PV MEAN VELOCITY: 1 M/S
ECHO PV PEAK GRADIENT: 7 MMHG
ECHO PV VTI: 25.5 CM
ECHO PVEIN A DURATION: 147.6 MS
ECHO PVEIN A VELOCITY: 0.3 M/S
ECHO PVEIN PEAK D VELOCITY: 0.4 M/S
ECHO PVEIN PEAK S VELOCITY: 0.7 M/S
ECHO PVEIN S/D RATIO: 1.8
ECHO RIGHT VENTRICULAR SYSTOLIC PRESSURE (RVSP): 26 MMHG
ECHO RV INTERNAL DIMENSION: 3.1 CM
ECHO RVOT PEAK GRADIENT: 3 MMHG
ECHO RVOT PEAK VELOCITY: 0.8 M/S
ECHO TV REGURGITANT MAX VELOCITY: 2.42 M/S
ECHO TV REGURGITANT PEAK GRADIENT: 23 MMHG
EKG ATRIAL RATE: 83 BPM
EKG P AXIS: 56 DEGREES
EKG P-R INTERVAL: 146 MS
EKG Q-T INTERVAL: 380 MS
EKG QRS DURATION: 100 MS
EKG QTC CALCULATION (BAZETT): 446 MS
EKG R AXIS: 19 DEGREES
EKG T AXIS: -163 DEGREES
EKG VENTRICULAR RATE: 83 BPM
HBA1C MFR BLD: 4.8 % (ref 4–5.6)
HDLC SERPL-MCNC: 88 MG/DL
LDLC SERPL CALC-MCNC: 100 MG/DL
MAGNESIUM SERPL-MCNC: 1.7 MG/DL (ref 1.6–2.6)
TRIGL SERPL-MCNC: 152 MG/DL
TSH SERPL DL<=0.05 MIU/L-ACNC: 1.11 UIU/ML (ref 0.27–4.2)
VLDLC SERPL CALC-MCNC: 30 MG/DL

## 2024-02-09 PROCEDURE — G0378 HOSPITAL OBSERVATION PER HR: HCPCS

## 2024-02-09 PROCEDURE — 83735 ASSAY OF MAGNESIUM: CPT

## 2024-02-09 PROCEDURE — 93306 TTE W/DOPPLER COMPLETE: CPT

## 2024-02-09 PROCEDURE — 93010 ELECTROCARDIOGRAM REPORT: CPT | Performed by: INTERNAL MEDICINE

## 2024-02-09 PROCEDURE — 96360 HYDRATION IV INFUSION INIT: CPT

## 2024-02-09 PROCEDURE — 6370000000 HC RX 637 (ALT 250 FOR IP): Performed by: OBSTETRICS & GYNECOLOGY

## 2024-02-09 PROCEDURE — 96368 THER/DIAG CONCURRENT INF: CPT

## 2024-02-09 PROCEDURE — 84443 ASSAY THYROID STIM HORMONE: CPT

## 2024-02-09 PROCEDURE — 96374 THER/PROPH/DIAG INJ IV PUSH: CPT

## 2024-02-09 PROCEDURE — 6370000000 HC RX 637 (ALT 250 FOR IP): Performed by: CLINICAL NURSE SPECIALIST

## 2024-02-09 PROCEDURE — 80061 LIPID PANEL: CPT

## 2024-02-09 PROCEDURE — 83036 HEMOGLOBIN GLYCOSYLATED A1C: CPT

## 2024-02-09 PROCEDURE — 99254 IP/OBS CNSLTJ NEW/EST MOD 60: CPT | Performed by: INTERNAL MEDICINE

## 2024-02-09 PROCEDURE — 96361 HYDRATE IV INFUSION ADD-ON: CPT

## 2024-02-09 PROCEDURE — APPSS60 APP SPLIT SHARED TIME 46-60 MINUTES: Performed by: CLINICAL NURSE SPECIALIST

## 2024-02-09 PROCEDURE — 96376 TX/PRO/DX INJ SAME DRUG ADON: CPT

## 2024-02-09 PROCEDURE — 93306 TTE W/DOPPLER COMPLETE: CPT | Performed by: INTERNAL MEDICINE

## 2024-02-09 PROCEDURE — 93005 ELECTROCARDIOGRAM TRACING: CPT | Performed by: CLINICAL NURSE SPECIALIST

## 2024-02-09 RX ADMIN — METFORMIN HYDROCHLORIDE 1 TABLET: 500 TABLET, EXTENDED RELEASE ORAL at 10:00

## 2024-02-09 RX ADMIN — LABETALOL HCL 100 MG: 200 TABLET, FILM COATED ORAL at 15:00

## 2024-02-09 RX ADMIN — FERROUS SULFATE TAB 325 MG (65 MG ELEMENTAL FE) 325 MG: 325 (65 FE) TAB at 19:44

## 2024-02-09 RX ADMIN — FERROUS SULFATE TAB 325 MG (65 MG ELEMENTAL FE) 325 MG: 325 (65 FE) TAB at 09:00

## 2024-02-09 RX ADMIN — POTASSIUM BICARBONATE 25 MEQ: 978 TABLET, EFFERVESCENT ORAL at 11:46

## 2024-02-09 RX ADMIN — LABETALOL HCL 100 MG: 200 TABLET, FILM COATED ORAL at 19:43

## 2024-02-09 RX ADMIN — LABETALOL HCL 100 MG: 200 TABLET, FILM COATED ORAL at 10:00

## 2024-02-09 NOTE — PROGRESS NOTES
Spoke with Priscilla from cardiology and notified of consult, relayed the request from Dr. Canales for an echocardiogram.

## 2024-02-09 NOTE — PROGRESS NOTES
S;S:deniesnausea and vomiting,fever and chills,Abdominal pain no vaginal bleeding no vaginal leaking denies any headache dizziness no right upper quadrant pain, Patient had a severe hypotension at late evening patient received IV labetalol maximum up to 80 mg and that brought the blood pressure under control since then her blood pressure has been 140s by 80s and patient is asymptomatic.  On review of the urine drug screen is positive for cocaine however patient denies using cocaine      O:VITALS:      Vitals:    02/09/24 0759   BP: (!) 142/85   Pulse: 80   Resp:    Temp:    SpO2:           LUNGS:   \      HEART:    Normal S1 and S2.  Regular rhythm. No murmurs, gallops, or rubs.       ABDOMEN: Gr ut FHT 140s     soft                         PELVIC:            EXTREMITIES: no cyanosis, clubbing or edema present  Negative Felisa's sign.    Results for orders placed or performed during the hospital encounter of 02/08/24   CBC with Auto Differential   Result Value Ref Range    WBC 7.4 4.5 - 11.5 k/uL    RBC 3.34 (L) 3.50 - 5.50 m/uL    Hemoglobin 9.2 (L) 11.5 - 15.5 g/dL    Hematocrit 28.5 (L) 34.0 - 48.0 %    MCV 85.3 80.0 - 99.9 fL    MCH 27.5 26.0 - 35.0 pg    MCHC 32.3 32.0 - 34.5 g/dL    RDW 18.0 (H) 11.5 - 15.0 %    Platelets 242 130 - 450 k/uL    MPV 10.4 7.0 - 12.0 fL    Neutrophils % 72 43.0 - 80.0 %    Lymphocytes % 17 (L) 20.0 - 42.0 %    Monocytes % 6 2.0 - 12.0 %    Eosinophils % 5 0 - 6 %    Basophils % 0 0.0 - 2.0 %    Immature Granulocytes 0 0.0 - 5.0 %    Neutrophils Absolute 5.35 1.80 - 7.30 k/uL    Lymphocytes Absolute 1.24 (L) 1.50 - 4.00 k/uL    Monocytes Absolute 0.43 0.10 - 0.95 k/uL    Eosinophils Absolute 0.38 0.05 - 0.50 k/uL    Basophils Absolute 0.02 0.00 - 0.20 k/uL    Absolute Immature Granulocyte <0.03 0.00 - 0.58 k/uL   Comprehensive Metabolic Panel   Result Value Ref Range    Sodium 132 132 - 146 mmol/L    Potassium 3.7 3.5 - 5.0 mmol/L    Chloride 101 98 - 107 mmol/L    CO2 21 (L) 22 -

## 2024-02-09 NOTE — CONSULTS
echocardiogram to reassess left ventricular function and to guide therapy  Patient has a known low Eas last echo in October 2023 revealed EF of 35%  She currently has no LifeVest and apparently must have declined  Not on guideline directed medical therapy given pregnancy  Consider aggressive drug rehab and consider social work consult to help given patient is pregnant and has urine drug screen positive  After delivery patient will benefit from optimal guideline directed medical therapy  Patient will need to follow-up with high risk pregnancy clinic frequently given low EF  Follow-up with cardiology closely  Further recommendation to follow after echo is resulted    Fer Sheridan MD  [facility] Cardiology

## 2024-02-09 NOTE — PROGRESS NOTES
Dr. Sheridna at bedside, plan of care reviewed. States we will do echocardiogram tomorrow. Patient resting with no co's .

## 2024-02-09 NOTE — PROGRESS NOTES
Dr. Canales at bedside rounding. Plan of care discussed. Patient verbalizes understanding. Denies headache, visual disturbances, edema or epigastric symptoms.

## 2024-02-09 NOTE — CARE COORDINATION
Peer Recovery Support Note    Name: Eliane Zimmerman  Date: 2/9/2024    Chief Complaint   Patient presents with    Hypertension       Peer Support met with patient.  [] Support and education provided  [] Resources provided   [] Treatment referral:   [] Other:   [] Patient declined peer recovery services     Referred By:     Notes: Peer heading over to [facility] to see patient to discuss options and give resources.    Signed: Radha Hector, 2/9/2024

## 2024-02-09 NOTE — CARE COORDINATION
2/9/2024: SS NOTE:  SS Consult noted regarding pt \"pregnant- living at [facility]- + Cocaine- rehab options & patient support\", sw spoke with Radha liaison for [facility] Recovery [facility] regarding consult and she will reach out to pt and try to see her at the hospital prior to her release to discuss addiction recovery options and to provide support and resources available, Nursing informed.Electronically signed by ASH Mobley on 2/9/2024 at 4:00 PM

## 2024-02-09 NOTE — PROGRESS NOTES
Dr. Canales notified of of blood pressures after taking PO labetalol. Orders to start Labetalol protocol.

## 2024-02-09 NOTE — PROGRESS NOTES
Dr. Canales updated on blood pressures and medication given. Orders to retake blood pressure in 4 hours and if over 160/ and or 110 give Hydralazine 5 mg IV and if unresolved in 20 minutes give 10 mg of hydralazine 10 mg IV.

## 2024-02-10 PROBLEM — Z3A.19 19 WEEKS GESTATION OF PREGNANCY: Status: ACTIVE | Noted: 2024-02-10

## 2024-02-10 PROCEDURE — G0378 HOSPITAL OBSERVATION PER HR: HCPCS

## 2024-02-10 PROCEDURE — 99233 SBSQ HOSP IP/OBS HIGH 50: CPT | Performed by: INTERNAL MEDICINE

## 2024-02-10 PROCEDURE — 1220000001 HC SEMI PRIVATE L&D R&B

## 2024-02-10 PROCEDURE — 2580000003 HC RX 258: Performed by: OBSTETRICS & GYNECOLOGY

## 2024-02-10 PROCEDURE — 6370000000 HC RX 637 (ALT 250 FOR IP): Performed by: OBSTETRICS & GYNECOLOGY

## 2024-02-10 RX ADMIN — LABETALOL HCL 100 MG: 200 TABLET, FILM COATED ORAL at 09:16

## 2024-02-10 RX ADMIN — SODIUM CHLORIDE, PRESERVATIVE FREE 10 ML: 5 INJECTION INTRAVENOUS at 09:16

## 2024-02-10 RX ADMIN — LABETALOL HCL 100 MG: 200 TABLET, FILM COATED ORAL at 15:41

## 2024-02-10 RX ADMIN — FERROUS SULFATE TAB 325 MG (65 MG ELEMENTAL FE) 325 MG: 325 (65 FE) TAB at 09:16

## 2024-02-10 RX ADMIN — LABETALOL HCL 100 MG: 200 TABLET, FILM COATED ORAL at 23:40

## 2024-02-10 RX ADMIN — METFORMIN HYDROCHLORIDE 1 TABLET: 500 TABLET, EXTENDED RELEASE ORAL at 09:16

## 2024-02-10 NOTE — PROGRESS NOTES
Assumed care of patient. Patient resting in bed. NO c/o H/A or discomfort. Plan if care discussed with patient. Call light in reach,

## 2024-02-10 NOTE — PROGRESS NOTES
The patient's blood pressure remains mildly elevated with an echocardiogram reviewed demonstrating moderate concentric left ventricular hypertrophy with normal left ventricular systolic function and stage I diastolic dysfunction with no significant valvular abnormalities.  On this basis, from a cardiovascular standpoint antihypertensive management would be appropriate to assist continued stabilization of her volume status in view of her substance abuse with active cocaine metabolites therapy if not contraindicated by her pregnancy optimally that of carvedilol.  The determination of substance abuse will be essential to reducing her risk of future cardiac complications in addition to that of smoking cessation to reduce risk of adverse effects related to chronic obstructive lung disease in addition to that of reducing her risk of future atherosclerotic development.  No additional cardiovascular evaluation is anticipated during the course of hospitalization with further management deferred to the obstetrical service.  Additional management of hypertension will be deferred to primary care in addition to that of recommendations of the obstetrical service in the face of her pregnancy we will further evaluate her should additional cardiovascular difficulties or concerns arise.  Should additional cardiovascular difficulties arise following discharge, follow-up with her primary cardiologist, Yoav Garcia would be advisable.

## 2024-02-10 NOTE — PROGRESS NOTES
Perfect serve sent to Dr. Kennedy. Medication information clarified. Dr. Canales updated. Continue with current POC for now.

## 2024-02-11 VITALS
TEMPERATURE: 98.4 F | BODY MASS INDEX: 28.72 KG/M2 | DIASTOLIC BLOOD PRESSURE: 70 MMHG | HEIGHT: 67 IN | WEIGHT: 183 LBS | RESPIRATION RATE: 16 BRPM | SYSTOLIC BLOOD PRESSURE: 142 MMHG | HEART RATE: 84 BPM | OXYGEN SATURATION: 100 %

## 2024-02-11 PROCEDURE — 6370000000 HC RX 637 (ALT 250 FOR IP): Performed by: OBSTETRICS & GYNECOLOGY

## 2024-02-11 RX ORDER — LABETALOL 100 MG/1
100 TABLET, FILM COATED ORAL EVERY 8 HOURS
Qty: 90 TABLET | Refills: 0 | Status: SHIPPED | OUTPATIENT
Start: 2024-02-11 | End: 2024-03-12

## 2024-02-11 RX ORDER — LABETALOL 100 MG/1
100 TABLET, FILM COATED ORAL 3 TIMES DAILY
Qty: 60 TABLET | Refills: 3 | Status: SHIPPED | OUTPATIENT
Start: 2024-02-11

## 2024-02-11 RX ORDER — FERROUS SULFATE 325(65) MG
325 TABLET ORAL 2 TIMES DAILY
Qty: 180 TABLET | Refills: 1 | Status: SHIPPED | OUTPATIENT
Start: 2024-02-11

## 2024-02-11 RX ORDER — LABETALOL 100 MG/1
100 TABLET, FILM COATED ORAL 2 TIMES DAILY
Qty: 60 TABLET | Refills: 3 | Status: SHIPPED | OUTPATIENT
Start: 2024-02-11

## 2024-02-11 RX ADMIN — METFORMIN HYDROCHLORIDE 1 TABLET: 500 TABLET, EXTENDED RELEASE ORAL at 09:16

## 2024-02-11 RX ADMIN — FERROUS SULFATE TAB 325 MG (65 MG ELEMENTAL FE) 325 MG: 325 (65 FE) TAB at 09:16

## 2024-02-11 RX ADMIN — LABETALOL HCL 100 MG: 200 TABLET, FILM COATED ORAL at 09:16

## 2024-02-11 NOTE — DISCHARGE INSTRUCTIONS
Discharge medications: Patient will continue with the prenatal vitamin and iron pills as advised and patient will check her blood pressure twice a week and keep the record.  If she has blood pressure 160 systolic in 110 diastolic and above patient is going to call back patient will be seen in office during this week.  Patient will continue with the labetalol 200 mg in the morning 100 mg in the afternoon and 200 mg in the evening.     Discharge instructions: Discharge to home with instructions return to office in 1 week for follow-up patient is discouraged and taking cocaine as well as other drugs and the patient is also encouraged to stop smoking.  Call as needed problems          Pregnancy: High Blood Pressure and Preeclampsia (03:24)  Your health professional recommends that you watch this short online health video.  Learn what it means for you and your baby when you're pregnant and have high blood pressure.  Purpose:  Describes high blood pressure in pregnancy, offers warnings about preeclampsia, and lists the symptoms that mean a patient should call a doctor.  Goal:  Users will understand what high blood pressure during pregnancy is, what the signs of preeclampsia are, and when to call a doctor.     How to watch the video    Scan the QR code   OR Visit the website    https://[email or web address]   Current as of: July 11, 2023               Content Version: 13.9  © 2006-2023 [employer].   Care instructions adapted under license by [employer]. If you have questions about a medical condition or this instruction, always ask your healthcare professional. [employer] disclaims any warranty or liability for your use of this information.

## 2024-02-11 NOTE — DISCHARGE SUMMARY
Date of admission 8 February 2024    Date of discharge: 11 February 2024    Diagnosis: 17 weeks intrauterine pregnancy, severe hypertension history of use of cocaine, advanced maternal age, anemia noncompliance with the medication and prenatal care    Progress: Patient is admitted to the hospital and placed on blood pressure monitor and blood pressures have been checked periodically patient was started back on labetalol 100 mg 3 times a day and within few hours her blood pressure where below critical and thereafter patient had episode of severe hypertension in the evening and patient was given intravenous labetalol to maximum dosage allowed.  And urine drug screen showed cocaine present.  Also patient had a echocardiogram in the past showing left ventricular hypertrophy so this visit also patient had a echocardiogram done as well as cardiology consult was obtained.  Patient has a persistent episode of severe hypertension through the day once or twice.  Will adjust the labetalol to 200 mg in the morning 100 mg in the afternoon and 200 mg in the evening and continue close observation close monitoring during this time of admission patient did not have any severe headache or dizziness or right epigastric pain or any other manifestations of severe preeclampsia.  Her lab reports were reassuring except showing anemia.    Discharge medications: Patient will continue with the prenatal vitamin and iron pills as advised and patient will check her blood pressure twice a week and keep the record.  If she has blood pressure 160 systolic in 110 diastolic and above patient is going to call back patient will be seen in office during this week.  Patient will continue with the labetalol 200 mg in the morning 100 mg in the afternoon and 200 mg in the evening.    Discharge instructions: Discharge to home with instructions return to office in 1 week for follow-up patient is discouraged and taking cocaine as well as other drugs and the

## 2024-02-11 NOTE — PROGRESS NOTES
Department of Obstetrics and Gynecology  Labor and Delivery    Progress Note  Patient Name: Eliane Zimmerman  YOB: 1990  MRN:    [id number]    Date: 2/10/2024      SUBJECTIVE:  denies having  headache dizziness right epigastric pain seeing black dots in front of her eyes, contractions, vaginal bleeding, and leaking per vagina    OBJECTIVE:     BP (!) 148/93   Pulse 88   Temp 98.4 °F (36.9 °C) (Oral)   Resp 16   Ht 1.702 m (5' 7\")   Wt 83 kg (183 lb)   LMP 01/31/2024 (Exact Date)   SpO2 100%   BMI 28.66 kg/m²    Weeks of gestation: 17 week 2 days  Fetal heart rate:       Baseline Heart Rate: 140                Contraction frequency:     Membranes:  Intact               Vaginal bleeding: absent    Results for orders placed or performed during the hospital encounter of 02/08/24   CBC with Auto Differential   Result Value Ref Range    WBC 7.4 4.5 - 11.5 k/uL    RBC 3.34 (L) 3.50 - 5.50 m/uL    Hemoglobin 9.2 (L) 11.5 - 15.5 g/dL    Hematocrit 28.5 (L) 34.0 - 48.0 %    MCV 85.3 80.0 - 99.9 fL    MCH 27.5 26.0 - 35.0 pg    MCHC 32.3 32.0 - 34.5 g/dL    RDW 18.0 (H) 11.5 - 15.0 %    Platelets 242 130 - 450 k/uL    MPV 10.4 7.0 - 12.0 fL    Neutrophils % 72 43.0 - 80.0 %    Lymphocytes % 17 (L) 20.0 - 42.0 %    Monocytes % 6 2.0 - 12.0 %    Eosinophils % 5 0 - 6 %    Basophils % 0 0.0 - 2.0 %    Immature Granulocytes 0 0.0 - 5.0 %    Neutrophils Absolute 5.35 1.80 - 7.30 k/uL    Lymphocytes Absolute 1.24 (L) 1.50 - 4.00 k/uL    Monocytes Absolute 0.43 0.10 - 0.95 k/uL    Eosinophils Absolute 0.38 0.05 - 0.50 k/uL    Basophils Absolute 0.02 0.00 - 0.20 k/uL    Absolute Immature Granulocyte <0.03 0.00 - 0.58 k/uL   Comprehensive Metabolic Panel   Result Value Ref Range    Sodium 132 132 - 146 mmol/L    Potassium 3.7 3.5 - 5.0 mmol/L    Chloride 101 98 - 107 mmol/L    CO2 21 (L) 22 - 29 mmol/L    Anion Gap 10 7 - 16 mmol/L    Glucose 130 (H) 74 - 99 mg/dL    BUN 9 6 - 20 mg/dL    Creatinine 0.6

## 2024-02-11 NOTE — PROGRESS NOTES
Assumed care of patient at this time. Plan of Care discussed with understanding verbalized by patient. VS and assessment to be completed. Will continue monitoring.

## 2024-02-11 NOTE — PROGRESS NOTES
Department of Obstetrics and Gynecology  Labor and Delivery    Progress Note  Patient Name: Eliane Zimmerman  YOB: 1990  MRN:    [id number]    Date: 2/11/2024      SUBJECTIVE:  denies having contractions, vaginal bleeding, and leaking per vagina denies having any headache dizziness any right epigastric pain.      OBJECTIVE:     BP (!) 158/80   Pulse 78   Temp 98.1 °F (36.7 °C) (Oral)   Resp 17   Ht 1.702 m (5' 7\")   Wt 83 kg (183 lb)   LMP 01/31/2024 (Exact Date)   SpO2 100%   BMI 28.66 kg/m²    Weeks of gestation: 17  Fetal heart rate:       Baseline Heart Rate: 140               Contraction frequency: none    Membranes:  Intact               Vaginal bleeding: absent    Results for orders placed or performed during the hospital encounter of 02/08/24   CBC with Auto Differential   Result Value Ref Range    WBC 7.4 4.5 - 11.5 k/uL    RBC 3.34 (L) 3.50 - 5.50 m/uL    Hemoglobin 9.2 (L) 11.5 - 15.5 g/dL    Hematocrit 28.5 (L) 34.0 - 48.0 %    MCV 85.3 80.0 - 99.9 fL    MCH 27.5 26.0 - 35.0 pg    MCHC 32.3 32.0 - 34.5 g/dL    RDW 18.0 (H) 11.5 - 15.0 %    Platelets 242 130 - 450 k/uL    MPV 10.4 7.0 - 12.0 fL    Neutrophils % 72 43.0 - 80.0 %    Lymphocytes % 17 (L) 20.0 - 42.0 %    Monocytes % 6 2.0 - 12.0 %    Eosinophils % 5 0 - 6 %    Basophils % 0 0.0 - 2.0 %    Immature Granulocytes 0 0.0 - 5.0 %    Neutrophils Absolute 5.35 1.80 - 7.30 k/uL    Lymphocytes Absolute 1.24 (L) 1.50 - 4.00 k/uL    Monocytes Absolute 0.43 0.10 - 0.95 k/uL    Eosinophils Absolute 0.38 0.05 - 0.50 k/uL    Basophils Absolute 0.02 0.00 - 0.20 k/uL    Absolute Immature Granulocyte <0.03 0.00 - 0.58 k/uL   Comprehensive Metabolic Panel   Result Value Ref Range    Sodium 132 132 - 146 mmol/L    Potassium 3.7 3.5 - 5.0 mmol/L    Chloride 101 98 - 107 mmol/L    CO2 21 (L) 22 - 29 mmol/L    Anion Gap 10 7 - 16 mmol/L    Glucose 130 (H) 74 - 99 mg/dL    BUN 9 6 - 20 mg/dL    Creatinine 0.6 0.50 - 1.00 mg/dL    Est,

## 2024-02-26 ENCOUNTER — OFFICE VISIT (OUTPATIENT)
Dept: CARDIOLOGY CLINIC | Age: 34
End: 2024-02-26
Payer: COMMERCIAL

## 2024-02-26 VITALS
DIASTOLIC BLOOD PRESSURE: 78 MMHG | WEIGHT: 186 LBS | RESPIRATION RATE: 18 BRPM | HEIGHT: 67 IN | BODY MASS INDEX: 29.19 KG/M2 | SYSTOLIC BLOOD PRESSURE: 142 MMHG | HEART RATE: 84 BPM

## 2024-02-26 DIAGNOSIS — I50.20 HFREF (HEART FAILURE WITH REDUCED EJECTION FRACTION) (HCC): Primary | ICD-10-CM

## 2024-02-26 PROCEDURE — 3078F DIAST BP <80 MM HG: CPT | Performed by: INTERNAL MEDICINE

## 2024-02-26 PROCEDURE — 93000 ELECTROCARDIOGRAM COMPLETE: CPT | Performed by: INTERNAL MEDICINE

## 2024-02-26 PROCEDURE — 3077F SYST BP >= 140 MM HG: CPT | Performed by: INTERNAL MEDICINE

## 2024-02-26 PROCEDURE — 4004F PT TOBACCO SCREEN RCVD TLK: CPT | Performed by: INTERNAL MEDICINE

## 2024-02-26 PROCEDURE — 99214 OFFICE O/P EST MOD 30 MIN: CPT | Performed by: INTERNAL MEDICINE

## 2024-02-26 PROCEDURE — G8484 FLU IMMUNIZE NO ADMIN: HCPCS | Performed by: INTERNAL MEDICINE

## 2024-02-26 PROCEDURE — 1111F DSCHRG MED/CURRENT MED MERGE: CPT | Performed by: INTERNAL MEDICINE

## 2024-02-26 PROCEDURE — G8419 CALC BMI OUT NRM PARAM NOF/U: HCPCS | Performed by: INTERNAL MEDICINE

## 2024-02-26 PROCEDURE — G8427 DOCREV CUR MEDS BY ELIG CLIN: HCPCS | Performed by: INTERNAL MEDICINE

## 2024-02-26 RX ORDER — PRENATAL VIT NO.126/IRON/FOLIC 28MG-0.8MG
TABLET ORAL
COMMUNITY
Start: 2024-02-07

## 2024-02-26 RX ORDER — ASPIRIN 81 MG/1
81 TABLET, COATED ORAL DAILY
COMMUNITY
Start: 2024-02-08

## 2024-02-26 NOTE — PROGRESS NOTES
[facility] Cardiology Progress Note  Dr. Yoav Garcia      Referring Physician: Blake Valentine MD  CHIEF COMPLAINT:   Chief Complaint   Patient presents with    Follow-Up from Hospital       HISTORY OF PRESENT ILLNESS:   Patient is 33 years old female with history of resolved cardiomyopathy, pulmonary embolism, is here for follow-up appointment.  Occasional sharp chest pain, comes and goes, at rest, last for few seconds, shortness of breath is at baseline, attributed to pregnancy, patient denies any lightheadedness, no dizziness, no pedal edema, no PND, no orthopnea, no syncope, no presyncopal episodes.    Past Medical History:   Diagnosis Date    Abnormal Pap smear     JERAMY (acute kidney injury) (Formerly [facility]) 3/27/2021    Cannabis abuse 6/17/2022    Cocaine abuse (Formerly [facility]) 6/17/2022    Complication of anesthesia 2011    States hhas spinal curvature and had one-sided Epidural    Herpes simplex without mention of complication     Hypertensive urgency 3/27/2021    Methamphetamine use (Formerly [facility]) 6/17/2022    Recurrent pregnancy loss, currently pregnant 6/17/2022    Suspected damage to fetus from maternal drug use 6/17/2022    Chronic, repeated use          Past Surgical History:   Procedure Laterality Date    DILATION AND CURETTAGE OF UTERUS N/A 7/31/2023    DILATATION AND CURETTAGE SUCTION performed by Gilbert Canales MD at [facility] OR    FOOT SURGERY Right 06/2023    IR BIOPSY KIDNEY PERCUTANEOUS  03/29/2021    IR BIOPSY KIDNEY PERCUTANEOUS 3/29/2021 [facility] SPECIAL PROCEDURES    VASCULAR SURGERY           Current Outpatient Medications   Medication Sig Dispense Refill    ASPIRIN LOW DOSE 81 MG EC tablet Take 1 tablet by mouth daily      Prenatal Vit-Fe Fumarate-FA (CLASSIC PRENATAL) 28-0.8 MG TABS TAKE ONE TABLET BY MOUTH ONCE PER DAY      labetalol (NORMODYNE) 100 MG tablet Take 1 tablet by mouth every 8 (eight) hours (Patient taking differently: Take 2 tablets by mouth every 8 (eight) hours) 90 tablet 0    ferrous sulfate (IRON 325)

## 2024-03-21 ENCOUNTER — HOSPITAL ENCOUNTER (EMERGENCY)
Age: 34
Discharge: HOME | End: 2024-03-21
Attending: FAMILY MEDICINE
Payer: COMMERCIAL

## 2024-03-21 VITALS
TEMPERATURE: 98.2 F | RESPIRATION RATE: 16 BRPM | BODY MASS INDEX: 29.13 KG/M2 | WEIGHT: 186 LBS | DIASTOLIC BLOOD PRESSURE: 88 MMHG | HEART RATE: 102 BPM | OXYGEN SATURATION: 97 % | SYSTOLIC BLOOD PRESSURE: 142 MMHG

## 2024-03-21 DIAGNOSIS — K04.7 DENTAL ABSCESS: Primary | ICD-10-CM

## 2024-03-21 PROCEDURE — 99283 EMERGENCY DEPT VISIT LOW MDM: CPT

## 2024-03-21 RX ORDER — AMOXICILLIN 500 MG/1
500 CAPSULE ORAL 3 TIMES DAILY
Qty: 30 CAPSULE | Refills: 0 | Status: SHIPPED | OUTPATIENT
Start: 2024-03-21 | End: 2024-03-31

## 2024-03-21 NOTE — ED PROVIDER NOTES
HPI:  3/21/24,   Time: 2:00 PM EDT         Eliane Zimmerman is a 33 y.o. female presenting to the ED for several days noticing a lump on the left side of the jaw, and tooth pain on the lower left side as well.  This is a tooth that she has had decay on for quite some time.  She has scheduled appoint with a dentist for tomorrow.  She is in her second trimester pregnancy.      ROS:   Pertinent positives and negatives are stated within HPI, all other systems reviewed and are negative.  --------------------------------------------- PAST HISTORY ---------------------------------------------  Past Medical History:  has a past medical history of Abnormal Pap smear, JERAMY (acute kidney injury) (HCC), Cannabis abuse, Cocaine abuse (HCC), Complication of anesthesia, Herpes simplex without mention of complication, Hypertensive urgency, Methamphetamine use (HCC), Recurrent pregnancy loss, currently pregnant, and Suspected damage to fetus from maternal drug use.    Past Surgical History:  has a past surgical history that includes IR BIOPSY KIDNEY PERCUTANEOUS (03/29/2021); vascular surgery; Foot surgery (Right, 06/2023); and Dilation and curettage of uterus (N/A, 7/31/2023).    Social History:  reports that she has been smoking cigarettes. She has a 1.0 pack-year smoking history. She has never used smokeless tobacco. She reports that she does not currently use alcohol. She reports that she does not currently use drugs after having used the following drugs: Marijuana (Weed) and Cocaine.    Family History: family history includes Deep Vein Thrombosis in her sister; Diabetes in her maternal grandfather; Hearing Loss in her sister; Heart Disease in her sister; Heart Surgery in her sister; Hypertension in her sister; Pulmonary Embolism in her sister.     The patient’s home medications have been reviewed.    Allergies: Patient has no known allergies.    -------------------------------------------------- RESULTS

## 2024-04-28 ENCOUNTER — HOSPITAL ENCOUNTER (EMERGENCY)
Age: 34
Discharge: LEFT BEFORE DISCHARGE | End: 2024-04-28
Attending: EMERGENCY MEDICINE

## 2024-04-28 VITALS
OXYGEN SATURATION: 100 % | HEART RATE: 108 BPM | WEIGHT: 196 LBS | HEIGHT: 67 IN | RESPIRATION RATE: 16 BRPM | BODY MASS INDEX: 30.76 KG/M2 | DIASTOLIC BLOOD PRESSURE: 95 MMHG | TEMPERATURE: 99.3 F | SYSTOLIC BLOOD PRESSURE: 143 MMHG

## 2024-04-28 ASSESSMENT — PAIN DESCRIPTION - PAIN TYPE: TYPE: ACUTE PAIN

## 2024-04-28 ASSESSMENT — PAIN DESCRIPTION - ORIENTATION: ORIENTATION: RIGHT;LOWER

## 2024-04-28 ASSESSMENT — PAIN DESCRIPTION - LOCATION: LOCATION: LEG

## 2024-04-28 ASSESSMENT — PAIN - FUNCTIONAL ASSESSMENT: PAIN_FUNCTIONAL_ASSESSMENT: 0-10

## 2024-04-28 ASSESSMENT — PAIN DESCRIPTION - DESCRIPTORS: DESCRIPTORS: SORE

## 2024-04-28 ASSESSMENT — PAIN DESCRIPTION - FREQUENCY: FREQUENCY: CONTINUOUS

## 2024-05-22 ENCOUNTER — APPOINTMENT (OUTPATIENT)
Dept: GENERAL RADIOLOGY | Age: 34
End: 2024-05-22
Payer: COMMERCIAL

## 2024-05-22 ENCOUNTER — ANESTHESIA EVENT (OUTPATIENT)
Dept: LABOR AND DELIVERY | Age: 34
End: 2024-05-22
Payer: COMMERCIAL

## 2024-05-22 ENCOUNTER — HOSPITAL ENCOUNTER (INPATIENT)
Age: 34
LOS: 3 days | Discharge: HOME | End: 2024-05-25
Attending: OBSTETRICS & GYNECOLOGY | Admitting: OBSTETRICS & GYNECOLOGY
Payer: COMMERCIAL

## 2024-05-22 ENCOUNTER — ANESTHESIA (OUTPATIENT)
Dept: LABOR AND DELIVERY | Age: 34
End: 2024-05-22
Payer: COMMERCIAL

## 2024-05-22 DIAGNOSIS — G89.18 POSTOPERATIVE ABDOMINAL PAIN: ICD-10-CM

## 2024-05-22 DIAGNOSIS — R10.9 POSTOPERATIVE ABDOMINAL PAIN: ICD-10-CM

## 2024-05-22 DIAGNOSIS — F14.10 COCAINE ABUSE (HCC): Primary | Chronic | ICD-10-CM

## 2024-05-22 DIAGNOSIS — O26.90 COMPLICATED PREGNANCY, UNSPECIFIED TRIMESTER: ICD-10-CM

## 2024-05-22 LAB
ABO + RH BLD: NORMAL
ALBUMIN SERPL-MCNC: 3.8 G/DL (ref 3.5–5.2)
ALP SERPL-CCNC: 81 U/L (ref 35–104)
ALT SERPL-CCNC: 9 U/L (ref 0–32)
AMPHET UR QL SCN: NEGATIVE
ANION GAP SERPL CALCULATED.3IONS-SCNC: 18 MMOL/L (ref 7–16)
ARM BAND NUMBER: NORMAL
AST SERPL-CCNC: 17 U/L (ref 0–31)
BARBITURATES UR QL SCN: NEGATIVE
BASOPHILS # BLD: 0.06 K/UL (ref 0–0.2)
BASOPHILS NFR BLD: 1 % (ref 0–2)
BENZODIAZ UR QL: NEGATIVE
BILIRUB SERPL-MCNC: 0.5 MG/DL (ref 0–1.2)
BILIRUB UR QL STRIP: NEGATIVE
BLOOD BANK SAMPLE EXPIRATION: NORMAL
BLOOD GROUP ANTIBODIES SERPL: NEGATIVE
BUN SERPL-MCNC: 7 MG/DL (ref 6–20)
BUPRENORPHINE UR QL: NEGATIVE
C TRACH DNA SPEC QL PROBE+SIG AMP: NORMAL
CALCIUM SERPL-MCNC: 8.9 MG/DL (ref 8.6–10.2)
CANNABINOIDS UR QL SCN: NEGATIVE
CHLORIDE SERPL-SCNC: 96 MMOL/L (ref 98–107)
CLARITY UR: CLEAR
CO2 SERPL-SCNC: 15 MMOL/L (ref 22–29)
COCAINE UR QL SCN: POSITIVE
COLOR UR: YELLOW
CREAT SERPL-MCNC: 0.6 MG/DL (ref 0.5–1)
EOSINOPHIL # BLD: 0.37 K/UL (ref 0.05–0.5)
EOSINOPHILS RELATIVE PERCENT: 4 % (ref 0–6)
EPI CELLS #/AREA URNS HPF: ABNORMAL /HPF
ERYTHROCYTE [DISTWIDTH] IN BLOOD BY AUTOMATED COUNT: 15.2 % (ref 11.5–15)
FENTANYL UR QL: POSITIVE
GFR, ESTIMATED: >90 ML/MIN/1.73M2
GLUCOSE SERPL-MCNC: 55 MG/DL (ref 74–99)
GLUCOSE UR STRIP-MCNC: NEGATIVE MG/DL
HCT VFR BLD AUTO: 33.2 % (ref 34–48)
HGB BLD-MCNC: 10.8 G/DL (ref 11.5–15.5)
HGB UR QL STRIP.AUTO: ABNORMAL
HIV1+2 AB SERPLBLD QL IA.RAPID: NONREACTIVE
IMM GRANULOCYTES # BLD AUTO: 0.05 K/UL (ref 0–0.58)
IMM GRANULOCYTES NFR BLD: 1 % (ref 0–5)
KETONES UR STRIP-MCNC: 40 MG/DL
LEUKOCYTE ESTERASE UR QL STRIP: NEGATIVE
LYMPHOCYTES NFR BLD: 1.6 K/UL (ref 1.5–4)
LYMPHOCYTES RELATIVE PERCENT: 15 % (ref 20–42)
MCH RBC QN AUTO: 29 PG (ref 26–35)
MCHC RBC AUTO-ENTMCNC: 32.5 G/DL (ref 32–34.5)
MCV RBC AUTO: 89 FL (ref 80–99.9)
METHADONE UR QL: NEGATIVE
MONOCYTES NFR BLD: 0.58 K/UL (ref 0.1–0.95)
MONOCYTES NFR BLD: 6 % (ref 2–12)
N GONORRHOEA DNA SPEC QL PROBE+SIG AMP: NORMAL
NEUTROPHILS NFR BLD: 75 % (ref 43–80)
NEUTS SEG NFR BLD: 7.85 K/UL (ref 1.8–7.3)
NITRITE UR QL STRIP: NEGATIVE
OPIATES UR QL SCN: NEGATIVE
OXYCODONE UR QL SCN: NEGATIVE
PCP UR QL SCN: NEGATIVE
PH UR STRIP: 5.5 [PH] (ref 5–9)
PLATELET # BLD AUTO: 274 K/UL (ref 130–450)
PMV BLD AUTO: 10.1 FL (ref 7–12)
POTASSIUM SERPL-SCNC: 3.8 MMOL/L (ref 3.5–5)
PROT SERPL-MCNC: 7.1 G/DL (ref 6.4–8.3)
PROT UR STRIP-MCNC: NEGATIVE MG/DL
RBC # BLD AUTO: 3.73 M/UL (ref 3.5–5.5)
RBC #/AREA URNS HPF: ABNORMAL /HPF
SODIUM SERPL-SCNC: 129 MMOL/L (ref 132–146)
SP GR UR STRIP: 1.02 (ref 1–1.03)
SPECIMEN DESCRIPTION: NORMAL
TEST INFORMATION: ABNORMAL
UROBILINOGEN UR STRIP-ACNC: 0.2 EU/DL (ref 0–1)
WBC #/AREA URNS HPF: ABNORMAL /HPF
WBC OTHER # BLD: 10.5 K/UL (ref 4.5–11.5)

## 2024-05-22 PROCEDURE — 7100000000 HC PACU RECOVERY - FIRST 15 MIN: Performed by: OBSTETRICS & GYNECOLOGY

## 2024-05-22 PROCEDURE — 80053 COMPREHEN METABOLIC PANEL: CPT

## 2024-05-22 PROCEDURE — 3700000001 HC ADD 15 MINUTES (ANESTHESIA): Performed by: OBSTETRICS & GYNECOLOGY

## 2024-05-22 PROCEDURE — 85025 COMPLETE CBC W/AUTO DIFF WBC: CPT

## 2024-05-22 PROCEDURE — 6360000002 HC RX W HCPCS: Performed by: NURSE ANESTHETIST, CERTIFIED REGISTERED

## 2024-05-22 PROCEDURE — 3700000000 HC ANESTHESIA ATTENDED CARE: Performed by: OBSTETRICS & GYNECOLOGY

## 2024-05-22 PROCEDURE — 2709999900 HC NON-CHARGEABLE SUPPLY: Performed by: OBSTETRICS & GYNECOLOGY

## 2024-05-22 PROCEDURE — 2580000003 HC RX 258: Performed by: OBSTETRICS & GYNECOLOGY

## 2024-05-22 PROCEDURE — 6360000002 HC RX W HCPCS

## 2024-05-22 PROCEDURE — 6360000002 HC RX W HCPCS: Performed by: OBSTETRICS & GYNECOLOGY

## 2024-05-22 PROCEDURE — 86592 SYPHILIS TEST NON-TREP QUAL: CPT

## 2024-05-22 PROCEDURE — 1220000001 HC SEMI PRIVATE L&D R&B

## 2024-05-22 PROCEDURE — 3609079900 HC CESAREAN SECTION: Performed by: OBSTETRICS & GYNECOLOGY

## 2024-05-22 PROCEDURE — 86850 RBC ANTIBODY SCREEN: CPT

## 2024-05-22 PROCEDURE — 86900 BLOOD TYPING SEROLOGIC ABO: CPT

## 2024-05-22 PROCEDURE — 86701 HIV-1ANTIBODY: CPT

## 2024-05-22 PROCEDURE — 86901 BLOOD TYPING SEROLOGIC RH(D): CPT

## 2024-05-22 PROCEDURE — 81001 URINALYSIS AUTO W/SCOPE: CPT

## 2024-05-22 PROCEDURE — G0480 DRUG TEST DEF 1-7 CLASSES: HCPCS

## 2024-05-22 PROCEDURE — 74018 RADEX ABDOMEN 1 VIEW: CPT

## 2024-05-22 PROCEDURE — 80307 DRUG TEST PRSMV CHEM ANLYZR: CPT

## 2024-05-22 PROCEDURE — 7100000001 HC PACU RECOVERY - ADDTL 15 MIN: Performed by: OBSTETRICS & GYNECOLOGY

## 2024-05-22 PROCEDURE — 87081 CULTURE SCREEN ONLY: CPT

## 2024-05-22 PROCEDURE — 2580000003 HC RX 258

## 2024-05-22 PROCEDURE — 87491 CHLMYD TRACH DNA AMP PROBE: CPT

## 2024-05-22 PROCEDURE — 87591 N.GONORRHOEAE DNA AMP PROB: CPT

## 2024-05-22 PROCEDURE — 2500000003 HC RX 250 WO HCPCS: Performed by: NURSE ANESTHETIST, CERTIFIED REGISTERED

## 2024-05-22 RX ORDER — KETOROLAC TROMETHAMINE 30 MG/ML
INJECTION, SOLUTION INTRAMUSCULAR; INTRAVENOUS PRN
Status: DISCONTINUED | OUTPATIENT
Start: 2024-05-22 | End: 2024-05-22 | Stop reason: SDUPTHER

## 2024-05-22 RX ORDER — WATER 10 ML/10ML
INJECTION INTRAMUSCULAR; INTRAVENOUS; SUBCUTANEOUS
Status: COMPLETED
Start: 2024-05-22 | End: 2024-05-22

## 2024-05-22 RX ORDER — LIDOCAINE HYDROCHLORIDE 10 MG/ML
INJECTION, SOLUTION INFILTRATION; PERINEURAL
Status: DISPENSED
Start: 2024-05-22 | End: 2024-05-23

## 2024-05-22 RX ORDER — CITRIC ACID/SODIUM CITRATE 334-500MG
30 SOLUTION, ORAL ORAL ONCE
Status: DISCONTINUED | OUTPATIENT
Start: 2024-05-22 | End: 2024-05-23

## 2024-05-22 RX ORDER — NALOXONE HYDROCHLORIDE 0.4 MG/ML
INJECTION, SOLUTION INTRAMUSCULAR; INTRAVENOUS; SUBCUTANEOUS PRN
Status: DISCONTINUED | OUTPATIENT
Start: 2024-05-22 | End: 2024-05-25 | Stop reason: HOSPADM

## 2024-05-22 RX ORDER — SODIUM CHLORIDE 0.9 % (FLUSH) 0.9 %
5-40 SYRINGE (ML) INJECTION EVERY 12 HOURS SCHEDULED
Status: DISCONTINUED | OUTPATIENT
Start: 2024-05-22 | End: 2024-05-23

## 2024-05-22 RX ORDER — SODIUM CHLORIDE, SODIUM LACTATE, POTASSIUM CHLORIDE, CALCIUM CHLORIDE 600; 310; 30; 20 MG/100ML; MG/100ML; MG/100ML; MG/100ML
INJECTION, SOLUTION INTRAVENOUS CONTINUOUS
Status: DISCONTINUED | OUTPATIENT
Start: 2024-05-22 | End: 2024-05-25 | Stop reason: HOSPADM

## 2024-05-22 RX ORDER — SODIUM CHLORIDE 0.9 % (FLUSH) 0.9 %
5-40 SYRINGE (ML) INJECTION EVERY 12 HOURS SCHEDULED
Status: DISCONTINUED | OUTPATIENT
Start: 2024-05-22 | End: 2024-05-25 | Stop reason: HOSPADM

## 2024-05-22 RX ORDER — SODIUM CHLORIDE 0.9 % (FLUSH) 0.9 %
5-40 SYRINGE (ML) INJECTION PRN
Status: DISCONTINUED | OUTPATIENT
Start: 2024-05-22 | End: 2024-05-25 | Stop reason: HOSPADM

## 2024-05-22 RX ORDER — DEXAMETHASONE SODIUM PHOSPHATE 4 MG/ML
INJECTION, SOLUTION INTRA-ARTICULAR; INTRALESIONAL; INTRAMUSCULAR; INTRAVENOUS; SOFT TISSUE PRN
Status: DISCONTINUED | OUTPATIENT
Start: 2024-05-22 | End: 2024-05-22 | Stop reason: SDUPTHER

## 2024-05-22 RX ORDER — SODIUM CHLORIDE 9 MG/ML
INJECTION, SOLUTION INTRAVENOUS PRN
Status: DISCONTINUED | OUTPATIENT
Start: 2024-05-22 | End: 2024-05-25 | Stop reason: HOSPADM

## 2024-05-22 RX ORDER — MAGNESIUM SULFATE IN WATER 40 MG/ML
INJECTION, SOLUTION INTRAVENOUS
Status: COMPLETED
Start: 2024-05-22 | End: 2024-05-22

## 2024-05-22 RX ORDER — FENTANYL CITRATE 50 UG/ML
INJECTION, SOLUTION INTRAMUSCULAR; INTRAVENOUS PRN
Status: DISCONTINUED | OUTPATIENT
Start: 2024-05-22 | End: 2024-05-22 | Stop reason: SDUPTHER

## 2024-05-22 RX ORDER — MAGNESIUM SULFATE IN WATER 40 MG/ML
4000 INJECTION, SOLUTION INTRAVENOUS ONCE
Status: COMPLETED | OUTPATIENT
Start: 2024-05-22 | End: 2024-05-22

## 2024-05-22 RX ORDER — SUCCINYLCHOLINE/SOD CL,ISO/PF 200MG/10ML
SYRINGE (ML) INTRAVENOUS PRN
Status: DISCONTINUED | OUTPATIENT
Start: 2024-05-22 | End: 2024-05-22 | Stop reason: SDUPTHER

## 2024-05-22 RX ORDER — SODIUM CHLORIDE, SODIUM LACTATE, POTASSIUM CHLORIDE, AND CALCIUM CHLORIDE .6; .31; .03; .02 G/100ML; G/100ML; G/100ML; G/100ML
1000 INJECTION, SOLUTION INTRAVENOUS ONCE
Status: COMPLETED | OUTPATIENT
Start: 2024-05-22 | End: 2024-05-22

## 2024-05-22 RX ORDER — ONDANSETRON 2 MG/ML
4 INJECTION INTRAMUSCULAR; INTRAVENOUS EVERY 6 HOURS PRN
Status: DISCONTINUED | OUTPATIENT
Start: 2024-05-22 | End: 2024-05-25 | Stop reason: HOSPADM

## 2024-05-22 RX ORDER — CEFAZOLIN 2 G/1
INJECTION, POWDER, FOR SOLUTION INTRAMUSCULAR; INTRAVENOUS
Status: COMPLETED
Start: 2024-05-22 | End: 2024-05-22

## 2024-05-22 RX ORDER — BETAMETHASONE SODIUM PHOSPHATE AND BETAMETHASONE ACETATE 3; 3 MG/ML; MG/ML
INJECTION, SUSPENSION INTRA-ARTICULAR; INTRALESIONAL; INTRAMUSCULAR; SOFT TISSUE
Status: COMPLETED
Start: 2024-05-22 | End: 2024-05-22

## 2024-05-22 RX ORDER — SODIUM CHLORIDE, SODIUM LACTATE, POTASSIUM CHLORIDE, AND CALCIUM CHLORIDE .6; .31; .03; .02 G/100ML; G/100ML; G/100ML; G/100ML
1000 INJECTION, SOLUTION INTRAVENOUS ONCE
Status: DISCONTINUED | OUTPATIENT
Start: 2024-05-22 | End: 2024-05-23

## 2024-05-22 RX ORDER — ONDANSETRON 2 MG/ML
INJECTION INTRAMUSCULAR; INTRAVENOUS PRN
Status: DISCONTINUED | OUTPATIENT
Start: 2024-05-22 | End: 2024-05-22 | Stop reason: SDUPTHER

## 2024-05-22 RX ORDER — CITRIC ACID/SODIUM CITRATE 334-500MG
SOLUTION, ORAL ORAL
Status: COMPLETED
Start: 2024-05-22 | End: 2024-05-22

## 2024-05-22 RX ORDER — SODIUM CHLORIDE 0.9 % (FLUSH) 0.9 %
10 SYRINGE (ML) INJECTION PRN
Status: DISCONTINUED | OUTPATIENT
Start: 2024-05-22 | End: 2024-05-25 | Stop reason: HOSPADM

## 2024-05-22 RX ORDER — MORPHINE SULFATE 1 MG/30ML
INJECTION INTRAVENOUS CONTINUOUS
Status: DISCONTINUED | OUTPATIENT
Start: 2024-05-23 | End: 2024-05-23 | Stop reason: ALTCHOICE

## 2024-05-22 RX ORDER — MORPHINE SULFATE/0.9% NACL/PF 1 MG/ML
SYRINGE (ML) INJECTION CONTINUOUS
Status: DISCONTINUED | OUTPATIENT
Start: 2024-05-22 | End: 2024-05-22

## 2024-05-22 RX ORDER — CALCIUM GLUCONATE 94 MG/ML
1000 INJECTION, SOLUTION INTRAVENOUS PRN
Status: DISCONTINUED | OUTPATIENT
Start: 2024-05-22 | End: 2024-05-25 | Stop reason: HOSPADM

## 2024-05-22 RX ADMIN — PHENYLEPHRINE HYDROCHLORIDE 100 MCG: 10 INJECTION INTRAVENOUS at 22:15

## 2024-05-22 RX ADMIN — MAGNESIUM SULFATE HEPTAHYDRATE 2000 MG/HR: 40 INJECTION, SOLUTION INTRAVENOUS at 23:37

## 2024-05-22 RX ADMIN — PHENYLEPHRINE HYDROCHLORIDE 100 MCG: 10 INJECTION INTRAVENOUS at 22:21

## 2024-05-22 RX ADMIN — FENTANYL CITRATE 50 MCG: 50 INJECTION, SOLUTION INTRAMUSCULAR; INTRAVENOUS at 22:03

## 2024-05-22 RX ADMIN — FENTANYL CITRATE 50 MCG: 50 INJECTION, SOLUTION INTRAMUSCULAR; INTRAVENOUS at 21:56

## 2024-05-22 RX ADMIN — MAGNESIUM SULFATE IN WATER FOR 4000 MG: 40 INJECTION INTRAVENOUS at 21:23

## 2024-05-22 RX ADMIN — MAGNESIUM SULFATE IN WATER 4000 MG: 40 INJECTION, SOLUTION INTRAVENOUS at 21:23

## 2024-05-22 RX ADMIN — CEFAZOLIN 2000 MG: 2 INJECTION, POWDER, FOR SOLUTION INTRAMUSCULAR; INTRAVENOUS at 21:43

## 2024-05-22 RX ADMIN — KETOROLAC TROMETHAMINE 30 MG: 30 INJECTION, SOLUTION INTRAMUSCULAR at 22:44

## 2024-05-22 RX ADMIN — WATER 20 ML: 1 INJECTION INTRAMUSCULAR; INTRAVENOUS; SUBCUTANEOUS at 21:42

## 2024-05-22 RX ADMIN — PHENYLEPHRINE HYDROCHLORIDE 100 MCG: 10 INJECTION INTRAVENOUS at 22:18

## 2024-05-22 RX ADMIN — SODIUM CHLORIDE 5 MILLION UNITS: 900 INJECTION INTRAVENOUS at 21:19

## 2024-05-22 RX ADMIN — ONDANSETRON 4 MG: 2 INJECTION INTRAMUSCULAR; INTRAVENOUS at 22:19

## 2024-05-22 RX ADMIN — PHENYLEPHRINE HYDROCHLORIDE 100 MCG: 10 INJECTION INTRAVENOUS at 22:29

## 2024-05-22 RX ADMIN — SODIUM CHLORIDE, POTASSIUM CHLORIDE, SODIUM LACTATE AND CALCIUM CHLORIDE: 600; 310; 30; 20 INJECTION, SOLUTION INTRAVENOUS at 22:44

## 2024-05-22 RX ADMIN — CEFAZOLIN 2000 MG: 2 INJECTION, POWDER, FOR SOLUTION INTRAMUSCULAR; INTRAVENOUS at 21:42

## 2024-05-22 RX ADMIN — PHENYLEPHRINE HYDROCHLORIDE 100 MCG: 10 INJECTION INTRAVENOUS at 22:37

## 2024-05-22 RX ADMIN — PHENYLEPHRINE HYDROCHLORIDE 100 MCG: 10 INJECTION INTRAVENOUS at 22:25

## 2024-05-22 RX ADMIN — DEXAMETHASONE SODIUM PHOSPHATE 8 MG: 4 INJECTION INTRA-ARTICULAR; INTRALESIONAL; INTRAMUSCULAR; INTRAVENOUS; SOFT TISSUE at 21:59

## 2024-05-22 RX ADMIN — PHENYLEPHRINE HYDROCHLORIDE 100 MCG: 10 INJECTION INTRAVENOUS at 22:33

## 2024-05-22 RX ADMIN — SODIUM CHLORIDE, POTASSIUM CHLORIDE, SODIUM LACTATE AND CALCIUM CHLORIDE: 600; 310; 30; 20 INJECTION, SOLUTION INTRAVENOUS at 21:39

## 2024-05-22 RX ADMIN — SODIUM CHLORIDE, POTASSIUM CHLORIDE, SODIUM LACTATE AND CALCIUM CHLORIDE 1000 ML: 600; 310; 30; 20 INJECTION, SOLUTION INTRAVENOUS at 21:18

## 2024-05-22 RX ADMIN — Medication 160 MG: at 21:51

## 2024-05-22 RX ADMIN — MORPHINE SULFATE 30 MG: 1 INJECTION INTRAVENOUS at 23:45

## 2024-05-22 RX ADMIN — Medication 87.3 MILLI-UNITS/MIN: at 22:05

## 2024-05-22 RX ADMIN — FENTANYL CITRATE 50 MCG: 50 INJECTION, SOLUTION INTRAMUSCULAR; INTRAVENOUS at 21:59

## 2024-05-22 RX ADMIN — FENTANYL CITRATE 50 MCG: 50 INJECTION, SOLUTION INTRAMUSCULAR; INTRAVENOUS at 22:11

## 2024-05-22 RX ADMIN — BETAMETHASONE SODIUM PHOSPHATE AND BETAMETHASONE ACETATE 12 MG: 3; 3 INJECTION, SUSPENSION INTRA-ARTICULAR; INTRALESIONAL; INTRAMUSCULAR at 21:19

## 2024-05-22 RX ADMIN — FENTANYL CITRATE 50 MCG: 50 INJECTION, SOLUTION INTRAMUSCULAR; INTRAVENOUS at 22:08

## 2024-05-22 ASSESSMENT — PAIN DESCRIPTION - ORIENTATION
ORIENTATION: LOWER;MID
ORIENTATION: LOWER;MID

## 2024-05-22 ASSESSMENT — PAIN SCALES - GENERAL
PAINLEVEL_OUTOF10: 10
PAINLEVEL_OUTOF10: 7
PAINLEVEL_OUTOF10: 10

## 2024-05-22 ASSESSMENT — LIFESTYLE VARIABLES: SMOKING_STATUS: 1

## 2024-05-22 ASSESSMENT — PAIN DESCRIPTION - DESCRIPTORS
DESCRIPTORS: CRAMPING;DISCOMFORT
DESCRIPTORS: CRAMPING;DISCOMFORT

## 2024-05-22 ASSESSMENT — PAIN DESCRIPTION - FREQUENCY
FREQUENCY: CONTINUOUS
FREQUENCY: CONTINUOUS

## 2024-05-22 ASSESSMENT — PAIN DESCRIPTION - LOCATION
LOCATION: ABDOMEN
LOCATION: ABDOMEN

## 2024-05-22 ASSESSMENT — PAIN DESCRIPTION - PAIN TYPE
TYPE: SURGICAL PAIN
TYPE: ACUTE PAIN

## 2024-05-23 LAB
BZE UR-MCNC: >1000 NG/ML
COMPLIANCE DRUG ANALYSIS, URINE: NORMAL
FENTANYL, URN, QUANT: <5 NG/ML
HCT VFR BLD AUTO: 31.1 % (ref 34–48)
HGB BLD-MCNC: 10.2 G/DL (ref 11.5–15.5)
NORFENTANYL, URN, QUANT: <10 NG/ML
RPR SER QL: NONREACTIVE

## 2024-05-23 PROCEDURE — 6370000000 HC RX 637 (ALT 250 FOR IP): Performed by: OBSTETRICS & GYNECOLOGY

## 2024-05-23 PROCEDURE — 85014 HEMATOCRIT: CPT

## 2024-05-23 PROCEDURE — 1220000001 HC SEMI PRIVATE L&D R&B

## 2024-05-23 PROCEDURE — 85018 HEMOGLOBIN: CPT

## 2024-05-23 PROCEDURE — 6360000002 HC RX W HCPCS: Performed by: OBSTETRICS & GYNECOLOGY

## 2024-05-23 RX ORDER — LABETALOL 200 MG/1
200 TABLET, FILM COATED ORAL 3 TIMES DAILY
Status: DISCONTINUED | OUTPATIENT
Start: 2024-05-23 | End: 2024-05-23

## 2024-05-23 RX ORDER — ONDANSETRON 2 MG/ML
4 INJECTION INTRAMUSCULAR; INTRAVENOUS EVERY 6 HOURS PRN
Status: DISCONTINUED | OUTPATIENT
Start: 2024-05-23 | End: 2024-05-25 | Stop reason: HOSPADM

## 2024-05-23 RX ORDER — ONDANSETRON 4 MG/1
4 TABLET, ORALLY DISINTEGRATING ORAL EVERY 8 HOURS PRN
Status: DISCONTINUED | OUTPATIENT
Start: 2024-05-23 | End: 2024-05-25 | Stop reason: HOSPADM

## 2024-05-23 RX ORDER — OXYCODONE HYDROCHLORIDE 5 MG/1
5 TABLET ORAL EVERY 8 HOURS PRN
Status: DISCONTINUED | OUTPATIENT
Start: 2024-05-23 | End: 2024-05-25 | Stop reason: HOSPADM

## 2024-05-23 RX ORDER — IBUPROFEN 800 MG/1
800 TABLET ORAL EVERY 8 HOURS PRN
Status: DISCONTINUED | OUTPATIENT
Start: 2024-05-23 | End: 2024-05-23

## 2024-05-23 RX ORDER — FERROUS SULFATE 325(65) MG
325 TABLET ORAL 2 TIMES DAILY WITH MEALS
Status: DISCONTINUED | OUTPATIENT
Start: 2024-05-23 | End: 2024-05-25 | Stop reason: HOSPADM

## 2024-05-23 RX ORDER — LABETALOL 200 MG/1
200 TABLET, FILM COATED ORAL 3 TIMES DAILY
Status: DISCONTINUED | OUTPATIENT
Start: 2024-05-23 | End: 2024-05-25 | Stop reason: HOSPADM

## 2024-05-23 RX ORDER — PRENATAL WITH FERROUS FUM AND FOLIC ACID 3080; 920; 120; 400; 22; 1.84; 3; 20; 10; 1; 12; 200; 27; 25; 2 [IU]/1; [IU]/1; MG/1; [IU]/1; MG/1; MG/1; MG/1; MG/1; MG/1; MG/1; UG/1; MG/1; MG/1; MG/1; MG/1
1 TABLET ORAL DAILY
Status: DISCONTINUED | OUTPATIENT
Start: 2024-05-23 | End: 2024-05-25 | Stop reason: HOSPADM

## 2024-05-23 RX ORDER — ACETAMINOPHEN 500 MG
1000 TABLET ORAL EVERY 8 HOURS PRN
Status: DISCONTINUED | OUTPATIENT
Start: 2024-05-23 | End: 2024-05-25 | Stop reason: HOSPADM

## 2024-05-23 RX ORDER — OXYCODONE HYDROCHLORIDE 5 MG/1
5 TABLET ORAL EVERY 6 HOURS PRN
Status: DISCONTINUED | OUTPATIENT
Start: 2024-05-23 | End: 2024-05-23

## 2024-05-23 RX ORDER — SIMETHICONE 80 MG
80 TABLET,CHEWABLE ORAL EVERY 6 HOURS PRN
Status: DISCONTINUED | OUTPATIENT
Start: 2024-05-23 | End: 2024-05-25 | Stop reason: HOSPADM

## 2024-05-23 RX ORDER — DOCUSATE SODIUM 100 MG/1
100 CAPSULE, LIQUID FILLED ORAL DAILY
Status: DISCONTINUED | OUTPATIENT
Start: 2024-05-23 | End: 2024-05-25 | Stop reason: HOSPADM

## 2024-05-23 RX ORDER — NIFEDIPINE 30 MG/1
30 TABLET, EXTENDED RELEASE ORAL DAILY
Status: DISCONTINUED | OUTPATIENT
Start: 2024-05-23 | End: 2024-05-24

## 2024-05-23 RX ORDER — MODIFIED LANOLIN
OINTMENT (GRAM) TOPICAL
Status: DISCONTINUED | OUTPATIENT
Start: 2024-05-23 | End: 2024-05-25 | Stop reason: HOSPADM

## 2024-05-23 RX ADMIN — LABETALOL HYDROCHLORIDE 200 MG: 200 TABLET, FILM COATED ORAL at 20:55

## 2024-05-23 RX ADMIN — OXYCODONE HYDROCHLORIDE 5 MG: 5 TABLET ORAL at 11:13

## 2024-05-23 RX ADMIN — LABETALOL HYDROCHLORIDE 200 MG: 200 TABLET, FILM COATED ORAL at 11:13

## 2024-05-23 RX ADMIN — NIFEDIPINE 30 MG: 30 TABLET, FILM COATED, EXTENDED RELEASE ORAL at 21:41

## 2024-05-23 RX ADMIN — OXYCODONE HYDROCHLORIDE 5 MG: 5 TABLET ORAL at 23:36

## 2024-05-23 RX ADMIN — SIMETHICONE 80 MG: 80 TABLET, CHEWABLE ORAL at 17:21

## 2024-05-23 RX ADMIN — SIMETHICONE 80 MG: 80 TABLET, CHEWABLE ORAL at 10:13

## 2024-05-23 RX ADMIN — PRENATAL WITH FERROUS FUM AND FOLIC ACID 1 TABLET: 3080; 920; 120; 400; 22; 1.84; 3; 20; 10; 1; 12; 200; 27; 25; 2 TABLET ORAL at 10:13

## 2024-05-23 RX ADMIN — IBUPROFEN 800 MG: 800 TABLET, FILM COATED ORAL at 20:13

## 2024-05-23 RX ADMIN — ACETAMINOPHEN 1000 MG: 500 TABLET ORAL at 13:30

## 2024-05-23 RX ADMIN — MAGNESIUM SULFATE HEPTAHYDRATE 2000 MG/HR: 40 INJECTION, SOLUTION INTRAVENOUS at 09:56

## 2024-05-23 RX ADMIN — OXYCODONE HYDROCHLORIDE 5 MG: 5 TABLET ORAL at 17:19

## 2024-05-23 RX ADMIN — MORPHINE SULFATE 30 MG: 1 INJECTION INTRAVENOUS at 06:37

## 2024-05-23 RX ADMIN — IBUPROFEN 800 MG: 800 TABLET, FILM COATED ORAL at 10:13

## 2024-05-23 RX ADMIN — DOCUSATE SODIUM 100 MG: 100 CAPSULE, LIQUID FILLED ORAL at 11:13

## 2024-05-23 RX ADMIN — MAGNESIUM SULFATE HEPTAHYDRATE 2000 MG/HR: 40 INJECTION, SOLUTION INTRAVENOUS at 20:07

## 2024-05-23 RX ADMIN — ACETAMINOPHEN 1000 MG: 500 TABLET ORAL at 23:35

## 2024-05-23 ASSESSMENT — PAIN DESCRIPTION - DESCRIPTORS
DESCRIPTORS: CRAMPING;DISCOMFORT
DESCRIPTORS: CRAMPING;SORE;SHARP
DESCRIPTORS: ACHING;CRAMPING;DISCOMFORT;PRESSURE
DESCRIPTORS: DISCOMFORT

## 2024-05-23 ASSESSMENT — PAIN SCALES - GENERAL
PAINLEVEL_OUTOF10: 0
PAINLEVEL_OUTOF10: 8
PAINLEVEL_OUTOF10: 4
PAINLEVEL_OUTOF10: 8
PAINLEVEL_OUTOF10: 7
PAINLEVEL_OUTOF10: 6
PAINLEVEL_OUTOF10: 8
PAINLEVEL_OUTOF10: 4
PAINLEVEL_OUTOF10: 8
PAINLEVEL_OUTOF10: 2

## 2024-05-23 ASSESSMENT — PAIN DESCRIPTION - LOCATION
LOCATION: ABDOMEN;INCISION
LOCATION: ABDOMEN
LOCATION: ABDOMEN;INCISION

## 2024-05-23 ASSESSMENT — PAIN DESCRIPTION - ORIENTATION
ORIENTATION: RIGHT;UPPER
ORIENTATION: MID;LOWER;RIGHT;UPPER

## 2024-05-23 ASSESSMENT — PAIN - FUNCTIONAL ASSESSMENT
PAIN_FUNCTIONAL_ASSESSMENT: ACTIVITIES ARE NOT PREVENTED

## 2024-05-23 NOTE — PROGRESS NOTES
Assuming care of patient at this time @ 0700. Report received from previous RN. RN to bedside for morning assessment/vitals. Plan of care for the day discussed with patient with a verbalized understanding. White board updated with change of shift staff as well as goals and pain medication availability. Call light is within reach and no needs expressed at this time.

## 2024-05-23 NOTE — PROGRESS NOTES
No counts performed due to urgency of the case, x ray performed.  Dr Canales visualized abdominal xray

## 2024-05-23 NOTE — OP NOTE
Operative Note      Patient: Eliane Zimmerman  YOB: 1990  MRN: [id number]    Date of Procedure: 2024    Preop diagnosis: 31 weeks 6 days IUP, chronic hypertensive on medications, use of cocaine, active  labor, category 3 fetal heart tones, grand multipara    Post-Op Diagnosis: Same delivered female infant via  section with Apgar 1 at 1 minute 3 at 5 minutes 7 at 10 minutes.       Procedure(s):   SECTION primary low-transverse     Surgeon(s):  Gilbert Canales MD  Pediatrician in attendance in the operating room  Assistant:   * No surgical staff found *    Anesthesia: General    Estimated Blood Loss (mL): 300     Complications: None    Specimens:   * No specimens in log *    Implants:  * No implants in log *      Drains: * No LDAs found *    Findings:    Detailed Description of Procedure:   Name: Eliane Zimmerman  MRN: [id number]  Admission Date: 2024  Delivery Date/Time: 2024  9:54 PM   PROCEDURE IN BRIEF: The patient, under spinal anesthesia anesthesia, in the supine    position with left lateral tilt and with a Diaz catheter in place, parts were painted and   draped as usual.     Vertical midline infraumbilical incision made and the abdomen was opened   in layers in the standard manner. Hemostasis achieved in the subcutaneous   layers. An incision was made in the fascia and then the fascia was    from the underlying muscles by sharp and blunt dissection in the   cephalad as well as in the caudal region and thereafter muscles were split in   the center. Peritoneum was opened caudally and then the incision was   extended cephalad . A bladder blade was placed and the   bladder flap was incised in a semi-lunar fashion and the bladder is    from the underlying lower segment by blunt dissection and   thereafter the lower segment is exposed. An incision is made in the lower   segment and deepened until the cavity is reached. Clear  Patient is   transferred to the recovery room with stable vital signs and in stable   condition.     Signed:   Dr Gilbert Canales M.D.  5/22/2024  11:07 PM           Electronically signed by Gilbert Canales MD on 5/22/2024 at 11:04 PM

## 2024-05-23 NOTE — PROGRESS NOTES
DR. Canales in department. Dr. Canales informed of O2 saturation, pain, blood pressure,  right epigastric pain, and gas pain. Dr. Canales to the bedside. Patient assessed. New order received to discontinue PCA pump, New order received for PO labetalol 200 mg TID, Roxicodone 5 mg q 6 prn, and to continue POC and magnesium drip for remainder of 24 hours.

## 2024-05-23 NOTE — CARE COORDINATION
RENÉE consult noted for Drug Use.  UDS was positive for Cocaine and Fentanyl.  Baby was transferred to special care.  Have called and left message for RENÉE Harper at special care.  They will follow and make appropriate referrals.

## 2024-05-23 NOTE — PROGRESS NOTES
Patient is a 33 year old , EDC 24 31.6 weeks.  She presented ambulatory to the Labor and Delivery unit with complaints of heavy vaginal bleeding that started about a hour prior to coming.  Patient also reports contractions and decreased fetal movement.  Patient taken to triage 1

## 2024-05-23 NOTE — PROGRESS NOTES
Delivery Log Form    Patient: Eliane Zimmerman  Delivery Method: Delivery Method: N/A   Date of Delivery: 2024  Time of Delivery:  (time therapist was called)  Were you able to attend? Yes  Amount of time spend at delivery? 1.5 hours  Actions Taken: 40% O2 via  t-piece resuscitator  given, catheter  Apgars Assessed? Yes  Apgar Results:    1Min:APGAR One: N/A   5Min: APGAR Five: N/A    LDA information:  Intubation: No  Outcomes: [facility] team present, Manually ventilated baby girl then switched to mask CPAP. Weaned O2 to 21%. Placed IKE cannula on baby and increased O2 to 40%. Assisted team with transport to [facility]. Baby remained on CPAP until [facility] transport team arrived. I stayed in Special Care until team arrived.    Problem List:   Patient Active Problem List   Diagnosis    Complicated pregnancy, unspecified trimester    27 weeks gestation of pregnancy    PIH (pregnancy induced hypertension), first trimester    Suspected damage to fetus from maternal drug use    Cocaine abuse (Formerly [facility])    Methamphetamine use ([facility])    Cannabis abuse    Recurrent pregnancy loss, currently pregnant    Fetal demise, greater than 22 weeks, antepartum, fetus 1    Complicated pregnancy, third trimester    Pulmonary embolism on left ([facility])    Acute pulmonary embolism without acute cor pulmonale, unspecified pulmonary embolism type ([facility])    Primary hypertension    HFrEF (heart failure with reduced ejection fraction) ([facility])    Dilated cardiomyopathy ([facility])    Nonrheumatic mitral valve regurgitation    Nonrheumatic tricuspid valve regurgitation    Tobacco abuse    Left ventricular hypertrophy    Cocaine use    5 weeks gestation of pregnancy    Hypertension affecting pregnancy in first trimester    Chronic hypertension affecting pregnancy    19 weeks gestation of pregnancy       Andrew Cuevas, PEÑA

## 2024-05-23 NOTE — ANESTHESIA PRE PROCEDURE
IntraVENous PRN Yenifer Rothman, APRN - CRNA   8 mg at 05/22/24 2159    succinylcholine (ANECTINE) injection   IntraVENous PRN RothmanYenifer, APRN - CRNA   160 mg at 05/22/24 2151       Allergies:  No Known Allergies    Problem List:    Patient Active Problem List   Diagnosis Code    Complicated pregnancy, unspecified trimester O26.90    27 weeks gestation of pregnancy Z3A.27    PIH (pregnancy induced hypertension), first trimester O13.1    Suspected damage to fetus from maternal drug use O35.5XX0    Cocaine abuse ([facility]) F14.10    Methamphetamine use ([facility]) F15.10    Cannabis abuse F12.10    Recurrent pregnancy loss, currently pregnant O26.20    Fetal demise, greater than 22 weeks, antepartum, fetus 1 O36.4XX1    Complicated pregnancy, third trimester O26.93    Pulmonary embolism on left ([facility]) I26.99    Acute pulmonary embolism without acute cor pulmonale, unspecified pulmonary embolism type ([facility]) I26.99    Primary hypertension I10    HFrEF (heart failure with reduced ejection fraction) ([facility]) I50.20    Dilated cardiomyopathy ([facility]) I42.0    Nonrheumatic mitral valve regurgitation I34.0    Nonrheumatic tricuspid valve regurgitation I36.1    Tobacco abuse Z72.0    Left ventricular hypertrophy I51.7    Cocaine use F14.90    5 weeks gestation of pregnancy Z3A.01    Hypertension affecting pregnancy in first trimester O16.1    Chronic hypertension affecting pregnancy O10.919    19 weeks gestation of pregnancy Z3A.19       Past Medical History:        Diagnosis Date    Abnormal Pap smear     JERAMY (acute kidney injury) ([facility]) 3/27/2021    Cannabis abuse 6/17/2022    Cocaine abuse ([facility]) 6/17/2022    Complication of anesthesia 2011    States as spinal curvature and had one-sided Epidural    Herpes simplex without mention of complication     Hypertensive urgency 3/27/2021    Methamphetamine use ([facility]) 6/17/2022    Recurrent pregnancy loss, currently pregnant 6/17/2022    Suspected damage to fetus from maternal drug use 6/17/2022

## 2024-05-23 NOTE — H&P
Department of Obstetrics and Gynecology   Obstetrics History and Physical      Eliane Zimmerman  2024  [id number]    CHIEF COMPLAINT: Abdominal pains cramping    HISTORY OF PRESENT ILLNESS:      The patient is a 33 y.o. female  at 31w6d.  Presented to labor and delivery.  RN called regarding patient being fully dilated and bulging bag with 32 weeks and cocaine drug positive and a urine drug screen.  Patient is here for further evaluation management.  Patient is known hypertensive on labetalol and Procardia and during her prenatal care.  Patient had drug screen +4 weeks ago and at that time counseled her as well as offered her drug rehab patient was attending drug rehab classes.  OB History          19    Para   7    Term   6       1    AB   9    Living   7         SAB   3    IAB   4    Ectopic   0    Molar   0    Multiple   1    Live Births   7          Obstetric Comments    viable female at 1349pm-Apgars 9/10 ,Wt. 5-6   Low Vacuum Extraction Twin B at 1422pm Apgars 9/9 Wt. 5-9           Patient presents with a chief complaint as above and is being admitted for further evaluation management staff is busy getting the IV started and labs drawn.  I arrived at 8 bedside ultrasound confirmed cephalic presentation and patient has been on mag sulfate and penicillin G patient received Celestone 12 mg IM.  I and cervical exam done station -3  ,floating and membranes are bulging cervix is about 7 cm dilated.  And fetal heart tone noticed to have severe variable decelerations in a repeative fashion down to 60s and no accelerations noticed variability is average to minimum.  Stat  was called at 213.  Estimated Due Date: Estimated Date of Delivery: 24    PRENATAL CARE:    Complicated by:   Patient Active Problem List   Diagnosis Code    Complicated pregnancy, unspecified trimester O26.90    27 weeks gestation of pregnancy Z3A.27    PIH (pregnancy induced hypertension), first  Laterality Date    DILATION AND CURETTAGE OF UTERUS N/A 7/31/2023    DILATATION AND CURETTAGE SUCTION performed by Gilbert Canales MD at [facility] OR    FOOT SURGERY Right 06/2023    IR BIOPSY KIDNEY PERCUTANEOUS  03/29/2021    IR BIOPSY KIDNEY PERCUTANEOUS 3/29/2021 [facility] SPECIAL PROCEDURES    VASCULAR SURGERY       Allergies:  Patient has no known allergies.  Social History:    Social History     Socioeconomic History    Marital status: Single     Spouse name: Not on file    Number of children: Not on file    Years of education: Not on file    Highest education level: Not on file   Occupational History    Not on file   Tobacco Use    Smoking status: Every Day     Current packs/day: 0.25     Average packs/day: 0.3 packs/day for 4.0 years (1.0 ttl pk-yrs)     Types: Cigarettes    Smokeless tobacco: Never   Vaping Use    Vaping Use: Never used   Substance and Sexual Activity    Alcohol use: Not Currently     Comment: rare    Drug use: Not Currently     Types: Marijuana (Weed), Cocaine     Comment: several positives during this pregnancy    Sexual activity: Yes     Partners: Male   Other Topics Concern    Not on file   Social History Narrative    Not on file     Social Determinants of Health     Financial Resource Strain: Not on file   Food Insecurity: Not on file   Transportation Needs: Not on file   Physical Activity: Not on file   Stress: Not on file   Social Connections: Not on file   Intimate Partner Violence: Not on file   Housing Stability: Not on file     Family History:       Problem Relation Age of Onset    Hearing Loss Sister     Hypertension Sister     Heart Disease Sister     Heart Surgery Sister     Pulmonary Embolism Sister     Deep Vein Thrombosis Sister     Diabetes Maternal Grandfather      Medications Prior to Admission:  Medications Prior to Admission: Prenatal MV-Min-Fe Fum-FA-DHA (PRENATAL 1 PO), Take by mouth  ASPIRIN LOW DOSE 81 MG EC tablet, Take 1 tablet by mouth daily  Prenatal Vit-Fe Fumarate-FA

## 2024-05-24 PROCEDURE — 6360000002 HC RX W HCPCS: Performed by: OBSTETRICS & GYNECOLOGY

## 2024-05-24 PROCEDURE — 2580000003 HC RX 258: Performed by: OBSTETRICS & GYNECOLOGY

## 2024-05-24 PROCEDURE — 1220000001 HC SEMI PRIVATE L&D R&B

## 2024-05-24 PROCEDURE — 6370000000 HC RX 637 (ALT 250 FOR IP): Performed by: OBSTETRICS & GYNECOLOGY

## 2024-05-24 RX ORDER — LABETALOL 200 MG/1
200 TABLET, FILM COATED ORAL 3 TIMES DAILY
Qty: 60 TABLET | Refills: 3 | Status: SHIPPED | OUTPATIENT
Start: 2024-05-24

## 2024-05-24 RX ORDER — FERROUS SULFATE 325(65) MG
325 TABLET ORAL 2 TIMES DAILY WITH MEALS
Qty: 60 TABLET | Refills: 1 | Status: SHIPPED | OUTPATIENT
Start: 2024-05-24

## 2024-05-24 RX ORDER — FERROUS SULFATE 325(65) MG
325 TABLET ORAL 2 TIMES DAILY
Qty: 180 TABLET | Refills: 1 | Status: SHIPPED | OUTPATIENT
Start: 2024-05-24

## 2024-05-24 RX ORDER — LABETALOL 100 MG/1
100 TABLET, FILM COATED ORAL 2 TIMES DAILY
Qty: 60 TABLET | Refills: 3 | Status: SHIPPED | OUTPATIENT
Start: 2024-05-24

## 2024-05-24 RX ORDER — NIFEDIPINE 30 MG/1
30 TABLET, EXTENDED RELEASE ORAL 2 TIMES DAILY
Qty: 60 TABLET | Refills: 0 | Status: SHIPPED | OUTPATIENT
Start: 2024-05-24 | End: 2024-06-23

## 2024-05-24 RX ORDER — NIFEDIPINE 30 MG/1
30 TABLET, EXTENDED RELEASE ORAL DAILY
Qty: 30 TABLET | Refills: 3 | Status: SHIPPED | OUTPATIENT
Start: 2024-05-25

## 2024-05-24 RX ORDER — BISACODYL 10 MG
10 SUPPOSITORY, RECTAL RECTAL DAILY PRN
Status: DISCONTINUED | OUTPATIENT
Start: 2024-05-24 | End: 2024-05-25 | Stop reason: HOSPADM

## 2024-05-24 RX ORDER — NIFEDIPINE 30 MG/1
30 TABLET, EXTENDED RELEASE ORAL 2 TIMES DAILY
Status: DISCONTINUED | OUTPATIENT
Start: 2024-05-24 | End: 2024-05-25 | Stop reason: HOSPADM

## 2024-05-24 RX ORDER — OXYCODONE HYDROCHLORIDE 5 MG/1
5 TABLET ORAL EVERY 8 HOURS PRN
Qty: 10 TABLET | Refills: 0 | Status: SHIPPED | OUTPATIENT
Start: 2024-05-24 | End: 2024-05-27

## 2024-05-24 RX ADMIN — LABETALOL HYDROCHLORIDE 200 MG: 200 TABLET, FILM COATED ORAL at 21:13

## 2024-05-24 RX ADMIN — SODIUM CHLORIDE, PRESERVATIVE FREE 10 ML: 5 INJECTION INTRAVENOUS at 21:13

## 2024-05-24 RX ADMIN — LABETALOL HYDROCHLORIDE 200 MG: 200 TABLET, FILM COATED ORAL at 17:00

## 2024-05-24 RX ADMIN — OXYCODONE HYDROCHLORIDE 5 MG: 5 TABLET ORAL at 17:00

## 2024-05-24 RX ADMIN — OXYCODONE HYDROCHLORIDE 5 MG: 5 TABLET ORAL at 08:09

## 2024-05-24 RX ADMIN — BISACODYL 10 MG: 10 SUPPOSITORY RECTAL at 17:35

## 2024-05-24 RX ADMIN — ACETAMINOPHEN 1000 MG: 500 TABLET ORAL at 08:09

## 2024-05-24 RX ADMIN — NIFEDIPINE 30 MG: 30 TABLET, FILM COATED, EXTENDED RELEASE ORAL at 08:10

## 2024-05-24 RX ADMIN — NIFEDIPINE 30 MG: 30 TABLET, FILM COATED, EXTENDED RELEASE ORAL at 21:14

## 2024-05-24 RX ADMIN — ACETAMINOPHEN 1000 MG: 500 TABLET ORAL at 17:00

## 2024-05-24 RX ADMIN — DOCUSATE SODIUM 100 MG: 100 CAPSULE, LIQUID FILLED ORAL at 08:09

## 2024-05-24 RX ADMIN — MAGNESIUM SULFATE HEPTAHYDRATE 2000 MG/HR: 40 INJECTION, SOLUTION INTRAVENOUS at 05:40

## 2024-05-24 RX ADMIN — LABETALOL HYDROCHLORIDE 200 MG: 200 TABLET, FILM COATED ORAL at 08:08

## 2024-05-24 ASSESSMENT — PAIN SCALES - GENERAL
PAINLEVEL_OUTOF10: 7
PAINLEVEL_OUTOF10: 7

## 2024-05-24 ASSESSMENT — PAIN DESCRIPTION - DESCRIPTORS
DESCRIPTORS: ACHING
DESCRIPTORS: ACHING;CRAMPING

## 2024-05-24 ASSESSMENT — PAIN DESCRIPTION - LOCATION
LOCATION: ABDOMEN
LOCATION: ABDOMEN

## 2024-05-24 ASSESSMENT — PAIN DESCRIPTION - ORIENTATION: ORIENTATION: LOWER

## 2024-05-24 NOTE — PROGRESS NOTES
Subjective:     Postpartum Day 1:  Delivery    The patient feels well. The patient denies emotional concerns. Pain is well controlled with current medications. The baby is transferred to NICU at [place] urinary output isadequate. The patient is ambulating well. The patient is tolerating a normal diet. Flatus has been passed.    Objective:        Vitals:    24 1700   BP: (!) 160/86   Pulse: 86   Resp:    Temp:    SpO2:           General:    alert, appears stated age, and cooperative   Bowel Sounds:  active   Lochia:  appropriate   Uterine Fundus:   firm,not tender   Incision:  healing well, no significant drainage, no dehiscence, no significant erythema   DVT Evaluation:  No evidence of DVT seen on physical exam.  Negative Felisa's sign.     CBC   Lab Results   Component Value Date    WBC 10.5 2024    HGB 10.2 (L) 2024    HCT 31.1 (L) 2024    MCV 89.0 2024     2024        Assessment:     Status post  section. Doing well postoperatively.  Postpartum hypertension    Plan:     Continue current care.  Labetalol 200 mg 3 times a day, add Procardia 30 XL p.o. daily  Reassess periodically      PATRIZIA Matamoros  33 y.o.  2024  [id number]

## 2024-05-24 NOTE — PROGRESS NOTES
RN to bedside. Patient vitals obtained. Patient voiced no questions at this time. Call light in reach.

## 2024-05-24 NOTE — PROGRESS NOTES
Subjective:     Postpartum Day 2:  Delivery    The patient feels well. The patient denies emotional concerns. Pain is well controlled with current medications. . Urinary output is adequate. The patient is ambulating well. The patient is tolerating a normal diet. Flatus has been passed.    Objective:        Blood pressure (!) 160/86, pulse 86, temperature 98.1 °F (36.7 °C), temperature source Oral, resp. rate 18, height 1.702 m (5' 7\"), weight 90.3 kg (199 lb), last menstrual period 2024, SpO2 99 %, unknown if currently breastfeeding.  In: -   Out: 850 [Urine:850]     Recent Results (from the past 72 hour(s))   Urinalysis with Microscopic    Collection Time: 24  8:55 PM   Result Value Ref Range    Color, UA Yellow Yellow    Turbidity UA Clear Clear    Glucose, Ur NEGATIVE NEGATIVE mg/dL    Bilirubin, Urine NEGATIVE NEGATIVE    Ketones, Urine 40 (A) NEGATIVE mg/dL    Specific Gravity, UA 1.025 1.005 - 1.030    Urine Hgb LARGE (A) NEGATIVE    pH, Urine 5.5 5.0 - 9.0    Protein, UA NEGATIVE NEGATIVE mg/dL    Urobilinogen, Urine 0.2 0.0 - 1.0 EU/dL    Nitrite, Urine NEGATIVE NEGATIVE    Leukocyte Esterase, Urine NEGATIVE NEGATIVE    WBC, UA 0 TO 5 0 TO 5 /HPF    RBC, UA 10 TO 20 (A) 0 TO 2 /HPF    Epithelial Cells, UA 0 TO 2 /HPF   Urine Drug Screen    Collection Time: 24  8:55 PM   Result Value Ref Range    Amphetamine Screen, Ur NEGATIVE NEGATIVE    Barbiturate Screen, Ur NEGATIVE NEGATIVE    Benzodiazepine Screen, Urine NEGATIVE NEGATIVE    Cocaine Metabolite, Urine POSITIVE (A) NEGATIVE    Methadone Screen, Urine NEGATIVE NEGATIVE    Opiates, Urine NEGATIVE NEGATIVE    Phencyclidine, Urine NEGATIVE NEGATIVE    Cannabinoid Scrn, Ur NEGATIVE NEGATIVE    Oxycodone Screen, Ur NEGATIVE NEGATIVE    Fentanyl, Ur POSITIVE (A) NEGATIVE    Buprenorphine Urine NEGATIVE NEGATIVE    Test Information       These drug screen results are for medical purposes only and should not be considered definitive or     Calcium 8.9 8.6 - 10.2 mg/dL    Total Protein 7.1 6.4 - 8.3 g/dL    Albumin 3.8 3.5 - 5.2 g/dL    Total Bilirubin 0.5 0.0 - 1.2 mg/dL    Alkaline Phosphatase 81 35 - 104 U/L    ALT 9 0 - 32 U/L    AST 17 0 - 31 U/L   RPR   Result Value Ref Range    RPR NONREACTIVE NONREACTIVE   HIV Rapid 1&2   Result Value Ref Range    Rapid HIV 1&2 NONREACTIVE NONREACTIVE   Hemoglobin and Hematocrit   Result Value Ref Range    Hemoglobin 10.2 (L) 11.5 - 15.5 g/dL    Hematocrit 31.1 (L) 34.0 - 48.0 %   BENZOYLECGONINE, Quantitative, Urine   Result Value Ref Range    Benzoylecgonine, Quantitative, Urine >1,000.0 (H) <50 ng/mL   FENTANYL, Quantitative, Urine   Result Value Ref Range    Fentanyl, Urn, Quant <5.0 <5 ng/mL    Norfentanyl, Urn, Quant <10.0 <10 ng/mL   Compliance Drug Analysis, Urine   Result Value Ref Range    Compliance Drug Analysis, Urine TEST INFORMATION:    TYPE AND SCREEN   Result Value Ref Range    Blood Bank Sample Expiration 2024,2359     Arm Band Number [id number]     ABO/Rh B POSITIVE     Antibody Screen NEGATIVE        Assessment:     Status post  section. POD # 2: Doing well postoperatively. female severe hypertension  Principal Problem:    Complicated pregnancy, third trimester  Active Problems:    Complicated pregnancy, unspecified trimester  Resolved Problems:    * No resolved hospital problems. *    Plan:     Continue current care.  Ambulate as tolerated  Continue PNV po daily.Pain meds as needed.  Iron sulfate p.o. daily patient will be taking labetalol 200 mg 3 times a day and Procardia 30 XL twice a day.  If blood pressure is not under control will have internist consulted for control of severe hypertension.  Patient has no symptoms at present time.    Gilbert Canales MD,M.D.  2024  6:06 PM        [facility]  1990  [phone]

## 2024-05-24 NOTE — PROGRESS NOTES
RN updated Dr. Canalse on patient status patient stated magnesium can be discontinued. Procardia 30mg XL BID and labetalol 3x a day.

## 2024-05-24 NOTE — PROGRESS NOTES
Assumed care of patient for the 11pm-7am shift. Plan of care for night discussed, patient verbalizes understanding. Call light placed within reach.

## 2024-05-24 NOTE — ANESTHESIA POSTPROCEDURE EVALUATION
Department of Anesthesiology  Postprocedure Note    Patient: Eliane Zimmerman  MRN: [id number]  YOB: 1990  Date of evaluation: 2024    Procedure Summary       Date: 24 Room / Location: [facility]    Anesthesia Start:  Anesthesia Stop:     Procedure:  SECTION (Abdomen) Diagnosis:     Surgeons: Gilbert Canales MD Responsible Provider: Krish Guillen MD    Anesthesia Type: general ASA Status: 3 - Emergent            Anesthesia Type: No value filed.    Vignesh Phase I:      Vignesh Phase II:      Anesthesia Post Evaluation    Patient location during evaluation: bedside  Patient participation: complete - patient participated  Level of consciousness: awake and alert  Pain score: 2  Airway patency: patent  Nausea & Vomiting: no nausea  Cardiovascular status: blood pressure returned to baseline  Respiratory status: acceptable  Hydration status: euvolemic  Pain management: adequate    No notable events documented.  
17-Jun-2019 04:30

## 2024-05-24 NOTE — DISCHARGE SUMMARY
Physician Discharge Summary     Patient ID:  Eliane Zimmerman  [id number]  33 y.o.  1990    Admit date: 2024    Discharge date and time:      Admitting Physician: Gilbert Canales MD     Diagnoses: Complicated pregnancy, third trimester [O26.93]  Complicated pregnancy, unspecified trimester [O26.90]    Discharged Condition: {condition:33866}    Indication for Admission: Complicated pregnancy, third trimester [O26.93]  Complicated pregnancy, unspecified trimester [O26.90]    Procedures Performed: {Desc; delivery type:45152}        Information for the patient's :  Edmundo Zimmerman [[id number]]            Information for the patient's :  Edmundo Zimmerman [[id number]]        Hospital Course: Patient was admitted  and underwent an uncomplicated procedure.postopcare was uncomplicated.H/H stable,Vital stable,Voided without probs,had passed flatus, incision and wound dry no bleeding or oozing,moderate lochia at perineum    Discharge Exam:  BP (!) 160/86   Pulse 86   Temp 98.1 °F (36.7 °C) (Oral)   Resp 18   Ht 1.702 m (5' 7\")   Wt 90.3 kg (199 lb)   LMP 2024 (Exact Date)   SpO2 99%   Breastfeeding Unknown   BMI 31.17 kg/m²     General Appearance:    Alert, cooperative, no distress, appears stated age   Back:     Nontender, no bleeding oozing on epidural site   Abdomen:     Soft, non-tender, bowel sounds active ,well contracted gravid uterus, nontender, no distention  , incision is healing very well no bleeding no oozing no drainage.   Extremities:  no calf tenderness, Homans' sign negative bilaterally.   Perineum: Healing well, moderate lochia       Labs:  Recent Results (from the past 72 hour(s))   Urinalysis with Microscopic    Collection Time: 24  8:55 PM   Result Value Ref Range    Color, UA Yellow Yellow    Turbidity UA Clear Clear    Glucose, Ur NEGATIVE NEGATIVE mg/dL    Bilirubin, Urine NEGATIVE NEGATIVE    Ketones, Urine 40 (A) NEGATIVE mg/dL    Specific Gravity, UA

## 2024-05-24 NOTE — PROGRESS NOTES
Dr. Canales on unit notified of pt BP's. Per Dr. Canales keep Mg running all night and start procardia. Orders received.

## 2024-05-24 NOTE — PROGRESS NOTES
RN to bedside. Patient stated IV on Right hand came out and would she would like gauze on site. Patient still has IV access on L hand.

## 2024-05-24 NOTE — DISCHARGE INSTRUCTIONS
hospital.  High blood pressure sometimes continues after childbirth. But it usually returns to normal levels with time.  Take and record your blood pressure at home if your doctor tells you to.  Ask your doctor to check your blood pressure monitor to be sure that it is accurate and that the cuff fits you. Also ask your doctor to watch you use it, to make sure that you are using it right.  Don't eat, use tobacco products, or use medicine known to raise blood pressure (such as some nasal decongestant sprays) before you take your blood pressure.  Avoid taking your blood pressure if you have just exercised or if you're nervous or upset. Rest at least 15 minutes before you take your blood pressure.  Take your medicines exactly as prescribed. Call your doctor if you think you are having a problem with your medicine.  If you smoke, try to quit. Talk to your doctor if you need help quitting.  Eat a variety of healthy foods. Include plenty of foods high in calcium, such as dairy products, almonds, and dark leafy greens.  Long-term health  After you've had preeclampsia, you have an increased risk of high blood pressure, heart disease, stroke, and kidney disease. This may be because the same things that cause preeclampsia also cause heart and kidney disease.  To protect your health, work with your doctor on living a heart-healthy lifestyle and getting the checkups you need. Your doctor may also want you to check your blood pressure at home.  Follow-up care is a key part of your treatment and safety. Be sure to make and go to all appointments, and call your doctor if you are having problems. It's also a good idea to know your test results and keep a list of the medicines you take.  When should you call for help?  Share this information with your partner or a friend. They can help you watch for warning signs.  Call 911  anytime you think you may need emergency care. For example, call if:    You passed out (lost consciousness).  day or 5 pounds in a week.  Feeling so tired or weak that you cannot do your usual activities.     You had spinal or epidural pain relief and have:  New or worse back pain.  Increased pain, swelling, warmth, or redness at the injection site.  Tingling, weakness, or numbness in your legs or groin.   Watch closely for changes in your health, and be sure to contact your doctor or midwife if:    Your vaginal bleeding isn't decreasing.     You feel sad, anxious, or hopeless for more than a few days.     You are having problems with your breasts or breastfeeding.   Where can you learn more?  Go to https://www.[employer].net/patientEd and enter M806 to learn more about \" Section: What to Expect at Home.\"  Current as of: July 10, 2023               Content Version: 14.0  © 0628-8590 [employer].   Care instructions adapted under license by [employer]. If you have questions about a medical condition or this instruction, always ask your healthcare professional. [employer] disclaims any warranty or liability for your use of this information.

## 2024-05-25 VITALS
HEART RATE: 97 BPM | BODY MASS INDEX: 31.23 KG/M2 | RESPIRATION RATE: 18 BRPM | HEIGHT: 67 IN | OXYGEN SATURATION: 98 % | DIASTOLIC BLOOD PRESSURE: 83 MMHG | WEIGHT: 199 LBS | TEMPERATURE: 98 F | SYSTOLIC BLOOD PRESSURE: 153 MMHG

## 2024-05-25 LAB
MICROORGANISM SPEC CULT: NORMAL
SPECIMEN DESCRIPTION: NORMAL

## 2024-05-25 PROCEDURE — 6370000000 HC RX 637 (ALT 250 FOR IP): Performed by: OBSTETRICS & GYNECOLOGY

## 2024-05-25 RX ADMIN — DOCUSATE SODIUM 100 MG: 100 CAPSULE, LIQUID FILLED ORAL at 09:24

## 2024-05-25 RX ADMIN — PRENATAL WITH FERROUS FUM AND FOLIC ACID 1 TABLET: 3080; 920; 120; 400; 22; 1.84; 3; 20; 10; 1; 12; 200; 27; 25; 2 TABLET ORAL at 09:24

## 2024-05-25 RX ADMIN — ACETAMINOPHEN 1000 MG: 500 TABLET ORAL at 09:24

## 2024-05-25 RX ADMIN — ACETAMINOPHEN 1000 MG: 500 TABLET ORAL at 01:17

## 2024-05-25 RX ADMIN — LABETALOL HYDROCHLORIDE 200 MG: 200 TABLET, FILM COATED ORAL at 09:23

## 2024-05-25 RX ADMIN — OXYCODONE HYDROCHLORIDE 5 MG: 5 TABLET ORAL at 09:24

## 2024-05-25 RX ADMIN — OXYCODONE HYDROCHLORIDE 5 MG: 5 TABLET ORAL at 01:16

## 2024-05-25 RX ADMIN — NIFEDIPINE 30 MG: 30 TABLET, FILM COATED, EXTENDED RELEASE ORAL at 09:29

## 2024-05-25 ASSESSMENT — PAIN DESCRIPTION - ORIENTATION: ORIENTATION: MID;LOWER

## 2024-05-25 ASSESSMENT — PAIN DESCRIPTION - RADICULAR PAIN: RADICULAR_PAIN: ABSENT

## 2024-05-25 ASSESSMENT — PAIN DESCRIPTION - LOCATION
LOCATION: ABDOMEN
LOCATION: ABDOMEN;INCISION

## 2024-05-25 ASSESSMENT — PAIN SCALES - GENERAL
PAINLEVEL_OUTOF10: 3
PAINLEVEL_OUTOF10: 8
PAINLEVEL_OUTOF10: 3
PAINLEVEL_OUTOF10: 7

## 2024-05-25 ASSESSMENT — PAIN DESCRIPTION - DESCRIPTORS
DESCRIPTORS: ACHING;CRAMPING
DESCRIPTORS: CRAMPING;SORE

## 2024-05-25 ASSESSMENT — PAIN SCALES - WONG BAKER: WONGBAKER_NUMERICALRESPONSE: NO HURT

## 2024-05-25 NOTE — PROGRESS NOTES
Discharge instructions and prescription reviewed with patient; discharge instructions reviewed with patient.  Dr. Canales updated on patient status and blood pressure. Education given for hypertension prescriptions and management. Blood pressure cuff given. Follow up appointment to be set up for June 4th or 5th . Patient acknowledged understanding.

## 2024-05-25 NOTE — PROGRESS NOTES
Assuming care of patient at this time. Report received from previous RN. RN to the bedside. Patient resting and request to have assessment preformed after breakfast.

## 2024-05-25 NOTE — PLAN OF CARE
Problem: Pain  Goal: Verbalizes/displays adequate comfort level or baseline comfort level  5/25/2024 0028 by Alecia Otoole, RN  Outcome: Progressing  Flowsheets (Taken 5/25/2024 0022)  Verbalizes/displays adequate comfort level or baseline comfort level:   Encourage patient to monitor pain and request assistance   Assess pain using appropriate pain scale   Administer analgesics based on type and severity of pain and evaluate response   Implement non-pharmacological measures as appropriate and evaluate response   Consider cultural and social influences on pain and pain management  5/24/2024 1211 by Tila Desai, RN  Outcome: Progressing

## 2024-05-28 LAB
C TRACH DNA SPEC QL PROBE+SIG AMP: NEGATIVE
N GONORRHOEA DNA SPEC QL PROBE+SIG AMP: NEGATIVE
SPECIMEN DESCRIPTION: NORMAL

## 2024-06-03 LAB — SURGICAL PATHOLOGY REPORT: NORMAL

## 2024-06-22 ENCOUNTER — APPOINTMENT (OUTPATIENT)
Dept: GENERAL RADIOLOGY | Age: 34
End: 2024-06-22
Payer: COMMERCIAL

## 2024-06-22 ENCOUNTER — HOSPITAL ENCOUNTER (EMERGENCY)
Age: 34
Discharge: HOME | End: 2024-06-22
Attending: EMERGENCY MEDICINE
Payer: COMMERCIAL

## 2024-06-22 VITALS
DIASTOLIC BLOOD PRESSURE: 92 MMHG | RESPIRATION RATE: 21 BRPM | SYSTOLIC BLOOD PRESSURE: 143 MMHG | TEMPERATURE: 98.6 F | HEART RATE: 88 BPM

## 2024-06-22 DIAGNOSIS — I10 UNCONTROLLED HYPERTENSION: Primary | ICD-10-CM

## 2024-06-22 LAB
ALBUMIN SERPL-MCNC: 4 G/DL (ref 3.5–5.2)
ALP SERPL-CCNC: 69 U/L (ref 35–104)
ALT SERPL-CCNC: 14 U/L (ref 0–32)
AMPHET UR QL SCN: NEGATIVE
ANION GAP SERPL CALCULATED.3IONS-SCNC: 14 MMOL/L (ref 7–16)
APAP SERPL-MCNC: <5 UG/ML (ref 10–30)
AST SERPL-CCNC: 30 U/L (ref 0–31)
BACTERIA URNS QL MICRO: ABNORMAL
BARBITURATES UR QL SCN: NEGATIVE
BASOPHILS # BLD: 0.02 K/UL (ref 0–0.2)
BASOPHILS NFR BLD: 0 % (ref 0–2)
BENZODIAZ UR QL: NEGATIVE
BILIRUB SERPL-MCNC: 0.3 MG/DL (ref 0–1.2)
BILIRUB UR QL STRIP: NEGATIVE
BNP SERPL-MCNC: 361 PG/ML (ref 0–450)
BUN SERPL-MCNC: 16 MG/DL (ref 6–20)
BUPRENORPHINE UR QL: NEGATIVE
CALCIUM SERPL-MCNC: 8.7 MG/DL (ref 8.6–10.2)
CANNABINOIDS UR QL SCN: NEGATIVE
CHLORIDE SERPL-SCNC: 108 MMOL/L (ref 98–107)
CLARITY UR: CLEAR
CO2 SERPL-SCNC: 19 MMOL/L (ref 22–29)
COCAINE UR QL SCN: NEGATIVE
COLOR UR: YELLOW
CREAT SERPL-MCNC: 0.9 MG/DL (ref 0.5–1)
EKG ATRIAL RATE: 99 BPM
EKG P AXIS: 69 DEGREES
EKG P-R INTERVAL: 162 MS
EKG Q-T INTERVAL: 376 MS
EKG QRS DURATION: 100 MS
EKG QTC CALCULATION (BAZETT): 482 MS
EKG R AXIS: -7 DEGREES
EKG T AXIS: 143 DEGREES
EKG VENTRICULAR RATE: 99 BPM
EOSINOPHIL # BLD: 0.47 K/UL (ref 0.05–0.5)
EOSINOPHILS RELATIVE PERCENT: 7 % (ref 0–6)
ERYTHROCYTE [DISTWIDTH] IN BLOOD BY AUTOMATED COUNT: 14.6 % (ref 11.5–15)
ETHANOLAMINE SERPL-MCNC: 106 MG/DL (ref 0–0.08)
FENTANYL UR QL: NEGATIVE
GFR, ESTIMATED: 83 ML/MIN/1.73M2
GLUCOSE SERPL-MCNC: 109 MG/DL (ref 74–99)
GLUCOSE UR STRIP-MCNC: NEGATIVE MG/DL
HCT VFR BLD AUTO: 30.1 % (ref 34–48)
HGB BLD-MCNC: 9.7 G/DL (ref 11.5–15.5)
HGB UR QL STRIP.AUTO: NEGATIVE
IMM GRANULOCYTES # BLD AUTO: <0.03 K/UL (ref 0–0.58)
IMM GRANULOCYTES NFR BLD: 0 % (ref 0–5)
KETONES UR STRIP-MCNC: NEGATIVE MG/DL
LDH SERPL-CCNC: 411 U/L (ref 135–214)
LEUKOCYTE ESTERASE UR QL STRIP: ABNORMAL
LYMPHOCYTES NFR BLD: 2.18 K/UL (ref 1.5–4)
LYMPHOCYTES RELATIVE PERCENT: 35 % (ref 20–42)
MCH RBC QN AUTO: 28.4 PG (ref 26–35)
MCHC RBC AUTO-ENTMCNC: 32.2 G/DL (ref 32–34.5)
MCV RBC AUTO: 88.3 FL (ref 80–99.9)
METHADONE UR QL: NEGATIVE
MONOCYTES NFR BLD: 0.54 K/UL (ref 0.1–0.95)
MONOCYTES NFR BLD: 9 % (ref 2–12)
NEUTROPHILS NFR BLD: 49 % (ref 43–80)
NEUTS SEG NFR BLD: 3.1 K/UL (ref 1.8–7.3)
NITRITE UR QL STRIP: NEGATIVE
OPIATES UR QL SCN: NEGATIVE
OXYCODONE UR QL SCN: NEGATIVE
PCP UR QL SCN: NEGATIVE
PH UR STRIP: 6 [PH] (ref 5–9)
PLATELET # BLD AUTO: 219 K/UL (ref 130–450)
PMV BLD AUTO: 10.5 FL (ref 7–12)
POTASSIUM SERPL-SCNC: 4.2 MMOL/L (ref 3.5–5)
PROT SERPL-MCNC: 6.8 G/DL (ref 6.4–8.3)
PROT UR STRIP-MCNC: NEGATIVE MG/DL
RBC # BLD AUTO: 3.41 M/UL (ref 3.5–5.5)
RBC #/AREA URNS HPF: ABNORMAL /HPF
SALICYLATES SERPL-MCNC: <0.3 MG/DL (ref 0–30)
SODIUM SERPL-SCNC: 141 MMOL/L (ref 132–146)
SP GR UR STRIP: <1.005 (ref 1–1.03)
TEST INFORMATION: NORMAL
TOXIC TRICYCLIC SC,BLOOD: NEGATIVE
TROPONIN I SERPL HS-MCNC: 6 NG/L (ref 0–9)
UROBILINOGEN UR STRIP-ACNC: 0.2 EU/DL (ref 0–1)
WBC #/AREA URNS HPF: ABNORMAL /HPF
WBC OTHER # BLD: 6.3 K/UL (ref 4.5–11.5)

## 2024-06-22 PROCEDURE — 83615 LACTATE (LD) (LDH) ENZYME: CPT

## 2024-06-22 PROCEDURE — 96374 THER/PROPH/DIAG INJ IV PUSH: CPT

## 2024-06-22 PROCEDURE — 81001 URINALYSIS AUTO W/SCOPE: CPT

## 2024-06-22 PROCEDURE — 93010 ELECTROCARDIOGRAM REPORT: CPT | Performed by: INTERNAL MEDICINE

## 2024-06-22 PROCEDURE — 99285 EMERGENCY DEPT VISIT HI MDM: CPT

## 2024-06-22 PROCEDURE — 80053 COMPREHEN METABOLIC PANEL: CPT

## 2024-06-22 PROCEDURE — G0480 DRUG TEST DEF 1-7 CLASSES: HCPCS

## 2024-06-22 PROCEDURE — 84484 ASSAY OF TROPONIN QUANT: CPT

## 2024-06-22 PROCEDURE — 85025 COMPLETE CBC W/AUTO DIFF WBC: CPT

## 2024-06-22 PROCEDURE — 80143 DRUG ASSAY ACETAMINOPHEN: CPT

## 2024-06-22 PROCEDURE — 80307 DRUG TEST PRSMV CHEM ANLYZR: CPT

## 2024-06-22 PROCEDURE — 83880 ASSAY OF NATRIURETIC PEPTIDE: CPT

## 2024-06-22 PROCEDURE — 93005 ELECTROCARDIOGRAM TRACING: CPT | Performed by: EMERGENCY MEDICINE

## 2024-06-22 PROCEDURE — 71045 X-RAY EXAM CHEST 1 VIEW: CPT

## 2024-06-22 PROCEDURE — 6360000002 HC RX W HCPCS: Performed by: EMERGENCY MEDICINE

## 2024-06-22 PROCEDURE — 80179 DRUG ASSAY SALICYLATE: CPT

## 2024-06-22 RX ORDER — LABETALOL HYDROCHLORIDE 5 MG/ML
20 INJECTION, SOLUTION INTRAVENOUS ONCE
Status: COMPLETED | OUTPATIENT
Start: 2024-06-22 | End: 2024-06-22

## 2024-06-22 RX ORDER — LABETALOL 100 MG/1
100 TABLET, FILM COATED ORAL 2 TIMES DAILY
Qty: 60 TABLET | Refills: 0 | Status: SHIPPED | OUTPATIENT
Start: 2024-06-22

## 2024-06-22 RX ADMIN — LABETALOL HYDROCHLORIDE 20 MG: 5 INJECTION INTRAVENOUS at 03:49

## 2024-06-22 NOTE — ED PROVIDER NOTES
Medical Decision Making/Differential Diagnosis:    CC/HPI Summary, Social Determinants of health, Records Reviewed, DDx, testing done/not done, ED Course, Reassessment, disposition considerations/shared decision making with patient, consults, disposition:            Chronic Conditions:   Past Medical History:   Diagnosis Date    Abnormal Pap smear     JERAMY (acute kidney injury) (HCC) 3/27/2021    Cannabis abuse 6/17/2022    Cocaine abuse (HCC) 6/17/2022    Complication of anesthesia 2011    States hhas spinal curvature and had one-sided Epidural    Herpes simplex without mention of complication     Hypertensive urgency 3/27/2021    Methamphetamine use ([facility]) 6/17/2022    Recurrent pregnancy loss, currently pregnant 6/17/2022    Suspected damage to fetus from maternal drug use 6/17/2022    Chronic, repeated use        CONSULTS: (Who and What was discussed)  None    Discussion with Other Profesionals : None    Social Determinants : None    Records Reviewed : Source Dr. Canales note from 5/22    CC/HPI Summary, DDx, ED Course, and Reassessment: EKG ordered to evaluate patient's current cardiac rate, rhythm, and QT interval. CBC was ordered as part of my assessment for possible infection, anemia or thrombocytopenia. CMP to assess electrolytes, kidney function, liver function or any metabolic derangements. Urinalysis to evaluate for a UTI. Troponin as a marker for myocardial ischemia or heart strain.      Disposition Considerations (Tests not ordered but considered, Shared Decision Making, Pt Expectation of Test or Tx.): Patient is a 33 year old female with a PMH of HTN who recently delivered a child about one month ago. Patient stated that she has been out of her blood pressure medication for several days and today started to feel funny and lightheaded which she stated occurs when she has elevated blood pressure. Patient was nontoxic, she had an NIH of 0. She had no elevation in her cardiac enzymes, renal  function was stable and no protein in her urine to suggest this was eclampsia. Patient was treated with labetalol with modest improvement in her BP as well as her symptoms. Patient had no new changes on her EKG her previous EKG showed LVH and she did have a recent ECHO. Patient was refilled her home BP medications and was advised to follow up with her PCP and OB/GYN    FINAL IMPRESSION      1. Uncontrolled hypertension          DISPOSITION/PLAN     DISPOSITION Decision To Discharge 06/22/2024 04:40:15 AM    PATIENT REFERRED TO:  Gilbert Canales MD  [address] [facility] [id number] [phone]    Schedule an appointment as soon as possible for a visit       [facility] Emergency Department  [address] [place] [id number]  [phone]  Go to   If symptoms worsen      DISCHARGE MEDICATIONS:  Discharge Medication List as of 6/22/2024  4:47 AM          DISCONTINUED MEDICATIONS:  Discharge Medication List as of 6/22/2024  4:47 AM               (Please note that portions of this note were completed with a voice recognition program.  Efforts were made to edit the dictations but occasionally words are mis-transcribed.)    Chaparro Padron DO (electronically signed)        Chaparro Padron DO  06/23/24 3600

## 2024-08-21 ENCOUNTER — HOSPITAL ENCOUNTER (EMERGENCY)
Age: 34
Discharge: HOME | End: 2024-08-21
Attending: EMERGENCY MEDICINE
Payer: COMMERCIAL

## 2024-08-21 VITALS
SYSTOLIC BLOOD PRESSURE: 144 MMHG | RESPIRATION RATE: 16 BRPM | TEMPERATURE: 97 F | DIASTOLIC BLOOD PRESSURE: 99 MMHG | HEART RATE: 98 BPM | OXYGEN SATURATION: 100 %

## 2024-08-21 DIAGNOSIS — I10 POORLY-CONTROLLED HYPERTENSION: ICD-10-CM

## 2024-08-21 DIAGNOSIS — R51.9 ACUTE NONINTRACTABLE HEADACHE, UNSPECIFIED HEADACHE TYPE: Primary | ICD-10-CM

## 2024-08-21 PROCEDURE — 96372 THER/PROPH/DIAG INJ SC/IM: CPT

## 2024-08-21 PROCEDURE — 99282 EMERGENCY DEPT VISIT SF MDM: CPT

## 2024-08-21 PROCEDURE — 6360000002 HC RX W HCPCS: Performed by: EMERGENCY MEDICINE

## 2024-08-21 RX ORDER — KETOROLAC TROMETHAMINE 30 MG/ML
30 INJECTION, SOLUTION INTRAMUSCULAR; INTRAVENOUS ONCE
Status: COMPLETED | OUTPATIENT
Start: 2024-08-21 | End: 2024-08-21

## 2024-08-21 RX ADMIN — KETOROLAC TROMETHAMINE 30 MG: 30 INJECTION, SOLUTION INTRAMUSCULAR at 18:52

## 2024-08-21 ASSESSMENT — PAIN DESCRIPTION - DESCRIPTORS: DESCRIPTORS: ACHING;SHARP

## 2024-08-21 ASSESSMENT — PAIN DESCRIPTION - ORIENTATION: ORIENTATION: RIGHT

## 2024-08-21 ASSESSMENT — PAIN DESCRIPTION - LOCATION: LOCATION: HEAD

## 2024-08-21 ASSESSMENT — PAIN SCALES - GENERAL: PAINLEVEL_OUTOF10: 10

## 2024-08-21 ASSESSMENT — PAIN - FUNCTIONAL ASSESSMENT: PAIN_FUNCTIONAL_ASSESSMENT: 0-10

## 2024-08-21 NOTE — ED PROVIDER NOTES
HPI:  24,   Time: 7:02 PM EDT         Eliane Zimmerman is a 33 y.o. female presenting to the ED for chief complaint: Patient states she woke with a headache this morning and believes that her blood pressure has been poorly controlled and elevated.,  She denies chest pain shortness of breath palpitations.  ROS:   Pertinent positives and negatives are stated within HPI, all other systems reviewed and are negative.  --------------------------------------------- PAST HISTORY ---------------------------------------------  Past Medical History:  has a past medical history of Abnormal Pap smear, JERAMY (acute kidney injury) (HCC), Cannabis abuse, Cocaine abuse (HCC), Complication of anesthesia, Herpes simplex without mention of complication, Hypertensive urgency, Methamphetamine use (HCC), Recurrent pregnancy loss, currently pregnant, and Suspected damage to fetus from maternal drug use.    Past Surgical History:  has a past surgical history that includes IR BIOPSY KIDNEY PERCUTANEOUS (2021); vascular surgery; Foot surgery (Right, 2023); Dilation and curettage of uterus (N/A, 2023); and  section (N/A, 2024).    Social History:  reports that she has been smoking cigarettes. She has a 1 pack-year smoking history. She has never used smokeless tobacco. She reports that she does not currently use alcohol. She reports that she does not currently use drugs after having used the following drugs: Marijuana (Weed) and Cocaine.    Family History: family history includes Deep Vein Thrombosis in her sister; Diabetes in her maternal grandfather; Hearing Loss in her sister; Heart Disease in her sister; Heart Surgery in her sister; Hypertension in her sister; Pulmonary Embolism in her sister.     The patient’s home medications have been reviewed.    Allergies: Patient has no known allergies.    -------------------------------------------------- RESULTS

## 2024-10-13 ENCOUNTER — HOSPITAL ENCOUNTER (EMERGENCY)
Age: 34
Discharge: HOME | End: 2024-10-13
Attending: FAMILY MEDICINE
Payer: COMMERCIAL

## 2024-10-13 VITALS
TEMPERATURE: 98.1 F | DIASTOLIC BLOOD PRESSURE: 112 MMHG | HEIGHT: 67 IN | RESPIRATION RATE: 16 BRPM | OXYGEN SATURATION: 100 % | HEART RATE: 80 BPM | WEIGHT: 180 LBS | BODY MASS INDEX: 28.25 KG/M2 | SYSTOLIC BLOOD PRESSURE: 174 MMHG

## 2024-10-13 DIAGNOSIS — Z71.1 CONCERN ABOUT STD IN FEMALE WITHOUT DIAGNOSIS: Primary | ICD-10-CM

## 2024-10-13 LAB
BACTERIA URNS QL MICRO: ABNORMAL
BILIRUB UR QL STRIP: NEGATIVE
CLARITY UR: CLEAR
COLOR UR: YELLOW
GLUCOSE UR STRIP-MCNC: NEGATIVE MG/DL
HCG UR QL: NEGATIVE
HGB UR QL STRIP.AUTO: ABNORMAL
KETONES UR STRIP-MCNC: NEGATIVE MG/DL
LEUKOCYTE ESTERASE UR QL STRIP: NEGATIVE
NITRITE UR QL STRIP: NEGATIVE
PH UR STRIP: 6 [PH] (ref 5–9)
PROT UR STRIP-MCNC: NEGATIVE MG/DL
RBC #/AREA URNS HPF: ABNORMAL /HPF
SP GR UR STRIP: >1.03 (ref 1–1.03)
UROBILINOGEN UR STRIP-ACNC: 0.2 EU/DL (ref 0–1)
WBC #/AREA URNS HPF: ABNORMAL /HPF

## 2024-10-13 PROCEDURE — 99283 EMERGENCY DEPT VISIT LOW MDM: CPT

## 2024-10-13 PROCEDURE — 81001 URINALYSIS AUTO W/SCOPE: CPT

## 2024-10-13 PROCEDURE — 87491 CHLMYD TRACH DNA AMP PROBE: CPT

## 2024-10-13 PROCEDURE — 84703 CHORIONIC GONADOTROPIN ASSAY: CPT

## 2024-10-13 PROCEDURE — 87591 N.GONORRHOEAE DNA AMP PROB: CPT

## 2024-10-13 ASSESSMENT — PAIN DESCRIPTION - DESCRIPTORS: DESCRIPTORS: BURNING

## 2024-10-13 ASSESSMENT — PAIN SCALES - GENERAL: PAINLEVEL_OUTOF10: 4

## 2024-10-13 ASSESSMENT — PAIN DESCRIPTION - FREQUENCY: FREQUENCY: CONTINUOUS

## 2024-10-13 ASSESSMENT — PAIN DESCRIPTION - LOCATION: LOCATION: VAGINA

## 2024-10-13 ASSESSMENT — PAIN DESCRIPTION - PAIN TYPE: TYPE: ACUTE PAIN

## 2024-10-13 ASSESSMENT — PAIN - FUNCTIONAL ASSESSMENT: PAIN_FUNCTIONAL_ASSESSMENT: 0-10

## 2024-10-13 NOTE — ED PROVIDER NOTES
HPI:  10/13/24,   Time: 1:18 PM EDT         Elaine Zimmerman is a 33 y.o. female presenting to the ED for onset of vaginal itching and burning this morning Liana reports of vaginal discharge although she is calling about the clear and a whitish discharge.      ROS:   Pertinent positives and negatives are stated within HPI, all other systems reviewed and are negative.  --------------------------------------------- PAST HISTORY ---------------------------------------------  Past Medical History:  has a past medical history of Abnormal Pap smear, JERAMY (acute kidney injury) (HCC), Cannabis abuse, Cocaine abuse (HCC), Complication of anesthesia, Herpes simplex without mention of complication, Hypertensive urgency, Methamphetamine use (HCC), Recurrent pregnancy loss, currently pregnant, and Suspected damage to fetus from maternal drug use.    Past Surgical History:  has a past surgical history that includes IR BIOPSY KIDNEY PERCUTANEOUS (2021); vascular surgery; Foot surgery (Right, 2023); Dilation and curettage of uterus (N/A, 2023); and  section (N/A, 2024).    Social History:  reports that she has been smoking cigarettes. She has a 1 pack-year smoking history. She has never used smokeless tobacco. She reports that she does not currently use alcohol. She reports that she does not currently use drugs after having used the following drugs: Marijuana (Weed) and Cocaine.    Family History: family history includes Deep Vein Thrombosis in her sister; Diabetes in her maternal grandfather; Hearing Loss in her sister; Heart Disease in her sister; Heart Surgery in her sister; Hypertension in her sister; Pulmonary Embolism in her sister.     The patient’s home medications have been reviewed.    Allergies: Patient has no known allergies.    -------------------------------------------------- RESULTS -------------------------------------------------  All laboratory and radiology results have been

## 2024-10-14 ENCOUNTER — HOSPITAL ENCOUNTER (EMERGENCY)
Age: 34
Discharge: HOME | End: 2024-10-14
Attending: EMERGENCY MEDICINE
Payer: COMMERCIAL

## 2024-10-14 VITALS
HEART RATE: 96 BPM | SYSTOLIC BLOOD PRESSURE: 140 MMHG | BODY MASS INDEX: 28.25 KG/M2 | HEIGHT: 67 IN | OXYGEN SATURATION: 98 % | DIASTOLIC BLOOD PRESSURE: 75 MMHG | WEIGHT: 180 LBS | TEMPERATURE: 97.3 F | RESPIRATION RATE: 18 BRPM

## 2024-10-14 DIAGNOSIS — R51.9 ACUTE NONINTRACTABLE HEADACHE, UNSPECIFIED HEADACHE TYPE: Primary | ICD-10-CM

## 2024-10-14 DIAGNOSIS — I16.0 HYPERTENSIVE URGENCY: ICD-10-CM

## 2024-10-14 LAB
ANION GAP SERPL CALCULATED.3IONS-SCNC: 9 MMOL/L (ref 7–16)
BASOPHILS # BLD: 0.05 K/UL (ref 0–0.2)
BASOPHILS NFR BLD: 1 % (ref 0–2)
BUN SERPL-MCNC: 17 MG/DL (ref 6–20)
CALCIUM SERPL-MCNC: 9 MG/DL (ref 8.6–10.2)
CHLORIDE SERPL-SCNC: 102 MMOL/L (ref 98–107)
CO2 SERPL-SCNC: 25 MMOL/L (ref 22–29)
CREAT SERPL-MCNC: 0.8 MG/DL (ref 0.5–1)
EOSINOPHIL # BLD: 0.41 K/UL (ref 0.05–0.5)
EOSINOPHILS RELATIVE PERCENT: 6 % (ref 0–6)
ERYTHROCYTE [DISTWIDTH] IN BLOOD BY AUTOMATED COUNT: 16.7 % (ref 11.5–15)
GFR, ESTIMATED: >90 ML/MIN/1.73M2
GLUCOSE SERPL-MCNC: 102 MG/DL (ref 74–99)
HCT VFR BLD AUTO: 38.9 % (ref 34–48)
HGB BLD-MCNC: 12.3 G/DL (ref 11.5–15.5)
IMM GRANULOCYTES # BLD AUTO: <0.03 K/UL (ref 0–0.58)
IMM GRANULOCYTES NFR BLD: 0 % (ref 0–5)
LYMPHOCYTES NFR BLD: 2.03 K/UL (ref 1.5–4)
LYMPHOCYTES RELATIVE PERCENT: 31 % (ref 20–42)
MCH RBC QN AUTO: 26.4 PG (ref 26–35)
MCHC RBC AUTO-ENTMCNC: 31.6 G/DL (ref 32–34.5)
MCV RBC AUTO: 83.5 FL (ref 80–99.9)
MONOCYTES NFR BLD: 0.5 K/UL (ref 0.1–0.95)
MONOCYTES NFR BLD: 8 % (ref 2–12)
NEUTROPHILS NFR BLD: 55 % (ref 43–80)
NEUTS SEG NFR BLD: 3.64 K/UL (ref 1.8–7.3)
PLATELET # BLD AUTO: 314 K/UL (ref 130–450)
PMV BLD AUTO: 9.9 FL (ref 7–12)
POTASSIUM SERPL-SCNC: 4 MMOL/L (ref 3.5–5)
RBC # BLD AUTO: 4.66 M/UL (ref 3.5–5.5)
SODIUM SERPL-SCNC: 136 MMOL/L (ref 132–146)
TROPONIN I SERPL HS-MCNC: 10 NG/L (ref 0–9)
WBC OTHER # BLD: 6.7 K/UL (ref 4.5–11.5)

## 2024-10-14 PROCEDURE — 93005 ELECTROCARDIOGRAM TRACING: CPT | Performed by: EMERGENCY MEDICINE

## 2024-10-14 PROCEDURE — 99284 EMERGENCY DEPT VISIT MOD MDM: CPT

## 2024-10-14 PROCEDURE — 80048 BASIC METABOLIC PNL TOTAL CA: CPT

## 2024-10-14 PROCEDURE — 84484 ASSAY OF TROPONIN QUANT: CPT

## 2024-10-14 PROCEDURE — 85025 COMPLETE CBC W/AUTO DIFF WBC: CPT

## 2024-10-14 PROCEDURE — 6360000002 HC RX W HCPCS: Performed by: EMERGENCY MEDICINE

## 2024-10-14 PROCEDURE — 96374 THER/PROPH/DIAG INJ IV PUSH: CPT

## 2024-10-14 PROCEDURE — 96375 TX/PRO/DX INJ NEW DRUG ADDON: CPT

## 2024-10-14 RX ORDER — METOCLOPRAMIDE HYDROCHLORIDE 5 MG/ML
10 INJECTION INTRAMUSCULAR; INTRAVENOUS ONCE
Status: COMPLETED | OUTPATIENT
Start: 2024-10-14 | End: 2024-10-14

## 2024-10-14 RX ORDER — DIPHENHYDRAMINE HYDROCHLORIDE 50 MG/ML
25 INJECTION INTRAMUSCULAR; INTRAVENOUS ONCE
Status: COMPLETED | OUTPATIENT
Start: 2024-10-14 | End: 2024-10-14

## 2024-10-14 RX ADMIN — DIPHENHYDRAMINE HYDROCHLORIDE 25 MG: 50 INJECTION INTRAMUSCULAR; INTRAVENOUS at 22:24

## 2024-10-14 RX ADMIN — METOCLOPRAMIDE HYDROCHLORIDE 10 MG: 5 INJECTION INTRAMUSCULAR; INTRAVENOUS at 22:26

## 2024-10-15 LAB
CHLAMYDIA DNA UR QL NAA+PROBE: NEGATIVE
N GONORRHOEA DNA UR QL NAA+PROBE: NEGATIVE
SPECIMEN DESCRIPTION: NORMAL

## 2024-10-15 NOTE — ED PROVIDER NOTES
ED PROVIDER NOTE    Chief Complaint   Patient presents with    Headache     Hx htn, R sided       HPI:  10/14/24,   Time: 9:36 PM EDT       Eliane Zimmerman is a 33 y.o. female presenting to the ED for headache and elevated blood pressure.  Gradual onset this morning.  Patient woke up with a right temporal headache.  Feels like previous headaches she has had.  Constant, throbbing, nonradiating.  Her blood pressure was also elevated.  She did take her home dose of nifedipine today.  No recent head injury or fall.  Not on anticoagulants.  Was previously on apixaban for PE which she has since completed.  Denies associated dizziness, lightheadedness, vision changes, chest pain, shortness of breath, abdominal pain.  She did have 1 episode of nausea and vomiting today followed by right-sided epistaxis which self resolved.  Denies drug or alcohol use.    Chart review: hx of cannabis use, cocaine use, methamphetamine use, HTN, PE, LVH, HFrEF  Lab Results   Component Value Date    LVEF 33 2023     Reviewed OB/GYN discharge summary from 2024 by Dr. Canales:  Patient underwent     Review of Systems:     Review of Systems  Pertinent positives and negatives as stated in HPI     --------------------------------------------- PAST HISTORY ---------------------------------------------  Past Medical History:   Past Medical History:   Diagnosis Date    Abnormal Pap smear     JERAMY (acute kidney injury) ([facility]) 3/27/2021    Cannabis abuse 2022    Cocaine abuse ([facility]) 2022    Complication of anesthesia 2011    States hhas spinal curvature and had one-sided Epidural    Herpes simplex without mention of complication     Hypertensive urgency 3/27/2021    Methamphetamine use ([facility]) 2022    Recurrent pregnancy loss, currently pregnant 2022    Suspected damage to fetus from maternal drug use 2022    Chronic, repeated use        Past Surgical History:   Past Surgical History:   Procedure Laterality

## 2024-10-16 LAB
EKG ATRIAL RATE: 85 BPM
EKG P AXIS: 54 DEGREES
EKG P-R INTERVAL: 122 MS
EKG Q-T INTERVAL: 420 MS
EKG QRS DURATION: 110 MS
EKG QTC CALCULATION (BAZETT): 499 MS
EKG R AXIS: 1 DEGREES
EKG T AXIS: 74 DEGREES
EKG VENTRICULAR RATE: 85 BPM

## 2024-10-16 PROCEDURE — 93010 ELECTROCARDIOGRAM REPORT: CPT | Performed by: INTERNAL MEDICINE

## 2024-10-22 ENCOUNTER — HOSPITAL ENCOUNTER (EMERGENCY)
Age: 34
Discharge: HOME | End: 2024-10-22
Attending: FAMILY MEDICINE
Payer: COMMERCIAL

## 2024-10-22 VITALS
DIASTOLIC BLOOD PRESSURE: 118 MMHG | HEART RATE: 87 BPM | BODY MASS INDEX: 28.25 KG/M2 | OXYGEN SATURATION: 98 % | TEMPERATURE: 97.8 F | SYSTOLIC BLOOD PRESSURE: 158 MMHG | WEIGHT: 180 LBS | HEIGHT: 67 IN | RESPIRATION RATE: 18 BRPM

## 2024-10-22 DIAGNOSIS — R51.9 NONINTRACTABLE HEADACHE, UNSPECIFIED CHRONICITY PATTERN, UNSPECIFIED HEADACHE TYPE: Primary | ICD-10-CM

## 2024-10-22 PROCEDURE — 96372 THER/PROPH/DIAG INJ SC/IM: CPT

## 2024-10-22 PROCEDURE — 99284 EMERGENCY DEPT VISIT MOD MDM: CPT

## 2024-10-22 PROCEDURE — 6360000002 HC RX W HCPCS: Performed by: FAMILY MEDICINE

## 2024-10-22 PROCEDURE — 6370000000 HC RX 637 (ALT 250 FOR IP): Performed by: FAMILY MEDICINE

## 2024-10-22 RX ORDER — ONDANSETRON 4 MG/1
4 TABLET, ORALLY DISINTEGRATING ORAL ONCE
Status: COMPLETED | OUTPATIENT
Start: 2024-10-22 | End: 2024-10-22

## 2024-10-22 RX ORDER — DIPHENHYDRAMINE HCL 25 MG
25 CAPSULE ORAL EVERY 6 HOURS PRN
Qty: 30 CAPSULE | Refills: 0 | Status: SHIPPED | OUTPATIENT
Start: 2024-10-22 | End: 2024-11-01

## 2024-10-22 RX ORDER — KETOROLAC TROMETHAMINE 30 MG/ML
30 INJECTION, SOLUTION INTRAMUSCULAR; INTRAVENOUS ONCE
Status: COMPLETED | OUTPATIENT
Start: 2024-10-22 | End: 2024-10-22

## 2024-10-22 RX ADMIN — ONDANSETRON 4 MG: 4 TABLET, ORALLY DISINTEGRATING ORAL at 19:04

## 2024-10-22 RX ADMIN — KETOROLAC TROMETHAMINE 30 MG: 30 INJECTION, SOLUTION INTRAMUSCULAR at 19:04

## 2024-10-22 ASSESSMENT — PAIN DESCRIPTION - FREQUENCY: FREQUENCY: CONTINUOUS

## 2024-10-22 ASSESSMENT — PAIN SCALES - GENERAL: PAINLEVEL_OUTOF10: 8

## 2024-10-22 ASSESSMENT — PAIN DESCRIPTION - PAIN TYPE: TYPE: ACUTE PAIN

## 2024-10-22 ASSESSMENT — PAIN - FUNCTIONAL ASSESSMENT
PAIN_FUNCTIONAL_ASSESSMENT: PREVENTS OR INTERFERES SOME ACTIVE ACTIVITIES AND ADLS
PAIN_FUNCTIONAL_ASSESSMENT: 0-10

## 2024-10-22 ASSESSMENT — PAIN DESCRIPTION - LOCATION: LOCATION: HEAD

## 2024-10-22 ASSESSMENT — PAIN DESCRIPTION - DESCRIPTORS: DESCRIPTORS: ACHING;DISCOMFORT

## 2024-10-22 NOTE — ED PROVIDER NOTES
HPI:  10/22/24,   Time: 6:59 PM EDT         Eliane Zimmerman is a 33 y.o. female presenting to the ED for headache that started today, and she has a generalized headache, not associate with nausea or vomiting.  She attributes to the blood pressure medication she is currently on, nifedipine and she has not tolerated it since it was began.  She denies vision changes or lightheadedness or dizziness.        ROS:   Pertinent positives and negatives are stated within HPI, all other systems reviewed and are negative.  --------------------------------------------- PAST HISTORY ---------------------------------------------  Past Medical History:  has a past medical history of Abnormal Pap smear, JERAMY (acute kidney injury) (HCC), Cannabis abuse, Cocaine abuse (HCC), Complication of anesthesia, Herpes simplex without mention of complication, Hypertensive urgency, Methamphetamine use (HCC), Recurrent pregnancy loss, currently pregnant, and Suspected damage to fetus from maternal drug use.    Past Surgical History:  has a past surgical history that includes IR BIOPSY KIDNEY PERCUTANEOUS (2021); vascular surgery; Foot surgery (Right, 2023); Dilation and curettage of uterus (N/A, 2023); and  section (N/A, 2024).    Social History:  reports that she has been smoking cigarettes. She has a 1 pack-year smoking history. She has never used smokeless tobacco. She reports that she does not currently use alcohol. She reports that she does not currently use drugs after having used the following drugs: Marijuana (Weed) and Cocaine.    Family History: family history includes Deep Vein Thrombosis in her sister; Diabetes in her maternal grandfather; Hearing Loss in her sister; Heart Disease in her sister; Heart Surgery in her sister; Hypertension in her sister; Pulmonary Embolism in her sister.     The patient’s home medications have been reviewed.    Allergies: Patient has no known

## 2024-10-22 NOTE — ED NOTES
Patient is anxious to go because her ride is on the way. States she is aware her blood pressure is high and her medication needs to be changed.   [employer] Pregnancy And Lactation Text: This medication is Pregnancy Category B and is considered safe. There is some data to suggest avoiding during the third trimester. It is unknown if this medication is excreted in breast milk.

## 2025-03-19 ENCOUNTER — TELEPHONE (OUTPATIENT)
Dept: CARDIOLOGY CLINIC | Age: 35
End: 2025-03-19

## 2025-03-24 ENCOUNTER — OFFICE VISIT (OUTPATIENT)
Dept: CARDIOLOGY CLINIC | Age: 35
End: 2025-03-24

## 2025-03-24 VITALS
HEART RATE: 91 BPM | SYSTOLIC BLOOD PRESSURE: 134 MMHG | DIASTOLIC BLOOD PRESSURE: 82 MMHG | HEIGHT: 67 IN | RESPIRATION RATE: 16 BRPM | BODY MASS INDEX: 28.41 KG/M2 | WEIGHT: 181 LBS

## 2025-03-24 DIAGNOSIS — I42.9 CARDIOMYOPATHY, UNSPECIFIED TYPE (HCC): Primary | ICD-10-CM

## 2025-03-24 RX ORDER — AMLODIPINE BESYLATE 5 MG/1
5 TABLET ORAL DAILY
COMMUNITY
Start: 2025-01-21

## 2025-03-24 RX ORDER — ESCITALOPRAM OXALATE 10 MG/1
10 TABLET ORAL DAILY
COMMUNITY
Start: 2025-02-22

## 2025-03-24 RX ORDER — CHOLECALCIFEROL (VITAMIN D3) 50 MCG
1 TABLET ORAL DAILY
COMMUNITY
Start: 2025-01-21

## 2025-03-24 NOTE — PROGRESS NOTES
[facility] Progress Note  Dr. Yoav Garcia      Referring Physician: Blake Valentine MD  CHIEF COMPLAINT:   Chief Complaint   Patient presents with    Cardiomyopathy     Annual        HISTORY OF PRESENT ILLNESS:   Patient is 34 years old female with history of resolved cardiomyopathy, pulmonary embolism, is here for follow-up appointment.  Patient denies any chest pain, no shortness of breath, no lightheadedness, no dizziness, no palpitations, no pedal edema, no PND, no orthopnea, no syncope, no presyncopal episodes.  Functional capacity is at baseline (I feel good)    Past Medical History:   Diagnosis Date    Abnormal Pap smear     JERAMY (acute kidney injury) 3/27/2021    Cannabis abuse 2022    Cocaine abuse (HCC) 2022    Complication of anesthesia 2011    States hhas spinal curvature and had one-sided Epidural    Herpes simplex without mention of complication     Hypertensive urgency 3/27/2021    Methamphetamine use (HCC) 2022    Recurrent pregnancy loss, currently pregnant 2022    Suspected damage to fetus from maternal drug use 2022    Chronic, repeated use          Past Surgical History:   Procedure Laterality Date     SECTION N/A 2024     SECTION performed by Gilbert Canales MD at [facility] L&D OR    DILATION AND CURETTAGE OF UTERUS N/A 2023    DILATATION AND CURETTAGE SUCTION performed by Gilbert Canales MD at [facility] OR    FOOT SURGERY Right 2023    IR BIOPSY KIDNEY PERCUTANEOUS  2021    IR BIOPSY KIDNEY PERCUTANEOUS 3/29/2021 [facility] SPECIAL PROCEDURES    VASCULAR SURGERY           Current Outpatient Medications   Medication Sig Dispense Refill    amLODIPine (NORVASC) 5 MG tablet Take 1 tablet by mouth daily      escitalopram (LEXAPRO) 10 MG tablet Take 1 tablet by mouth daily      Cholecalciferol (VITAMIN D3) 50 MCG ( UT) TABS Take 1 tablet by mouth daily      labetalol (NORMODYNE) 100 MG tablet Take 1 tablet by mouth 2 times daily

## 2025-04-14 ENCOUNTER — APPOINTMENT (OUTPATIENT)
Dept: ULTRASOUND IMAGING | Age: 35
End: 2025-04-14
Payer: COMMERCIAL

## 2025-04-14 ENCOUNTER — HOSPITAL ENCOUNTER (EMERGENCY)
Age: 35
Discharge: HOME | End: 2025-04-14
Payer: COMMERCIAL

## 2025-04-14 VITALS
WEIGHT: 180 LBS | RESPIRATION RATE: 18 BRPM | SYSTOLIC BLOOD PRESSURE: 148 MMHG | HEART RATE: 100 BPM | OXYGEN SATURATION: 97 % | TEMPERATURE: 98.1 F | BODY MASS INDEX: 28.19 KG/M2 | DIASTOLIC BLOOD PRESSURE: 83 MMHG

## 2025-04-14 DIAGNOSIS — N30.00 ACUTE CYSTITIS WITHOUT HEMATURIA: ICD-10-CM

## 2025-04-14 DIAGNOSIS — O46.90 VAGINAL BLEEDING IN PREGNANCY: Primary | ICD-10-CM

## 2025-04-14 LAB
ALBUMIN SERPL-MCNC: 4.4 G/DL (ref 3.5–5.2)
ALP SERPL-CCNC: 71 U/L (ref 35–104)
ALT SERPL-CCNC: 10 U/L (ref 0–32)
ANION GAP SERPL CALCULATED.3IONS-SCNC: 10 MMOL/L (ref 7–16)
AST SERPL-CCNC: 15 U/L (ref 0–31)
B-HCG SERPL EIA 3RD IS-ACNC: 3516 MIU/ML (ref 0–7)
BASOPHILS # BLD: 0.03 K/UL (ref 0–0.2)
BASOPHILS NFR BLD: 1 % (ref 0–2)
BILIRUB SERPL-MCNC: 0.5 MG/DL (ref 0–1.2)
BILIRUB UR QL STRIP: NEGATIVE
BUN SERPL-MCNC: 17 MG/DL (ref 6–20)
CALCIUM SERPL-MCNC: 9.4 MG/DL (ref 8.6–10.2)
CHLORIDE SERPL-SCNC: 103 MMOL/L (ref 98–107)
CLARITY UR: CLEAR
CO2 SERPL-SCNC: 24 MMOL/L (ref 22–29)
COLOR UR: YELLOW
CREAT SERPL-MCNC: 0.8 MG/DL (ref 0.5–1)
EOSINOPHIL # BLD: 0.25 K/UL (ref 0.05–0.5)
EOSINOPHILS RELATIVE PERCENT: 5 % (ref 0–6)
EPI CELLS #/AREA URNS HPF: NORMAL /HPF
ERYTHROCYTE [DISTWIDTH] IN BLOOD BY AUTOMATED COUNT: 14.4 % (ref 11.5–15)
GFR, ESTIMATED: >90 ML/MIN/1.73M2
GLUCOSE SERPL-MCNC: 93 MG/DL (ref 74–99)
GLUCOSE UR STRIP-MCNC: NEGATIVE MG/DL
HCG, URINE, POC: POSITIVE
HCT VFR BLD AUTO: 39.5 % (ref 34–48)
HGB BLD-MCNC: 12.8 G/DL (ref 11.5–15.5)
HGB UR QL STRIP.AUTO: ABNORMAL
IMM GRANULOCYTES # BLD AUTO: <0.03 K/UL (ref 0–0.58)
IMM GRANULOCYTES NFR BLD: 0 % (ref 0–5)
KETONES UR STRIP-MCNC: NEGATIVE MG/DL
LEUKOCYTE ESTERASE UR QL STRIP: NEGATIVE
LYMPHOCYTES NFR BLD: 1.41 K/UL (ref 1.5–4)
LYMPHOCYTES RELATIVE PERCENT: 30 % (ref 20–42)
Lab: NORMAL
MCH RBC QN AUTO: 28.1 PG (ref 26–35)
MCHC RBC AUTO-ENTMCNC: 32.4 G/DL (ref 32–34.5)
MCV RBC AUTO: 86.6 FL (ref 80–99.9)
MONOCYTES NFR BLD: 0.5 K/UL (ref 0.1–0.95)
MONOCYTES NFR BLD: 11 % (ref 2–12)
NEGATIVE QC PASS/FAIL: NORMAL
NEUTROPHILS NFR BLD: 54 % (ref 43–80)
NEUTS SEG NFR BLD: 2.57 K/UL (ref 1.8–7.3)
NITRITE UR QL STRIP: POSITIVE
PH UR STRIP: 6 [PH] (ref 5–8)
PLATELET # BLD AUTO: 306 K/UL (ref 130–450)
PMV BLD AUTO: 10.6 FL (ref 7–12)
POSITIVE QC PASS/FAIL: NORMAL
POTASSIUM SERPL-SCNC: 3.9 MMOL/L (ref 3.5–5)
PROT SERPL-MCNC: 8 G/DL (ref 6.4–8.3)
PROT UR STRIP-MCNC: NEGATIVE MG/DL
RBC # BLD AUTO: 4.56 M/UL (ref 3.5–5.5)
RBC #/AREA URNS HPF: NORMAL /HPF
SODIUM SERPL-SCNC: 137 MMOL/L (ref 132–146)
SP GR UR STRIP: 1.02 (ref 1–1.03)
UROBILINOGEN UR STRIP-ACNC: 0.2 EU/DL (ref 0–1)
WBC #/AREA URNS HPF: NORMAL /HPF
WBC OTHER # BLD: 4.8 K/UL (ref 4.5–11.5)

## 2025-04-14 PROCEDURE — 85025 COMPLETE CBC W/AUTO DIFF WBC: CPT

## 2025-04-14 PROCEDURE — 99284 EMERGENCY DEPT VISIT MOD MDM: CPT

## 2025-04-14 PROCEDURE — 80053 COMPREHEN METABOLIC PANEL: CPT

## 2025-04-14 PROCEDURE — 84702 CHORIONIC GONADOTROPIN TEST: CPT

## 2025-04-14 PROCEDURE — 81001 URINALYSIS AUTO W/SCOPE: CPT

## 2025-04-14 PROCEDURE — 76801 OB US < 14 WKS SINGLE FETUS: CPT

## 2025-04-14 RX ORDER — CEFDINIR 300 MG/1
300 CAPSULE ORAL 2 TIMES DAILY
Qty: 14 CAPSULE | Refills: 0 | Status: SHIPPED | OUTPATIENT
Start: 2025-04-14 | End: 2025-04-21

## 2025-04-14 ASSESSMENT — PAIN SCALES - GENERAL: PAINLEVEL_OUTOF10: 8

## 2025-04-14 ASSESSMENT — PAIN DESCRIPTION - DESCRIPTORS: DESCRIPTORS: CRAMPING

## 2025-04-14 ASSESSMENT — PAIN - FUNCTIONAL ASSESSMENT: PAIN_FUNCTIONAL_ASSESSMENT: 0-10

## 2025-04-14 ASSESSMENT — PAIN DESCRIPTION - LOCATION: LOCATION: ABDOMEN

## 2025-04-14 NOTE — ED PROVIDER NOTES
Independent RYAN Visit.             [facility] EMERGENCY DEPARTMENT  ED  Encounter Note  Admit Date/RoomTime: 2025 11:51 AM  ED Room:   NAME: Eliane Zimmerman  : 1990  MRN: [id number]  PCP: Blake Valentine MD    CHIEF COMPLAINT     Vaginal Bleeding (Approx 9 weeks pregnant/ started over 5 dayd)    HISTORY OF PRESENT ILLNESS        Eliane Zimmerman is a 34 y.o. female who presents to the ED by private vehicle for vaginal bleeding during early pregnancy, beginning 1 day(s) ago. The complaint has been persistent and are moderate in severity.  Pt states she is Gr 26 Pr 8.   Multiple miscarriages and elective abs.    Blood type B+.   States that her last normal menstrual cycle was in February.  She believes she would be about 9 weeks pregnant.  Has not yet seen her OB or had any ultrasounds.  Has had some intermittent vaginal bleeding for days but now getting heavier.  The blood has been dark as well.  She is having some lower abdominal pain a little worse on the left than the right.    Denies N/V.      REVIEW OF SYSTEMS     Pertinent positives and negatives are stated within HPI, all other systems reviewed and are negative.    Past Medical History:  has a past medical history of Abnormal Pap smear, JERAMY (acute kidney injury), Cannabis abuse, Cocaine abuse, Complication of anesthesia, Herpes simplex without mention of complication, Hypertensive urgency, Methamphetamine use, Recurrent pregnancy loss, currently pregnant, and Suspected damage to fetus from maternal drug use.  Surgical History:  has a past surgical history that includes IR BIOPSY KIDNEY PERCUTANEOUS (2021); vascular surgery; Foot surgery (Right, 2023); Dilation and curettage of uterus (N/A, 2023); and  section (N/A, 2024).  Social History:  reports that she has been smoking cigarettes. She has a 1 pack-year smoking history. She has never used smokeless tobacco. She reports

## 2025-04-18 ENCOUNTER — HOSPITAL ENCOUNTER (EMERGENCY)
Age: 35
Discharge: LEFT BEFORE DISCHARGE | End: 2025-04-18
Payer: COMMERCIAL

## 2025-04-18 ENCOUNTER — APPOINTMENT (OUTPATIENT)
Dept: ULTRASOUND IMAGING | Age: 35
End: 2025-04-18
Payer: COMMERCIAL

## 2025-04-18 VITALS
OXYGEN SATURATION: 98 % | RESPIRATION RATE: 20 BRPM | DIASTOLIC BLOOD PRESSURE: 76 MMHG | SYSTOLIC BLOOD PRESSURE: 118 MMHG | TEMPERATURE: 98.1 F | HEART RATE: 90 BPM

## 2025-04-18 DIAGNOSIS — N76.0 BACTERIAL VAGINOSIS: ICD-10-CM

## 2025-04-18 DIAGNOSIS — O03.9 MISCARRIAGE: Primary | ICD-10-CM

## 2025-04-18 DIAGNOSIS — B96.89 BACTERIAL VAGINOSIS: ICD-10-CM

## 2025-04-18 LAB
ABO + RH BLD: NORMAL
ALBUMIN SERPL-MCNC: 4.4 G/DL (ref 3.5–5.2)
ALP SERPL-CCNC: 73 U/L (ref 35–104)
ALT SERPL-CCNC: 9 U/L (ref 0–32)
ANION GAP SERPL CALCULATED.3IONS-SCNC: 11 MMOL/L (ref 7–16)
AST SERPL-CCNC: 13 U/L (ref 0–31)
B-HCG SERPL EIA 3RD IS-ACNC: 871 MIU/ML (ref 0–7)
BACTERIA URNS QL MICRO: ABNORMAL
BASOPHILS # BLD: 0.03 K/UL (ref 0–0.2)
BASOPHILS NFR BLD: 1 % (ref 0–2)
BILIRUB SERPL-MCNC: 0.4 MG/DL (ref 0–1.2)
BILIRUB UR QL STRIP: NEGATIVE
BUN SERPL-MCNC: 15 MG/DL (ref 6–20)
C TRACH DNA SPEC QL PROBE+SIG AMP: NORMAL
CALCIUM SERPL-MCNC: 9.4 MG/DL (ref 8.6–10.2)
CHLORIDE SERPL-SCNC: 101 MMOL/L (ref 98–107)
CLARITY UR: CLEAR
CLUE CELLS VAG QL WET PREP: ABNORMAL
CO2 SERPL-SCNC: 25 MMOL/L (ref 22–29)
COLOR UR: YELLOW
CREAT SERPL-MCNC: 0.8 MG/DL (ref 0.5–1)
EOSINOPHIL # BLD: 0.33 K/UL (ref 0.05–0.5)
EOSINOPHILS RELATIVE PERCENT: 5 % (ref 0–6)
EPI CELLS #/AREA URNS HPF: ABNORMAL /HPF
ERYTHROCYTE [DISTWIDTH] IN BLOOD BY AUTOMATED COUNT: 14 % (ref 11.5–15)
GFR, ESTIMATED: >90 ML/MIN/1.73M2
GLUCOSE SERPL-MCNC: 95 MG/DL (ref 74–99)
GLUCOSE UR STRIP-MCNC: NEGATIVE MG/DL
HCT VFR BLD AUTO: 35.7 % (ref 34–48)
HCT VFR BLD AUTO: 37.2 % (ref 34–48)
HGB BLD-MCNC: 11.9 G/DL (ref 11.5–15.5)
HGB BLD-MCNC: 12.3 G/DL (ref 11.5–15.5)
HGB UR QL STRIP.AUTO: ABNORMAL
IMM GRANULOCYTES # BLD AUTO: <0.03 K/UL (ref 0–0.58)
IMM GRANULOCYTES NFR BLD: 0 % (ref 0–5)
KETONES UR STRIP-MCNC: NEGATIVE MG/DL
LEUKOCYTE ESTERASE UR QL STRIP: NEGATIVE
LYMPHOCYTES NFR BLD: 1.4 K/UL (ref 1.5–4)
LYMPHOCYTES RELATIVE PERCENT: 22 % (ref 20–42)
MAGNESIUM SERPL-MCNC: 2 MG/DL (ref 1.6–2.6)
MCH RBC QN AUTO: 28.7 PG (ref 26–35)
MCHC RBC AUTO-ENTMCNC: 33.1 G/DL (ref 32–34.5)
MCV RBC AUTO: 86.9 FL (ref 80–99.9)
MONOCYTES NFR BLD: 0.54 K/UL (ref 0.1–0.95)
MONOCYTES NFR BLD: 9 % (ref 2–12)
N GONORRHOEA DNA SPEC QL PROBE+SIG AMP: NORMAL
NEUTROPHILS NFR BLD: 64 % (ref 43–80)
NEUTS SEG NFR BLD: 4.06 K/UL (ref 1.8–7.3)
NITRITE UR QL STRIP: NEGATIVE
PH UR STRIP: 7 [PH] (ref 5–8)
PLATELET # BLD AUTO: 305 K/UL (ref 130–450)
PMV BLD AUTO: 10.6 FL (ref 7–12)
POTASSIUM SERPL-SCNC: 3.4 MMOL/L (ref 3.5–5)
PROT SERPL-MCNC: 8 G/DL (ref 6.4–8.3)
PROT UR STRIP-MCNC: NEGATIVE MG/DL
RBC # BLD AUTO: 4.28 M/UL (ref 3.5–5.5)
RBC #/AREA URNS HPF: ABNORMAL /HPF
SODIUM SERPL-SCNC: 137 MMOL/L (ref 132–146)
SOURCE WET PREP: ABNORMAL
SP GR UR STRIP: 1.02 (ref 1–1.03)
SPECIMEN DESCRIPTION: NORMAL
T VAGINALIS VAG QL WET PREP: ABNORMAL
UROBILINOGEN UR STRIP-ACNC: 1 EU/DL (ref 0–1)
WBC #/AREA URNS HPF: ABNORMAL /HPF
WBC OTHER # BLD: 6.4 K/UL (ref 4.5–11.5)
YEAST WET PREP: ABNORMAL

## 2025-04-18 PROCEDURE — 99284 EMERGENCY DEPT VISIT MOD MDM: CPT

## 2025-04-18 PROCEDURE — 85018 HEMOGLOBIN: CPT

## 2025-04-18 PROCEDURE — 85014 HEMATOCRIT: CPT

## 2025-04-18 PROCEDURE — 87491 CHLMYD TRACH DNA AMP PROBE: CPT

## 2025-04-18 PROCEDURE — 76801 OB US < 14 WKS SINGLE FETUS: CPT

## 2025-04-18 PROCEDURE — 2580000003 HC RX 258: Performed by: PHYSICIAN ASSISTANT

## 2025-04-18 PROCEDURE — 81001 URINALYSIS AUTO W/SCOPE: CPT

## 2025-04-18 PROCEDURE — 6370000000 HC RX 637 (ALT 250 FOR IP): Performed by: PHYSICIAN ASSISTANT

## 2025-04-18 PROCEDURE — 83735 ASSAY OF MAGNESIUM: CPT

## 2025-04-18 PROCEDURE — 80053 COMPREHEN METABOLIC PANEL: CPT

## 2025-04-18 PROCEDURE — 87086 URINE CULTURE/COLONY COUNT: CPT

## 2025-04-18 PROCEDURE — 87210 SMEAR WET MOUNT SALINE/INK: CPT

## 2025-04-18 PROCEDURE — 86900 BLOOD TYPING SEROLOGIC ABO: CPT

## 2025-04-18 PROCEDURE — 85025 COMPLETE CBC W/AUTO DIFF WBC: CPT

## 2025-04-18 PROCEDURE — 86901 BLOOD TYPING SEROLOGIC RH(D): CPT

## 2025-04-18 PROCEDURE — 87591 N.GONORRHOEAE DNA AMP PROB: CPT

## 2025-04-18 PROCEDURE — 84702 CHORIONIC GONADOTROPIN TEST: CPT

## 2025-04-18 RX ORDER — METRONIDAZOLE 500 MG/1
500 TABLET ORAL 2 TIMES DAILY
Qty: 14 TABLET | Refills: 0 | Status: SHIPPED | OUTPATIENT
Start: 2025-04-18 | End: 2025-04-25

## 2025-04-18 RX ORDER — 0.9 % SODIUM CHLORIDE 0.9 %
1000 INTRAVENOUS SOLUTION INTRAVENOUS ONCE
Status: COMPLETED | OUTPATIENT
Start: 2025-04-18 | End: 2025-04-18

## 2025-04-18 RX ORDER — ACETAMINOPHEN 325 MG/1
650 TABLET ORAL ONCE
Status: COMPLETED | OUTPATIENT
Start: 2025-04-18 | End: 2025-04-18

## 2025-04-18 RX ADMIN — SODIUM CHLORIDE 1000 ML: 0.9 INJECTION, SOLUTION INTRAVENOUS at 11:51

## 2025-04-18 RX ADMIN — ACETAMINOPHEN 650 MG: 325 TABLET ORAL at 11:50

## 2025-04-18 ASSESSMENT — PAIN SCALES - GENERAL: PAINLEVEL_OUTOF10: 8

## 2025-04-18 ASSESSMENT — PAIN DESCRIPTION - ORIENTATION: ORIENTATION: LOWER;RIGHT;LEFT

## 2025-04-18 ASSESSMENT — PAIN DESCRIPTION - LOCATION: LOCATION: ABDOMEN

## 2025-04-18 ASSESSMENT — PAIN DESCRIPTION - DESCRIPTORS: DESCRIPTORS: ACHING;CRAMPING

## 2025-04-18 NOTE — ED PROVIDER NOTES
Independent RYAN Visit.      HPI:  25, Time: 10:55 AM EDT         Eliane Zimmerman is a 34 y.o. female presenting to the ED for abdominal, vaginal bleed, beginning 5 days  ago.  The complaint has been persistent, moderate in severity, and worsened by nothing.      Patient comes in with lower abdominal cramping pain with vaginal bleeding that started on Monday.  She states she has been passing clots and may have passed tissue.  Patient states she went throug multiple pads bleeding in not excessively heavy.  She denies any lightheaded dizziness.  She denies any nausea vomiting diarrhea constipation no urinary frequency urgency burning.  No fever or chills.  Patient was seen for the same on Monday.  Had a ultrasound on Monday showing intrauterine pregnancy.  Patient did attempt to follow-up with OB/GYN Dr. East is not able to be seen till the .  Patient is  26 para 8 AB 17      Review of Systems:   A complete review of systems was performed and pertinent positives and negatives are stated within HPI, all other systems reviewed and are negative.          --------------------------------------------- PAST HISTORY ---------------------------------------------  Past Medical History:  has a past medical history of Abnormal Pap smear, JERAMY (acute kidney injury), Cannabis abuse, Cocaine abuse, Complication of anesthesia, Herpes simplex without mention of complication, Hypertensive urgency, Methamphetamine use, Recurrent pregnancy loss, currently pregnant, and Suspected damage to fetus from maternal drug use.    Past Surgical History:  has a past surgical history that includes IR BIOPSY KIDNEY PERCUTANEOUS (2021); vascular surgery; Foot surgery (Right, 2023); Dilation and curettage of uterus (N/A, 2023); and  section (N/A, 2024).    Social History:  reports that she has been smoking cigarettes. She has a 1 pack-year smoking history. She has never used smokeless tobacco. She

## 2025-04-18 NOTE — ED NOTES
Pt signed out AMA. Provider at bedside with results and verbal discharge instructions. Pt declined AVS, as she had to tend with family emergency. Verbalized understanding d/c instructions given and stated that pharmacy was gone over during triage and  that she would  medication.

## 2025-04-19 LAB
MICROORGANISM SPEC CULT: NO GROWTH
SERVICE CMNT-IMP: NORMAL
SPECIMEN DESCRIPTION: NORMAL

## 2025-04-22 LAB
C TRACH DNA SPEC QL PROBE+SIG AMP: NEGATIVE
CHLAMYDIA DNA UR QL NAA+PROBE: NEGATIVE
N GONORRHOEA DNA SPEC QL PROBE+SIG AMP: NEGATIVE
N GONORRHOEA DNA UR QL NAA+PROBE: NEGATIVE
SPECIMEN DESCRIPTION: NORMAL
SPECIMEN DESCRIPTION: NORMAL

## 2025-05-18 ENCOUNTER — HOSPITAL ENCOUNTER (EMERGENCY)
Age: 35
Discharge: HOME | End: 2025-05-18
Attending: FAMILY MEDICINE
Payer: COMMERCIAL

## 2025-05-18 VITALS
RESPIRATION RATE: 16 BRPM | DIASTOLIC BLOOD PRESSURE: 98 MMHG | SYSTOLIC BLOOD PRESSURE: 146 MMHG | OXYGEN SATURATION: 100 % | HEART RATE: 80 BPM | TEMPERATURE: 98.1 F

## 2025-05-18 DIAGNOSIS — R51.9 ACUTE NONINTRACTABLE HEADACHE, UNSPECIFIED HEADACHE TYPE: Primary | ICD-10-CM

## 2025-05-18 PROCEDURE — 99284 EMERGENCY DEPT VISIT MOD MDM: CPT

## 2025-05-18 PROCEDURE — 6360000002 HC RX W HCPCS: Performed by: FAMILY MEDICINE

## 2025-05-18 PROCEDURE — 96372 THER/PROPH/DIAG INJ SC/IM: CPT

## 2025-05-18 RX ORDER — AMLODIPINE BESYLATE 5 MG/1
5 TABLET ORAL DAILY
Qty: 30 TABLET | Refills: 0 | Status: SHIPPED | OUTPATIENT
Start: 2025-05-18

## 2025-05-18 RX ORDER — CLONIDINE HYDROCHLORIDE 0.1 MG/1
0.1 TABLET ORAL 2 TIMES DAILY
Qty: 60 TABLET | Refills: 0 | Status: SHIPPED | OUTPATIENT
Start: 2025-05-18

## 2025-05-18 RX ORDER — KETOROLAC TROMETHAMINE 30 MG/ML
30 INJECTION, SOLUTION INTRAMUSCULAR; INTRAVENOUS ONCE
Status: COMPLETED | OUTPATIENT
Start: 2025-05-18 | End: 2025-05-18

## 2025-05-18 RX ADMIN — KETOROLAC TROMETHAMINE 30 MG: 30 INJECTION, SOLUTION INTRAMUSCULAR at 10:37

## 2025-05-18 ASSESSMENT — PAIN - FUNCTIONAL ASSESSMENT
PAIN_FUNCTIONAL_ASSESSMENT: 0-10
PAIN_FUNCTIONAL_ASSESSMENT: 0-10

## 2025-05-18 ASSESSMENT — PAIN DESCRIPTION - DESCRIPTORS: DESCRIPTORS: ACHING

## 2025-05-18 ASSESSMENT — PAIN DESCRIPTION - LOCATION
LOCATION: HEAD
LOCATION: HEAD

## 2025-05-18 ASSESSMENT — PAIN SCALES - GENERAL
PAINLEVEL_OUTOF10: 10
PAINLEVEL_OUTOF10: 10

## 2025-05-18 NOTE — ED PROVIDER NOTES
Electronically signed by Wilma Cordova MD on 5/18/2025 at 10:33 AM         Wilma Cordova MD  05/18/25 1032

## 2025-06-08 ENCOUNTER — APPOINTMENT (OUTPATIENT)
Dept: CT IMAGING | Age: 35
End: 2025-06-08
Payer: COMMERCIAL

## 2025-06-08 ENCOUNTER — HOSPITAL ENCOUNTER (EMERGENCY)
Age: 35
Discharge: HOME | End: 2025-06-08
Payer: COMMERCIAL

## 2025-06-08 VITALS
DIASTOLIC BLOOD PRESSURE: 96 MMHG | HEART RATE: 84 BPM | OXYGEN SATURATION: 98 % | RESPIRATION RATE: 18 BRPM | SYSTOLIC BLOOD PRESSURE: 136 MMHG | BODY MASS INDEX: 28.19 KG/M2 | WEIGHT: 180 LBS | TEMPERATURE: 98 F

## 2025-06-08 DIAGNOSIS — R09.1 PLEURISY: Primary | ICD-10-CM

## 2025-06-08 DIAGNOSIS — R10.11 ABDOMINAL PAIN, RIGHT UPPER QUADRANT: ICD-10-CM

## 2025-06-08 LAB
ALBUMIN SERPL-MCNC: 4.4 G/DL (ref 3.5–5.2)
ALP SERPL-CCNC: 70 U/L (ref 35–104)
ALT SERPL-CCNC: 8 U/L (ref 0–32)
ANION GAP SERPL CALCULATED.3IONS-SCNC: 12 MMOL/L (ref 7–16)
AST SERPL-CCNC: 14 U/L (ref 0–31)
BASOPHILS # BLD: 0.02 K/UL (ref 0–0.2)
BASOPHILS NFR BLD: 0 % (ref 0–2)
BILIRUB SERPL-MCNC: 0.4 MG/DL (ref 0–1.2)
BUN SERPL-MCNC: 13 MG/DL (ref 6–20)
CALCIUM SERPL-MCNC: 9.7 MG/DL (ref 8.6–10.2)
CHLORIDE SERPL-SCNC: 103 MMOL/L (ref 98–107)
CO2 SERPL-SCNC: 24 MMOL/L (ref 22–29)
CREAT SERPL-MCNC: 0.9 MG/DL (ref 0.5–1)
D-DIMER QUANTITATIVE: 1652 NG/ML DDU (ref 0–230)
EOSINOPHIL # BLD: 0.31 K/UL (ref 0.05–0.5)
EOSINOPHILS RELATIVE PERCENT: 5 % (ref 0–6)
ERYTHROCYTE [DISTWIDTH] IN BLOOD BY AUTOMATED COUNT: 13.9 % (ref 11.5–15)
GFR, ESTIMATED: 90 ML/MIN/1.73M2
GLUCOSE SERPL-MCNC: 97 MG/DL (ref 74–99)
HCG, URINE, POC: NEGATIVE
HCT VFR BLD AUTO: 38 % (ref 34–48)
HGB BLD-MCNC: 12.2 G/DL (ref 11.5–15.5)
IMM GRANULOCYTES # BLD AUTO: <0.03 K/UL (ref 0–0.58)
IMM GRANULOCYTES NFR BLD: 0 % (ref 0–5)
LIPASE SERPL-CCNC: 56 U/L (ref 13–60)
LYMPHOCYTES NFR BLD: 1.54 K/UL (ref 1.5–4)
LYMPHOCYTES RELATIVE PERCENT: 27 % (ref 20–42)
Lab: NORMAL
MCH RBC QN AUTO: 27.5 PG (ref 26–35)
MCHC RBC AUTO-ENTMCNC: 32.1 G/DL (ref 32–34.5)
MCV RBC AUTO: 85.6 FL (ref 80–99.9)
MONOCYTES NFR BLD: 0.35 K/UL (ref 0.1–0.95)
MONOCYTES NFR BLD: 6 % (ref 2–12)
NEGATIVE QC PASS/FAIL: NORMAL
NEUTROPHILS NFR BLD: 62 % (ref 43–80)
NEUTS SEG NFR BLD: 3.58 K/UL (ref 1.8–7.3)
PLATELET # BLD AUTO: 239 K/UL (ref 130–450)
PMV BLD AUTO: 10.7 FL (ref 7–12)
POSITIVE QC PASS/FAIL: NORMAL
POTASSIUM SERPL-SCNC: 3.7 MMOL/L (ref 3.5–5)
PROT SERPL-MCNC: 7.8 G/DL (ref 6.4–8.3)
RBC # BLD AUTO: 4.44 M/UL (ref 3.5–5.5)
SODIUM SERPL-SCNC: 139 MMOL/L (ref 132–146)
TROPONIN I SERPL HS-MCNC: <6 NG/L (ref 0–14)
WBC OTHER # BLD: 5.8 K/UL (ref 4.5–11.5)

## 2025-06-08 PROCEDURE — 71275 CT ANGIOGRAPHY CHEST: CPT

## 2025-06-08 PROCEDURE — 84484 ASSAY OF TROPONIN QUANT: CPT

## 2025-06-08 PROCEDURE — 80053 COMPREHEN METABOLIC PANEL: CPT

## 2025-06-08 PROCEDURE — 85379 FIBRIN DEGRADATION QUANT: CPT

## 2025-06-08 PROCEDURE — 6360000004 HC RX CONTRAST MEDICATION: Performed by: RADIOLOGY

## 2025-06-08 PROCEDURE — 83690 ASSAY OF LIPASE: CPT

## 2025-06-08 PROCEDURE — 99285 EMERGENCY DEPT VISIT HI MDM: CPT

## 2025-06-08 PROCEDURE — 93005 ELECTROCARDIOGRAM TRACING: CPT | Performed by: PHYSICIAN ASSISTANT

## 2025-06-08 PROCEDURE — 85025 COMPLETE CBC W/AUTO DIFF WBC: CPT

## 2025-06-08 RX ORDER — IOPAMIDOL 755 MG/ML
75 INJECTION, SOLUTION INTRAVASCULAR
Status: COMPLETED | OUTPATIENT
Start: 2025-06-08 | End: 2025-06-08

## 2025-06-08 RX ORDER — IBUPROFEN 600 MG/1
600 TABLET, FILM COATED ORAL EVERY 8 HOURS PRN
Qty: 30 TABLET | Refills: 0 | Status: SHIPPED | OUTPATIENT
Start: 2025-06-08 | End: 2025-06-18

## 2025-06-08 RX ADMIN — IOPAMIDOL 75 ML: 755 INJECTION, SOLUTION INTRAVENOUS at 15:10

## 2025-06-08 NOTE — ED PROVIDER NOTES
Independent [facility] Visit.        Department of Emergency Medicine   ED  Provider Note  Admit Date/RoomTime: 6/8/2025 12:07 PM  ED Room: PABLO/PABLO  HPI:  6/8/25, Time: 5:53 PM EDT      The patient is a 34-year-old female presenting to the emergency department with right upper abdominal/rib pain since waking up this morning.  Patient states it comes in waves and is a severe sharp stabbing pain in the right upper abdomen that radiates around her side to her back.  She states that it is worse with inspiration.  She does have a history of a PE about 2 years ago which she relates to her drug use.  Patient states she has been clean for almost a year now.  She is not currently on blood thinners.  Patient did have some nausea after eating last night but denies any other abdominal symptoms such as vomiting, constipation or diarrhea.  Patient denies any cough, fevers or chills, dysuria, hematuria, concern for pregnancy, dizziness, shortness of breath.    The history is provided by the patient. No  was used.           REVIEW OF SYSTEMS:  Review of Systems   Constitutional:  Negative for activity change, chills, fatigue and fever.   HENT:  Negative for congestion, ear pain, facial swelling, sinus pressure, sinus pain, sore throat and trouble swallowing.    Respiratory:  Negative for cough, chest tightness and shortness of breath.    Cardiovascular:  Positive for chest pain. Negative for palpitations and leg swelling.   Gastrointestinal:  Positive for abdominal pain and nausea. Negative for constipation, diarrhea and vomiting.   Genitourinary:  Negative for dysuria, flank pain, frequency and hematuria.   Musculoskeletal:  Negative for back pain, neck pain and neck stiffness.   Skin:  Negative for color change, pallor and rash.   Neurological:  Negative for dizziness, weakness, light-headedness and headaches.   Psychiatric/Behavioral:  Negative for agitation, behavioral problems and confusion.       Pertinent

## 2025-06-09 LAB
EKG ATRIAL RATE: 76 BPM
EKG P AXIS: 50 DEGREES
EKG P-R INTERVAL: 138 MS
EKG Q-T INTERVAL: 362 MS
EKG QRS DURATION: 94 MS
EKG QTC CALCULATION (BAZETT): 407 MS
EKG R AXIS: 14 DEGREES
EKG T AXIS: 227 DEGREES
EKG VENTRICULAR RATE: 76 BPM

## 2025-06-09 PROCEDURE — 93010 ELECTROCARDIOGRAM REPORT: CPT | Performed by: INTERNAL MEDICINE

## 2025-06-15 ENCOUNTER — HOSPITAL ENCOUNTER (EMERGENCY)
Age: 35
Discharge: HOME | End: 2025-06-15
Attending: STUDENT IN AN ORGANIZED HEALTH CARE EDUCATION/TRAINING PROGRAM
Payer: COMMERCIAL

## 2025-06-15 ENCOUNTER — APPOINTMENT (OUTPATIENT)
Dept: ULTRASOUND IMAGING | Age: 35
End: 2025-06-15
Payer: COMMERCIAL

## 2025-06-15 VITALS
OXYGEN SATURATION: 100 % | TEMPERATURE: 97.7 F | DIASTOLIC BLOOD PRESSURE: 100 MMHG | RESPIRATION RATE: 18 BRPM | SYSTOLIC BLOOD PRESSURE: 139 MMHG | HEART RATE: 71 BPM

## 2025-06-15 DIAGNOSIS — R10.11 ABDOMINAL PAIN, RIGHT UPPER QUADRANT: Primary | ICD-10-CM

## 2025-06-15 DIAGNOSIS — N30.00 ACUTE CYSTITIS WITHOUT HEMATURIA: ICD-10-CM

## 2025-06-15 LAB
ALBUMIN SERPL-MCNC: 4.1 G/DL (ref 3.5–5.2)
ALP SERPL-CCNC: 62 U/L (ref 35–104)
ALT SERPL-CCNC: 9 U/L (ref 0–35)
ANION GAP SERPL CALCULATED.3IONS-SCNC: 9 MMOL/L (ref 7–16)
AST SERPL-CCNC: 15 U/L (ref 0–35)
BASOPHILS # BLD: 0.01 K/UL (ref 0–0.2)
BASOPHILS NFR BLD: 0 % (ref 0–2)
BILIRUB DIRECT SERPL-MCNC: 0.2 MG/DL (ref 0–0.2)
BILIRUB INDIRECT SERPL-MCNC: 0.4 MG/DL (ref 0–1)
BILIRUB SERPL-MCNC: 0.6 MG/DL (ref 0–1.2)
BILIRUB UR QL STRIP: ABNORMAL
BUN SERPL-MCNC: 15 MG/DL (ref 6–20)
CALCIUM SERPL-MCNC: 8.9 MG/DL (ref 8.6–10)
CHLORIDE SERPL-SCNC: 108 MMOL/L (ref 98–107)
CLARITY UR: CLEAR
CO2 SERPL-SCNC: 23 MMOL/L (ref 22–29)
COLOR UR: YELLOW
CREAT SERPL-MCNC: 0.8 MG/DL (ref 0.5–1)
EKG ATRIAL RATE: 74 BPM
EKG P AXIS: 50 DEGREES
EKG P-R INTERVAL: 170 MS
EKG Q-T INTERVAL: 400 MS
EKG QRS DURATION: 96 MS
EKG QTC CALCULATION (BAZETT): 444 MS
EKG R AXIS: 2 DEGREES
EKG T AXIS: 39 DEGREES
EKG VENTRICULAR RATE: 74 BPM
EOSINOPHIL # BLD: 0.27 K/UL (ref 0.05–0.5)
EOSINOPHILS RELATIVE PERCENT: 7 % (ref 0–6)
EPI CELLS #/AREA URNS HPF: ABNORMAL /HPF
ERYTHROCYTE [DISTWIDTH] IN BLOOD BY AUTOMATED COUNT: 14.5 % (ref 11.5–15)
GFR, ESTIMATED: >90 ML/MIN/1.73M2
GLUCOSE SERPL-MCNC: 86 MG/DL (ref 74–99)
GLUCOSE UR STRIP-MCNC: NEGATIVE MG/DL
HCG, URINE, POC: NEGATIVE
HCT VFR BLD AUTO: 35.8 % (ref 34–48)
HGB BLD-MCNC: 11.4 G/DL (ref 11.5–15.5)
HGB UR QL STRIP.AUTO: NEGATIVE
IMM GRANULOCYTES # BLD AUTO: <0.03 K/UL (ref 0–0.58)
IMM GRANULOCYTES NFR BLD: 0 % (ref 0–5)
KETONES UR STRIP-MCNC: NEGATIVE MG/DL
LACTATE BLDV-SCNC: 0.6 MMOL/L (ref 0.5–2.2)
LEUKOCYTE ESTERASE UR QL STRIP: NEGATIVE
LIPASE SERPL-CCNC: 37 U/L (ref 13–60)
LYMPHOCYTES NFR BLD: 1.34 K/UL (ref 1.5–4)
LYMPHOCYTES RELATIVE PERCENT: 33 % (ref 20–42)
Lab: NORMAL
MAGNESIUM SERPL-MCNC: 2.1 MG/DL (ref 1.6–2.6)
MCH RBC QN AUTO: 27.5 PG (ref 26–35)
MCHC RBC AUTO-ENTMCNC: 31.8 G/DL (ref 32–34.5)
MCV RBC AUTO: 86.3 FL (ref 80–99.9)
MONOCYTES NFR BLD: 0.42 K/UL (ref 0.1–0.95)
MONOCYTES NFR BLD: 10 % (ref 2–12)
NEGATIVE QC PASS/FAIL: NORMAL
NEUTROPHILS NFR BLD: 50 % (ref 43–80)
NEUTS SEG NFR BLD: 2.05 K/UL (ref 1.8–7.3)
NITRITE UR QL STRIP: POSITIVE
PH UR STRIP: 6 [PH] (ref 5–8)
PLATELET # BLD AUTO: 227 K/UL (ref 130–450)
PMV BLD AUTO: 11 FL (ref 7–12)
POSITIVE QC PASS/FAIL: NORMAL
POTASSIUM SERPL-SCNC: 4.1 MMOL/L (ref 3.5–5.1)
PROT SERPL-MCNC: 7.2 G/DL (ref 6.4–8.3)
PROT UR STRIP-MCNC: ABNORMAL MG/DL
RBC # BLD AUTO: 4.15 M/UL (ref 3.5–5.5)
RBC #/AREA URNS HPF: ABNORMAL /HPF
SODIUM SERPL-SCNC: 140 MMOL/L (ref 136–145)
SP GR UR STRIP: >1.03 (ref 1–1.03)
TROPONIN I SERPL HS-MCNC: <6 NG/L (ref 0–14)
TROPONIN I SERPL HS-MCNC: <6 NG/L (ref 0–14)
UROBILINOGEN UR STRIP-ACNC: 0.2 EU/DL (ref 0–1)
WBC #/AREA URNS HPF: ABNORMAL /HPF
WBC OTHER # BLD: 4.1 K/UL (ref 4.5–11.5)

## 2025-06-15 PROCEDURE — 83605 ASSAY OF LACTIC ACID: CPT

## 2025-06-15 PROCEDURE — 93005 ELECTROCARDIOGRAM TRACING: CPT

## 2025-06-15 PROCEDURE — 2580000003 HC RX 258

## 2025-06-15 PROCEDURE — 2500000003 HC RX 250 WO HCPCS

## 2025-06-15 PROCEDURE — 96376 TX/PRO/DX INJ SAME DRUG ADON: CPT

## 2025-06-15 PROCEDURE — 96374 THER/PROPH/DIAG INJ IV PUSH: CPT

## 2025-06-15 PROCEDURE — 96375 TX/PRO/DX INJ NEW DRUG ADDON: CPT

## 2025-06-15 PROCEDURE — 93010 ELECTROCARDIOGRAM REPORT: CPT | Performed by: INTERNAL MEDICINE

## 2025-06-15 PROCEDURE — 6360000002 HC RX W HCPCS

## 2025-06-15 PROCEDURE — 85025 COMPLETE CBC W/AUTO DIFF WBC: CPT

## 2025-06-15 PROCEDURE — 80053 COMPREHEN METABOLIC PANEL: CPT

## 2025-06-15 PROCEDURE — 84484 ASSAY OF TROPONIN QUANT: CPT

## 2025-06-15 PROCEDURE — 99284 EMERGENCY DEPT VISIT MOD MDM: CPT

## 2025-06-15 PROCEDURE — 83735 ASSAY OF MAGNESIUM: CPT

## 2025-06-15 PROCEDURE — 81001 URINALYSIS AUTO W/SCOPE: CPT

## 2025-06-15 PROCEDURE — 82248 BILIRUBIN DIRECT: CPT

## 2025-06-15 PROCEDURE — 83690 ASSAY OF LIPASE: CPT

## 2025-06-15 PROCEDURE — 76705 ECHO EXAM OF ABDOMEN: CPT

## 2025-06-15 RX ORDER — PANTOPRAZOLE SODIUM 40 MG/1
40 TABLET, DELAYED RELEASE ORAL
Qty: 30 TABLET | Refills: 0 | Status: SHIPPED | OUTPATIENT
Start: 2025-06-15

## 2025-06-15 RX ORDER — DICYCLOMINE HCL 20 MG
20 TABLET ORAL 4 TIMES DAILY
Qty: 20 TABLET | Refills: 0 | Status: SHIPPED | OUTPATIENT
Start: 2025-06-15 | End: 2025-06-20

## 2025-06-15 RX ORDER — SUCRALFATE ORAL 1 G/10ML
1 SUSPENSION ORAL 4 TIMES DAILY
Qty: 400 ML | Refills: 0 | Status: SHIPPED | OUTPATIENT
Start: 2025-06-15 | End: 2025-06-25

## 2025-06-15 RX ORDER — 0.9 % SODIUM CHLORIDE 0.9 %
1000 INTRAVENOUS SOLUTION INTRAVENOUS ONCE
Status: COMPLETED | OUTPATIENT
Start: 2025-06-15 | End: 2025-06-15

## 2025-06-15 RX ORDER — KETOROLAC TROMETHAMINE 30 MG/ML
15 INJECTION, SOLUTION INTRAMUSCULAR; INTRAVENOUS ONCE
Status: COMPLETED | OUTPATIENT
Start: 2025-06-15 | End: 2025-06-15

## 2025-06-15 RX ORDER — CEFDINIR 300 MG/1
300 CAPSULE ORAL 2 TIMES DAILY
Qty: 10 CAPSULE | Refills: 0 | Status: SHIPPED | OUTPATIENT
Start: 2025-06-15 | End: 2025-06-20

## 2025-06-15 RX ADMIN — KETOROLAC TROMETHAMINE 15 MG: 30 INJECTION, SOLUTION INTRAMUSCULAR at 09:53

## 2025-06-15 RX ADMIN — SODIUM CHLORIDE 1000 ML: 0.9 INJECTION, SOLUTION INTRAVENOUS at 09:52

## 2025-06-15 RX ADMIN — KETOROLAC TROMETHAMINE 15 MG: 30 INJECTION, SOLUTION INTRAMUSCULAR at 12:37

## 2025-06-15 RX ADMIN — CEFTRIAXONE SODIUM 1000 MG: 1 INJECTION, POWDER, FOR SOLUTION INTRAMUSCULAR; INTRAVENOUS at 11:08

## 2025-06-15 ASSESSMENT — PAIN DESCRIPTION - LOCATION
LOCATION: ABDOMEN
LOCATION: ABDOMEN

## 2025-06-15 ASSESSMENT — PAIN SCALES - GENERAL
PAINLEVEL_OUTOF10: 8

## 2025-06-15 ASSESSMENT — LIFESTYLE VARIABLES
HOW OFTEN DO YOU HAVE A DRINK CONTAINING ALCOHOL: NEVER
HOW MANY STANDARD DRINKS CONTAINING ALCOHOL DO YOU HAVE ON A TYPICAL DAY: PATIENT DOES NOT DRINK

## 2025-06-15 ASSESSMENT — PAIN DESCRIPTION - ORIENTATION: ORIENTATION: RIGHT;UPPER

## 2025-06-15 NOTE — ED PROVIDER NOTES
Department of Emergency Medicine     Written by: Gregory Nichols MD  Patient Name: Eliane Zimmerman  Visit Date: 6/15/2025  8:48 AM  MRN: [id number]                   : 1990    ------------------------- CC-------------------------  Chief Complaint   Patient presents with    Abdominal Pain     RUQ pain X1 week; c/o constipation. Denies other symptoms.      ------------------------- HPI -------------------------    Eliane is a 34 y.o. year old female with history of substance use, dilated cardiomyopathy, nonrheumatic mitral valve regurg presents with right upper quadrant abdominal pain.  Reports that the pain started about a week ago and she was recently seen in the emergency department for the same complaint.  Rates the pain a 8 out of 10 currently, denies radiation of the right upper quadrant pain to the back.  Reports that the pain is \"internal,\"  denies nausea vomiting, reports no changes in appetite, has been eating and drinking well, but reports increase of pain with eating.  Denies any blood in the urine, denies hematochezia or melena.    Denies chest pain, shortness of breath.  Denies alcohol, illicit drug or marijuana use.  Denies possibility of being pregnant.  Denies any other symptoms.    There are no family/friends at bedside. The history is provided by patient, who is felt to be a good historian.    Nursing notes were all reviewed and agreed with or any disagreements were addressed in the HPI.    REVIEW OF SYSTEMS:    Review of Systems:   Please see HPI above. All bolded are positive. All un-bolded are negative.  Constitutional Symptoms: fever, chills, fatigue, generalized weakness, diaphoresis, increase in thirst, loss of appetite  Eyes: vision change   Ears, Nose, Mouth, Throat: hearing loss, nasal congestion, sores in the mouth  Cardiovascular: chest pain, chest heaviness, palpitations  Respiratory: shortness of breath, wheezing, coughing  Gastrointestinal: abdominal pain, nausea,

## 2025-06-15 NOTE — DISCHARGE INSTRUCTIONS
Please return to the ER for any new or worsening symptoms including but not limited to Fever, Chest pain or difficulty breathing, Inability to keep down liquids, difficulty urinating/decreased urination, Worsening abdominal pain, stool that appears bloody or dark/tarry, or any other concerning symptoms  If prescribed, please be sure to  your prescriptions from the pharmacy.  Please take medications as directed and as needed.  Please follow-up with Primary care provider and Gastroenterology as instructed

## 2025-08-07 ENCOUNTER — TELEPHONE (OUTPATIENT)
Dept: INTERVENTIONAL RADIOLOGY/VASCULAR | Age: 35
End: 2025-08-07

## (undated) DEVICE — Device

## (undated) DEVICE — SUTURE VICRYL COAT SZ 0 L36IN ABSRB VLT CTX L48MM TAPERPOINT J370H

## (undated) DEVICE — PAD,SANITARY,11 IN,MAXI,W/WINGS,N-STRL: Brand: MEDLINE

## (undated) DEVICE — CURETTE VAC DIA8MM PLAS CRV OPN TIP RIG DISP VACURET D/E

## (undated) DEVICE — SUTURE VICRYL SZ 2-0 L27IN ABSRB VLT L36MM CT-1 1/2 CIR J339H

## (undated) DEVICE — MARKER,SKIN,WI/RULER AND LABELS: Brand: MEDLINE

## (undated) DEVICE — SCALPEL SURG NO21 S STL STR W/ INTEGR MTRC RUL DISP

## (undated) DEVICE — PACK PROC 3IN1 W/ L12FT DIA0.25IN REINF SUCT TBNG W50XL901IN

## (undated) DEVICE — SUTURE VICRYL SZ 3-0 L27IN ABSRB UD L36MM CT-1 1/2 CIR J258H

## (undated) DEVICE — TOWEL,OR,DSP,ST,BLUE,STD,6/PK,12PK/CS: Brand: MEDLINE

## (undated) DEVICE — CATHETER,URETHRAL,VINYL,16",14 FR: Brand: MEDLINE

## (undated) DEVICE — CESAREAN BIRTH PACK: Brand: MEDLINE INDUSTRIES, INC.

## (undated) DEVICE — GOWN,SIRUS,NONRNF,SETINSLV,XL,20/CS: Brand: MEDLINE

## (undated) DEVICE — BASIC DOUBLE BASIN 2-LF: Brand: MEDLINE INDUSTRIES, INC.

## (undated) DEVICE — SLEEVE COMPRESS STD CALF KNEE SCD

## (undated) DEVICE — SUTURE VICRYL SZ 2-0 L27IN ABSRB VLT L26MM SH 1/2 CIR J317H

## (undated) DEVICE — SUTURE VICRYL SZ 0 L36IN ABSRB VLT L36MM CT-1 1/2 CIR J346H

## (undated) DEVICE — GLOVE ORANGE PI 7 1/2   MSG9075

## (undated) DEVICE — PREMIUM WET SKIN PREP TRAY: Brand: MEDLINE INDUSTRIES, INC.

## (undated) DEVICE — STAPLER SKIN SQ 30 ABSRB STPL DISP INSORB ORDER VIA PHONE OR EMAIL

## (undated) DEVICE — MASK,FLEXIBLE,ANESTH,SIZE 2,TOP VALVE: Brand: MEDLINE

## (undated) DEVICE — GAUZE,SPONGE,4"X4",16PLY,XRAY,STRL,LF: Brand: MEDLINE

## (undated) DEVICE — Z DISCONTINUED USE 2855003 SCALPEL SURG NO10 S STL ABS PLAS HNDL DISPOSABLE

## (undated) DEVICE — SET COLL TBNG L6FT DIA3/8IN W/ INTEGR SWVL HNDL SLIP RNG M

## (undated) DEVICE — RESUSCITATION KIT CIRC 1 NEONATAL T PC FOR MASK DISP

## (undated) DEVICE — COVER,LIGHT HANDLE,FLX,1/PK: Brand: MEDLINE INDUSTRIES, INC.

## (undated) DEVICE — TUBE BLD COLLECT ST 1 SIL COAT 7ML 10ML

## (undated) DEVICE — SUTURE VICRYL SZ 0 L27IN ABSRB VLT L48MM CTX 1/2 CIR TAPR PNT J364H

## (undated) DEVICE — 3M™ STERI-STRIP™ ELASTIC SKIN CLOSURES, E4547, 1/2 IN X 4 IN (12 MM X 100 MM), 6 STRIPS/ENVELOPE: Brand: 3M™ STERI-STRIP™

## (undated) DEVICE — DRESSING HYDROFIBER AQUACEL AG ADVANTAGE 3.5X14 IN

## (undated) DEVICE — CLOTH SURG PREP PREOPERATIVE CHLORHEXIDINE GLUC 2% READYPREP

## (undated) DEVICE — VAGINAL PREP TRAY: Brand: MEDLINE INDUSTRIES, INC.

## (undated) DEVICE — GOWN,SIRUS,FABRNF,XL,20/CS: Brand: MEDLINE

## (undated) DEVICE — 4-PORT MANIFOLD: Brand: NEPTUNE 2

## (undated) DEVICE — CAP PNK BLU STRP KNIT INF STOCK 100% COT 1 PLY STRP NWBRN

## (undated) DEVICE — NEPTUNE E-SEP SMOKE EVACUATION PENCIL, COATED, 70MM BLADE, PUSH BUTTON SWITCH: Brand: NEPTUNE E-SEP

## (undated) DEVICE — BLADE CLIPPER GEN PURP NS

## (undated) DEVICE — BAG SPECIMEN BIOHAZARD ZIP 12X15 PCKT

## (undated) DEVICE — ELECTRODE PT RET AD L9FT HI MOIST COND ADH HYDRGEL CORDED